# Patient Record
Sex: FEMALE | Race: WHITE | NOT HISPANIC OR LATINO | Employment: OTHER | URBAN - METROPOLITAN AREA
[De-identification: names, ages, dates, MRNs, and addresses within clinical notes are randomized per-mention and may not be internally consistent; named-entity substitution may affect disease eponyms.]

---

## 2017-05-31 DIAGNOSIS — Z13.820 ENCOUNTER FOR SCREENING FOR OSTEOPOROSIS: ICD-10-CM

## 2017-06-05 ENCOUNTER — ALLSCRIPTS OFFICE VISIT (OUTPATIENT)
Dept: OTHER | Facility: OTHER | Age: 82
End: 2017-06-05

## 2017-07-21 ENCOUNTER — GENERIC CONVERSION - ENCOUNTER (OUTPATIENT)
Dept: OTHER | Facility: OTHER | Age: 82
End: 2017-07-21

## 2018-01-12 VITALS
SYSTOLIC BLOOD PRESSURE: 130 MMHG | TEMPERATURE: 98.7 F | HEART RATE: 72 BPM | WEIGHT: 130.8 LBS | BODY MASS INDEX: 28.22 KG/M2 | DIASTOLIC BLOOD PRESSURE: 80 MMHG | HEIGHT: 57 IN

## 2018-01-14 NOTE — PROGRESS NOTES
Assessment    1  Encounter for preventive health examination (V70 0) (Z00 00)   2  Hypertension (401 9) (I10)   3  Hypothyroidism (244 9) (E03 9)    Plan  Health Maintenance    · *VB - Fall Risk Assessment  (Dx Z13 89 Screen for Neurologic Disorder);  Status:Complete;   Done: 17YCW3547 05:43PM   · *VB - Urinary Incontinence Screen (Dx Z13 89 Screen for UI); Status:Complete;   Done:  36XRA7775 05:43PM   · Eat a low fat and low cholesterol diet ; Status:Complete;   Done: 53URH6258   · Stretch and warm up your muscles during the first 10 minutes , then cool down your  muscles for the last 10 minutes of exercise ; Status:Complete;   Done: 29WCH5830   · The plan of care for falls is detailed in the plan and/or discussion section of today's note ;  Status:Complete;   Done: 62DGA5415   · The plan of care for urinary incontinence is detailed in the plan and/or discussion section  of today's note ; Status:Complete;   Done: 32ZSL1070   · We recommend that you create an advance directive ; Status:Complete;   Done:  99MVU2028  PMH: Encounter for screening for osteoporosis    · * DXA BONE DENSITY SPINE HIP AND PELVIS; Status:Active; Requested  for:73Hrn3490;   PMH: Screening for genitourinary condition    · *VB - Urinary Incontinence Screen (Dx Z13 89 Screen for UI); Status:Resulted - Requires  Verification,Retrospective Authorization;   Done: 11MXH4296 06:54PM    Discussion/Summary  Impression: Subsequent Annual Wellness Visit  Cardiovascular screening and counseling: the patient declines screening  Diabetes screening and counseling: the patient declines screening, counseling was given on maintaining a healthy diet and counseling was given on maintaining a healthy weight  Colorectal cancer screening and counseling: screening not indicated  Breast cancer screening and counseling: the risks and benefits of screening were discussed and the patient declines screening     Cervical cancer screening and counseling: screening not indicated  Osteoporosis screening and counseling: the risks and benefits of screening were discussed and the patient declines screening  Abdominal aortic aneurysm screening and counseling: screening not indicated  Glaucoma screening and counseling: screening is current  HIV screening and counseling: screening not indicated  Immunizations: the patient declines the influenza vaccination, the patient declines the pneumococcal vaccination, hepatitis B vaccination series is not indicated at this time due to the patient's low risk of apryl the disease, the patient declines the Zostavax vaccine and the patient declines the Td vaccine  Advance Directive Planning: not complete, paperwork and instructions were given to the patient  Advice and education were given regarding fall risk reduction  She was referred to none  Medical Equipment/Suppliers: none  Patient Discussion: plan discussed with the patient, follow-up visit needed in one year  Possible side effects of new medications were reviewed with the patient/guardian today  The treatment plan was reviewed with the patient/guardian  The patient/guardian understands and agrees with the treatment plan         Self Referrals: No      Chief Complaint  HM      History of Present Illness  HPI: Feeling well overall  Keeps active, mows her lawn with diana nava       Welcome to [employer] and Wellness Visits: The patient is being seen for the subsequent annual wellness visit  Medicare Screening and Risk Factors   Hospitalizations: no previous hospitalizations  Once per lifetime medicare screening tests: ECG has not been done and AAA screening US has not yet been done  Medicare Screening Tests Risk Questions   Abdominal aortic aneurysm risk assessment: none indicated  Osteoporosis risk assessment: , female gender and over 48years of age  HIV risk assessment: none indicated  Drug and Alcohol Use:  The patient reports never drinking alcohol  She has never used illicit drugs  Diet and Physical Activity: Current diet includes well balanced meals  She exercises daily  Exercise: walking  Mood Disorder and Cognitive Impairment Screening:   Depression screening  negative for symptoms  She denies feeling down, depressed, or hopeless over the past two weeks  She denies feeling little interest or pleasure in doing things over the past two weeks  Cognitive impairment screening: denies difficulty learning/retaining new information, denies difficulty handling complex tasks, denies difficulty with reasoning, denies difficulty with spatial ability and orientation, denies difficulty with language and denies difficulty with behavior  Functional Ability/Level of Safety: Hearing is significantly decreased and a hearing aid is not used  The patient is currently able to drive with limitations, but able to do activities of daily living without limitations, able to do instrumental activities of daily living without limitations and able to participate in social activities without limitations  Activities of daily living details: transportation help needed, but does not need help using the phone, does not need help shopping, no meal preparation help needed, does not need help doing housework, does not need help doing laundry, does not need help managing medications and does not need help managing money  Fall risk factors:  no antidepressant use, no deconditioning, no sedative use, no visual impairment, no urinary incontinence, no antihypertensive use and no cognitive impairment  Home safety risk factors:  no unfamiliar surroundings, no loose rugs and no poor household lighting  Advance Directives: Advance directives: no living will, no durable power of  for health care directives and no advance directives  end of life decisions were not reviewed with the patient     Co-Managers and Medical Equipment/Suppliers: See Patient Care Team Reviewed Updated Earnest Dopp:   Last Medicare Wellness Visit Information was reviewed, patient interviewed and updates made to the record today  Preventive Quality Program 65 and Older: Falls Risk: The patient fell 0 times in the past 12 months  The patient is currently asymptomatic Symptoms Include:  Associated symptoms:  No associated symptoms are reported  The patient currently has no urinary incontinence symptoms  Patient Care Team    Care Team Member Role Specialty Office Number   Mckayla Hernandez MD  Family Medicine [phone]     Review of Systems    Constitutional: no fever and no malaise  ENT: no earache and no nasal congestion  Cardiovascular: no chest pain and no palpitations  Respiratory: no shortness of breath and no wheezing  Gastrointestinal: no abdominal pain, no nausea and no vomiting  Genitourinary: no dysuria  Musculoskeletal: joint swelling and joint stiffness, but no diffuse joint pain  Integumentary and Breasts: no rashes  Neurological: headache, but no leg numbness  Psychiatric: no insomnia, no anxiety and no depression  Hematologic and Lymphatic: no jaundice  Active Problems    1  Generalized anxiety disorder (300 02) (F41 1)   2  Headache (784 0) (R51)   3  Hyperlipidemia (272 4) (E78 5)   4  Hypertension (401 9) (I10)   5  Hypothyroidism (244 9) (E03 9)    Past Medical History    · Denied: History of Alcohol abuse   · Denied: History of substance abuse    Surgical History    · History of Cataract Surgery    The surgical history was reviewed and updated today         Family History  Mother    · Denied: Family history of alcoholism   · Denied: Family history of substance abuse   · Denied: Family history of Mental health problem  Father    · Denied: Family history of alcoholism   · Denied: Family history of substance abuse   · Denied: Family history of Mental health problem    Social History    · Marital History - Currently    · Never A Smoker   · Never Drank Alcohol   · Non-smoker (V49 89) (Z78 9)  The social history was reviewed and updated today  The social history was reviewed and is unchanged  Current Meds   1  Enalapril Maleate 2 5 MG Oral Tablet; TAKE ONE TABLET BY MOUTH EVERY DAY; Therapy: 53TKP8074 to (Jailene Dubose)  Requested for: 71MRZ3751; Last   Rx:[id number] Ordered   2  LORazepam 1 MG Oral Tablet; TAKE ONE-HALF TO ONE TABLET BY MOUTH EVERY 8   HOURS AS NEEDED; Therapy: 17ZHK5367 to (Evaluate:26Yef7333)  Requested for: 24XER3086; Last   Rx:[id number] Ordered    The medication list was reviewed and updated today  Allergies    1  Ultram TABS    Immunizations   1    PPSV  Temporarily Deferred: Pt refuses    Zoster  Temporarily Deferred: Pt refuses     Vitals  Signs    Temperature: 98 7 F, Tympanic  Heart Rate: 72  Pulse Quality: Regular  Systolic: 969, Sitting  Diastolic: 80, Sitting  Height: 4 ft 9 in  Weight: 130 lb 12 8 oz  BMI Calculated: 28 31  BSA Calculated: 1 5    Physical Exam    Constitutional   General appearance: No acute distress, well appearing and well nourished  Eyes   Conjunctiva and lids: No swelling, erythema or discharge  Ears, Nose, Mouth, and Throat   Otoscopic examination: Tympanic membranes translucent with normal light reflex  Canals patent without erythema  Hearing: Normal     Nasal mucosa, septum, and turbinates: Normal without edema or erythema  Lips, teeth, and gums: Normal, good dentition  Oropharynx: Normal with no erythema, edema, exudate or lesions  Neck   Neck: Supple, symmetric, trachea midline, no masses  Thyroid: Normal, no thyromegaly  Pulmonary   Respiratory effort: No increased work of breathing or signs of respiratory distress  Auscultation of lungs: Clear to auscultation  Cardiovascular   Auscultation of heart: Normal rate and rhythm, normal S1 and S2, no murmurs  Carotid pulses: 2+ bilaterally  Pedal pulses: 2+ bilaterally      Peripheral vascular exam: Normal     Examination of extremities for edema and/or varicosities: Normal     Lymphatic   Palpation of lymph nodes in neck: No lymphadenopathy  Musculoskeletal   Gait and station: Normal     Joints, bones, and muscles: Abnormal   medially deviated DIP's both hands  Range of motion: Abnormal   marked decrease in vertical spinal collumn  Stability: Normal     Muscle strength/tone: Normal        Results/Data  PHQ-9 Adult Depression Screening 31DRF3702 06:56PM User, Coridons     Test Name Result Flag Reference   PHQ-9 Adult Depression Score 0     Over the last two weeks, how often have you been bothered by any of the following problems? Little interest or pleasure in doing things: Not at all - 0  Feeling down, depressed, or hopeless: Not at all - 0  Trouble falling or staying asleep, or sleeping too much: Not at all - 0  Feeling tired or having little energy: Not at all - 0  Poor appetite or over eating: Not at all - 0  Feeling bad about yourself - or that you are a failure or have let yourself or your family down: Not at all - 0  Trouble concentrating on things, such as reading the newspaper or watching television: Not at all - 0  Moving or speaking so slowly that other people could have noticed   Or the opposite -  being so fidgety or restless that you have been moving around a lot more than usual: Not at all - 0  Thoughts that you would be better off dead, or of hurting yourself in some way: Not at all - 0   PHQ-9 Adult Depression Screening Negative     PHQ-9 Difficulty Level Not difficult at all     PHQ-9 Severity No Depression       *VB - Urinary Incontinence Screen (Dx Z13 89 Screen for UI) 34JVY9158 06:54PM Fox Ramirez     Test Name Result Flag Reference   Urinary Incontinence Assessment [id number]                   Falls Risk Assessment (Dx Z13 89 Screen for Neurologic Disorder) 63QOL4880 06:53PM User, Coridons     Test Name Result Flag Reference   Falls Risk      No falls in the past year *VB - Fall Risk Assessment  (Dx Z13 89 Screen for Neurologic Disorder) 36ZSN8610 05:43PM Jacklynn Derek     Test Name Result Flag Reference   Falls Risk      No falls in the past year     *VB - Urinary Incontinence Screen (Dx Z13 89 Screen for UI) 80LAW9903 05:43PM Jacklynn Belmont     Test Name Result Flag Reference   Urinary Incontinence Assessment 93EXN0432         Procedure    Procedure: Visual Acuity Test    Indication: routine screening  Inforrmation supplied by a Snellen chart     Results: 20/40 in the right eye with corrective device, 20/50 in the left eye with corrective device      Future Appointments    Date/Time Provider Specialty Site   06/11/2018 06:00 PM Thiago Reed MD Family Medicine Mattie Duran MD     Signatures   Electronically signed by : Alli Aguilar MD; Jun 5 2017  7:24PM EST                       (Author)

## 2018-01-15 NOTE — MISCELLANEOUS
Message   Recorded as Task   Date: 07/21/2017 10:16 AM, Created By: Mikael Hubbard   Task Name: Medical Complaint Callback   Assigned To: Judah Jackson   Regarding Patient: Krista Sun, Status: Active   Comment:    Mikael Hubbard - 21 Jul 2017 10:16 AM     TASK CREATED  Caller: Self; [phone] (Home)  Patient called in this AM asking for a prescription to be ordered for hemorrhoids  Does this patient need to be seen in office first?   Jihan Culver - 21 Jul 2017 10:19 AM     TASK REASSIGNED: Previously Assigned To Σκαφίδια 233 - 21 Jul 2017 11:57 AM     TASK REPLIED TO: Previously Assigned To Suyapa Nassar               what symptoms is she having that makes her thinks it is a hemorrhoid? does she have a known h/o hemorrhoids - no mention in chart? has she been straining or constipation recently to trigger a hemorrhoid? Jessica Bailon - 21 Jul 2017 1:03 PM     TASK EDITED  Pt has never been diagnosed with Hemorrhoid  She has been taking fiber tablets for the last two weeks--she has been trouble passing bowels in the morning  Please advise  She has been using the [employer] brand of Prep H  Suyapa Nassar - 21 Jul 2017 3:23 PM     TASK REPLIED TO: Previously Assigned To Suyapa Nsasar  Pt called d/t unclear medical complaint - pt states she has h/o hemorrhoids and needs a hemorrhoid cream, she has been having more constipation and has been straining  She notes no significant pain/bleeding/Abd pain/F/C  She did just buy a stool softner, she is taking a fiber supplement also and is drinking plenty of water    Rx sent to [facility]   Electronically signed by : Sara Ellis DO; Jul 21 2017  3:24PM EST                       (Author)

## 2018-01-18 NOTE — PROGRESS NOTES
Assessment    1  Encounter for preventive health examination (V70 0) (Z00 00)    Plan  Health Maintenance    · *VB - Fall Risk Assessment  (Dx V80 09 Screen for Neurologic Disorder);  Status:Complete;   Done: 82KSY5303   · *VB-Urinary Incontinence Screen (Dx V81 6 Screen for UI); Status:Complete;   Done:  03NVW7581   · Eat a low fat and low cholesterol diet ; Status:Complete;   Done: 99DVJ3180   · The plan of care for urinary incontinence is detailed in the plan and/or discussion section  of today's note ; Status:Complete;   Done: 35EKG1053   · These are things you can do to prevent falls in and around the home ; Status:Complete;    Done: 43TKY4646   · Follow-up visit in 1 year Evaluation and Treatment  Follow-up  Status: Complete  Done:  83TAY9814  Screening for osteoporosis    · * DXA BONE DENSITY SPINE HIP AND PELVIS; Status:Canceled;    · * DXA BONE DENSITY SPINE HIP AND PELVIS; Status:Temporary Deferral - Pt refuses; Discussion/Summary  Impression: Subsequent Annual Wellness Visit  Cardiovascular screening and counseling: the patient declines screening  Diabetes screening and counseling: the patient declines screening, counseling was given on maintaining a healthy diet and counseling was given on maintaining a healthy weight  Colorectal cancer screening and counseling: screening not indicated  Breast cancer screening and counseling: the risks and benefits of screening were discussed and the patient declines screening  Cervical cancer screening and counseling: screening not indicated  Osteoporosis screening and counseling: the risks and benefits of screening were discussed and the patient declines screening  Abdominal aortic aneurysm screening and counseling: screening not indicated  Glaucoma screening and counseling: screening is current  HIV screening and counseling: screening not indicated   Immunizations: the patient declines the influenza vaccination, the patient declines the pneumococcal vaccination, hepatitis B vaccination series is not indicated at this time due to the patient's low risk of apryl the disease, the patient declines the Zostavax vaccine and the patient declines the Td vaccine  Advance Directive Planning: not complete, paperwork and instructions were given to the patient  Advice and education were given regarding fall risk reduction  She was referred to none  Medical Equipment/Suppliers: none  Patient Discussion: plan discussed with the patient, follow-up visit needed in one year  Chief Complaint  HM      History of Present Illness  HPI: Feeling well overall  Keeps active, mows her lawn with diana nava   Welcome to [employer] and Wellness Visits: The patient is being seen for the subsequent annual wellness visit  Medicare Screening and Risk Factors   Hospitalizations: no previous hospitalizations  Once per lifetime medicare screening tests: ECG has not been done and AAA screening US has not yet been done  Medicare Screening Tests Risk Questions   Abdominal aortic aneurysm risk assessment: none indicated  Osteoporosis risk assessment: , female gender and over 48years of age  HIV risk assessment: none indicated  Drug and Alcohol Use: The patient reports never drinking alcohol  She has never used illicit drugs  Diet and Physical Activity: Current diet includes well balanced meals  She exercises daily  Exercise: walking  Mood Disorder and Cognitive Impairment Screening:   Depression screening  negative for symptoms  She denies feeling down, depressed, or hopeless over the past two weeks  She denies feeling little interest or pleasure in doing things over the past two weeks     Cognitive impairment screening: denies difficulty learning/retaining new information, denies difficulty handling complex tasks, denies difficulty with reasoning, denies difficulty with spatial ability and orientation, denies difficulty with language and denies difficulty with behavior  Functional Ability/Level of Safety: Hearing is significantly decreased and a hearing aid is not used  The patient is currently able to drive with limitations, but able to do activities of daily living without limitations, able to do instrumental activities of daily living without limitations and able to participate in social activities without limitations  Activities of daily living details: transportation help needed, but does not need help using the phone, does not need help shopping, no meal preparation help needed, does not need help doing housework, does not need help doing laundry, does not need help managing medications and does not need help managing money  Fall risk factors:  no antidepressant use, no deconditioning, no sedative use, no visual impairment, no urinary incontinence, no antihypertensive use and no cognitive impairment  Home safety risk factors:  no unfamiliar surroundings, no loose rugs and no poor household lighting  Advance Directives: Advance directives: no living will, no durable power of  for health care directives and no advance directives  end of life decisions were not reviewed with the patient  Co-Managers and Medical Equipment/Suppliers: See Patient Care Team   Reviewed Updated [facility]:   Last Medicare Wellness Visit Information was reviewed, patient interviewed and updates made to the record today  Preventive Quality Program 65 and Older: Falls Risk: The patient fell 0 times in the past 12 months  The patient is currently asymptomatic Symptoms Include:  Associated symptoms:  No associated symptoms are reported  The patient currently has no urinary incontinence symptoms  Patient Care Team    Care Team Member Role Specialty Office Number   Montell Gosselin MD  Family Medicine [phone]     Review of Systems    Constitutional: no fever and no malaise  ENT: no earache and no nasal congestion     Cardiovascular: no chest pain and no palpitations  Respiratory: no shortness of breath and no wheezing  Gastrointestinal: no abdominal pain, no nausea and no vomiting  Genitourinary: no dysuria  Musculoskeletal: joint swelling and joint stiffness, but no diffuse joint pain  Integumentary and Breasts: no rashes  Neurological: headache, but no leg numbness  Psychiatric: no insomnia, no anxiety and no depression  Hematologic and Lymphatic: no jaundice  Active Problems    1  Acute Bronchitis With Bronchospasm (466 0)   2  Acute upper respiratory infection (465 9) (J06 9)   3  Encounter for screening mammogram for malignant neoplasm of breast (V76 12)   (Z12 31)   4  Generalized anxiety disorder (300 02) (F41 1)   5  Headache (784 0) (R51)   6  Hyperlipidemia (272 4) (E78 5)   7  Hypertension (401 9) (I10)   8  Hypothyroidism (244 9) (E03 9)   9  Screening for osteoporosis (V82 81) (Z13 820)   10  Vaginal candidiasis (112 1) (B37 3)    Surgical History    · History of Cataract Surgery    The surgical history was reviewed and updated today  Social History    · Marital History - Currently    · Never A Smoker   · Never Drank Alcohol   · Non-smoker (V49 89) (Z78 9)  The social history was reviewed and updated today  The social history was reviewed and is unchanged  Current Meds   1  Enalapril Maleate 2 5 MG Oral Tablet; TAKE ONE TABLET BY MOUTH EVERY DAY; Therapy: 45DGC6458 to (Evaluate:93Tfd2772)  Requested for: 57Tpb9749; Last   Rx:[id number] Ordered   2  LORazepam 1 MG Oral Tablet; TAKE ONE-HALF TO ONE TABLET BY MOUTH EVERY 8   HOURS AS NEEDED; Therapy: 05LOM3808 to (David Bounds)  Requested for: 86Ejk8608; Last   Rx:[id number] Ordered    The medication list was reviewed and updated today  Allergies    1   Ultram TABS    Immunizations   1    Pneumococcal  Temporarily Deferred: Pt refuses    Zoster  Temporarily Deferred: Pt refuses     Vitals  Signs [Data Includes: Current Encounter]    Systolic: 582  Diastolic: 90   Temperature: 98 7 F, Tympanic  Heart Rate: 84, L Radial  Pulse Quality: Regular  Systolic: 725, LUE, Sitting  Diastolic: 90, LUE, Sitting  Height: 4 ft 9 in  Weight: 126 lb 12 8 oz  BMI Calculated: 27 44  BSA Calculated: 1 49    Physical Exam    Constitutional   General appearance: No acute distress, well appearing and well nourished  Eyes   Conjunctiva and lids: No swelling, erythema or discharge  Ears, Nose, Mouth, and Throat   Otoscopic examination: Tympanic membranes translucent with normal light reflex  Canals patent without erythema  Hearing: Normal     Nasal mucosa, septum, and turbinates: Normal without edema or erythema  Lips, teeth, and gums: Normal, good dentition  Oropharynx: Normal with no erythema, edema, exudate or lesions  Neck   Neck: Supple, symmetric, trachea midline, no masses  Thyroid: Normal, no thyromegaly  Pulmonary   Respiratory effort: No increased work of breathing or signs of respiratory distress  Auscultation of lungs: Clear to auscultation  Cardiovascular   Auscultation of heart: Normal rate and rhythm, normal S1 and S2, no murmurs  Carotid pulses: 2+ bilaterally  Pedal pulses: 2+ bilaterally  Peripheral vascular exam: Normal     Examination of extremities for edema and/or varicosities: Normal     Lymphatic   Palpation of lymph nodes in neck: No lymphadenopathy  Musculoskeletal   Gait and station: Normal     Joints, bones, and muscles: Abnormal   medially deviated DIP's both hands  Range of motion: Abnormal   marked decrease in vertical spinal collumn  Stability: Normal     Muscle strength/tone: Normal        Procedure    Procedure: Visual Acuity Test    Indication: routine screening  Inforrmation supplied by a Snellen chart     Results: 20/40 in the right eye with corrective device, 20/50 in the left eye with corrective device      Future Appointments    Date/Time Provider Specialty Site   05/23/2017 10:00 AM Bonilla Junior Jett  [facility] MD     Signatures   Electronically signed by : Berlin Powers MD; May 16 2016 10:50PM EST                       (Author)

## 2018-03-16 ENCOUNTER — HOSPITAL ENCOUNTER (EMERGENCY)
Facility: HOSPITAL | Age: 83
Discharge: HOME | End: 2018-03-16
Admitting: EMERGENCY MEDICINE
Payer: COMMERCIAL

## 2018-03-16 ENCOUNTER — APPOINTMENT (EMERGENCY)
Dept: RADIOLOGY | Facility: HOSPITAL | Age: 83
End: 2018-03-16
Payer: COMMERCIAL

## 2018-03-16 VITALS
RESPIRATION RATE: 18 BRPM | SYSTOLIC BLOOD PRESSURE: 173 MMHG | BODY MASS INDEX: 28.13 KG/M2 | OXYGEN SATURATION: 96 % | WEIGHT: 130 LBS | DIASTOLIC BLOOD PRESSURE: 80 MMHG | HEART RATE: 79 BPM | TEMPERATURE: 98.3 F

## 2018-03-16 DIAGNOSIS — R00.0 TACHYCARDIA: Primary | ICD-10-CM

## 2018-03-16 LAB
ALBUMIN SERPL BCP-MCNC: 3.8 G/DL (ref 3.5–5)
ALP SERPL-CCNC: 85 U/L (ref 46–116)
ALT SERPL W P-5'-P-CCNC: 19 U/L (ref 12–78)
ANION GAP SERPL CALCULATED.3IONS-SCNC: 12 MMOL/L (ref 4–13)
AST SERPL W P-5'-P-CCNC: 21 U/L (ref 5–45)
ATRIAL RATE: 102 BPM
BASOPHILS # BLD AUTO: 0.02 THOUSANDS/ΜL (ref 0–0.1)
BASOPHILS NFR BLD AUTO: 0 % (ref 0–1)
BILIRUB SERPL-MCNC: 0.6 MG/DL (ref 0.2–1)
BUN SERPL-MCNC: 14 MG/DL (ref 5–25)
CALCIUM SERPL-MCNC: 8.3 MG/DL (ref 8.3–10.1)
CHLORIDE SERPL-SCNC: 104 MMOL/L (ref 100–108)
CO2 SERPL-SCNC: 25 MMOL/L (ref 21–32)
CREAT SERPL-MCNC: 0.9 MG/DL (ref 0.6–1.3)
EOSINOPHIL # BLD AUTO: 0.1 THOUSAND/ΜL (ref 0–0.61)
EOSINOPHIL NFR BLD AUTO: 1 % (ref 0–6)
ERYTHROCYTE [DISTWIDTH] IN BLOOD BY AUTOMATED COUNT: 14.2 % (ref 11.6–15.1)
GFR SERPL CREATININE-BSD FRML MDRD: 59 ML/MIN/1.73SQ M
GLUCOSE SERPL-MCNC: 133 MG/DL (ref 65–140)
HCT VFR BLD AUTO: 41 % (ref 34.8–46.1)
HGB BLD-MCNC: 14 G/DL (ref 11.5–15.4)
LYMPHOCYTES # BLD AUTO: 2.27 THOUSANDS/ΜL (ref 0.6–4.47)
LYMPHOCYTES NFR BLD AUTO: 31 % (ref 14–44)
MCH RBC QN AUTO: 29.8 PG (ref 26.8–34.3)
MCHC RBC AUTO-ENTMCNC: 34.1 G/DL (ref 31.4–37.4)
MCV RBC AUTO: 87 FL (ref 82–98)
MONOCYTES # BLD AUTO: 0.47 THOUSAND/ΜL (ref 0.17–1.22)
MONOCYTES NFR BLD AUTO: 7 % (ref 4–12)
NEUTROPHILS # BLD AUTO: 4.38 THOUSANDS/ΜL (ref 1.85–7.62)
NEUTS SEG NFR BLD AUTO: 61 % (ref 43–75)
P AXIS: 64 DEGREES
PLATELET # BLD AUTO: 266 THOUSANDS/UL (ref 149–390)
PMV BLD AUTO: 9.8 FL (ref 8.9–12.7)
POTASSIUM SERPL-SCNC: 3.6 MMOL/L (ref 3.5–5.3)
PR INTERVAL: 128 MS
PROT SERPL-MCNC: 7.5 G/DL (ref 6.4–8.2)
QRS AXIS: 82 DEGREES
QRSD INTERVAL: 80 MS
QT INTERVAL: 346 MS
QTC INTERVAL: 450 MS
RBC # BLD AUTO: 4.7 MILLION/UL (ref 3.81–5.12)
SODIUM SERPL-SCNC: 141 MMOL/L (ref 136–145)
T WAVE AXIS: 23 DEGREES
TROPONIN I SERPL-MCNC: <0.02 NG/ML
TSH SERPL DL<=0.05 MIU/L-ACNC: 3.52 UIU/ML (ref 0.36–3.74)
VENTRICULAR RATE: 102 BPM
WBC # BLD AUTO: 7.24 THOUSAND/UL (ref 4.31–10.16)

## 2018-03-16 PROCEDURE — 93005 ELECTROCARDIOGRAM TRACING: CPT | Performed by: PHYSICIAN ASSISTANT

## 2018-03-16 PROCEDURE — 99285 EMERGENCY DEPT VISIT HI MDM: CPT

## 2018-03-16 PROCEDURE — 36415 COLL VENOUS BLD VENIPUNCTURE: CPT | Performed by: PHYSICIAN ASSISTANT

## 2018-03-16 PROCEDURE — 84443 ASSAY THYROID STIM HORMONE: CPT | Performed by: PHYSICIAN ASSISTANT

## 2018-03-16 PROCEDURE — 71046 X-RAY EXAM CHEST 2 VIEWS: CPT

## 2018-03-16 PROCEDURE — 96360 HYDRATION IV INFUSION INIT: CPT

## 2018-03-16 PROCEDURE — 80053 COMPREHEN METABOLIC PANEL: CPT | Performed by: PHYSICIAN ASSISTANT

## 2018-03-16 PROCEDURE — 84484 ASSAY OF TROPONIN QUANT: CPT | Performed by: PHYSICIAN ASSISTANT

## 2018-03-16 PROCEDURE — 96361 HYDRATE IV INFUSION ADD-ON: CPT

## 2018-03-16 PROCEDURE — 85025 COMPLETE CBC W/AUTO DIFF WBC: CPT | Performed by: PHYSICIAN ASSISTANT

## 2018-03-16 RX ORDER — ENALAPRIL MALEATE 2.5 MG/1
1 TABLET ORAL DAILY
COMMUNITY
Start: 2012-10-24 | End: 2018-05-03 | Stop reason: SDUPTHER

## 2018-03-16 RX ORDER — LORAZEPAM 1 MG/1
TABLET ORAL
COMMUNITY
Start: 2012-10-24 | End: 2018-06-22 | Stop reason: SDUPTHER

## 2018-03-16 RX ORDER — ENALAPRIL MALEATE 5 MG/1
2.5 TABLET ORAL ONCE
Status: COMPLETED | OUTPATIENT
Start: 2018-03-16 | End: 2018-03-16

## 2018-03-16 RX ADMIN — ENALAPRIL MALEATE 2.5 MG: 5 TABLET ORAL at 14:04

## 2018-03-16 RX ADMIN — SODIUM CHLORIDE 1000 ML: 0.9 INJECTION, SOLUTION INTRAVENOUS at 14:10

## 2018-03-16 NOTE — ED NOTES
Discharge instructions reviewed with patient  All questions and concerns addressed        Noy Tatum RN  03/16/18 [id number]

## 2018-03-16 NOTE — ED PROVIDER NOTES
History  Chief Complaint   Patient presents with    Palpitations     pt stated that they started this morning    Weakness - Generalized     pt stated that she is feeling weak with the palpitations     Patient presents to the ED with heart palpitations that started this morning  She states it feels like her heart is racing  She states she has been fatigued over the past couple days  She denies chest pain, SOB, nausea, vomiting, blood in stool, or diarrhea  She denies any recent illnesses  SHe denies any medication changes  She does have a history of anxiety and states she has been under increased stress lately, but did take her ativan  She states she did not take her BP meds today  Patient states she just feels "really dry "         History provided by:  Patient  Palpitations   Palpitations quality:  Fast  Onset quality:  Sudden  Duration:  1 day  Timing:  Constant  Progression:  Unchanged  Chronicity:  New  Context: anxiety    Context: not blood loss and not dehydration    Relieved by:  Nothing  Worsened by:  Nothing  Ineffective treatments: ativan  Associated symptoms: malaise/fatigue    Associated symptoms: no back pain, no chest pain, no chest pressure, no cough, no diaphoresis, no dizziness, no hemoptysis, no leg pain, no lower extremity edema, no nausea, no near-syncope, no numbness, no orthopnea, no shortness of breath, no syncope, no vomiting and no weakness    Risk factors: heart disease    Risk factors: no diabetes mellitus, no hx of atrial fibrillation, no hx of PE, no hyperthyroidism, no OTC sinus medications and no stress        Prior to Admission Medications   Prescriptions Last Dose Informant Patient Reported? Taking?    LORazepam (ATIVAN) 1 mg tablet   Yes Yes   Sig: Take by mouth   enalapril (VASOTEC) 2 5 mg tablet   Yes Yes   Sig: Take 1 tablet by mouth daily      Facility-Administered Medications: None       Past Medical History:   Diagnosis Date    Hypertension        Past Surgical History:   Procedure Laterality Date    JOINT REPLACEMENT         History reviewed  No pertinent family history  I have reviewed and agree with the history as documented  Social History   Substance Use Topics    Smoking status: Never Smoker    Smokeless tobacco: Never Used    Alcohol use No        Review of Systems   Constitutional: Positive for fatigue and malaise/fatigue  Negative for diaphoresis and fever  HENT: Negative for facial swelling and trouble swallowing  Eyes: Negative for visual disturbance  Respiratory: Negative for cough, hemoptysis and shortness of breath  Cardiovascular: Positive for palpitations  Negative for chest pain, orthopnea, leg swelling, syncope and near-syncope  Gastrointestinal: Negative for nausea and vomiting  Genitourinary: Negative for dysuria  Musculoskeletal: Negative for back pain  Skin: Negative for color change and rash  Neurological: Negative for dizziness, weakness and numbness  Psychiatric/Behavioral: Negative for confusion  All other systems reviewed and are negative  Physical Exam  ED Triage Vitals [03/16/18 1343]   Temperature Pulse Respirations Blood Pressure SpO2   98 3 °F (36 8 °C) (!) 120 16 (!) 235/115 97 %      Temp Source Heart Rate Source Patient Position - Orthostatic VS BP Location FiO2 (%)   Temporal Monitor Sitting Right arm --      Pain Score       No Pain           Orthostatic Vital Signs  Vitals:    03/16/18 1345 03/16/18 1430 03/16/18 1445 03/16/18 1500   BP: (!) 235/115 168/77 170/76 (!) 173/80   Pulse: (!) 119 85 83 79   Patient Position - Orthostatic VS: Lying Lying Lying Lying       Physical Exam   Constitutional: She is oriented to person, place, and time  She appears well-developed and well-nourished  She is active and cooperative  She does not appear ill  No distress  HENT:   Head: Normocephalic and atraumatic     Right Ear: Hearing and tympanic membrane normal    Left Ear: Hearing and tympanic membrane normal  Nose: Nose normal    Mouth/Throat: Oropharynx is clear and moist and mucous membranes are normal    Eyes: Conjunctivae and EOM are normal  Pupils are equal, round, and reactive to light  Neck: Normal range of motion  Cardiovascular: Normal heart sounds  An irregular rhythm present  Tachycardia present  No murmur heard  Pulmonary/Chest: Effort normal and breath sounds normal  She has no wheezes  She has no rhonchi  She has no rales  Abdominal: Soft  Normal appearance and bowel sounds are normal  There is no tenderness  Musculoskeletal: Normal range of motion  She exhibits no edema, tenderness or deformity  Neurological: She is alert and oriented to person, place, and time  She has normal strength  No sensory deficit  Gait normal    Skin: Skin is warm and dry  No rash noted  She is not diaphoretic  No pallor  Psychiatric: Her mood appears anxious  Her speech is rapid and/or pressured  Cognition and memory are normal    Nursing note and vitals reviewed  ED Medications  Medications   sodium chloride 0 9 % bolus 1,000 mL (1,000 mL Intravenous New Bag 3/16/18 1410)   enalapril (VASOTEC) tablet 2 5 mg (2 5 mg Oral Given 3/16/18 1404)       Diagnostic Studies  Results Reviewed     Procedure Component Value Units Date/Time    TSH [[id number]]  (Normal) Collected:  03/16/18 1409    Lab Status:  Final result Specimen:  Blood from Arm, Right Updated:  03/16/18 1458     TSH 3RD GENERATON 3 521 uIU/mL     Narrative:         Patients undergoing fluorescein dye angiography may retain small amounts of fluorescein in the body for 48-72 hours post procedure  Samples containing fluorescein can produce falsely depressed TSH values  If the patient had this procedure,a specimen should be resubmitted post fluorescein clearance            The recommended reference ranges for TSH during pregnancy are as follows:  First trimester 0 1 to 2 5 uIU/mL  Second trimester  0 2 to 3 0 uIU/mL  Third trimester 0 3 to 3 0 uIU/m Comprehensive metabolic panel [[id number]] Collected:  03/16/18 1409    Lab Status:  Final result Specimen:  Blood from Arm, Right Updated:  03/16/18 1456     Sodium 141 mmol/L      Potassium 3 6 mmol/L      Chloride 104 mmol/L      CO2 25 mmol/L      Anion Gap 12 mmol/L      BUN 14 mg/dL      Creatinine 0 90 mg/dL      Glucose 133 mg/dL      Calcium 8 3 mg/dL      AST 21 U/L      ALT 19 U/L      Alkaline Phosphatase 85 U/L      Total Protein 7 5 g/dL      Albumin 3 8 g/dL      Total Bilirubin 0 60 mg/dL      eGFR 59 ml/min/1 73sq m     Narrative:         National Kidney Disease Education Program recommendations are as follows:  GFR calculation is accurate only with a steady state creatinine  Chronic Kidney disease less than 60 ml/min/1 73 sq  meters  Kidney failure less than 15 ml/min/1 73 sq  meters  Troponin I [[id number]]  (Normal) Collected:  03/16/18 1409    Lab Status:  Final result Specimen:  Blood from Arm, Right Updated:  03/16/18 1452     Troponin I <0 02 ng/mL     Narrative:         [employer] Chemistry analyzer 99% cutoff is > 0 04 ng/mL in network labs    o cTnI 99% cutoff is useful only when applied to patients in the clinical setting of myocardial ischemia  o cTnI 99% cutoff should be interpreted in the context of clinical history, ECG findings and possibly cardiac imaging to establish correct diagnosis  o cTnI 99% cutoff may be suggestive but clearly not indicative of a coronary event without the clinical setting of myocardial ischemia      CBC and differential [[id number]]  (Normal) Collected:  03/16/18 1410    Lab Status:  Final result Specimen:  Blood from Arm, Right Updated:  03/16/18 1424     WBC 7 24 Thousand/uL      RBC 4 70 Million/uL      Hemoglobin 14 0 g/dL      Hematocrit 41 0 %      MCV 87 fL      MCH 29 8 pg      MCHC 34 1 g/dL      RDW 14 2 %      MPV 9 8 fL      Platelets 480 Thousands/uL      Neutrophils Relative 61 %      Lymphocytes Relative 31 %      Monocytes Relative 7 % Eosinophils Relative 1 %      Basophils Relative 0 %      Neutrophils Absolute 4 38 Thousands/µL      Lymphocytes Absolute 2 27 Thousands/µL      Monocytes Absolute 0 47 Thousand/µL      Eosinophils Absolute 0 10 Thousand/µL      Basophils Absolute 0 02 Thousands/µL     POCT urinalysis dipstick [[id number]]     Lab Status:  No result Specimen:  Urine                  XR chest 2 views   Final Result by Ana Chen MD (03/16 1502)      Clear lungs  Distended stomach with an air-fluid level within a large hiatal hernia  Workstation performed: [id number]                    Procedures  ECG 12 Lead Documentation  Date/Time: 3/16/2018 1:52 PM  Performed by: Jenni Upton by: Parker Marquez     Indications / Diagnosis:  Palpitations  Patient location:  ED  Previous ECG:     Previous ECG:  Unavailable  Rate:     ECG rate:  102    ECG rate assessment: tachycardic    Ectopy:     Ectopy: PAC    QRS:     QRS axis:  Normal  ST segments:     ST segments:  Normal  T waves:     T waves: normal             Phone Contacts  ED Phone Contact    ED Course  ED Course as of Mar 16 1532   Fri Mar 16, 2018   1529 Patient feeling better  No longer tachycardic, BP improving  MDM  Number of Diagnoses or Management Options  Tachycardia: new and requires workup  Diagnosis management comments: Patient with tachycardia, will check TSH, labs to r/o electrolyte abnormality, anemia, or thyroid disease  Patient improved while in the ED, tachycardia most likely from anxiety          Amount and/or Complexity of Data Reviewed  Clinical lab tests: ordered and reviewed  Tests in the radiology section of CPT®: ordered and reviewed    Patient Progress  Patient progress: improved    CritCare Time    Disposition  Final diagnoses:   Tachycardia     Time reflects when diagnosis was documented in both MDM as applicable and the Disposition within this note     Time User Action Codes Description Comment    3/16/2018  3:30 PM Kevin Negron Add [R00 0] Tachycardia       ED Disposition     ED Disposition Condition Comment    Discharge  Layamna Petite discharge to home/self care  Condition at discharge: Stable        Follow-up Information     Follow up With Specialties Details Why Contact Info    Grzegorz Stubbs MD Family [facility] In 3 days For [facility] [place]  [address]  [phone]          Patient's Medications   Discharge Prescriptions    No medications on file     No discharge procedures on file      ED Provider  Electronically Signed by           Vanessa Garrett PA-C  03/16/18 1190

## 2018-03-16 NOTE — DISCHARGE INSTRUCTIONS
Rest, increase fluids  Follow up with family doctor on MOnday for recheck and possibly a Holter monitor  Continue your current medications  REturn to ER if symptoms worsen  Tachycardia   WHAT YOU NEED TO KNOW:   Tachycardia (fast heart rate) is when your heart rate is 100 beats per minute or more at rest  It is normal for the heart rate to increase with activity or exercise and then decrease when you stop  A fast heart rate at rest may be caused by any of the following:  · Anxiety, stress, or pain    · Fever    · Physical fatigue or strenuous exercise    · Large amounts of caffeine such as coffee, tea, and energy drinks    · Heavy alcohol use or cigarette smoking    · Some medicines, inhalers, or street drugs    · Increased thyroid hormone level  DISCHARGE INSTRUCTIONS:   Call 911 or have someone else call for any of the following:   · You have any of the following signs of a heart attack:     ¨ Squeezing, pressure, or pain in your chest that lasts longer than a few minutes  Chest pain may come and go  ¨ Pain in your jaw, neck, one or both arms, upper and lower back, or stomach  ¨ Shortness of breath, or panting    ¨ Nausea or vomiting    ¨ Lightheadedness    · You cannot be woken  Contact your healthcare provider if:   · Your pulse is faster than your healthcare provider said it should be  · You have frequent periods of a fast heart rate  · You feel weak or dizzy  · You have questions or concerns about your condition or care  Help prevent a fast heart rate:  · Decrease the amount of caffeine you drink  Caffeine can increase your heart rate  · Limit or do not drink alcohol  Alcohol can increase your heart rate  Ask your healthcare provider if it is safe for you to drink alcohol  Also ask how much is safe for you to drink  · Do not smoke  Nicotine and other chemicals in cigarettes can cause damage to your heart   Ask your healthcare provider for information if you currently smoke and need help to quit  E-cigarettes or smokeless tobacco still contain nicotine  Talk to your healthcare provider before you use these products  · Do not use illegal drugs  Drugs such as meth and cocaine can increase your heart rate  Talk to your healthcare provider if you use illegal drugs and want to quit  · Get more rest   Fatigue can cause your heart rate to increase  Ask your healthcare provider about the best exercise plan for you  · Learn ways to cope with stress  Stress, fear, and anxiety can cause a fast heart rate  Your healthcare provider may recommend relaxation techniques and deep breathing exercises  Your healthcare provider may recommend you talk to someone about your stress or anxiety, such as a counselor or a trusted friend  Check your pulse as directed: Your healthcare provider will show you how to check your pulse, and how often to check it  Write down how fast your pulse is and if it feels regular or like it is skipping beats  Also write down the activity you were doing if your heart rate is above 100  Bring the information with you to your follow-up appointment  Follow up with your healthcare provider as directed: You may need more tests  Write down your questions so you remember to ask them at your visit  © 2017 [address] Information is for End User's use only and may not be sold, redistributed or otherwise used for commercial purposes  All illustrations and images included in CareNotes® are the copyrighted property of [facility] [employer] , [employer]  or [employer]  The above information is an  only  It is not intended as medical advice for individual conditions or treatments  Talk to your doctor, nurse or pharmacist before following any medical regimen to see if it is safe and effective for you

## 2018-03-25 PROBLEM — K64.4 EXTERNAL HEMORRHOIDS: Status: ACTIVE | Noted: 2017-07-21

## 2018-03-26 ENCOUNTER — OFFICE VISIT (OUTPATIENT)
Dept: FAMILY MEDICINE CLINIC | Facility: HOSPITAL | Age: 83
End: 2018-03-26
Payer: COMMERCIAL

## 2018-03-26 VITALS
HEART RATE: 92 BPM | TEMPERATURE: 100.1 F | HEIGHT: 59 IN | DIASTOLIC BLOOD PRESSURE: 78 MMHG | WEIGHT: 129.6 LBS | SYSTOLIC BLOOD PRESSURE: 158 MMHG | BODY MASS INDEX: 26.13 KG/M2

## 2018-03-26 DIAGNOSIS — L30.4 INTERTRIGO: ICD-10-CM

## 2018-03-26 DIAGNOSIS — I10 ESSENTIAL HYPERTENSION: Primary | ICD-10-CM

## 2018-03-26 DIAGNOSIS — K64.4 EXTERNAL HEMORRHOIDS: ICD-10-CM

## 2018-03-26 DIAGNOSIS — F41.1 GENERALIZED ANXIETY DISORDER: ICD-10-CM

## 2018-03-26 PROCEDURE — 1160F RVW MEDS BY RX/DR IN RCRD: CPT | Performed by: FAMILY MEDICINE

## 2018-03-26 PROCEDURE — 99214 OFFICE O/P EST MOD 30 MIN: CPT | Performed by: FAMILY MEDICINE

## 2018-03-26 PROCEDURE — 3008F BODY MASS INDEX DOCD: CPT | Performed by: FAMILY MEDICINE

## 2018-03-26 NOTE — PROGRESS NOTES
Assessment/Plan:       Diagnoses and all orders for this visit:    Essential hypertension  Comments:  Labile but very much affected by anxiety, advised to take a full tablet Enalapril rather than just 1/2 tablet  ER evaluation and labs reviewed in detail    Generalized anxiety disorder  Comments:  OK to be more regular with Lorazepam which does help her anxiety    External hemorrhoids  Comments:  OK to continue OTC topicals as she has been doing  She refused colorectal evaluation    Intertrigo  Comments:  Gave samples of Dr Dariana Marshall cream to apply to intertriginous area  Subjective:      Patient ID: Eric Rapp is a 80 y o  female  Had episode of fast heartbeat and elevated Bp  Seen in [facility] ER, was given Enalapril 2 5 mg with benefit  Was only taking 1/2 Enalapril and 1/2 Lorazepam  Has a m  Burping, will take TUMs  Hemorrhoids from prolonged diarrhea and uses OTC products  Skin rash in folds L inguinal and panniculus, also anterior pubic area        The following portions of the patient's history were reviewed and updated as appropriate: allergies, current medications, past family history, past medical history, past social history, past surgical history and problem list     Review of Systems   Constitutional: Negative for appetite change, fatigue and fever  HENT: Negative for congestion  Eyes: Negative for pain  Respiratory: Negative for cough, chest tightness and shortness of breath  Cardiovascular: Negative for chest pain, palpitations and leg swelling  Gastrointestinal: Positive for diarrhea  Negative for abdominal pain  Genitourinary: Negative for difficulty urinating  Musculoskeletal: Positive for arthralgias  Neurological: Positive for headaches  Negative for dizziness  Hematological: Negative for adenopathy  Does not bruise/bleed easily  Psychiatric/Behavioral: Positive for sleep disturbance  The patient is nervous/anxious            Objective:      /78   Pulse 92 Temp 100 1 °F (37 8 °C) (Tympanic)   Ht 4' 11" (1 499 m)   Wt 58 8 kg (129 lb 9 6 oz)   BMI 26 18 kg/m²          Physical Exam   Constitutional: She is oriented to person, place, and time  She appears well-developed and well-nourished  Neck: No thyromegaly present  Cardiovascular: Normal rate, regular rhythm, normal heart sounds and intact distal pulses  No murmur heard  Pulmonary/Chest: Effort normal and breath sounds normal    Abdominal: Bowel sounds are normal  She exhibits no distension  Musculoskeletal: Normal range of motion  Neurological: She is alert and oriented to person, place, and time  Skin: Rash noted  Psychiatric: She has a normal mood and affect  Her behavior is normal  Judgment and thought content normal    Nursing note and vitals reviewed  irritated skinfolds L abdominal pannus

## 2018-05-03 DIAGNOSIS — I10 ESSENTIAL HYPERTENSION: Primary | ICD-10-CM

## 2018-05-03 RX ORDER — ENALAPRIL MALEATE 2.5 MG/1
2.5 TABLET ORAL DAILY
Qty: 30 TABLET | Refills: 5 | Status: SHIPPED | OUTPATIENT
Start: 2018-05-03 | End: 2018-07-06 | Stop reason: SDUPTHER

## 2018-06-11 ENCOUNTER — OFFICE VISIT (OUTPATIENT)
Dept: FAMILY MEDICINE CLINIC | Facility: HOSPITAL | Age: 83
End: 2018-06-11
Payer: COMMERCIAL

## 2018-06-11 VITALS
TEMPERATURE: 98.2 F | SYSTOLIC BLOOD PRESSURE: 132 MMHG | BODY MASS INDEX: 26.29 KG/M2 | HEIGHT: 59 IN | WEIGHT: 130.4 LBS | DIASTOLIC BLOOD PRESSURE: 82 MMHG | HEART RATE: 80 BPM | RESPIRATION RATE: 14 BRPM

## 2018-06-11 DIAGNOSIS — Z00.00 MEDICARE ANNUAL WELLNESS VISIT, SUBSEQUENT: Primary | ICD-10-CM

## 2018-06-11 DIAGNOSIS — I10 ESSENTIAL HYPERTENSION: ICD-10-CM

## 2018-06-11 DIAGNOSIS — E78.2 MIXED HYPERLIPIDEMIA: ICD-10-CM

## 2018-06-11 DIAGNOSIS — F41.1 GENERALIZED ANXIETY DISORDER: ICD-10-CM

## 2018-06-11 PROCEDURE — 3725F SCREEN DEPRESSION PERFORMED: CPT | Performed by: FAMILY MEDICINE

## 2018-06-11 PROCEDURE — 1101F PT FALLS ASSESS-DOCD LE1/YR: CPT | Performed by: FAMILY MEDICINE

## 2018-06-11 PROCEDURE — 3075F SYST BP GE 130 - 139MM HG: CPT | Performed by: FAMILY MEDICINE

## 2018-06-11 PROCEDURE — G0439 PPPS, SUBSEQ VISIT: HCPCS | Performed by: FAMILY MEDICINE

## 2018-06-11 PROCEDURE — 1036F TOBACCO NON-USER: CPT | Performed by: FAMILY MEDICINE

## 2018-06-11 PROCEDURE — 3079F DIAST BP 80-89 MM HG: CPT | Performed by: FAMILY MEDICINE

## 2018-06-11 NOTE — PATIENT INSTRUCTIONS

## 2018-06-11 NOTE — PROGRESS NOTES
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  The following portions of the patient's history were reviewed and updated as appropriate: allergies, current medications, past family history, past medical history, past social history, past surgical history and problem list     Review of Systems   Constitutional: Negative  HENT: Positive for sinus pressure  Negative for congestion  Eyes: Negative  Respiratory: Negative  Cardiovascular: Negative  Gastrointestinal: Negative  Genitourinary: Negative for dysuria  Allergic/Immunologic: Positive for environmental allergies  Neurological: Positive for headaches  Hematological: Negative  Psychiatric/Behavioral: Negative for dysphoric mood         AWV Clinical     ISAR:   Previous hospitalizations?:  No       Once in a Lifetime Medicare Screening:   EKG performed?:  No    AAA screening performed? (if performed, please add date to Health Maintenance):  No       Medicare Screening Tests and Risk Assessment:   AAA Risk Assessment    Age over 72 (males only):  No    Osteoporosis Risk Assessment     Female:  Yes   :  Yes :  No   Age over 48:  Yes Low body weight (<127lbs):  No   Tobacco use:  No Alcohol use:  No   Low calcium diet:  No PMHX of fractures:  No   FHX of fractures:  No    HIV Risk Assessment        Drug and Alcohol Use:   Tobacco use    Cigarettes:  never smoker    Smokeless:  never used smokeless tobacco    Tobacco use duration    Tobacco Cessation Readiness    Alcohol use    Alcohol use:  never    Alcohol Treatment Readiness   Illicit Drug Use    Drug use:  never    Drug type:  no sedative use       Diet & Exercise:   Diet   What is your diet?:  Regular   How many servings a day of the following:   Fruits and Vegetables:  1-2, 3-4 Meat:  1-2   Whole Grains:  1    Dairy:  2 Soda:  0, 1   Coffee:  0 Tea:  0   Exercise    Do you currently exercise?:  yes    Frequency:  rarely    Type of exercise:  walking   Mow grass         Cognitive Impairment Screening:   Depression screening preformed:  Yes     PHQ-9 Depression scale score:  0   Depression screening results:  negative for symptoms   Cognitive Impairment Screening    Do you have difficulty learning or retaining new information?:  No Do you have difficulty handling new tasks?:  No   Do you have difficulty with reasoning?:  No Do you have difficulty with spatial ability and orientation?:  No   Do you have difficulty with language?:  No Do you have difficulty with behavior?:  No       Functional Ability/Level of Safety:   Hearing    Hearing difficulties:  Yes Bilateral:  slightly decreased   Left:  slightly decreased Right:  slightly decreased   Hearing aid:  No    Hearing Impairment Assessment    Hearing status:  Hard of hearing   Current Activities    Status:  unlimited ADL's, unlimited IADL's, no driving, limited social activities   Help needed with the folllowing:    Using the phone:  No Transportation:  Yes   Shopping:  No Preparing Meals:  No   Doing Housework:  No Doing Laundry:  No   Managing Medications:  No Managing Money:  No   ADL    Fall Risk   Have you fallen in the last 12 months?:  No    How many times?:  0    Injury History   Polypharmacy:  No Antidepressant Use:  No   Sedative Use:  No Antihypertensive Use:  No   Previous Fall:  No Alcohol Use:  No   Deconditioning:  No Visual Impairment:  No   Cogitive Impairment:  No Mmobility Impairment:  No   Postural Hypotension:  No Urinary Incontinence:  No       Home Safety:   Home Safety Risk Factors   Unfamilar with surroundings:  No Uneven floors:  No   Stairs or handrail saftey risk:  No Loose rugs:  No   Household clutter:  No Poor household lighting:  No   No grab bars in bathroom:  No Further evaluation needed:  No       Advanced Directives:   Advanced Directives    Living Will:  No Durable POA for healthcare:  No   Advanced directive:  No    Patient's End of Life Decisions        Urinary Incontinence:   Do you have urinary incontinence?:  No        Glaucoma:           Provider Screening     Preventative Screening/Counseling:   Cardiovascular Screening/Counseling:   (Labs Q5 years, EKG optional one-time)   General:  Risks and Benefits Discussed           Diabetes Screening/Counseling:   (2 tests/year if Pre-Diabetes or 1 test/year if no Diabetes)   General:  Screening Current           Colorectal Cancer Screening/Counseling:   (FOBT Q1 yr; Flex Sig Q4 yrs or Q10 yrs after Screening Colonoscopy; Screening Colonoscpy Q2 yrs High Risk or Q10 yrs Low Risk; Barium Enema Q2 yrs High Risk or Q4 yrs Low Risk)   General:  Screening Not Indicated           Prostate Cancer Screening/Counseling:   (Annual)          Breast Cancer Screening/Counseling:   (Baseline Age 28 - 43; Annual Age 36+)   General:  Patient Declines          Cervical Cancer Screening/Counseling:   (Annual for High Risk or Childbearing Age with Abnormal Pap in Last 3 yrs;  Every 2 all others)   General:  Screening Not Indicated           Osteoporosis Screening/Counseling:   (Every 2 Yrs if at risk or more if medically necessary)   General:  Patient Declines           AAA Screening/Counseling:   (Once per Lifetime with risk factors)     Age over 72 (males only):  No    General:  Screening Not Indicated           Glaucoma Screening/Counseling:   (Annual)   General:  Screening Current          HIV Screening/Counseling: (Voluntary; Once annually for high risk OR 3 times for Pregnancy at diagnosis of IUP; 3rd trimester; and at Labor   General:  Screening Not Indicated           Hepatitis C Screening:             Immunizations:   Influenza (annual):  Patient Declines   Pneumococcal (Once in a Lifetime):  Patient Declines   Hepatitis B Series (low risk patients):  Series Not Indicated   Zostavax (Medicare D Coverage, Pt >70 yo):  Patient Declines   TD (Non-Medicare Wellness  Visit required):  Patient Declines   Tdap (Non-Medicare Wellness Visit required):  Patient Declines       Other Preventative Couseling (Non-Medicare Wellness Visit Required):   fall prevention education provided       Referrals (Non-Medicare Wellness Visit Required):       Medical Equipment/Suppliers:   none           Objective:      /82   Pulse 80   Temp 98 2 °F (36 8 °C)   Resp 14   Ht 4' 11" (1 499 m)   Wt 59 1 kg (130 lb 6 4 oz)   BMI 26 34 kg/m²          Physical Exam   Constitutional: She is oriented to person, place, and time  She appears well-developed and well-nourished  Eyes: Conjunctivae are normal    Neck: Neck supple  Cardiovascular: Normal rate, regular rhythm, normal heart sounds and intact distal pulses  Pulmonary/Chest: Effort normal and breath sounds normal    Musculoskeletal: She exhibits no edema  Neurological: She is alert and oriented to person, place, and time  Skin: No rash noted  Psychiatric: She has a normal mood and affect  Her behavior is normal  Judgment and thought content normal    Nursing note and vitals reviewed

## 2018-06-22 DIAGNOSIS — F41.9 ANXIETY: Primary | ICD-10-CM

## 2018-06-22 RX ORDER — LORAZEPAM 1 MG/1
1 TABLET ORAL
Qty: 30 TABLET | Refills: 1 | Status: SHIPPED | OUTPATIENT
Start: 2018-06-22 | End: 2018-11-16 | Stop reason: SDUPTHER

## 2018-07-06 DIAGNOSIS — I10 ESSENTIAL HYPERTENSION: ICD-10-CM

## 2018-07-06 RX ORDER — ENALAPRIL MALEATE 2.5 MG/1
2.5 TABLET ORAL DAILY
Qty: 90 TABLET | Refills: 1 | Status: SHIPPED | OUTPATIENT
Start: 2018-07-06 | End: 2019-05-28 | Stop reason: SDUPTHER

## 2018-10-10 ENCOUNTER — TELEPHONE (OUTPATIENT)
Dept: FAMILY MEDICINE CLINIC | Facility: HOSPITAL | Age: 83
End: 2018-10-10

## 2018-10-10 DIAGNOSIS — L30.4 INTERTRIGO: Primary | ICD-10-CM

## 2018-10-10 RX ORDER — MOMETASONE FUROATE 1 MG/ML
SOLUTION TOPICAL DAILY
Qty: 60 ML | Refills: 0 | Status: SHIPPED | OUTPATIENT
Start: 2018-10-10 | End: 2019-07-08 | Stop reason: ALTCHOICE

## 2018-10-10 NOTE — TELEPHONE ENCOUNTER
Pt has an itch in her private area, she wants lotion rx'd   I offered her an apt, she doesn't drive and has no ride, please advise

## 2018-10-10 NOTE — TELEPHONE ENCOUNTER
Patient has tried OTC ointments but they do not make the itch go away  Can we Rx something?   [facility] Q/T

## 2018-11-16 DIAGNOSIS — F41.9 ANXIETY: ICD-10-CM

## 2018-11-16 RX ORDER — LORAZEPAM 1 MG/1
1 TABLET ORAL
Qty: 30 TABLET | Refills: 3 | Status: SHIPPED | OUTPATIENT
Start: 2018-11-16 | End: 2019-07-10 | Stop reason: SDUPTHER

## 2019-05-10 ENCOUNTER — OFFICE VISIT (OUTPATIENT)
Dept: FAMILY MEDICINE CLINIC | Facility: HOSPITAL | Age: 84
End: 2019-05-10
Payer: COMMERCIAL

## 2019-05-10 VITALS
DIASTOLIC BLOOD PRESSURE: 90 MMHG | BODY MASS INDEX: 26.21 KG/M2 | TEMPERATURE: 98.8 F | HEART RATE: 78 BPM | WEIGHT: 130 LBS | HEIGHT: 59 IN | SYSTOLIC BLOOD PRESSURE: 164 MMHG

## 2019-05-10 DIAGNOSIS — I10 ESSENTIAL HYPERTENSION: ICD-10-CM

## 2019-05-10 DIAGNOSIS — Z01.818 PRE-OP EXAM: Primary | ICD-10-CM

## 2019-05-10 DIAGNOSIS — E03.9 ACQUIRED HYPOTHYROIDISM: ICD-10-CM

## 2019-05-10 DIAGNOSIS — H25.012 CORTICAL AGE-RELATED CATARACT OF LEFT EYE: ICD-10-CM

## 2019-05-10 PROCEDURE — 1160F RVW MEDS BY RX/DR IN RCRD: CPT | Performed by: FAMILY MEDICINE

## 2019-05-10 PROCEDURE — 93000 ELECTROCARDIOGRAM COMPLETE: CPT | Performed by: FAMILY MEDICINE

## 2019-05-10 PROCEDURE — 99214 OFFICE O/P EST MOD 30 MIN: CPT | Performed by: FAMILY MEDICINE

## 2019-05-28 DIAGNOSIS — I10 ESSENTIAL HYPERTENSION: ICD-10-CM

## 2019-05-28 RX ORDER — ENALAPRIL MALEATE 2.5 MG/1
2.5 TABLET ORAL DAILY
Qty: 90 TABLET | Refills: 1 | Status: SHIPPED | OUTPATIENT
Start: 2019-05-28 | End: 2019-12-05 | Stop reason: SDUPTHER

## 2019-06-10 ENCOUNTER — TELEPHONE (OUTPATIENT)
Dept: OTHER | Facility: OTHER | Age: 84
End: 2019-06-10

## 2019-06-11 NOTE — TELEPHONE ENCOUNTER
PT called in confused as to why she has an upcoming annual wellness check up  PT states she just had one done last month, and canceled the one scheduled for the 12 of this month

## 2019-07-02 ENCOUNTER — HOSPITAL ENCOUNTER (EMERGENCY)
Facility: HOSPITAL | Age: 84
Discharge: HOME | End: 2019-07-02
Attending: EMERGENCY MEDICINE | Admitting: EMERGENCY MEDICINE
Payer: COMMERCIAL

## 2019-07-02 ENCOUNTER — APPOINTMENT (EMERGENCY)
Dept: CT IMAGING | Facility: HOSPITAL | Age: 84
End: 2019-07-02
Payer: COMMERCIAL

## 2019-07-02 VITALS
DIASTOLIC BLOOD PRESSURE: 99 MMHG | SYSTOLIC BLOOD PRESSURE: 162 MMHG | TEMPERATURE: 98 F | HEIGHT: 59 IN | RESPIRATION RATE: 20 BRPM | WEIGHT: 130 LBS | OXYGEN SATURATION: 94 % | HEART RATE: 88 BPM | BODY MASS INDEX: 26.21 KG/M2
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
## 2019-07-02 DIAGNOSIS — S01.511A LACERATION OF VERMILION BORDER OF UPPER LIP: ICD-10-CM

## 2019-07-02 DIAGNOSIS — S09.93XA DENTAL INJURY: ICD-10-CM

## 2019-07-02 PROCEDURE — 99284 EMERGENCY DEPT VISIT MOD MDM: CPT | Performed by: PHYSICIAN ASSISTANT

## 2019-07-02 PROCEDURE — 70486 CT MAXILLOFACIAL W/O DYE: CPT

## 2019-07-02 PROCEDURE — 90715 TDAP VACCINE 7 YRS/> IM: CPT | Performed by: PHYSICIAN ASSISTANT

## 2019-07-02 PROCEDURE — 99284 EMERGENCY DEPT VISIT MOD MDM: CPT

## 2019-07-02 PROCEDURE — 12013 RPR F/E/E/N/L/M 2.6-5.0 CM: CPT | Performed by: PHYSICIAN ASSISTANT

## 2019-07-02 PROCEDURE — 70450 CT HEAD/BRAIN W/O DYE: CPT

## 2019-07-02 PROCEDURE — 90471 IMMUNIZATION ADMIN: CPT

## 2019-07-02 RX ORDER — LIDOCAINE HYDROCHLORIDE 10 MG/ML
5 INJECTION, SOLUTION EPIDURAL; INFILTRATION; INTRACAUDAL; PERINEURAL ONCE
Status: COMPLETED | OUTPATIENT
Start: 2019-07-02 | End: 2019-07-02

## 2019-07-02 RX ORDER — GINSENG 100 MG
1 CAPSULE ORAL ONCE
Status: COMPLETED | OUTPATIENT
Start: 2019-07-02 | End: 2019-07-02

## 2019-07-02 RX ADMIN — BACITRACIN 1 SMALL APPLICATION: 500 OINTMENT TOPICAL at 18:44

## 2019-07-02 RX ADMIN — TETANUS TOXOID, REDUCED DIPHTHERIA TOXOID AND ACELLULAR PERTUSSIS VACCINE, ADSORBED 0.5 ML: 5; 2.5; 8; 8; 2.5 SUSPENSION INTRAMUSCULAR at 18:43

## 2019-07-02 RX ADMIN — LIDOCAINE HYDROCHLORIDE 5 ML: 10 INJECTION, SOLUTION EPIDURAL; INFILTRATION; INTRACAUDAL; PERINEURAL at 18:44

## 2019-07-03 NOTE — DISCHARGE INSTRUCTIONS
Keep wound dry for next 24 hours, then clean daily with soap and water and apply antibiotic ointment  Follow up with family doctor in 4-6 days for suture removal   If change in behavior or vomiting return to ER  Tylenol as needed for pain  Follow up with dentist concerning fractured tooth

## 2019-07-03 NOTE — ED PROVIDER NOTES
History  Chief Complaint   Patient presents with   Rodolfo Stafford     pt stated "I fell while going out to get the mail" NO BLOOD THINNERS    Lip Laceration     right upper lip laceration, bleeding controlled, no blood thinners, abrasions to right knee     Patient is an 81 y/o F with h/o anxiety, HTN, thyroid disease that presents to the ED after a fall that occurred 3 hours ago  She states she was walking outside to get the mail and lost her balance and fell  She fell forward and hit her face on the concrete  She denies LOC  She has a laceration to her upper lip and dental fracture  No other injuries  Last tetanus is unknown  History provided by:  Patient  Fall   Mechanism of injury: fall    Injury location:  Face  Facial injury location:  Upper lip  Incident location:  Outdoors  Time since incident:  3 hours  Arrived directly from scene: no    Fall:     Fall occurred:  Walking    Impact surface:  [place]    Point of impact:  Face  Protective equipment: none    Suspicion of alcohol use: no    Suspicion of drug use: no    Tetanus status:  Unknown  Prior to arrival data:     Blood loss:  Minimal    Responsiveness at scene:  Alert    Orientation at scene:  Person, place and situation    Loss of consciousness: no      Amnesic to event: no      Immobilization:  None  Associated symptoms: no abdominal pain, no back pain, no blindness, no chest pain, no difficulty breathing, no headaches, no hearing loss, no loss of consciousness, no nausea, no neck pain, no seizures and no vomiting    Risk factors: no anticoagulation therapy        Prior to Admission Medications   Prescriptions Last Dose Informant Patient Reported? Taking?    LORazepam (ATIVAN) 1 mg tablet   No No   Sig: Take 1 tablet (1 mg total) by mouth daily at bedtime   enalapril (VASOTEC) 2 5 mg tablet   No No   Sig: Take 1 tablet (2 5 mg total) by mouth daily   mometasone (ELOCON) 0 1 % lotion   No No   Sig: Apply topically daily   Patient not taking: Reported on 5/10/2019      Facility-Administered Medications: None       Past Medical History:   Diagnosis Date    Anxiety     Disease of thyroid gland     Hypertension        Past Surgical History:   Procedure Laterality Date    CATARACT EXTRACTION Right     FOOT SURGERY      JOINT REPLACEMENT         History reviewed  No pertinent family history  I have reviewed and agree with the history as documented  Social History     Tobacco Use    Smoking status: Never Smoker    Smokeless tobacco: Never Used   Substance Use Topics    Alcohol use: No    Drug use: No        Review of Systems   Constitutional: Negative for chills and fever  HENT: Positive for dental problem  Negative for hearing loss  Eyes: Negative for blindness  Cardiovascular: Negative for chest pain  Gastrointestinal: Negative for abdominal pain, nausea and vomiting  Musculoskeletal: Negative for back pain and neck pain  Skin: Positive for wound  Neurological: Negative for dizziness, seizures, loss of consciousness, weakness and headaches  Psychiatric/Behavioral: Negative for confusion  All other systems reviewed and are negative  Physical Exam  Physical Exam   Constitutional: She is oriented to person, place, and time  She appears well-developed and well-nourished  She is cooperative  She does not appear ill  No distress  HENT:   Head: Normocephalic  Head is with laceration  Right Ear: Decreased hearing is noted  Left Ear: Decreased hearing is noted  Nose: Nose normal    Mouth/Throat: Oropharynx is clear and moist and mucous membranes are normal  Abnormal dentition  Eyes: Pupils are equal, round, and reactive to light  Conjunctivae are normal    Neck: Normal range of motion  No spinous process tenderness and no muscular tenderness present  Cardiovascular: Normal rate, regular rhythm and normal heart sounds  No murmur heard    Pulmonary/Chest: Effort normal and breath sounds normal  She has no wheezes  She has no rhonchi  She has no rales  Abdominal: Soft  Normal appearance and bowel sounds are normal  There is no tenderness  Musculoskeletal: Normal range of motion  She exhibits no tenderness or deformity  Neurological: She is alert and oriented to person, place, and time  She has normal strength  No cranial nerve deficit or sensory deficit  Gait normal  GCS eye subscore is 4  GCS verbal subscore is 5  GCS motor subscore is 6  Skin: Skin is warm and dry  Laceration (3cm linear laceration to upper lip extending through vermillion border  bleeding controlled  ) noted  No rash noted  She is not diaphoretic  No pallor  Nursing note and vitals reviewed  Vital Signs  ED Triage Vitals   Temperature Pulse Respirations Blood Pressure SpO2   07/02/19 1726 07/02/19 1726 07/02/19 1726 07/02/19 1726 07/02/19 1726   98 °F (36 7 °C) 96 20 (!) 235/103 96 %      Temp Source Heart Rate Source Patient Position - Orthostatic VS BP Location FiO2 (%)   07/02/19 1726 07/02/19 1726 07/02/19 1800 07/02/19 1726 --   Tympanic Monitor Lying Right arm       Pain Score       07/02/19 1726       3           Vitals:    07/02/19 1726 07/02/19 1730 07/02/19 1800 07/02/19 2015   BP: (!) 235/103 (!) 191/86 167/80 162/99   Pulse: 96 92 85 88   Patient Position - Orthostatic VS:   Lying Lying         Visual Acuity      ED Medications  Medications   tetanus-diphtheria-acellular pertussis (BOOSTRIX) IM injection 0 5 mL (0 5 mL Intramuscular Given 7/2/19 1843)   lidocaine (PF) (XYLOCAINE-MPF) 1 % injection 5 mL (5 mL Infiltration Given by Other 7/2/19 1844)   bacitracin topical ointment 1 small application (1 small application Topical Given by Other 7/2/19 1844)       Diagnostic Studies  Results Reviewed     None                 CT head without contrast   Final Result by Scottie Deutsch DO (07/02 1933)      No acute intracranial abnormality                    Workstation performed: [id number]         CT facial bones without contrast Final Result by David Mijares DO (1934)      Normal noncontrast CT of the facial bones  Workstation performed: [id number]                    Procedures  Laceration repair  Date/Time: 7/2/2019 7:00 PM  Performed by: William Pearl PA-C  Authorized by: William Pearl PA-C   Consent: Verbal consent obtained  Risks and benefits: risks, benefits and alternatives were discussed  Consent given by: patient  Body area: head/neck  Location details: upper lip  Full thickness lip laceration: no  Vermilion border involved: yes  Laceration length: 3 cm  Foreign bodies: no foreign bodies  Tendon involvement: none  Nerve involvement: none  Vascular damage: no  Anesthesia: local infiltration    Anesthesia:  Local Anesthetic: lidocaine 1% without epinephrine  Anesthetic total: 2 mL    Wound Dehiscence:  Superficial Wound Dehiscence: simple closure      Procedure Details:  Preparation: Patient was prepped and draped in the usual sterile fashion  Irrigation solution: saline  Irrigation method: tap  Amount of cleaning: standard  Debridement: none  Degree of undermining: none  Skin closure: 6-0 nylon (5)  Subcutaneous closure: 5-0 Vicryl (2 inner lip)  Number of sutures: 7  Technique: simple  Approximation: close  Approximation difficulty: simple  Lip approximation: vermillion border well aligned  Dressing: antibiotic ointment  Patient tolerance: Patient tolerated the procedure well with no immediate complications  Comments: Suture through vermillion border placed by me, the other sutures placed by Chelly POP under my direct supervision                ED Course                               MDM  Number of Diagnoses or Management Options  Dental injury: minor  Fall: minor  Laceration of vermilion border of upper lip: minor  Patient Progress  Patient progress: stable      Disposition  Final diagnoses:   Fall   Laceration of vermilion border of upper lip   Dental injury - tooth #7     Time reflects when diagnosis was documented in both MDM as applicable and the Disposition within this note     Time User Action Codes Description Comment    7/2/2019  7:59 PM Ravi Pope Add [Z58  XXXA] Fall     7/2/2019  7:59 PM Ravi Pope Add [S19 [id number]] Laceration of vermilion border of upper lip     7/2/2019  7:59 PM Ravi Pope Add [D71 [id number]] Dental injury     7/2/2019  8:00 PM [address], [address] [G32 [id number]] Dental injury tooth #7      ED Disposition     ED Disposition Condition Date/Time Comment    Discharge Stable Tue Jul 2, 2019  7:59 PM Lexy Draper discharge to home/self care  Follow-up Information     Follow up With Specialties Details Why Contact Info    Nehal Kovacs MD Family Medicine Call in 4 days For suture removal [facility]  [address] [place] [address] Road  [phone]      your dentist  Call  concerning fractured tooth           Discharge Medication List as of 7/2/2019  8:02 PM      CONTINUE these medications which have NOT CHANGED    Details   enalapril (VASOTEC) 2 5 mg tablet Take 1 tablet (2 5 mg total) by mouth daily, Starting Tue 5/28/2019, Normal      LORazepam (ATIVAN) 1 mg tablet Take 1 tablet (1 mg total) by mouth daily at bedtime, Starting Fri 11/16/2018, Normal      mometasone (ELOCON) 0 1 % lotion Apply topically daily, Starting Wed 10/10/2018, Normal           No discharge procedures on file      ED Provider  Electronically Signed by           Michelle Lubin PA-C  07/02/19 2892

## 2019-07-08 ENCOUNTER — OFFICE VISIT (OUTPATIENT)
Dept: FAMILY MEDICINE CLINIC | Facility: HOSPITAL | Age: 84
End: 2019-07-08
Payer: COMMERCIAL

## 2019-07-08 VITALS
HEART RATE: 74 BPM | HEIGHT: 59 IN | BODY MASS INDEX: 26 KG/M2 | SYSTOLIC BLOOD PRESSURE: 160 MMHG | WEIGHT: 129 LBS | TEMPERATURE: 99.6 F | DIASTOLIC BLOOD PRESSURE: 92 MMHG

## 2019-07-08 DIAGNOSIS — S01.511D: ICD-10-CM

## 2019-07-08 DIAGNOSIS — Z48.02 VISIT FOR SUTURE REMOVAL: Primary | ICD-10-CM

## 2019-07-08 PROCEDURE — 1160F RVW MEDS BY RX/DR IN RCRD: CPT | Performed by: NURSE PRACTITIONER

## 2019-07-08 PROCEDURE — 99213 OFFICE O/P EST LOW 20 MIN: CPT | Performed by: NURSE PRACTITIONER

## 2019-07-08 NOTE — PROGRESS NOTES
Assessment/Plan:      Diagnoses and all orders for this visit:    Visit for suture removal    Laceration of vermilion border of upper lip, subsequent encounter    Other orders  -     Suture removal          Subjective:     Patient ID: Chantelle Gutierrez is a 80 y o  female  Donna Linea going up front walk steps  Tripped on step and hit face on concrete  Went to ER and 7 sutures  7 outside and 2 in lip that she was told are dissolvable  Has been uisng Neosporin  Some mild discomfort at wound  No redness, swelling, drainage, fever,chills  Review of Systems   Constitutional: Negative for chills and fever  Skin: Positive for wound  The following portions of the patient's history were reviewed and updated as appropriate: allergies, current medications, past family history, past medical history, past social history, past surgical history and problem list     Objective:  Vitals:    07/08/19 1056   BP: 160/92   Pulse: 74   Temp: 99 6 °F (37 6 °C)      Physical Exam   Constitutional: She is oriented to person, place, and time  She appears well-developed and well-nourished  Neurological: She is alert and oriented to person, place, and time  Skin: Skin is warm and dry  3 cm linear laceration upper lip extending to vermillion border  7 sutures noted  Laceration is scabbed over  No redness, swelling, drainage noted  Psychiatric: She has a normal mood and affect  Suture removal  Date/Time: 7/8/2019 11:47 AM  Performed by: CYRUS Levin  Authorized by: CYRUS Levin     Consent:     Consent obtained:  Verbal    Consent given by:  Patient    Risks discussed:  Bleeding, pain and wound separation  Universal protocol:     Procedure explained and questions answered to patient or proxy's satisfaction: yes    Location:     Laterality:  Median    Location:  Mouth    Mouth location:  Upper exterior lip  Procedure details:      Tools used:  Scissors and tweezers    Wound appearance:  No sign(s) of infection, good wound healing and clean    Number of sutures removed:  5  Post-procedure details:     Post-removal:  Antibiotic ointment applied    Patient tolerance of procedure: Tolerated well, no immediate complications  Comments:      2 dissolvable sutures in upper lip left

## 2019-07-10 DIAGNOSIS — F41.9 ANXIETY: ICD-10-CM

## 2019-07-10 RX ORDER — LORAZEPAM 1 MG/1
1 TABLET ORAL
Qty: 30 TABLET | Refills: 3 | Status: SHIPPED | OUTPATIENT
Start: 2019-07-10 | End: 2020-01-18 | Stop reason: SDUPTHER

## 2019-08-06 ENCOUNTER — OFFICE VISIT (OUTPATIENT)
Dept: FAMILY MEDICINE CLINIC | Facility: HOSPITAL | Age: 84
End: 2019-08-06
Payer: COMMERCIAL

## 2019-08-06 VITALS
WEIGHT: 129.8 LBS | HEIGHT: 59 IN | DIASTOLIC BLOOD PRESSURE: 96 MMHG | SYSTOLIC BLOOD PRESSURE: 180 MMHG | HEART RATE: 78 BPM | TEMPERATURE: 99 F | BODY MASS INDEX: 26.17 KG/M2

## 2019-08-06 DIAGNOSIS — I10 ESSENTIAL HYPERTENSION: ICD-10-CM

## 2019-08-06 DIAGNOSIS — Z48.89 SUTURE CHECK: Primary | ICD-10-CM

## 2019-08-06 PROCEDURE — 1036F TOBACCO NON-USER: CPT | Performed by: INTERNAL MEDICINE

## 2019-08-06 PROCEDURE — 99213 OFFICE O/P EST LOW 20 MIN: CPT | Performed by: INTERNAL MEDICINE

## 2019-08-06 NOTE — ASSESSMENT & PLAN NOTE
BP very elevated and not better on recheck at end of appt - pt visibly anxious and admits to not taking her BP meds this am, advised to take meds daily and re-eval in 3 mos with Dr Una Nguyen to ensure BP is better and anxiety improved,  Currently asymptomatic but urged to call with HA/dizziness/CP/fatigue or any stroke/TIA symptoms

## 2019-08-06 NOTE — PROGRESS NOTES
Assessment/Plan:    Essential hypertension  BP very elevated and not better on recheck at end of appt - pt visibly anxious and admits to not taking her BP meds this am, advised to take meds daily and re-eval in 3 mos with Dr Robby Hayes to ensure BP is better and anxiety improved,  Currently asymptomatic but urged to call with HA/dizziness/CP/fatigue or any stroke/TIA symptoms       Diagnoses and all orders for this visit:    Suture check  Comments:  Retained suture noted R upper lip - removed w/o difficulty, advised pt to stop Neosporin and to just keep area clean with soap/water    Essential hypertension    Other orders  -     Suture removal          Subjective:      Patient ID: Darby Lester is a 80 y o  female  HPI Pt here for evaluation of sutures in the lip  Pt was in ED 7/2/19 and had 7 sutures place I her lip after a fall  5 sutures were nylon and 2 were vicryl for subcutaneous closure  Pt was in and saw Jered Onofre 7/8/19 and had the 5 skin nylon sutures removed  She was told the other 2 vicryl sutures would slowly absorb  She had a large scab over the inner lip region  She states a large part of the scab fell off 2 wks ago and another small part fell off last night  She has a small piece of suture that is sticking out and is bothersome  She notes no pain/redness/drainage/F/C  Bp very elevated today  Pt admits she has not taken her BP meds yet today and she was running late to appt and is all worked up  She notes no symptoms today at all  Review of Systems   Constitutional: Negative for chills and fever  Eyes: Negative for visual disturbance  Respiratory: Negative for shortness of breath  Cardiovascular: Negative for chest pain  Skin: Negative for wound  Neurological: Negative for dizziness and headaches  Psychiatric/Behavioral: The patient is nervous/anxious            Objective:    BP (!) 180/96   Pulse 78   Temp 99 °F (37 2 °C)   Ht 4' 11" (1 499 m)   Wt 58 9 kg (129 lb 12 8 oz) BMI 26 22 kg/m²        Physical Exam   Constitutional: She appears well-developed and well-nourished  anxious   Skin:   R upper lip with small retained suture sticking out   Psychiatric:   anxious   Nursing note and vitals reviewed  Suture removal  Date/Time: 8/6/2019 10:31 AM  Performed by: Sara Ellis DO  Authorized by: Sara Ellis DO     Patient location:  Clinic  Other Assisting Provider: No    Consent:     Consent obtained:  Verbal    Consent given by:  Patient    Risks discussed:  Bleeding and pain    Alternatives discussed:  No treatment and observation  Universal protocol:     Procedure explained and questions answered to patient or proxy's satisfaction: yes      Patient identity confirmed:  Verbally with patient  Location:     Laterality:  Right    Location:  Mouth    Mouth location:  Upper exterior lip  Procedure details: Tools used:  Scissors and tweezers    Wound appearance:  No sign(s) of infection and good wound healing    Number of sutures removed:  1  Post-procedure details:     Post-removal:  No dressing applied    Patient tolerance of procedure:   Tolerated well, no immediate complications

## 2019-11-06 ENCOUNTER — OFFICE VISIT (OUTPATIENT)
Dept: FAMILY MEDICINE CLINIC | Facility: HOSPITAL | Age: 84
End: 2019-11-06
Payer: COMMERCIAL

## 2019-11-06 VITALS
TEMPERATURE: 97 F | HEART RATE: 82 BPM | DIASTOLIC BLOOD PRESSURE: 84 MMHG | SYSTOLIC BLOOD PRESSURE: 152 MMHG | HEIGHT: 59 IN | BODY MASS INDEX: 26.41 KG/M2 | WEIGHT: 131 LBS

## 2019-11-06 DIAGNOSIS — F41.1 GENERALIZED ANXIETY DISORDER: ICD-10-CM

## 2019-11-06 DIAGNOSIS — E78.2 MIXED HYPERLIPIDEMIA: ICD-10-CM

## 2019-11-06 DIAGNOSIS — I10 ESSENTIAL HYPERTENSION: Primary | ICD-10-CM

## 2019-11-06 DIAGNOSIS — E03.9 ACQUIRED HYPOTHYROIDISM: ICD-10-CM

## 2019-11-06 DIAGNOSIS — E66.3 OVERWEIGHT (BMI 25.0-29.9): ICD-10-CM

## 2019-11-06 PROCEDURE — 3288F FALL RISK ASSESSMENT DOCD: CPT | Performed by: FAMILY MEDICINE

## 2019-11-06 PROCEDURE — 99214 OFFICE O/P EST MOD 30 MIN: CPT | Performed by: FAMILY MEDICINE

## 2019-11-06 PROCEDURE — 1100F PTFALLS ASSESS-DOCD GE2>/YR: CPT | Performed by: FAMILY MEDICINE

## 2019-11-06 NOTE — PROGRESS NOTES
Assessment/Plan:         Diagnoses and all orders for this visit:    Essential hypertension  Comments:  Very labile BP readings  She is very reluctant to increase medication doses  She will have her son check BP's at home  Mixed hyperlipidemia  Comments:  Not interested in rechecking levels and not wanting medication for lipids  Acquired hypothyroidism  Comments:  Clinically euthyroid    Generalized anxiety disorder  Comments:  Stable on intermittent use of Lorazepam     Overweight (BMI 25 0-29  9)          Subjective:      Patient ID: Evonne Ferguson is a 80 y o  female  Follow up BP and other issues    Healing OK from her facial injuries  Still has area of roughness on upper lip    Taking Colace for stool softener with regular bowels  Very happy with her cataract surgery by Dr Ericka Dejesus  Had some scar tissue removed with laser OD    She is aware of tendency for BP to elevate especially when she gets nervous      The following portions of the patient's history were reviewed and updated as appropriate: allergies, current medications, past family history, past medical history, past social history, past surgical history and problem list     Review of Systems   Constitutional: Negative for unexpected weight change  HENT: Negative  Respiratory: Negative  Gastrointestinal: Negative  Musculoskeletal: Positive for arthralgias  Neurological: Negative  Hematological: Negative  Psychiatric/Behavioral: The patient is nervous/anxious  All other systems reviewed and are negative  Objective:      /84   Pulse 82   Temp (!) 97 °F (36 1 °C)   Ht 4' 11" (1 499 m)   Wt 59 4 kg (131 lb)   BMI 26 46 kg/m²          Physical Exam   Constitutional: She appears well-developed and well-nourished  Cardiovascular: Normal rate, regular rhythm, normal heart sounds and intact distal pulses     Pulmonary/Chest: Effort normal and breath sounds normal    Lymphadenopathy:     She has no cervical adenopathy  Psychiatric: Her speech is normal and behavior is normal  Judgment and thought content normal  Her mood appears anxious  Cognition and memory are normal    Nursing note and vitals reviewed  BMI Counseling: Body mass index is 26 46 kg/m²  The BMI is above normal  Nutrition recommendations include reducing portion sizes and moderation in carbohydrate intake  Exercise recommendations include strength training exercises

## 2019-12-05 DIAGNOSIS — I10 ESSENTIAL HYPERTENSION: ICD-10-CM

## 2019-12-05 RX ORDER — ENALAPRIL MALEATE 2.5 MG/1
2.5 TABLET ORAL DAILY
Qty: 90 TABLET | Refills: 1 | Status: SHIPPED | OUTPATIENT
Start: 2019-12-05 | End: 2020-06-10 | Stop reason: SDUPTHER

## 2020-01-18 DIAGNOSIS — F41.9 ANXIETY: ICD-10-CM

## 2020-01-18 RX ORDER — LORAZEPAM 1 MG/1
1 TABLET ORAL
Qty: 30 TABLET | Refills: 3 | Status: SHIPPED | OUTPATIENT
Start: 2020-01-18 | End: 2020-06-24 | Stop reason: SDUPTHER

## 2020-06-10 DIAGNOSIS — I10 ESSENTIAL HYPERTENSION: ICD-10-CM

## 2020-06-10 RX ORDER — ENALAPRIL MALEATE 2.5 MG/1
2.5 TABLET ORAL DAILY
Qty: 90 TABLET | Refills: 1 | Status: SHIPPED | OUTPATIENT
Start: 2020-06-10 | End: 2020-12-07 | Stop reason: SDUPTHER

## 2020-06-24 DIAGNOSIS — F41.9 ANXIETY: ICD-10-CM

## 2020-06-24 RX ORDER — LORAZEPAM 1 MG/1
1 TABLET ORAL
Qty: 30 TABLET | Refills: 3 | Status: SHIPPED | OUTPATIENT
Start: 2020-06-24 | End: 2020-11-24 | Stop reason: SDUPTHER

## 2020-07-14 ENCOUNTER — OFFICE VISIT (OUTPATIENT)
Dept: FAMILY MEDICINE CLINIC | Facility: HOSPITAL | Age: 85
End: 2020-07-14
Payer: COMMERCIAL

## 2020-07-14 VITALS
HEIGHT: 57 IN | BODY MASS INDEX: 28.48 KG/M2 | SYSTOLIC BLOOD PRESSURE: 138 MMHG | DIASTOLIC BLOOD PRESSURE: 78 MMHG | WEIGHT: 132 LBS | TEMPERATURE: 99.2 F | HEART RATE: 83 BPM

## 2020-07-14 DIAGNOSIS — F41.1 GENERALIZED ANXIETY DISORDER: ICD-10-CM

## 2020-07-14 DIAGNOSIS — E78.2 MIXED HYPERLIPIDEMIA: ICD-10-CM

## 2020-07-14 DIAGNOSIS — I10 ESSENTIAL HYPERTENSION: ICD-10-CM

## 2020-07-14 DIAGNOSIS — E03.9 ACQUIRED HYPOTHYROIDISM: ICD-10-CM

## 2020-07-14 DIAGNOSIS — Z00.00 MEDICARE ANNUAL WELLNESS VISIT, SUBSEQUENT: Primary | ICD-10-CM

## 2020-07-14 PROCEDURE — 99214 OFFICE O/P EST MOD 30 MIN: CPT | Performed by: FAMILY MEDICINE

## 2020-07-14 PROCEDURE — 3075F SYST BP GE 130 - 139MM HG: CPT | Performed by: FAMILY MEDICINE

## 2020-07-14 PROCEDURE — 1160F RVW MEDS BY RX/DR IN RCRD: CPT | Performed by: FAMILY MEDICINE

## 2020-07-14 PROCEDURE — G0439 PPPS, SUBSEQ VISIT: HCPCS | Performed by: FAMILY MEDICINE

## 2020-07-14 PROCEDURE — 3078F DIAST BP <80 MM HG: CPT | Performed by: FAMILY MEDICINE

## 2020-07-14 PROCEDURE — 1170F FXNL STATUS ASSESSED: CPT | Performed by: FAMILY MEDICINE

## 2020-07-14 PROCEDURE — 1036F TOBACCO NON-USER: CPT | Performed by: FAMILY MEDICINE

## 2020-07-14 PROCEDURE — 1125F AMNT PAIN NOTED PAIN PRSNT: CPT | Performed by: FAMILY MEDICINE

## 2020-07-14 NOTE — PATIENT INSTRUCTIONS

## 2020-07-14 NOTE — PROGRESS NOTES
Assessment/Plan:         Diagnoses and all orders for this visit:    Medicare annual wellness visit, subsequent    Essential hypertension  Comments:  Excellent BP control    Acquired hypothyroidism  Comments:  Clinically euthyroid    Mixed hyperlipidemia    Generalized anxiety disorder  Comments:  OK PRN Lorazepam    BMI 28 0-28 9,adult          Subjective:      Patient ID: Nilson More is a 80 y o  female  6 month follow up  Feeling well overall  Mood and nerves OK with PRN use of Lorazepam    NO recent illness or injury  NO COVID concerns    Sons help with her shopping and other needs  The following portions of the patient's history were reviewed and updated as appropriate: allergies, current medications, past family history, past medical history, past social history, past surgical history and problem list     Review of Systems   Constitutional: Negative for unexpected weight change  HENT: Positive for hearing loss  Eyes: Negative for visual disturbance  Respiratory: Negative  Cardiovascular: Negative  Gastrointestinal: Negative  Genitourinary: Negative  Musculoskeletal: Positive for arthralgias  Neurological: Negative  Hematological: Negative  Psychiatric/Behavioral: The patient is nervous/anxious  All other systems reviewed and are negative  Objective:      /78   Pulse 83   Temp 99 2 °F (37 3 °C)   Ht 4' 9" (1 448 m)   Wt 59 9 kg (132 lb)   BMI 28 56 kg/m²          Physical Exam   Constitutional: She is oriented to person, place, and time  She appears well-developed and well-nourished  HENT:   Head: Normocephalic and atraumatic  Neck: No thyromegaly present  Cardiovascular: Normal rate, regular rhythm, normal heart sounds and intact distal pulses  Pulmonary/Chest: Effort normal    Neurological: She is alert and oriented to person, place, and time  Psychiatric: She has a normal mood and affect   Her behavior is normal  Judgment and thought content normal    Nursing note and vitals reviewed

## 2020-07-14 NOTE — PROGRESS NOTES
Assessment and Plan:     Problem List Items Addressed This Visit        Endocrine    Acquired hypothyroidism       Cardiovascular and Mediastinum    Essential hypertension       Other    Generalized anxiety disorder    Mixed hyperlipidemia    Medicare annual wellness visit, subsequent - Primary    BMI 28 0-28 9,adult        BMI Counseling: Body mass index is 28 56 kg/m²  The BMI is above normal  Nutrition recommendations include decreasing portion sizes, moderation in carbohydrate intake and reducing intake of cholesterol  Exercise recommendations include strength training exercises  No pharmacotherapy was ordered  Preventive health issues were discussed with patient, and age appropriate screening tests were ordered as noted in patient's After Visit Summary  Personalized health advice and appropriate referrals for health education or preventive services given if needed, as noted in patient's After Visit Summary  History of Present Illness:     Patient presents for Medicare Annual Wellness visit    Patient Care Team:  Ewa Choudhary MD as PCP - General  Ewa Choudhary MD     Problem List:     Patient Active Problem List   Diagnosis    External hemorrhoids    Generalized anxiety disorder    Headache    Mixed hyperlipidemia    Essential hypertension    Acquired hypothyroidism    Intertrigo    Medicare annual wellness visit, subsequent    Cortical age-related cataract of left eye    Pre-op exam    BMI 28 0-28 9,adult      Past Medical and Surgical History:     Past Medical History:   Diagnosis Date    Anxiety     Disease of thyroid gland     Hypertension      Past Surgical History:   Procedure Laterality Date    CATARACT EXTRACTION Right     FOOT SURGERY      JOINT REPLACEMENT        Family History:     History reviewed  No pertinent family history     Social History:        Social History     Socioeconomic History    Marital status: /Civil Union     Spouse name: None    Number of children: None    Years of education: None    Highest education level: None   Occupational History    None   Social Needs    Financial resource strain: None    Food insecurity:     Worry: None     Inability: None    Transportation needs:     Medical: None     Non-medical: None   Tobacco Use    Smoking status: Never Smoker    Smokeless tobacco: Never Used   Substance and Sexual Activity    Alcohol use: No    Drug use: No    Sexual activity: None   Lifestyle    Physical activity:     Days per week: None     Minutes per session: None    Stress: None   Relationships    Social connections:     Talks on phone: None     Gets together: None     Attends [employer] witness service: None     Active member of club or organization: None     Attends meetings of clubs or organizations: None     Relationship status: None    Intimate partner violence:     Fear of current or ex partner: None     Emotionally abused: None     Physically abused: None     Forced sexual activity: None   Other Topics Concern    None   Social History Narrative    None      Medications and Allergies:     Current Outpatient Medications   Medication Sig Dispense Refill    enalapril (VASOTEC) 2 5 mg tablet Take 1 tablet (2 5 mg total) by mouth daily 90 tablet 1    LORazepam (ATIVAN) 1 mg tablet Take 1 tablet (1 mg total) by mouth daily at bedtime 30 tablet 3     No current facility-administered medications for this visit        Allergies   Allergen Reactions    Preservision Areds [B-Plex Plus] Tremor    Tramadol      Reaction Date: [id number];       Immunizations:     Immunization History   Administered Date(s) Administered    Tdap 07/02/2019      Health Maintenance:         Topic Date Due    DXA SCAN  01/14/2021 (Originally 1934)         Topic Date Due    Influenza Vaccine  07/01/2020      Medicare Health Risk Assessment:     /78   Pulse 83   Temp 99 2 °F (37 3 °C)   Ht 4' 9" (1 448 m)   Wt 59 9 kg (132 lb)   BMI 28 56 kg/m² Shiloh Price is here for her Subsequent Wellness visit  Last Medicare Wellness visit information reviewed, patient interviewed and updates made to the record today  Health Risk Assessment:   Patient rates overall health as good  Patient feels that their physical health rating is same  Eyesight was rated as same  Hearing was rated as slightly worse  Patient feels that their emotional and mental health rating is same  Pain experienced in the last 7 days has been none  Patient states that she has experienced no weight loss or gain in last 6 months  Depression Screening:   PHQ-2 Score: 0      Fall Risk Screening: In the past year, patient has experienced: no history of falling in past year      Urinary Incontinence Screening:   Patient has not leaked urine accidently in the last six months  Home Safety:  Patient has trouble with stairs inside or outside of their home  Patient has working smoke alarms and has working carbon monoxide detector  Home safety hazards include: none  Nutrition:   Current diet is Regular  Medications:   Patient is currently taking over-the-counter supplements  OTC medications include: see medication list  Patient is able to manage medications  Activities of Daily Living (ADLs)/Instrumental Activities of Daily Living (IADLs):   Walk and transfer into and out of bed and chair?: Yes  Dress and groom yourself?: Yes    Bathe or shower yourself?: Yes    Feed yourself? Yes  Do your laundry/housekeeping?: Yes  Manage your money, pay your bills and track your expenses?: Yes  Make your own meals?: Yes    Do your own shopping?: Yes    ADL comments: Family takes care of yardwork  Previous Hospitalizations:   Any hospitalizations or ED visits within the last 12 months?: No      Advance Care Planning:   Living will: Yes    Durable POA for healthcare:  Yes    Advanced directive: Yes    Five wishes given: No      Cognitive Screening:   Provider or family/friend/caregiver concerned regarding cognition?: No    PREVENTIVE SCREENINGS      Cardiovascular Screening:    General: Screening Not Indicated and History Lipid Disorder      Diabetes Screening:     General: Patient Declines      Colorectal Cancer Screening:     General: Screening Not Indicated      Breast Cancer Screening:     General: Patient Declines and Risks and Benefits Discussed    Due for: Mammogram        Cervical Cancer Screening:    General: Screening Not Indicated      Osteoporosis Screening:    General: Screening Current      Abdominal Aortic Aneurysm (AAA) Screening:        General: Screening Not Indicated      Lung Cancer Screening:     General: Screening Not Indicated      Hepatitis C Screening:    General: Screening Not Indicated      Mika Noriega MD

## 2020-11-24 DIAGNOSIS — F41.9 ANXIETY: ICD-10-CM

## 2020-11-24 RX ORDER — LORAZEPAM 1 MG/1
1 TABLET ORAL
Qty: 30 TABLET | Refills: 3 | Status: SHIPPED | OUTPATIENT
Start: 2020-11-24 | End: 2021-04-28 | Stop reason: SDUPTHER

## 2020-12-07 DIAGNOSIS — I10 ESSENTIAL HYPERTENSION: ICD-10-CM

## 2020-12-07 RX ORDER — ENALAPRIL MALEATE 2.5 MG/1
2.5 TABLET ORAL DAILY
Qty: 90 TABLET | Refills: 1 | Status: SHIPPED | OUTPATIENT
Start: 2020-12-07 | End: 2021-06-01 | Stop reason: SDUPTHER

## 2021-04-08 ENCOUNTER — TELEPHONE (OUTPATIENT)
Dept: FAMILY MEDICINE CLINIC | Facility: HOSPITAL | Age: 86
End: 2021-04-08

## 2021-04-08 DIAGNOSIS — N34.2 INFECTIVE URETHRITIS: Primary | ICD-10-CM

## 2021-04-08 RX ORDER — NITROFURANTOIN 25; 75 MG/1; MG/1
100 CAPSULE ORAL 2 TIMES DAILY
Qty: 10 CAPSULE | Refills: 0 | Status: SHIPPED | OUTPATIENT
Start: 2021-04-08 | End: 2021-04-13

## 2021-04-08 NOTE — TELEPHONE ENCOUNTER
Believes she has a UTI  Urinating frequently, urine has an odor  Asking if Dr Jose Miguel Ackerman will sent Dixie Thomas to pharmacy for her   PCB

## 2021-04-28 DIAGNOSIS — F41.9 ANXIETY: ICD-10-CM

## 2021-04-28 RX ORDER — LORAZEPAM 1 MG/1
1 TABLET ORAL
Qty: 30 TABLET | Refills: 3 | Status: SHIPPED | OUTPATIENT
Start: 2021-04-28 | End: 2021-09-02 | Stop reason: SDUPTHER

## 2021-06-01 DIAGNOSIS — I10 ESSENTIAL HYPERTENSION: ICD-10-CM

## 2021-06-01 RX ORDER — ENALAPRIL MALEATE 2.5 MG/1
2.5 TABLET ORAL DAILY
Qty: 90 TABLET | Refills: 1 | Status: SHIPPED | OUTPATIENT
Start: 2021-06-01 | End: 2021-11-08 | Stop reason: HOSPADM

## 2021-07-14 ENCOUNTER — OFFICE VISIT (OUTPATIENT)
Dept: FAMILY MEDICINE CLINIC | Facility: HOSPITAL | Age: 86
End: 2021-07-14
Payer: COMMERCIAL

## 2021-07-14 VITALS
HEIGHT: 58 IN | DIASTOLIC BLOOD PRESSURE: 82 MMHG | BODY MASS INDEX: 27.08 KG/M2 | WEIGHT: 129 LBS | HEART RATE: 77 BPM | SYSTOLIC BLOOD PRESSURE: 152 MMHG | TEMPERATURE: 98.2 F

## 2021-07-14 DIAGNOSIS — I10 ESSENTIAL HYPERTENSION: ICD-10-CM

## 2021-07-14 DIAGNOSIS — E78.2 MIXED HYPERLIPIDEMIA: ICD-10-CM

## 2021-07-14 DIAGNOSIS — Z00.00 MEDICARE ANNUAL WELLNESS VISIT, SUBSEQUENT: Primary | ICD-10-CM

## 2021-07-14 DIAGNOSIS — E03.9 ACQUIRED HYPOTHYROIDISM: ICD-10-CM

## 2021-07-14 PROCEDURE — 3288F FALL RISK ASSESSMENT DOCD: CPT | Performed by: FAMILY MEDICINE

## 2021-07-14 PROCEDURE — 99214 OFFICE O/P EST MOD 30 MIN: CPT | Performed by: FAMILY MEDICINE

## 2021-07-14 PROCEDURE — G0439 PPPS, SUBSEQ VISIT: HCPCS | Performed by: FAMILY MEDICINE

## 2021-07-14 NOTE — PATIENT INSTRUCTIONS
Medicare Preventive Visit Patient Instructions  Thank you for completing your Welcome to Medicare Visit or Medicare Annual Wellness Visit today  Your next wellness visit will be due in one year (7/15/2022)  The screening/preventive services that you may require over the next 5-10 years are detailed below  Some tests may not apply to you based off risk factors and/or age  Screening tests ordered at today's visit but not completed yet may show as past due  Also, please note that scanned in results may not display below  Preventive Screenings:  Service Recommendations Previous Testing/Comments   Colorectal Cancer Screening  * Colonoscopy    * Fecal Occult Blood Test (FOBT)/Fecal Immunochemical Test (FIT)  * Fecal DNA/Cologuard Test  * Flexible Sigmoidoscopy Age: 54-65 years old   Colonoscopy: every 10 years (may be performed more frequently if at higher risk)  OR  FOBT/FIT: every 1 year  OR  Cologuard: every 3 years  OR  Sigmoidoscopy: every 5 years  Screening may be recommended earlier than age 48 if at higher risk for colorectal cancer  Also, an individualized decision between you and your healthcare provider will decide whether screening between the ages of 74-80 would be appropriate  Colonoscopy: Not on file  FOBT/FIT: Not on file  Cologuard: Not on file  Sigmoidoscopy: Not on file    Screening Not Indicated     Breast Cancer Screening Age: 36 years old  Frequency: every 1-2 years  Not required if history of left and right mastectomy Mammogram: Not on file        Cervical Cancer Screening Between the ages of 21-29, pap smear recommended once every 3 years  Between the ages of 33-67, can perform pap smear with HPV co-testing every 5 years     Recommendations may differ for women with a history of total hysterectomy, cervical cancer, or abnormal pap smears in past  Pap Smear: Not on file    Screening Not Indicated   Hepatitis C Screening Once for adults born between 1945 and 1965  More frequently in patients at high risk for Hepatitis C Hep C Antibody: Not on file        Diabetes Screening 1-2 times per year if you're at risk for diabetes or have pre-diabetes Fasting glucose: No results in last 5 years   A1C: No results in last 5 years        Cholesterol Screening Once every 5 years if you don't have a lipid disorder  May order more often based on risk factors  Lipid panel: Not on file    Screening Not Indicated  History Lipid Disorder     Other Preventive Screenings Covered by Medicare:  1  Abdominal Aortic Aneurysm (AAA) Screening: covered once if your at risk  You're considered to be at risk if you have a family history of AAA  2  Lung Cancer Screening: covers low dose CT scan once per year if you meet all of the following conditions: (1) Age 50-69; (2) No signs or symptoms of lung cancer; (3) Current smoker or have quit smoking within the last 15 years; (4) You have a tobacco smoking history of at least 30 pack years (packs per day multiplied by number of years you smoked); (5) You get a written order from a healthcare provider  3  Glaucoma Screening: covered annually if you're considered high risk: (1) You have diabetes OR (2) Family history of glaucoma OR (3)  aged 48 and older OR (3)  American aged 72 and older  3  Osteoporosis Screening: covered every 2 years if you meet one of the following conditions: (1) You're estrogen deficient and at risk for osteoporosis based off medical history and other findings; (2) Have a vertebral abnormality; (3) On glucocorticoid therapy for more than 3 months; (4) Have primary hyperparathyroidism; (5) On osteoporosis medications and need to assess response to drug therapy  · Last bone density test (DXA Scan): Not on file  5  HIV Screening: covered annually if you're between the age of 12-76  Also covered annually if you are younger than 13 and older than 72 with risk factors for HIV infection   For pregnant patients, it is covered up to 3 times per pregnancy  Immunizations:  Immunization Recommendations   Influenza Vaccine Annual influenza vaccination during flu season is recommended for all persons aged >= 6 months who do not have contraindications   Pneumococcal Vaccine (Prevnar and Pneumovax)  * Prevnar = PCV13  * Pneumovax = PPSV23   Adults 25-60 years old: 1-3 doses may be recommended based on certain risk factors  Adults 72 years old: Prevnar (PCV13) vaccine recommended followed by Pneumovax (PPSV23) vaccine  If already received PPSV23 since turning 65, then PCV13 recommended at least one year after PPSV23 dose  Hepatitis B Vaccine 3 dose series if at intermediate or high risk (ex: diabetes, end stage renal disease, liver disease)   Tetanus (Td) Vaccine - COST NOT COVERED BY MEDICARE PART B Following completion of primary series, a booster dose should be given every 10 years to maintain immunity against tetanus  Td may also be given as tetanus wound prophylaxis  Tdap Vaccine - COST NOT COVERED BY MEDICARE PART B Recommended at least once for all adults  For pregnant patients, recommended with each pregnancy  Shingles Vaccine (Shingrix) - COST NOT COVERED BY MEDICARE PART B  2 shot series recommended in those aged 48 and above     Health Maintenance Due:      Topic Date Due    DXA SCAN  01/14/2022 (Originally 1934)     Immunizations Due:      Topic Date Due    COVID-19 Vaccine (1) Never done    Influenza Vaccine (1) 09/01/2021     Advance Directives   What are advance directives? Advance directives are legal documents that state your wishes and plans for medical care  These plans are made ahead of time in case you lose your ability to make decisions for yourself  Advance directives can apply to any medical decision, such as the treatments you want, and if you want to donate organs  What are the types of advance directives? There are many types of advance directives, and each state has rules about how to use them   You may choose a combination of any of the following:  · Living will: This is a written record of the treatment you want  You can also choose which treatments you do not want, which to limit, and which to stop at a certain time  This includes surgery, medicine, IV fluid, and tube feedings  · Durable power of  for healthcare [facility]): This is a written record that states who you want to make healthcare choices for you when you are unable to make them for yourself  This person, called a proxy, is usually a family member or a friend  You may choose more than 1 proxy  · Do not resuscitate (DNR) order:  A DNR order is used in case your heart stops beating or you stop breathing  It is a request not to have certain forms of treatment, such as CPR  A DNR order may be included in other types of advance directives  · Medical directive: This covers the care that you want if you are in a coma, near death, or unable to make decisions for yourself  You can list the treatments you want for each condition  Treatment may include pain medicine, surgery, blood transfusions, dialysis, IV or tube feedings, and a ventilator (breathing machine)  · Values history: This document has questions about your views, beliefs, and how you feel and think about life  This information can help others choose the care that you would choose  Why are advance directives important? An advance directive helps you control your care  Although spoken wishes may be used, it is better to have your wishes written down  Spoken wishes can be misunderstood, or not followed  Treatments may be given even if you do not want them  An advance directive may make it easier for your family to make difficult choices about your care  Weight Management   Why it is important to manage your weight:  Being overweight increases your risk of health conditions such as heart disease, high blood pressure, type 2 diabetes, and certain types of cancer   It can also increase your risk for osteoarthritis, sleep apnea, and other respiratory problems  Aim for a slow, steady weight loss  Even a small amount of weight loss can lower your risk of health problems  How to lose weight safely:  A safe and healthy way to lose weight is to eat fewer calories and get regular exercise  You can lose up about 1 pound a week by decreasing the number of calories you eat by 500 calories each day  Healthy meal plan for weight management:  A healthy meal plan includes a variety of foods, contains fewer calories, and helps you stay healthy  A healthy meal plan includes the following:  · Eat whole-grain foods more often  A healthy meal plan should contain fiber  Fiber is the part of grains, fruits, and vegetables that is not broken down by your body  Whole-grain foods are healthy and provide extra fiber in your diet  Some examples of whole-grain foods are whole-wheat breads and pastas, oatmeal, brown rice, and bulgur  · Eat a variety of vegetables every day  Include dark, leafy greens such as spinach, kale, bharti greens, and mustard greens  Eat yellow and orange vegetables such as carrots, sweet potatoes, and winter squash  · Eat a variety of fruits every day  Choose fresh or canned fruit (canned in its own juice or light syrup) instead of juice  Fruit juice has very little or no fiber  · Eat low-fat dairy foods  Drink fat-free (skim) milk or 1% milk  Eat fat-free yogurt and low-fat cottage cheese  Try low-fat cheeses such as mozzarella and other reduced-fat cheeses  · Choose meat and other protein foods that are low in fat  Choose beans or other legumes such as split peas or lentils  Choose fish, skinless poultry (chicken or turkey), or lean cuts of red meat (beef or pork)  Before you cook meat or poultry, cut off any visible fat  · Use less fat and oil  Try baking foods instead of frying them  Add less fat, such as margarine, sour cream, regular salad dressing and mayonnaise to foods   Eat fewer high-fat foods  Some examples of high-fat foods include french fries, doughnuts, ice cream, and cakes  · Eat fewer sweets  Limit foods and drinks that are high in sugar  This includes candy, cookies, regular soda, and sweetened drinks  Exercise:  Exercise at least 30 minutes per day on most days of the week  Some examples of exercise include walking, biking, dancing, and swimming  You can also fit in more physical activity by taking the stairs instead of the elevator or parking farther away from stores  Ask your healthcare provider about the best exercise plan for you  © Copyright [employer] 2018 Information is for End User's use only and may not be sold, redistributed or otherwise used for commercial purposes   All illustrations and images included in CareNotes® are the copyrighted property of A D A M [employer]  or [address] [place] [address]

## 2021-09-02 DIAGNOSIS — F41.9 ANXIETY: ICD-10-CM

## 2021-09-02 RX ORDER — LORAZEPAM 1 MG/1
1 TABLET ORAL
Qty: 30 TABLET | Refills: 3 | Status: ON HOLD | OUTPATIENT
Start: 2021-09-02 | End: 2022-01-20 | Stop reason: SDUPTHER

## 2021-11-02 ENCOUNTER — HOSPITAL ENCOUNTER (INPATIENT)
Facility: HOSPITAL | Age: 86
LOS: 6 days | Discharge: HOME HEALTH | DRG: 871 | End: 2021-11-08
Attending: EMERGENCY MEDICINE | Admitting: INTERNAL MEDICINE
Payer: COMMERCIAL

## 2021-11-02 ENCOUNTER — APPOINTMENT (EMERGENCY)
Dept: RADIOLOGY | Facility: HOSPITAL | Age: 86
DRG: 871 | End: 2021-11-02
Payer: COMMERCIAL

## 2021-11-02 DIAGNOSIS — I26.09 OTHER ACUTE PULMONARY EMBOLISM WITH ACUTE COR PULMONALE (HCC): ICD-10-CM

## 2021-11-02 DIAGNOSIS — I10 ESSENTIAL HYPERTENSION: Chronic | ICD-10-CM

## 2021-11-02 DIAGNOSIS — R77.8 ELEVATED TROPONIN: ICD-10-CM

## 2021-11-02 DIAGNOSIS — R65.20 SEVERE SEPSIS (HCC): ICD-10-CM

## 2021-11-02 DIAGNOSIS — A41.9 SEVERE SEPSIS (HCC): ICD-10-CM

## 2021-11-02 DIAGNOSIS — J18.9 COMMUNITY ACQUIRED PNEUMONIA OF RIGHT LOWER LOBE OF LUNG: Primary | ICD-10-CM

## 2021-11-02 DIAGNOSIS — I27.20 PULMONARY HTN (HCC): ICD-10-CM

## 2021-11-02 DIAGNOSIS — K44.9 HIATAL HERNIA: ICD-10-CM

## 2021-11-02 PROBLEM — E87.1 HYPONATREMIA: Status: ACTIVE | Noted: 2021-11-02

## 2021-11-02 PROBLEM — R79.89 ELEVATED TROPONIN: Status: ACTIVE | Noted: 2021-11-02

## 2021-11-02 LAB
ALBUMIN SERPL BCP-MCNC: 3 G/DL (ref 3.5–5)
ALP SERPL-CCNC: 89 U/L (ref 46–116)
ALT SERPL W P-5'-P-CCNC: 17 U/L (ref 12–78)
ANION GAP SERPL CALCULATED.3IONS-SCNC: 17 MMOL/L (ref 4–13)
APTT PPP: 32 SECONDS (ref 23–37)
AST SERPL W P-5'-P-CCNC: 37 U/L (ref 5–45)
ATRIAL RATE: 116 BPM
BASOPHILS # BLD AUTO: 0.02 THOUSANDS/ÂΜL (ref 0–0.1)
BASOPHILS NFR BLD AUTO: 0 % (ref 0–1)
BILIRUB SERPL-MCNC: 0.9 MG/DL (ref 0.2–1)
BUN SERPL-MCNC: 24 MG/DL (ref 5–25)
CALCIUM ALBUM COR SERPL-MCNC: 9.6 MG/DL (ref 8.3–10.1)
CALCIUM SERPL-MCNC: 8.8 MG/DL (ref 8.3–10.1)
CHLORIDE SERPL-SCNC: 94 MMOL/L (ref 100–108)
CO2 SERPL-SCNC: 18 MMOL/L (ref 21–32)
CREAT SERPL-MCNC: 1.17 MG/DL (ref 0.6–1.3)
EOSINOPHIL # BLD AUTO: 0 THOUSAND/ÂΜL (ref 0–0.61)
EOSINOPHIL NFR BLD AUTO: 0 % (ref 0–6)
ERYTHROCYTE [DISTWIDTH] IN BLOOD BY AUTOMATED COUNT: 14.1 % (ref 11.6–15.1)
FLUAV RNA RESP QL NAA+PROBE: NEGATIVE
FLUBV RNA RESP QL NAA+PROBE: NEGATIVE
GFR SERPL CREATININE-BSD FRML MDRD: 42 ML/MIN/1.73SQ M
GLUCOSE SERPL-MCNC: 140 MG/DL (ref 65–140)
HCT VFR BLD AUTO: 40.9 % (ref 34.8–46.1)
HGB BLD-MCNC: 13.3 G/DL (ref 11.5–15.4)
IMM GRANULOCYTES # BLD AUTO: 0.08 THOUSAND/UL (ref 0–0.2)
IMM GRANULOCYTES NFR BLD AUTO: 1 % (ref 0–2)
INR PPP: 1.08 (ref 0.84–1.19)
LACTATE SERPL-SCNC: 1.8 MMOL/L (ref 0.5–2)
LACTATE SERPL-SCNC: 3.1 MMOL/L (ref 0.5–2)
LYMPHOCYTES # BLD AUTO: 1.18 THOUSANDS/ÂΜL (ref 0.6–4.47)
LYMPHOCYTES NFR BLD AUTO: 8 % (ref 14–44)
MCH RBC QN AUTO: 29.4 PG (ref 26.8–34.3)
MCHC RBC AUTO-ENTMCNC: 32.5 G/DL (ref 31.4–37.4)
MCV RBC AUTO: 90 FL (ref 82–98)
MONOCYTES # BLD AUTO: 1.1 THOUSAND/ÂΜL (ref 0.17–1.22)
MONOCYTES NFR BLD AUTO: 7 % (ref 4–12)
NEUTROPHILS # BLD AUTO: 13.18 THOUSANDS/ÂΜL (ref 1.85–7.62)
NEUTS SEG NFR BLD AUTO: 84 % (ref 43–75)
NRBC BLD AUTO-RTO: 0 /100 WBCS
P AXIS: 37 DEGREES
PLATELET # BLD AUTO: 262 THOUSANDS/UL (ref 149–390)
PMV BLD AUTO: 10.4 FL (ref 8.9–12.7)
POTASSIUM SERPL-SCNC: 5.1 MMOL/L (ref 3.5–5.3)
PR INTERVAL: 124 MS
PROCALCITONIN SERPL-MCNC: 0.97 NG/ML
PROT SERPL-MCNC: 7.8 G/DL (ref 6.4–8.2)
PROTHROMBIN TIME: 13.9 SECONDS (ref 11.6–14.5)
QRS AXIS: 88 DEGREES
QRSD INTERVAL: 76 MS
QT INTERVAL: 314 MS
QTC INTERVAL: 436 MS
RBC # BLD AUTO: 4.53 MILLION/UL (ref 3.81–5.12)
RSV RNA RESP QL NAA+PROBE: NEGATIVE
SARS-COV-2 RNA RESP QL NAA+PROBE: NEGATIVE
SODIUM SERPL-SCNC: 129 MMOL/L (ref 136–145)
T WAVE AXIS: -12 DEGREES
TROPONIN I SERPL-MCNC: 1.39 NG/ML
TROPONIN I SERPL-MCNC: 1.58 NG/ML
VENTRICULAR RATE: 116 BPM
WBC # BLD AUTO: 15.56 THOUSAND/UL (ref 4.31–10.16)

## 2021-11-02 PROCEDURE — 85025 COMPLETE CBC W/AUTO DIFF WBC: CPT | Performed by: PHYSICIAN ASSISTANT

## 2021-11-02 PROCEDURE — 84484 ASSAY OF TROPONIN QUANT: CPT | Performed by: PHYSICIAN ASSISTANT

## 2021-11-02 PROCEDURE — 99285 EMERGENCY DEPT VISIT HI MDM: CPT

## 2021-11-02 PROCEDURE — 0241U HB NFCT DS VIR RESP RNA 4 TRGT: CPT | Performed by: PHYSICIAN ASSISTANT

## 2021-11-02 PROCEDURE — 99223 1ST HOSP IP/OBS HIGH 75: CPT | Performed by: INTERNAL MEDICINE

## 2021-11-02 PROCEDURE — 85610 PROTHROMBIN TIME: CPT | Performed by: PHYSICIAN ASSISTANT

## 2021-11-02 PROCEDURE — 96365 THER/PROPH/DIAG IV INF INIT: CPT

## 2021-11-02 PROCEDURE — 87040 BLOOD CULTURE FOR BACTERIA: CPT | Performed by: PHYSICIAN ASSISTANT

## 2021-11-02 PROCEDURE — 85730 THROMBOPLASTIN TIME PARTIAL: CPT | Performed by: PHYSICIAN ASSISTANT

## 2021-11-02 PROCEDURE — 80053 COMPREHEN METABOLIC PANEL: CPT | Performed by: PHYSICIAN ASSISTANT

## 2021-11-02 PROCEDURE — 36415 COLL VENOUS BLD VENIPUNCTURE: CPT | Performed by: PHYSICIAN ASSISTANT

## 2021-11-02 PROCEDURE — 84145 PROCALCITONIN (PCT): CPT | Performed by: PHYSICIAN ASSISTANT

## 2021-11-02 PROCEDURE — 71045 X-RAY EXAM CHEST 1 VIEW: CPT

## 2021-11-02 PROCEDURE — 99285 EMERGENCY DEPT VISIT HI MDM: CPT | Performed by: PHYSICIAN ASSISTANT

## 2021-11-02 PROCEDURE — 93010 ELECTROCARDIOGRAM REPORT: CPT | Performed by: INTERNAL MEDICINE

## 2021-11-02 PROCEDURE — 83605 ASSAY OF LACTIC ACID: CPT | Performed by: PHYSICIAN ASSISTANT

## 2021-11-02 PROCEDURE — 93005 ELECTROCARDIOGRAM TRACING: CPT

## 2021-11-02 RX ORDER — ONDANSETRON 2 MG/ML
4 INJECTION INTRAMUSCULAR; INTRAVENOUS EVERY 6 HOURS PRN
Status: DISCONTINUED | OUTPATIENT
Start: 2021-11-02 | End: 2021-11-08 | Stop reason: HOSPADM

## 2021-11-02 RX ORDER — ACETAMINOPHEN 325 MG/1
650 TABLET ORAL ONCE
Status: COMPLETED | OUTPATIENT
Start: 2021-11-02 | End: 2021-11-02

## 2021-11-02 RX ORDER — DOXYCYCLINE HYCLATE 100 MG/1
100 CAPSULE ORAL EVERY 12 HOURS SCHEDULED
Status: DISCONTINUED | OUTPATIENT
Start: 2021-11-02 | End: 2021-11-06

## 2021-11-02 RX ORDER — HEPARIN SODIUM 5000 [USP'U]/ML
5000 INJECTION, SOLUTION INTRAVENOUS; SUBCUTANEOUS EVERY 8 HOURS SCHEDULED
Status: DISCONTINUED | OUTPATIENT
Start: 2021-11-02 | End: 2021-11-04

## 2021-11-02 RX ORDER — CEFTRIAXONE 2 G/50ML
2000 INJECTION, SOLUTION INTRAVENOUS EVERY 24 HOURS
Status: DISCONTINUED | OUTPATIENT
Start: 2021-11-03 | End: 2021-11-07

## 2021-11-02 RX ORDER — SODIUM CHLORIDE 9 MG/ML
75 INJECTION, SOLUTION INTRAVENOUS CONTINUOUS
Status: DISCONTINUED | OUTPATIENT
Start: 2021-11-02 | End: 2021-11-04

## 2021-11-02 RX ORDER — LORAZEPAM 1 MG/1
1 TABLET ORAL
Status: DISCONTINUED | OUTPATIENT
Start: 2021-11-02 | End: 2021-11-08 | Stop reason: HOSPADM

## 2021-11-02 RX ORDER — ACETAMINOPHEN 325 MG/1
650 TABLET ORAL EVERY 6 HOURS PRN
Status: DISCONTINUED | OUTPATIENT
Start: 2021-11-02 | End: 2021-11-08 | Stop reason: HOSPADM

## 2021-11-02 RX ORDER — CEFTRIAXONE 2 G/50ML
2000 INJECTION, SOLUTION INTRAVENOUS ONCE
Status: COMPLETED | OUTPATIENT
Start: 2021-11-02 | End: 2021-11-02

## 2021-11-02 RX ADMIN — ACETAMINOPHEN 650 MG: 325 TABLET, FILM COATED ORAL at 18:18

## 2021-11-02 RX ADMIN — METOPROLOL TARTRATE 25 MG: 25 TABLET, FILM COATED ORAL at 20:59

## 2021-11-02 RX ADMIN — SODIUM CHLORIDE 100 ML/HR: 0.9 INJECTION, SOLUTION INTRAVENOUS at 20:59

## 2021-11-02 RX ADMIN — SODIUM CHLORIDE 500 ML: 0.9 INJECTION, SOLUTION INTRAVENOUS at 15:26

## 2021-11-02 RX ADMIN — LORAZEPAM 1 MG: 1 TABLET ORAL at 21:00

## 2021-11-02 RX ADMIN — HEPARIN SODIUM 5000 UNITS: 5000 INJECTION INTRAVENOUS; SUBCUTANEOUS at 21:04

## 2021-11-02 RX ADMIN — CEFTRIAXONE 2000 MG: 2 INJECTION, SOLUTION INTRAVENOUS at 15:41

## 2021-11-02 NOTE — ASSESSMENT & PLAN NOTE
Patient has elevated troponin 1 39  This is most likely due to non MI troponin elevation due to severe sepsis due to pneumonia    Patient denies chest pain and EKG shows no acute ischemia

## 2021-11-02 NOTE — ASSESSMENT & PLAN NOTE
Patient has generalized anxiety disorder and she takes lorazepam in the evening    Will continue the same

## 2021-11-02 NOTE — ASSESSMENT & PLAN NOTE
Patient has chronic hypertension  Will hold enalapril, her potassium is 5 1  I gave her Lopressor 25 b i d

## 2021-11-02 NOTE — ASSESSMENT & PLAN NOTE
Patient meets criteria for severe sepsis as evidenced by her 124, respiratory 24, leukocytosis of 15,000, lactic acid level 3 1      Most likely source is right lower lobe pneumonia    Will follow-up on blood cultures  I placed patient on IV Rocephin and doxycycline  Will hydrate patient with normal saline solution  Patient requires more than 2 midnights hospital stay  Patient is level 3 DNR  I discussed the case with patient's sons at the bedside in detail

## 2021-11-02 NOTE — ED PROVIDER NOTES
History  Chief Complaint   Patient presents with   • Shortness of Breath     patient presents to the ED with c/o fever, SOB, cough, back pain begining around sunday  +n/+v     81 yo female w/ hx of HTN, hypothyroidism presents for evaluation of fever, low back pain and SOB x 2-3 days  SOB dramatically worsened in past 24 hours  No back trauma  Denies fever  Did received her COVID vaccine  Denies chest pain, abd pain, N/V/D, leg swelling or rashes  No ill contacts  Prior to Admission Medications   Prescriptions Last Dose Informant Patient Reported? Taking? LORazepam (ATIVAN) 1 mg tablet   No No   Sig: Take 1 tablet (1 mg total) by mouth daily at bedtime   enalapril (VASOTEC) 2 5 mg tablet   No No   Sig: Take 1 tablet (2 5 mg total) by mouth daily      Facility-Administered Medications: None       Past Medical History:   Diagnosis Date   • Anxiety    • Disease of thyroid gland    • Hypertension        Past Surgical History:   Procedure Laterality Date   • CATARACT EXTRACTION Right    • FOOT SURGERY     • JOINT REPLACEMENT         History reviewed  No pertinent family history  I have reviewed and agree with the history as documented  E-Cigarette/Vaping   • E-Cigarette Use Never User      E-Cigarette/Vaping Substances     Social History     Tobacco Use   • Smoking status: Never Smoker   • Smokeless tobacco: Never Used   Vaping Use   • Vaping Use: Never used   Substance Use Topics   • Alcohol use: No   • Drug use: No       Review of Systems   Constitutional: Positive for fatigue  Negative for chills, diaphoresis and fever  Eyes: Negative for visual disturbance  Respiratory: Positive for shortness of breath  Negative for cough  Cardiovascular: Negative for chest pain and palpitations  Gastrointestinal: Negative for abdominal pain, diarrhea, nausea and vomiting  Genitourinary: Negative for dysuria, flank pain and frequency  Musculoskeletal: Positive for back pain   Negative for arthralgias and myalgias  Skin: Negative for color change, rash and wound  Allergic/Immunologic: Negative for immunocompromised state  Neurological: Negative for dizziness and light-headedness  Hematological: Does not bruise/bleed easily  Psychiatric/Behavioral: Negative for confusion  The patient is not nervous/anxious  Physical Exam  Physical Exam  Vitals reviewed  Constitutional:       General: She is not in acute distress  Appearance: She is well-developed  She is not diaphoretic  HENT:      Head: Normocephalic and atraumatic  Mouth/Throat:      Mouth: Mucous membranes are dry  Eyes:      General: No scleral icterus  Pupils: Pupils are equal, round, and reactive to light  Neck:      Vascular: No JVD  Cardiovascular:      Rate and Rhythm: Regular rhythm  Tachycardia present  Heart sounds: No murmur heard  No friction rub  No gallop  Pulmonary:      Effort: Tachypnea present  No accessory muscle usage, respiratory distress or retractions  Breath sounds: No decreased air movement  Examination of the right-lower field reveals decreased breath sounds  Decreased breath sounds present  No wheezing or rales  Abdominal:      General: Bowel sounds are normal  There is no distension  Palpations: Abdomen is soft  There is no mass  Tenderness: There is no abdominal tenderness  There is no guarding or rebound  Musculoskeletal:      Right lower leg: No edema  Left lower leg: No edema  Skin:     General: Skin is warm and dry  Capillary Refill: Capillary refill takes less than 2 seconds  Coloration: Skin is not pale  Neurological:      Mental Status: She is alert and oriented to person, place, and time  Cranial Nerves: No cranial nerve deficit     Psychiatric:         Mood and Affect: Mood normal          Behavior: Behavior normal          Vital Signs  ED Triage Vitals   Temperature Pulse Respirations Blood Pressure SpO2   11/02/21 1455 11/02/21 1455 11/02/21 1455 11/02/21 1459 11/02/21 1455   99 3 °F (37 4 °C) (!) 124 (!) 24 (!) 174/100 91 %      Temp Source Heart Rate Source Patient Position - Orthostatic VS BP Location FiO2 (%)   11/02/21 1455 11/02/21 1455 -- -- --   Oral Monitor         Pain Score       --                  Vitals:    11/02/21 1455 11/02/21 1459   BP:  (!) 174/100   Pulse: (!) 124          Visual Acuity      ED Medications  Medications   acetaminophen (TYLENOL) tablet 650 mg (has no administration in time range)   sodium chloride 0 9 % bolus 500 mL (500 mL Intravenous New Bag 11/2/21 1526)   cefTRIAXone (ROCEPHIN) IVPB (premix in dextrose) 2,000 mg 50 mL (2,000 mg Intravenous New Bag 11/2/21 1541)       Diagnostic Studies  Results Reviewed     Procedure Component Value Units Date/Time    Troponin I [[id number]]     Lab Status: No result Specimen: Blood     COVID19, Influenza A/B, RSV PCR, [facility] - 2 hour STAT [[id number]]  (Normal) Collected: 11/02/21 1524    Lab Status: Final result Specimen: Nares from Nose Updated: 11/02/21 1609     SARS-CoV-2 Negative     INFLUENZA A PCR Negative     INFLUENZA B PCR Negative     RSV PCR Negative    Narrative:      FOR PEDIATRIC PATIENTS - copy/paste COVID Guidelines URL to browser: [email or web address]     This test has been authorized by FDA under an EUA (Emergency Use Assay) for use by authorized laboratories  Clinical caution and judgement should be used with the interpretation of these results with consideration of the clinical impression and other laboratory testing  Testing reported as "Positive" or "Negative" has been proven to be accurate according to standard laboratory validation requirements  All testing is performed with control materials showing appropriate reactivity at standard intervals      Lactic acid [[id number]]  (Abnormal) Collected: 11/02/21 1523    Lab Status: Final result Specimen: Blood from Arm, Right Updated: 11/02/21 1605 LACTIC ACID 3 1 mmol/L     Narrative:      Result may be elevated if tourniquet was used during collection      Lactic acid 2 Hours [[id number]]     Lab Status: No result Specimen: Blood     Comprehensive metabolic panel [[id number]]  (Abnormal) Collected: 11/02/21 1523    Lab Status: Final result Specimen: Blood from Arm, Right Updated: 11/02/21 1602     Sodium 129 mmol/L      Potassium 5 1 mmol/L      Chloride 94 mmol/L      CO2 18 mmol/L      ANION GAP 17 mmol/L      BUN 24 mg/dL      Creatinine 1 17 mg/dL      Glucose 140 mg/dL      Calcium 8 8 mg/dL      Corrected Calcium 9 6 mg/dL      AST 37 U/L      ALT 17 U/L      Alkaline Phosphatase 89 U/L      Total Protein 7 8 g/dL      Albumin 3 0 g/dL      Total Bilirubin 0 90 mg/dL      eGFR 42 ml/min/1 73sq m     Narrative:      [facility] guidelines for Chronic Kidney Disease (CKD):   •  Stage 1 with normal or high GFR (GFR > 90 mL/min/1 73 square meters)  •  Stage 2 Mild CKD (GFR = 60-89 mL/min/1 73 square meters)  •  Stage 3A Moderate CKD (GFR = 45-59 mL/min/1 73 square meters)  •  Stage 3B Moderate CKD (GFR = 30-44 mL/min/1 73 square meters)  •  Stage 4 Severe CKD (GFR = 15-29 mL/min/1 73 square meters)  •  Stage 5 End Stage CKD (GFR <15 mL/min/1 73 square meters)  Note: GFR calculation is accurate only with a steady state creatinine    Troponin I [[id number]]  (Abnormal) Collected: 11/02/21 1523    Lab Status: Final result Specimen: Blood from Arm, Right Updated: 11/02/21 1602     Troponin I 1 39 ng/mL     Protime-INR [[id number]]  (Normal) Collected: 11/02/21 1523    Lab Status: Final result Specimen: Blood from Arm, Right Updated: 11/02/21 1601     Protime 13 9 seconds      INR 1 08    APTT [[id number]]  (Normal) Collected: 11/02/21 1523    Lab Status: Final result Specimen: Blood from Arm, Right Updated: 11/02/21 1601     PTT 32 seconds     CBC and differential [[id number]]  (Abnormal) Collected: 11/02/21 1523    Lab Status: Final result Specimen: Blood from Arm, Right Updated: 11/02/21 1536     WBC 15 56 Thousand/uL      RBC 4 53 Million/uL      Hemoglobin 13 3 g/dL      Hematocrit 40 9 %      MCV 90 fL      MCH 29 4 pg      MCHC 32 5 g/dL      RDW 14 1 %      MPV 10 4 fL      Platelets 460 Thousands/uL      nRBC 0 /100 WBCs      Neutrophils Relative 84 %      Immat GRANS % 1 %      Lymphocytes Relative 8 %      Monocytes Relative 7 %      Eosinophils Relative 0 %      Basophils Relative 0 %      Neutrophils Absolute 13 18 Thousands/µL      Immature Grans Absolute 0 08 Thousand/uL      Lymphocytes Absolute 1 18 Thousands/µL      Monocytes Absolute 1 10 Thousand/µL      Eosinophils Absolute 0 00 Thousand/µL      Basophils Absolute 0 02 Thousands/µL     Procalcitonin with AM Reflex [[id number]] Collected: 11/02/21 1523    Lab Status: In process Specimen: Blood from Arm, Right Updated: 11/02/21 1531    Blood culture #1 [[id number]] Collected: 11/02/21 1523    Lab Status: In process Specimen: Blood from Arm, Left Updated: 11/02/21 1531    Blood culture #2 [[id number]] Collected: 11/02/21 1523    Lab Status: In process Specimen: Blood from Arm, Right Updated: 11/02/21 1531    UA w Reflex to Microscopic w Reflex to Culture [[id number]]     Lab Status: No result Specimen: Urine                  XR chest portable - 1 view   ED Interpretation by Geovani Kumar PA-C (11/02 1517)   RLL infiltrate with associated effusion                 Procedures  Procedures         ED Course  ED Course as of Nov 02 1644   Tue Nov 02, 2021   1608 Likely on the basis of hypoxemia and tachycardia, patient has no chest pain or ST elevations   Troponin I(!): 1 39                         Initial Sepsis Screening     Row Name 11/02/21 1607                Is the patient's history suggestive of a new or worsening infection?   (!) Yes (Proceed)  -DK        Suspected source of infection  pneumonia  -DK        Are two or more of the following signs & symptoms of infection both present and new to the patient? (!) Yes (Proceed)  -DK        Indicate SIRS criteria  Tachycardia > 90 bpm;Tachypnea > 20 resp per min;Leukocytosis (WBC > 89650 IJL)  -DK        If the answer is yes to both questions, suspicion of sepsis is present  --        If severe sepsis is present AND tissue hypoperfusion perists in the hour after fluid resuscitation or lactate > 4, the patient meets criteria for SEPTIC SHOCK  --        Are any of the following organ dysfunction criteria present within 6 hours of suspected infection and SIRS criteria that are NOT considered to be chronic conditions? (!) Yes  -DK        Organ dysfunction  Lactate > 2 0 mmol/L  -DK        Date of presentation of severe sepsis  11/02/21  -DK        Time of presentation of severe sepsis  --        Tissue hypoperfusion persists in the hour after crystalloid fluid administration, evidenced, by either:  --        Was hypotension present within one hour of the conclusion of crystalloid fluid administration? No  -DK        Date of presentation of septic shock  --        Time of presentation of septic shock  --          User Key  (r) = Recorded By, (t) = Taken By, (c) = Cosigned By    [facility] [address] Name Provider Type    TORIE Deal PA-C Physician Assistant                        MDM  Number of Diagnoses or Management Options  Community acquired pneumonia of right lower lobe of lung: new and requires workup  Elevated troponin: new and requires workup  Severe sepsis [facility]): new and requires workup  Diagnosis management comments:  80 year presents with shortness of breath and general weakness secondary to right lower lobe community-acquired pneumonia  COVID-19 is negative    She will be admitted to Medicine for ongoing IV antibiotics fluid resuscitation given severe sepsis criteria with lactic acidosis and elevated troponin       Amount and/or Complexity of Data Reviewed  Clinical lab tests: ordered and reviewed  Tests in the radiology section of CPT®: ordered and reviewed  Tests in the medicine section of CPT®: ordered and reviewed  Review and summarize past medical records: yes  Independent visualization of images, tracings, or specimens: yes        Disposition  Final diagnoses:   Community acquired pneumonia of right lower lobe of lung   Severe sepsis (HCC)   Elevated troponin     Time reflects when diagnosis was documented in both MDM as applicable and the Disposition within this note     Time User Action Codes Description Comment    11/2/2021  4:15 PM Marthenia Furnish Add [J18 9] Community acquired pneumonia of right lower lobe of lung     11/2/2021  4:15 PM Marthenia Furnish Add [A41 9,  R65 20] Severe sepsis ([address] )     11/2/2021  4:15 PM Marthenia Furnish Add [R77 8] Elevated troponin       ED Disposition     ED Disposition Condition Date/Time Comment    Admit Stable Tue Nov 2, 2021  4:15 PM Case was discussed with Dr Santo Berumen and the patient's admission status was agreed to be Admission Status: inpatient status to the service of Dr Santo Berumen   Follow-up Information    None         Patient's Medications   Discharge Prescriptions    No medications on file     No discharge procedures on file      PDMP Review       Value Time User    PDMP Reviewed  Yes 9/2/2021  2:55 PM Marita Resendiz MD          ED Provider  Electronically Signed by           Fariha Strong PA-C  11/02/21 2111

## 2021-11-02 NOTE — ASSESSMENT & PLAN NOTE
Patient has hyponatremia admission was sodium 129  She appears to be dehydrated    Will hydrate with normal saline solution will follow sodium

## 2021-11-02 NOTE — H&P
[facility]  H&P- Nilson More 1934, 80 y o  female MRN: [id number]  Unit/Bed#: ED 02 Encounter: [id number]  Primary Care Provider: Berlin Powers MD   Date and time admitted to hospital: 11/2/2021  2:47 PM    Severe sepsis [facility])  Assessment & Plan  Patient meets criteria for severe sepsis as evidenced by her 124, respiratory 24, leukocytosis of 15,000, lactic acid level 3 1  Most likely source is right lower lobe pneumonia    Will follow-up on blood cultures  I placed patient on IV Rocephin and doxycycline  Will hydrate patient with normal saline solution  Patient requires more than 2 midnights hospital stay  Patient is level 3 DNR  I discussed the case with patient's sons at the bedside in detail    Pneumonia  Assessment & Plan  Patient presented with the chills, shortness of breath worsening with exertion fevers at home an dry cough  Chest x-ray showed new right lower lobe infiltrate  Suspect patient has right-sided pneumonia  She denies any signs of aspiration or dysphagia    Will treat her with IV Rocephin and doxycycline oxygen    Elevated troponin  Assessment & Plan  Patient has elevated troponin 1 39  This is most likely due to non MI troponin elevation due to severe sepsis due to pneumonia  Patient denies chest pain and EKG shows no acute ischemia    Hyponatremia  Assessment & Plan  Patient has hyponatremia admission was sodium 129  She appears to be dehydrated  Will hydrate with normal saline solution will follow sodium      Essential hypertension  Assessment & Plan  Patient has chronic hypertension  Will hold enalapril, her potassium is 5 1  I gave her Lopressor 25 b i d  Generalized anxiety disorder  Assessment & Plan  Patient has generalized anxiety disorder and she takes lorazepam in the evening    Will continue the same    VTE Prophylaxis: ordered    Code Status: as above    Anticipated Length of Stay: as above    Justification for Hospital Stay: see assessment and plan        Chief Complaint:   Shortness of breath    History of Present Illness:    Arley Hogue is a 80 y o  female who presents with above chief compaint  Patient was in her usual state of health until Sunday when she developed low back pain, chills, fever, shortness of breath worsening with exertion, dry cough  She had nausea and vomited once today  She was brought to the ER because of her worsening shortness of breath with exertion  Without questioned her regarding her back pain she told me that her back pain is gone after placing a pillow behind her back  She denies any dysphagia, odynophagia, coughing or choking on swallowing  She had subjective fevers at home but she does not have a thermometer she did not take her temperature  She also had chills  She denies any productive cough or hemoptysis  She has no chest pain or pleurisy  She was getting shortness of breath with the slightest of physical activity at home  Denies weight gain or increasing lower extremity edema  Review of Systems:    Review of Systems   Constitutional: Positive for appetite change (She lost her appetite the last 2 days and barely ate anything), chills and fever  Respiratory: Positive for cough and shortness of breath  Cardiovascular: Negative for chest pain, palpitations and leg swelling  Gastrointestinal: Positive for nausea and vomiting  Negative for abdominal pain, blood in stool and diarrhea  Genitourinary: Negative for dysuria, frequency, hematuria and vaginal bleeding  Musculoskeletal: Positive for back pain ( resolved by the time I saw her in the ER)  Skin: Negative for rash  Neurological: Negative for headaches  Psychiatric/Behavioral: The patient is nervous/anxious          Past Medical and Surgical History:     Past Medical History:   Diagnosis Date   • Anxiety    • Disease of thyroid gland    • Hypertension        Past Surgical History:   Procedure Laterality Date   • CATARACT EXTRACTION Right    • FOOT SURGERY     • JOINT REPLACEMENT         Meds/Allergies:    Prior to Admission Medications   Prescriptions Last Dose Informant Patient Reported? Taking? LORazepam (ATIVAN) 1 mg tablet   No No   Sig: Take 1 tablet (1 mg total) by mouth daily at bedtime   enalapril (VASOTEC) 2 5 mg tablet   No No   Sig: Take 1 tablet (2 5 mg total) by mouth daily      Facility-Administered Medications: None       Allergies: Allergies   Allergen Reactions   • Preservision Areds [B-Plex Plus] Tremor   • Tramadol      Reaction Date: 22Aug2011;        Social History:     Social History     Substance and Sexual Activity   Alcohol Use No     Social History     Tobacco Use   Smoking Status Never Smoker   Smokeless Tobacco Never Used     Social History     Substance and Sexual Activity   Drug Use No       Family History:    Family History   Problem Relation Age of Onset   • No Known Problems Mother    • Stroke Father        Physical Exam:     Vitals:   Blood Pressure: (!) 174/100 (11/02/21 1459)  Pulse: (!) 124 (11/02/21 1455)  Temperature: 99 3 °F (37 4 °C) (11/02/21 1455)  Temp Source: Oral (11/02/21 1455)  Respirations: (!) 24 (11/02/21 1455)  SpO2: 91 % (11/02/21 1455)    Physical Exam  HENT:      Mouth/Throat:      Mouth: Mucous membranes are dry  Pharynx: No oropharyngeal exudate  Eyes:      Conjunctiva/sclera: Conjunctivae normal    Cardiovascular:      Rate and Rhythm: Regular rhythm  Tachycardia present  Comments: Sinus tachycardia on the EKG  Pulmonary:      Effort: Respiratory distress (Has conversational dyspnea) present  Breath sounds: Rales ( scant crackles are heard bilaterally posteriorly) present  No wheezing  Abdominal:      General: There is no distension  Palpations: Abdomen is soft  Tenderness: There is no abdominal tenderness  There is no guarding     Musculoskeletal:         General: No tenderness ( patient had no tenderness on palpation of the spinous processes  She had no paraspinal tenderness)  Skin:     General: Skin is warm and dry  Comments: Patient no lower extremity edema   Neurological:      Mental Status: She is alert  Comments: She has no facial droop, tongue is midline, extremity strength is 5 5 bilaterally equal, speech is normal   Psychiatric:      Comments: She is anxious looking             Additional Data:     Lab Results: I personally reviewed them    Results from last 7 days   Lab Units 11/02/21  1523   WBC Thousand/uL 15 56*   HEMOGLOBIN g/dL 13 3   HEMATOCRIT % 40 9   PLATELETS Thousands/uL 262   NEUTROS PCT % 84*   LYMPHS PCT % 8*   MONOS PCT % 7   EOS PCT % 0     Results from last 7 days   Lab Units 11/02/21  1523   POTASSIUM mmol/L 5 1   CHLORIDE mmol/L 94*   CO2 mmol/L 18*   BUN mg/dL 24   CREATININE mg/dL 1 17   CALCIUM mg/dL 8 8   ALK PHOS U/L 89   ALT U/L 17   AST U/L 37     Results from last 7 days   Lab Units 11/02/21  1523   INR  1 08       Imaging: I personally reviewed them    XR chest portable - 1 view   ED Interpretation by Melly Brooke PA-C (11/02 1517)   RLL infiltrate with associated effusion      Final Result by Kanika Saini MD (11/02 1714)      New lower lung opacity concerning for pneumonia  New right pleural effusion  Large hiatal hernia  Workstation performed: [id number]             EKG : I personally reviewed      Ton Ortega MD    ** Please Note: This note has been constructed using a voice recognition system   **

## 2021-11-02 NOTE — ASSESSMENT & PLAN NOTE
Patient presented with the chills, shortness of breath worsening with exertion fevers at home an dry cough  Chest x-ray showed new right lower lobe infiltrate  Suspect patient has right-sided pneumonia    She denies any signs of aspiration or dysphagia    Will treat her with IV Rocephin and doxycycline oxygen

## 2021-11-03 ENCOUNTER — APPOINTMENT (INPATIENT)
Dept: NON INVASIVE DIAGNOSTICS | Facility: HOSPITAL | Age: 86
DRG: 871 | End: 2021-11-03
Payer: COMMERCIAL

## 2021-11-03 LAB
ALBUMIN SERPL BCP-MCNC: 2.3 G/DL (ref 3.5–5)
ALP SERPL-CCNC: 69 U/L (ref 46–116)
ALT SERPL W P-5'-P-CCNC: 13 U/L (ref 12–78)
ANION GAP SERPL CALCULATED.3IONS-SCNC: 13 MMOL/L (ref 4–13)
AORTIC ROOT: 2.6 CM
APICAL FOUR CHAMBER EJECTION FRACTION: 66 %
ASCENDING AORTA: 3.1 CM
AST SERPL W P-5'-P-CCNC: 19 U/L (ref 5–45)
BACTERIA UR QL AUTO: ABNORMAL /HPF
BILIRUB SERPL-MCNC: 0.5 MG/DL (ref 0.2–1)
BILIRUB UR QL STRIP: NEGATIVE
BUN SERPL-MCNC: 26 MG/DL (ref 5–25)
CALCIUM ALBUM COR SERPL-MCNC: 9.5 MG/DL (ref 8.3–10.1)
CALCIUM SERPL-MCNC: 8.1 MG/DL (ref 8.3–10.1)
CHLORIDE SERPL-SCNC: 102 MMOL/L (ref 100–108)
CLARITY UR: CLEAR
CO2 SERPL-SCNC: 20 MMOL/L (ref 21–32)
COLOR UR: YELLOW
CREAT SERPL-MCNC: 0.98 MG/DL (ref 0.6–1.3)
DOP CALC LVOT AREA: 3.14 CM2
DOP CALC LVOT DIAMETER: 2 CM
E WAVE DECELERATION TIME: 252 MS
ERYTHROCYTE [DISTWIDTH] IN BLOOD BY AUTOMATED COUNT: 14.5 % (ref 11.6–15.1)
FRACTIONAL SHORTENING: 47 % (ref 28–44)
GFR SERPL CREATININE-BSD FRML MDRD: 52 ML/MIN/1.73SQ M
GLUCOSE SERPL-MCNC: 104 MG/DL (ref 65–140)
GLUCOSE UR STRIP-MCNC: NEGATIVE MG/DL
HCT VFR BLD AUTO: 35.7 % (ref 34.8–46.1)
HGB BLD-MCNC: 11.5 G/DL (ref 11.5–15.4)
HGB UR QL STRIP.AUTO: ABNORMAL
INTERVENTRICULAR SEPTUM IN DIASTOLE (PARASTERNAL SHORT AXIS VIEW): 1.3 CM
KETONES UR STRIP-MCNC: ABNORMAL MG/DL
LEFT INTERNAL DIMENSION IN SYSTOLE: 1.9 CM (ref 2.1–4)
LEFT VENTRICULAR INTERNAL DIMENSION IN DIASTOLE: 3.6 CM (ref 3.7–5.51)
LEFT VENTRICULAR POSTERIOR WALL IN END DIASTOLE: 1.2 CM
LEFT VENTRICULAR STROKE VOLUME: 43 ML
LEUKOCYTE ESTERASE UR QL STRIP: NEGATIVE
MAGNESIUM SERPL-MCNC: 2.3 MG/DL (ref 1.6–2.6)
MCH RBC QN AUTO: 29.1 PG (ref 26.8–34.3)
MCHC RBC AUTO-ENTMCNC: 32.2 G/DL (ref 31.4–37.4)
MCV RBC AUTO: 90 FL (ref 82–98)
MUCOUS THREADS UR QL AUTO: ABNORMAL
MV E'TISSUE VEL-SEP: 6 CM/S
MV PEAK A VEL: 1.03 M/S
MV PEAK E VEL: 67 CM/S
MV STENOSIS PRESSURE HALF TIME: 0 MS
NITRITE UR QL STRIP: NEGATIVE
NON-SQ EPI CELLS URNS QL MICRO: ABNORMAL /HPF
PA SYSTOLIC PRESSURE: 60 MMHG
PH UR STRIP.AUTO: 5.5 [PH]
PLATELET # BLD AUTO: 226 THOUSANDS/UL (ref 149–390)
PMV BLD AUTO: 10.2 FL (ref 8.9–12.7)
POTASSIUM SERPL-SCNC: 4.1 MMOL/L (ref 3.5–5.3)
PROCALCITONIN SERPL-MCNC: 1.07 NG/ML
PROT SERPL-MCNC: 6 G/DL (ref 6.4–8.2)
PROT UR STRIP-MCNC: ABNORMAL MG/DL
RBC # BLD AUTO: 3.95 MILLION/UL (ref 3.81–5.12)
RBC #/AREA URNS AUTO: ABNORMAL /HPF
RIGHT VENTRICLE ID DIMENSION: 3.4 CM
SL CV LV EF: 55
SL CV PED ECHO LEFT VENTRICLE DIASTOLIC VOLUME (MOD BIPLANE) 2D: 55 ML
SL CV PED ECHO LEFT VENTRICLE SYSTOLIC VOLUME (MOD BIPLANE) 2D: 12 ML
SODIUM SERPL-SCNC: 135 MMOL/L (ref 136–145)
SP GR UR STRIP.AUTO: 1.02 (ref 1–1.03)
TR PEAK VELOCITY: 3.6 M/S
TRICUSPID VALVE PEAK REGURGITATION VELOCITY: 3.56 M/S
TRICUSPID VALVE S': 0.7 CM/S
TV PEAK GRADIENT: 51 MMHG
UROBILINOGEN UR QL STRIP.AUTO: 0.2 E.U./DL
WBC # BLD AUTO: 11.18 THOUSAND/UL (ref 4.31–10.16)
WBC #/AREA URNS AUTO: ABNORMAL /HPF
Z-SCORE OF LEFT VENTRICULAR DIMENSION IN END SYSTOLE: -2.26

## 2021-11-03 PROCEDURE — 80053 COMPREHEN METABOLIC PANEL: CPT | Performed by: INTERNAL MEDICINE

## 2021-11-03 PROCEDURE — 93005 ELECTROCARDIOGRAM TRACING: CPT

## 2021-11-03 PROCEDURE — 97163 PT EVAL HIGH COMPLEX 45 MIN: CPT

## 2021-11-03 PROCEDURE — 81001 URINALYSIS AUTO W/SCOPE: CPT | Performed by: INTERNAL MEDICINE

## 2021-11-03 PROCEDURE — 93306 TTE W/DOPPLER COMPLETE: CPT | Performed by: INTERNAL MEDICINE

## 2021-11-03 PROCEDURE — 84145 PROCALCITONIN (PCT): CPT | Performed by: PHYSICIAN ASSISTANT

## 2021-11-03 PROCEDURE — 93306 TTE W/DOPPLER COMPLETE: CPT

## 2021-11-03 PROCEDURE — 83735 ASSAY OF MAGNESIUM: CPT | Performed by: PHYSICIAN ASSISTANT

## 2021-11-03 PROCEDURE — 85027 COMPLETE CBC AUTOMATED: CPT | Performed by: INTERNAL MEDICINE

## 2021-11-03 PROCEDURE — 99233 SBSQ HOSP IP/OBS HIGH 50: CPT | Performed by: INTERNAL MEDICINE

## 2021-11-03 RX ORDER — ASPIRIN 81 MG/1
81 TABLET ORAL DAILY
Status: DISCONTINUED | OUTPATIENT
Start: 2021-11-03 | End: 2021-11-05

## 2021-11-03 RX ADMIN — SODIUM CHLORIDE 100 ML/HR: 0.9 INJECTION, SOLUTION INTRAVENOUS at 07:55

## 2021-11-03 RX ADMIN — CEFTRIAXONE 2000 MG: 2 INJECTION, SOLUTION INTRAVENOUS at 17:27

## 2021-11-03 RX ADMIN — HEPARIN SODIUM 5000 UNITS: 5000 INJECTION INTRAVENOUS; SUBCUTANEOUS at 21:06

## 2021-11-03 RX ADMIN — HEPARIN SODIUM 5000 UNITS: 5000 INJECTION INTRAVENOUS; SUBCUTANEOUS at 05:24

## 2021-11-03 RX ADMIN — SODIUM CHLORIDE 75 ML/HR: 0.9 INJECTION, SOLUTION INTRAVENOUS at 20:09

## 2021-11-03 RX ADMIN — HEPARIN SODIUM 5000 UNITS: 5000 INJECTION INTRAVENOUS; SUBCUTANEOUS at 13:14

## 2021-11-03 RX ADMIN — METOPROLOL TARTRATE 25 MG: 25 TABLET, FILM COATED ORAL at 08:15

## 2021-11-03 RX ADMIN — LORAZEPAM 1 MG: 1 TABLET ORAL at 21:05

## 2021-11-03 RX ADMIN — METOPROLOL TARTRATE 25 MG: 25 TABLET, FILM COATED ORAL at 21:05

## 2021-11-03 RX ADMIN — ASPIRIN 81 MG: 81 TABLET, COATED ORAL at 13:13

## 2021-11-03 NOTE — PLAN OF CARE
Problem: Potential for Falls  Goal: Patient will remain free of falls  Description: INTERVENTIONS:  - Educate patient/family on patient safety including physical limitations  - Instruct patient to call for assistance with activity   - Consult OT/PT to assist with strengthening/mobility   - Keep Call bell within reach  - Keep bed low and locked with side rails adjusted as appropriate  - Keep care items and personal belongings within reach  - Initiate and maintain comfort rounds  - Make Fall Risk Sign visible to staff  - Offer Toileting every 3 Hours, in advance of need  - Initiate/Maintain 3alarm  - Obtain necessary fall risk management equipment: 3  - Apply yellow socks and bracelet for high fall risk patients  - Consider moving patient to room near nurses station  Outcome: Progressing     Problem: DISCHARGE PLANNING - CARE MANAGEMENT  Goal: Discharge to post-acute care or home with appropriate resources  Description: INTERVENTIONS:  - Conduct assessment to determine patient/family and health care team treatment goals, and need for post-acute services based on payer coverage, community resources, and patient preferences, and barriers to discharge  - Address psychosocial, clinical, and financial barriers to discharge as identified in assessment in conjunction with the patient/family and health care team  - Arrange appropriate level of post-acute services according to patient’s   needs and preference and payer coverage in collaboration with the physician and health care team  - Communicate with and update the patient/family, physician, and health care team regarding progress on the discharge plan  - Arrange appropriate transportation to post-acute venues  Outcome: Progressing     Problem: PAIN - ADULT  Goal: Verbalizes/displays adequate comfort level or baseline comfort level  Description: Interventions:  - Encourage patient to monitor pain and request assistance  - Assess pain using appropriate pain scale  - Administer analgesics based on type and severity of pain and evaluate response  - Implement non-pharmacological measures as appropriate and evaluate response  - Consider cultural and social influences on pain and pain management  - Notify physician/advanced practitioner if interventions unsuccessful or patient reports new pain  Outcome: Progressing     Problem: INFECTION - ADULT  Goal: Absence or prevention of progression during hospitalization  Description: INTERVENTIONS:  - Assess and monitor for signs and symptoms of infection  - Monitor lab/diagnostic results  - Monitor all insertion sites, i e  indwelling lines, tubes, and drains  - Monitor endotracheal if appropriate and nasal secretions for changes in amount and color  - [place] appropriate cooling/warming therapies per order  - Administer medications as ordered  - Instruct and encourage patient and family to use good hand hygiene technique  - Identify and instruct in appropriate isolation precautions for identified infection/condition  Outcome: Progressing     Problem: SAFETY ADULT  Goal: Patient will remain free of falls  Description: INTERVENTIONS:  - Educate patient/family on patient safety including physical limitations  - Instruct patient to call for assistance with activity   - Consult OT/PT to assist with strengthening/mobility   - Keep Call bell within reach  - Keep bed low and locked with side rails adjusted as appropriate  - Keep care items and personal belongings within reach  - Initiate and maintain comfort rounds  - Make Fall Risk Sign visible to staff  - Offer Toileting every 3 Hours, in advance of need  - Initiate/Maintain 3alarm  - Obtain necessary fall risk management equipment: 3  - Apply yellow socks and bracelet for high fall risk patients  - Consider moving patient to room near nurses station  Outcome: Progressing  Goal: Maintain or return to baseline ADL function  Description: INTERVENTIONS:  -  Assess patient's ability to carry out ADLs; assess patient's baseline for ADL function and identify physical deficits which impact ability to perform ADLs (bathing, care of mouth/teeth, toileting, grooming, dressing, etc )  - Assess/evaluate cause of self-care deficits   - Assess range of motion  - Assess patient's mobility; develop plan if impaired  - Assess patient's need for assistive devices and provide as appropriate  - Encourage maximum independence but intervene and supervise when necessary  - Involve family in performance of ADLs  - Assess for home care needs following discharge   - Consider OT consult to assist with ADL evaluation and planning for discharge  - Provide patient education as appropriate  Outcome: Progressing  Goal: Maintains/Returns to pre admission functional level  Description: INTERVENTIONS:  - Perform BMAT or MOVE assessment daily    - Set and communicate daily mobility goal to care team and patient/family/caregiver  - Collaborate with rehabilitation services on mobility goals if consulted  - Perform Range of Motion 3 times a day  - Reposition patient every 3 hours    - Dangle patient 3 times a day  - Stand patient 3 times a day  - Ambulate patient 3 times a day  - Out of bed to chair 3 times a day   - Out of bed for meals 3 times a day  - Out of bed for toileting  - Record patient progress and toleration of activity level   Outcome: Progressing     Problem: DISCHARGE PLANNING  Goal: Discharge to home or other facility with appropriate resources  Description: INTERVENTIONS:  - Identify barriers to discharge w/patient and caregiver  - Arrange for needed discharge resources and transportation as appropriate  - Identify discharge learning needs (meds, wound care, etc )  - Arrange for interpretive services to assist at discharge as needed  - Refer to Case Management Department for coordinating discharge planning if the patient needs post-hospital services based on physician/advanced practitioner order or complex needs related to functional status, cognitive ability, or social support system  Outcome: Progressing     Problem: Knowledge Deficit  Goal: Patient/family/caregiver demonstrates understanding of disease process, treatment plan, medications, and discharge instructions  Description: Complete learning assessment and assess knowledge base  Interventions:  - Provide teaching at level of understanding  - Provide teaching via preferred learning methods  Outcome: Progressing     Problem: Nutrition/Hydration-ADULT  Goal: Nutrient/Hydration intake appropriate for improving, restoring or maintaining nutritional needs  Description: Monitor and assess patient's nutrition/hydration status for malnutrition  Collaborate with interdisciplinary team and initiate plan and interventions as ordered  Monitor patient's weight and dietary intake as ordered or per policy  Utilize nutrition screening tool and intervene as necessary  Determine patient's food preferences and provide high-protein, high-caloric foods as appropriate       INTERVENTIONS:  - Monitor oral intake, urinary output, labs, and treatment plans  - Assess nutrition and hydration status and recommend course of action  - Evaluate amount of meals eaten  - Assist patient with eating if necessary   - Allow adequate time for meals  - Recommend/ encourage appropriate diets, oral nutritional supplements, and vitamin/mineral supplements  - Order, calculate, and assess calorie counts as needed  - Recommend, monitor, and adjust tube feedings and TPN/PPN based on assessed needs  - Assess need for intravenous fluids  - Provide specific nutrition/hydration education as appropriate  - Include patient/family/caregiver in decisions related to nutrition  Outcome: Progressing     Problem: MOBILITY - ADULT  Goal: Maintain or return to baseline ADL function  Description: INTERVENTIONS:  -  Assess patient's ability to carry out ADLs; assess patient's baseline for ADL function and identify physical deficits which impact ability to perform ADLs (bathing, care of mouth/teeth, toileting, grooming, dressing, etc )  - Assess/evaluate cause of self-care deficits   - Assess range of motion  - Assess patient's mobility; develop plan if impaired  - Assess patient's need for assistive devices and provide as appropriate  - Encourage maximum independence but intervene and supervise when necessary  - Involve family in performance of ADLs  - Assess for home care needs following discharge   - Consider OT consult to assist with ADL evaluation and planning for discharge  - Provide patient education as appropriate  Outcome: Progressing  Goal: Maintains/Returns to pre admission functional level  Description: INTERVENTIONS:  - Perform BMAT or MOVE assessment daily    - Set and communicate daily mobility goal to care team and patient/family/caregiver  - Collaborate with rehabilitation services on mobility goals if consulted  - Perform Range of Motion 3 times a day  - Reposition patient every 3 hours    - Dangle patient 3 times a day  - Stand patient 3 times a day  - Ambulate patient 3 times a day  - Out of bed to chair 3 times a day   - Out of bed for meals 3 times a day  - Out of bed for toileting  - Record patient progress and toleration of activity level   Outcome: Progressing     Problem: Prexisting or High Potential for Compromised Skin Integrity  Goal: Skin integrity is maintained or improved  Description: INTERVENTIONS:  - Identify patients at risk for skin breakdown  - Assess and monitor skin integrity  - Assess and monitor nutrition and hydration status  - Monitor labs   - Assess for incontinence   - Turn and reposition patient  - Assist with mobility/ambulation  - Relieve pressure over bony prominences  - Avoid friction and shearing  - Provide appropriate hygiene as needed including keeping skin clean and dry  - Evaluate need for skin moisturizer/barrier cream  - Collaborate with interdisciplinary team   - Patient/family teaching  - Consider wound care consult   Outcome: Progressing

## 2021-11-03 NOTE — PROGRESS NOTES
[place]  Progress Note Charlene Lunch 1934, 80 y o  female MRN: [id number]  Unit/Bed#: -01 Encounter: [id number]  Primary Care Provider: Aly Hernandez MD   Date and time admitted to hospital: 11/2/2021  2:47 PM    * Severe sepsis [facility])  Assessment & Plan  Patient meets criteria for severe sepsis as evidenced by her 124, respiratory 24, leukocytosis of 15,000, lactic acid level 3 1  due to right lower lobe pneumonia  Cultures showed no growth so far  Lactic acidosis resolved  Continue IV fluids at 75 cc an hour    Called patient's son and left message on his voicemail on November 3rd      Pneumonia  Assessment & Plan  Patient presented with the chills, shortness of breath worsening with exertion fevers at home an dry cough  Chest x-ray showed new right lower lobe infiltrate  Patient has right lower lobe community-acquired pneumonia  Her COVID came back negative  Her swallowing is normal without any coughing or choking when swallowing    She was hypoxic on room air and will continue oxygen via nasal cannula at 3 liters/minute with which her oxygen saturation is 94% while I was in the room  Continue Rocephin and doxycycline    Will monitor leukocyte count and procalcitonin  I requested Physical and Occupational therapy evaluation    Elevated troponin  Assessment & Plan  Patient has elevated troponin 1 39 most likely due to non MI troponin elevation due to severe sepsis due to pneumonia  Patient denies chest pain and EKG showed no acute ischemia  Hyponatremia  Assessment & Plan  Patient had hyponatremia on admission with sodium of 129  Hyponatremia is improving with hydration:  Sodium increased 135    Essential hypertension  Assessment & Plan  Patient has chronic hypertension    Systolic blood pressure is 124 this morning  Continue Lopressor 25 b i d       VTE Prophylaxis: in place    Patient Centered Rounds: I rounded with patient's nurse    Current Length of Stay: 1 day(s)    Current Patient Status: Inpatient    Certification Statement: Pt requires additional inpatient hospital stay due to: see assessment and plan        Subjective:   Patient's nurse reported the patient had hypoxia and oxygen had to be increased to 3 liters/minute  She had poor p o  Intake and 8 or 25% of her meals  She had no coughing or choking when swallowing  Patient complains of cough, denies chest pain, pleurisy, shortness of breath on oxygen, abdominal pain, nausea, diarrhea  She has mild low back pain which is chronic and is alleviated when she places a pillow behind her back      All other ROS are negative    Objective:     Vitals:   Temp (24hrs), Av 3 °F (36 8 °C), Min:97 7 °F (36 5 °C), Max:99 3 °F (37 4 °C)    Temp:  [97 7 °F (36 5 °C)-99 3 °F (37 4 °C)] 97 7 °F (36 5 °C)  HR:  [] 88  Resp:  [20-24] 20  BP: (110-174)/() 124/89  SpO2:  [87 %-95 %] 87 %  There is no height or weight on file to calculate BMI  Input and Output Summary (last 24 hours): Intake/Output Summary (Last 24 hours) at 11/3/2021 1124  Last data filed at 11/3/2021 0755  Gross per 24 hour   Intake 1093 33 ml   Output --   Net 1093 33 ml       Physical Exam:     Physical Exam  Constitutional:       General: She is not in acute distress  Eyes:      Conjunctiva/sclera: Conjunctivae normal    Neck:      Comments: I saw no JVD  Cardiovascular:      Rate and Rhythm: Normal rate and regular rhythm  Pulmonary:      Effort: No respiratory distress  Breath sounds: Rales (Scant crackles were heard the right side) present  No wheezing  Abdominal:      General: Bowel sounds are normal       Palpations: Abdomen is soft  Tenderness: There is no abdominal tenderness  Musculoskeletal:      Cervical back: Neck supple  Skin:     General: Skin is warm and dry  Comments: Patient no pedal edema   Neurological:      Mental Status: She is alert        Comments: She had no facial droop, she was moving all extremities on command, her speech was normal             I personally reviewed labs and imaging reports for today  Last 24 Hours Medication List:   Current Facility-Administered Medications   Medication Dose Route Frequency Provider Last Rate   • acetaminophen  650 mg Oral Q6H PRN Sparkle Lord MD     • aspirin  81 mg Oral Daily Sparkle Lord MD     • cefTRIAXone  2,000 mg Intravenous Q24H Sparkel Lord MD     • doxycycline hyclate  100 mg Oral Q12H Severino Escobedo MD     • heparin (porcine)  5,000 Units Subcutaneous Q8H [address] Sparkle Lord MD     • LORazepam  1 mg Oral HS Sparkle Lord MD     • metoprolol tartrate  25 mg Oral Q12H Severino Escobedo MD     • ondansetron  4 mg Intravenous Q6H PRN Sparkle Lord MD     • sodium chloride  75 mL/hr Intravenous Continuous Sparkle Lord  mL/hr (11/03/21 3335)          Today, Patient Was Seen By: Sparkle Lord MD    ** Please Note: Dictation voice to text software may have been used in the creation of this document   **

## 2021-11-03 NOTE — ASSESSMENT & PLAN NOTE
Patient has elevated troponin 1 39 most likely due to non MI troponin elevation due to severe sepsis due to pneumonia  Patient denies chest pain and EKG showed no acute ischemia

## 2021-11-03 NOTE — ASSESSMENT & PLAN NOTE
Patient meets criteria for severe sepsis as evidenced by her 124, respiratory 24, leukocytosis of 15,000, lactic acid level 3 1  due to right lower lobe pneumonia  Cultures showed no growth so far  Lactic acidosis resolved      Continue IV fluids at 75 cc an hour    Called patient's son and left message on his voicemail on November 3rd

## 2021-11-03 NOTE — ASSESSMENT & PLAN NOTE
Patient has chronic hypertension  Systolic blood pressure is 124 this morning  Continue Lopressor 25 b i d

## 2021-11-03 NOTE — RESPIRATORY THERAPY NOTE
RT Protocol Note  Tor Majors 80 y o  female MRN: [id number]  Unit/Bed#: -01 Encounter: [id number]    Assessment    Active Problems:    Generalized anxiety disorder    Essential hypertension    Severe sepsis (HCC)    Pneumonia    Hyponatremia    Elevated troponin      Home Pulmonary Medications:None         Past Medical History:   Diagnosis Date   • Anxiety    • Disease of thyroid gland    • Hypertension      Social History     Socioeconomic History   • Marital status: /Civil Union     Spouse name: None   • Number of children: None   • Years of education: None   • Highest education level: None   Occupational History   • None   Tobacco Use   • Smoking status: Never Smoker   • Smokeless tobacco: Never Used   Vaping Use   • Vaping Use: Never used   Substance and Sexual Activity   • Alcohol use: No   • Drug use: No   • Sexual activity: None   Other Topics Concern   • None   Social History Narrative   • None     Social Determinants of Health     Financial Resource Strain:    • Difficulty of Paying Living Expenses:    Food Insecurity:    • Worried About Running Out of Food in the Last Year:    • Ran Out of Food in the Last Year:    Transportation Needs:    • Lack of Transportation (Medical):     • Lack of Transportation (Non-Medical):    Physical Activity:    • Days of Exercise per Week:    • Minutes of Exercise per Session:    Stress:    • Feeling of Stress :    Social Connections:    • Frequency of Communication with Friends and Family:    • Frequency of Social Gatherings with Friends and Family:    • Attends [employer] Services:    • Active Member of Clubs or Organizations:    • Attends Club or Organization Meetings:    • Marital Status:    Intimate Partner Violence:    • Fear of Current or Ex-Partner:    • Emotionally Abused:    • Physically Abused:    • Sexually Abused:        Subjective         Objective    Physical Exam:   Assessment Type: (P) Assess only  General Appearance: (P) Awake, Other (Comment) (anxious)  Respiratory Pattern: (P) Tachypneic  Chest Assessment: (P) Chest expansion symmetrical  Bilateral Breath Sounds: (P) Diminished  Cough: (P) None    Vitals:  Blood pressure 128/83, pulse 88, temperature 99 3 °F (37 4 °C), temperature source Oral, resp  rate (!) (P) 24, SpO2 (P) 93 %  Imaging and other studies: I have personally reviewed pertinent reports              Plan    Respiratory Plan: (P) No distress/Pulmonary history

## 2021-11-03 NOTE — PHYSICAL THERAPY NOTE
PT eval   11/03/21 1525   PT Last Visit   PT Visit Date 11/03/21   Note Type   Note type Evaluation   Pain Assessment   Pain Assessment Tool 0-10   Pain Score No Pain   Home Living   Type of [address] One level; Able to live on main level with bedroom/bathroom  (1ste)   Home Equipment Cane   Additional Comments no AD PTA   Prior Function   Level of [place] Needs assistance with IADLs; Independent with ADLs and functional mobility   Lives With Son   Receives Help From Family   ADL Assistance Independent   IADLs Needs assistance   Falls in the last 6 months 0   Vocational Retired   Comments reports recent cataract surgery but no new glasses yet   Restrictions/Precautions   Weight Bearing Precautions Per Order No   Other Precautions Telemetry;Multiple lines;O2;Visual impairment;Hard of hearing   General   Additional Pertinent History ADm with sob x few days and orthopnea, dx= sepsis/pneumonia, covid neg   Family/Caregiver Present No   Cognition   Overall Cognitive Status WFL   Arousal/Participation Alert   Orientation Level Oriented X4   Memory Within functional limits   Following Commands Follows all commands and directions without difficulty   RUE Assessment   RUE Assessment WFL  (arhtritic changes B hands R>L [place] DIP)   LUE Assessment   LUE Assessment WFL   RLE Assessment   RLE Assessment WFL   LLE Assessment   LLE Assessment WFL   Coordination   Movements are Fluid and Coordinated 1   Sensation WFL   Proprioception   RLE Proprioception Grossly intact   LLE Proprioception Grossly Intact   Bed Mobility   Rolling R 5  Supervision   Rolling L 5  Supervision   Supine to Sit 5  Supervision   Sit to Supine 5  Supervision   Transfers   Sit to Stand 5  Supervision   Stand to Sit 5  Supervision   Stand pivot 4  Minimal assistance   Additional items Assist x 1;Verbal cues;Armrests; Bedrails   Ambulation/Elevation   Gait pattern   (not assessed pt sob with standing)   Balance   Static Sitting Good   Dynamic Sitting Fair +   Static Standing Fair +   Dynamic Standing Fair   Endurance Deficit   Endurance Deficit Yes   Endurance Deficit Description tires quickly adn very SOB, denies nose blocked only able to avoid mouth breathing x few seconds   Activity Tolerance   Activity Tolerance Patient limited by fatigue   Nurse Made Aware RN Ruth   Assessment   Prognosis Good   Problem List Decreased endurance; Impaired balance;Decreased mobility   Assessment Pt presents with impaired activit ytolernace and sob with any activity  On 2L 02 adn sats 89-95%  Tends to mouth breathe and only corrects breifly with v cues  Returned to bed after session and needs in reach  [facility] benefit [facility] skilled[facility] to regain safe ind funcitonal mboiltyi and activity tolerance  WIll follow see goals  Hopes to return home at d/c  con't PT   Barriers to Discharge   (medical clearance, breathing)   Goals   Patient Goals breath better get off 02   STG Expiration Date 11/13/21   Short Term Goal #1 1) safe ind transfers 2) safe ind amb with or without AD 50' level no sob on room air 3) improve balance to good   Plan   Treatment/Interventions ADL retraining;Functional transfer training; Endurance training;Gait training;Spoke to nursing   PT Frequency   (3-5x/wk)   Recommendation   PT Discharge Recommendation Home with home health rehabilitation  (depending on progress)   PT - OK to Discharge No   AM-PAC Basic Mobility Inpatient   Turning in Bed Without Bedrails 3   Lying on Back to Sitting on Edge of Flat Bed 3   Moving Bed to Chair 3   Standing Up From Chair 3   Walk in Room 3   Climb 3-5 Stairs 2   Basic Mobility Inpatient Raw Score 17   Basic Mobility Standardized Score 39 67   Modified Toribio Scale   Modified Toribio Scale 4   Laurel Villegas, PT

## 2021-11-03 NOTE — ASSESSMENT & PLAN NOTE
Patient had hyponatremia on admission with sodium of 129      Hyponatremia is improving with hydration:  Sodium increased 135

## 2021-11-03 NOTE — PLAN OF CARE
Problem: PHYSICAL THERAPY ADULT  Goal: Performs mobility at highest level of function for planned discharge setting  See evaluation for individualized goals  Description: Treatment/Interventions: ADL retraining, Functional transfer training, Endurance training, Gait training, Spoke to nursing          See flowsheet documentation for full assessment, interventions and recommendations  Outcome: Progressing  Note: Prognosis: Good  Problem List: Decreased endurance, Impaired balance, Decreased mobility  Assessment: Pt presents with impaired activit ytolernace and sob with any activity  On 2L 02 adn sats 89-95%  Tends to mouth breathe and only corrects breifly with v cues  Returned to bed after session and needs in reach  Cna benefit [facility] skilledTP services to regain safe ind funcitonal mboiltyi and activity tolerance  WIll follow see goals  Hopes to return home at d/c  con't PT  Barriers to Discharge:  (medical clearance, breathing)        PT Discharge Recommendation: Home with home health rehabilitation (depending on progress)     PT - OK to Discharge: No    See flowsheet documentation for full assessment

## 2021-11-03 NOTE — CASE MANAGEMENT
LOS: 1 day  Pt is not a documented bundle  Pt is not a 30 day readmission  Unplanned readmission score is 11 and green  Met with Pt  Pt presents AA&Ox3  Discussed role of case management, discharge planning, identifying help at home and discharge preference  Pt reports her son(Sanford) lives with her in 1sh, 1 step to enter  Pt reports she is independent with adls and ambulation  Pt reports she has cane  Pt reports she does not have home oxygen  Pt reports her PCP is Dr Leigha Justin  Pt reports she uses [facility] DR AYLIN EUGENE in [place] and is able to afford medications  Pt reports her son(Fernando) is POA and she has living will  Pt denies hx of VNA and SNF  Pt reports she had mental health treatment 50 or 60 years ago  Pt reports her sons assist with transportation and she goes grocery shopping and cooking  Pt reports her sons help her at home as needed  Pt reports she her son will transport her home and is unsure of discharge needs at this time  CM to follow      Pt's son:  Foot: [phone], [phone]

## 2021-11-03 NOTE — UTILIZATION REVIEW
Initial Clinical Review    Admission: Date/Time/Statement:   Admission Orders (From admission, onward)     Ordered        11/02/21 1616  INPATIENT ADMISSION  Once                   Orders Placed This Encounter   Procedures   • INPATIENT ADMISSION     Standing Status:   Standing     Number of Occurrences:   1     Order Specific Question:   Level of Care     Answer:   Med Surg [16]     Order Specific Question:   Estimated length of stay     Answer:   More than 2 Midnights     Order Specific Question:   Certification     Answer:   I certify that inpatient services are medically necessary for this patient for a duration of greater than two midnights  See H&P and MD Progress Notes for additional information about the patient's course of treatment  ED Arrival Information     Expected Arrival Acuity    - 11/2/2021 14:19 Emergent         Means of arrival Escorted by Service Admission type    [facility] Emergency         Arrival complaint    back pain, fever, sob         Chief Complaint   Patient presents with   • Shortness of Breath     patient presents to the ED with c/o fever, SOB, cough, back pain begining around sunday  +n/+v       Initial Presentation: this is a 80year old female from home to ED admitted inpatient due to Sepsis/pneumonia/elevated troponin/hyponatremia  Presented due to fever, low back pain and shortness of breath starting about 3 days ago and worse in last 24 hours prior to arrival   On exam mucous membranes dry  Tachycardic  Tachypnea  Decreased breath sounds  Procalcitonin 0 97  Troponin 1 39,  1 58  Lactic acid 3 1  Na 129  Wbc 15 56  CxR showed pneumonia  In the ED given bolus of IVF and started on antibiotics  Plan is follow cultures, continue Rocephin and start Doxycycline, hydrate with IVF  Suspect elevated troponin is non MI, trend  Hold enalapril and start lopressor  Trend BMP    Date: 11/3/21 Day 2: patient with hypoxia and placed on oxygen    Eating only 25% of meals, intake in poor  No cough with swallow  Has intermittent cough, shortness of breath, pleurisy, abdominal discomfort and nausea  Chronic back pain continues  Na improving to 135 with IVF  One exam:  Scant rales right side  Continue IVF, antibiotics  Continue oxygen, wean as able  ED Triage Vitals   Temperature Pulse Respirations Blood Pressure SpO2   11/02/21 1455 11/02/21 1455 11/02/21 1455 11/02/21 1459 11/02/21 1455   99 3 °F (37 4 °C) (!) 124 (!) 24 (!) 174/100 91 %      Temp Source Heart Rate Source Patient Position - Orthostatic VS BP Location FiO2 (%)   11/02/21 1455 11/02/21 1455 11/02/21 2235 11/02/21 2235 --   Oral Monitor Lying Right arm       Pain Score       11/02/21 1818       Med Not Given for Pain - for MAR use only          Wt Readings from Last 1 Encounters:   07/14/21 58 5 kg (129 lb)     Additional Vital Signs:   11/03/21 0818  --  --  --  --  --  --  32  3 L/min  Nasal cannula  --   11/03/21 0800  --  --  --  --  --  87 %Abnormal   --  --  --  --   11/03/21 0713  97 7 °F (36 5 °C)  88  20  124/89  101  95 %  --  --  --  --   11/03/21 0000  --  --  --  --  --  90 %  28  2 L/min  Nasal cannula  --   11/02/21 22:35:35  97 8 °F (36 6 °C)  87  22  110/76  87  89 %Abnormal   28  2 L/min  Nasal cannula  Lying   11/02/21 2153  --  88  24Abnormal   --  --  93 %  --  --  --  --   11/02/21 21:11:38  --  98  --  --  --  95 %  --  --  --  --   11/02/21 2059  --  97  --  128/83  --  --  --  --           Pertinent Labs/Diagnostic Test Results:   11/2/21 CxR - New lower lung opacity concerning for pneumonia  New right pleural effusion  Large hiatal hernia       11/2/21 ecg Sinus tachycardia   ST & T wave abnormality, consider inferior ischemia   Abnormal ECG   When compared with ECG of 16-MAR-2018 13:48,   Aberrant conduction is no longer Present   Nonspecific T wave abnormality now evident in Anterior leads     Results from last 7 days   Lab Units 11/02/21  1524   SARS-COV-2 Negative     Results from last 7 days   Lab Units 11/03/21  0453 11/02/21  1523   WBC Thousand/uL 11 18* 15 56*   HEMOGLOBIN g/dL 11 5 13 3   HEMATOCRIT % 35 7 40 9   PLATELETS Thousands/uL 226 262   NEUTROS ABS Thousands/µL  --  13 18*     Results from last 7 days   Lab Units 11/03/21  0453 11/02/21  1523   SODIUM mmol/L 135* 129*   POTASSIUM mmol/L 4 1 5 1   CHLORIDE mmol/L 102 94*   CO2 mmol/L 20* 18*   ANION GAP mmol/L 13 17*   BUN mg/dL 26* 24   CREATININE mg/dL 0 98 1 17   EGFR ml/min/1 73sq m 52 42   CALCIUM mg/dL 8 1* 8 8     Results from last 7 days   Lab Units 11/03/21  0453 11/02/21  1523   AST U/L 19 37   ALT U/L 13 17   ALK PHOS U/L 69 89   TOTAL PROTEIN g/dL 6 0* 7 8   ALBUMIN g/dL 2 3* 3 0*   TOTAL BILIRUBIN mg/dL 0 50 0 90     Results from last 7 days   Lab Units 11/03/21  0453 11/02/21  1523   GLUCOSE RANDOM mg/dL 104 140     Results from last 7 days   Lab Units 11/02/21  1844 11/02/21  1523   TROPONIN I ng/mL 1 58* 1 39*     Results from last 7 days   Lab Units 11/02/21  1523   PROTIME seconds 13 9   INR  1 08   PTT seconds 32     Results from last 7 days   Lab Units 11/03/21  0453 11/02/21  1523   PROCALCITONIN ng/ml 1 07* 0 97*     Results from last 7 days   Lab Units 11/02/21  1844 11/02/21  1523   LACTIC ACID mmol/L 1 8 3 1*     Results from last 7 days   Lab Units 11/02/21  1524   INFLUENZA A PCR  Negative   INFLUENZA B PCR  Negative   RSV PCR  Negative     Results from last 7 days   Lab Units 11/02/21  1523   BLOOD CULTURE  Received in Microbiology Lab  Culture in Progress  Received in Microbiology Lab  Culture in Progress       ED Treatment:   Medication Administration from 11/02/2021 1419 to 11/02/2021 2032       Date/Time Order Dose Route Action Comments     11/02/2021 1526 sodium chloride 0 9 % bolus 500 mL 500 mL Intravenous New Bag      11/02/2021 1541 cefTRIAXone (ROCEPHIN) IVPB (premix in dextrose) 2,000 mg 50 mL 2,000 mg Intravenous New Bag      11/02/2021 1814 acetaminophen (TYLENOL) tablet 650 mg 650 mg Oral Given         Past Medical History:   Diagnosis Date   • Anxiety    • Disease of thyroid gland    • Hypertension      Present on Admission:  • Severe sepsis (HCC)  • Generalized anxiety disorder  • Essential hypertension  • Pneumonia  • Hyponatremia  • Elevated troponin      Admitting Diagnosis: Shortness of breath [R06 02]  Elevated troponin [R77 8]  Severe sepsis ([facility] [address] ) [A41 9, R65 20]  Community acquired pneumonia of right lower lobe of lung [J18 9]  Age/Sex: 80 y o  female  Admission Orders:  11/2/21 1616 inpatient   Scheduled Medications:  cefTRIAXone, 2,000 mg, Intravenous, Q24H  doxycycline hyclate, 100 mg, Oral, Q12H FRAN  heparin (porcine), 5,000 Units, Subcutaneous, Q8H FRAN  LORazepam, 1 mg, Oral, HS  metoprolol tartrate, 25 mg, Oral, Q12H [address]      Continuous IV Infusions:  sodium chloride, 100 mL/hr, Intravenous, Continuous      PRN Meds: not used   acetaminophen, 650 mg, Oral, Q6H PRN  ondansetron, 4 mg, Intravenous, Q6H PRN    Telemetry       Network Utilization Review Department  ATTENTION: Please call with any questions or concerns to [phone] and carefully listen to the prompts so that you are directed to the right person  All voicemails are confidential   Rg Worthington all requests for admission clinical reviews, approved or denied determinations and any other requests to dedicated fax number below belonging to the campus where the patient is receiving treatment   List of dedicated fax numbers for the Facilities:  [address] DENIALS (Administrative/Medical Necessity) [phone]   [address] (Maternity/NICU/Pediatrics) [phone]   [address] [place] [address] [address] [address] Cristian Lares [facility] 28 [phone]   [address] [facility] 134 [address] [facility] [phone]

## 2021-11-03 NOTE — ASSESSMENT & PLAN NOTE
Patient presented with the chills, shortness of breath worsening with exertion fevers at home an dry cough  Chest x-ray showed new right lower lobe infiltrate  Patient has right lower lobe community-acquired pneumonia  Her COVID came back negative  Her swallowing is normal without any coughing or choking when swallowing    She was hypoxic on room air and will continue oxygen via nasal cannula at 3 liters/minute with which her oxygen saturation is 94% while I was in the room    Continue Rocephin and doxycycline    Will monitor leukocyte count and procalcitonin  I requested Physical and Occupational therapy evaluation

## 2021-11-04 ENCOUNTER — APPOINTMENT (INPATIENT)
Dept: CT IMAGING | Facility: HOSPITAL | Age: 86
DRG: 871 | End: 2021-11-04
Payer: COMMERCIAL

## 2021-11-04 PROBLEM — I26.09 ACUTE PULMONARY EMBOLISM WITH ACUTE COR PULMONALE (HCC): Status: ACTIVE | Noted: 2021-11-04

## 2021-11-04 PROBLEM — I50.812 CHRONIC RIGHT-SIDED CHF (CONGESTIVE HEART FAILURE) (HCC): Status: ACTIVE | Noted: 2021-11-04

## 2021-11-04 LAB
ANION GAP SERPL CALCULATED.3IONS-SCNC: 12 MMOL/L (ref 4–13)
ATRIAL RATE: 89 BPM
ATRIAL RATE: 95 BPM
BUN SERPL-MCNC: 15 MG/DL (ref 5–25)
CALCIUM SERPL-MCNC: 8 MG/DL (ref 8.3–10.1)
CHLORIDE SERPL-SCNC: 107 MMOL/L (ref 100–108)
CO2 SERPL-SCNC: 20 MMOL/L (ref 21–32)
CREAT SERPL-MCNC: 0.71 MG/DL (ref 0.6–1.3)
ERYTHROCYTE [DISTWIDTH] IN BLOOD BY AUTOMATED COUNT: 14.3 % (ref 11.6–15.1)
GFR SERPL CREATININE-BSD FRML MDRD: 77 ML/MIN/1.73SQ M
GLUCOSE SERPL-MCNC: 105 MG/DL (ref 65–140)
HCT VFR BLD AUTO: 33.1 % (ref 34.8–46.1)
HGB BLD-MCNC: 10.6 G/DL (ref 11.5–15.4)
MCH RBC QN AUTO: 29.3 PG (ref 26.8–34.3)
MCHC RBC AUTO-ENTMCNC: 32 G/DL (ref 31.4–37.4)
MCV RBC AUTO: 91 FL (ref 82–98)
P AXIS: 32 DEGREES
P AXIS: 53 DEGREES
PLATELET # BLD AUTO: 248 THOUSANDS/UL (ref 149–390)
PMV BLD AUTO: 10.5 FL (ref 8.9–12.7)
POTASSIUM SERPL-SCNC: 3.8 MMOL/L (ref 3.5–5.3)
PR INTERVAL: 126 MS
PR INTERVAL: 132 MS
PROCALCITONIN SERPL-MCNC: 0.6 NG/ML
QRS AXIS: 56 DEGREES
QRS AXIS: 59 DEGREES
QRSD INTERVAL: 80 MS
QRSD INTERVAL: 88 MS
QT INTERVAL: 348 MS
QT INTERVAL: 372 MS
QTC INTERVAL: 437 MS
QTC INTERVAL: 452 MS
RBC # BLD AUTO: 3.62 MILLION/UL (ref 3.81–5.12)
SODIUM SERPL-SCNC: 139 MMOL/L (ref 136–145)
T WAVE AXIS: -23 DEGREES
T WAVE AXIS: -3 DEGREES
VENTRICULAR RATE: 89 BPM
VENTRICULAR RATE: 95 BPM
WBC # BLD AUTO: 9.24 THOUSAND/UL (ref 4.31–10.16)

## 2021-11-04 PROCEDURE — 99233 SBSQ HOSP IP/OBS HIGH 50: CPT | Performed by: INTERNAL MEDICINE

## 2021-11-04 PROCEDURE — 93005 ELECTROCARDIOGRAM TRACING: CPT

## 2021-11-04 PROCEDURE — 97167 OT EVAL HIGH COMPLEX 60 MIN: CPT

## 2021-11-04 PROCEDURE — 84145 PROCALCITONIN (PCT): CPT | Performed by: INTERNAL MEDICINE

## 2021-11-04 PROCEDURE — 85027 COMPLETE CBC AUTOMATED: CPT | Performed by: INTERNAL MEDICINE

## 2021-11-04 PROCEDURE — 80048 BASIC METABOLIC PNL TOTAL CA: CPT | Performed by: INTERNAL MEDICINE

## 2021-11-04 PROCEDURE — 97535 SELF CARE MNGMENT TRAINING: CPT

## 2021-11-04 PROCEDURE — 85730 THROMBOPLASTIN TIME PARTIAL: CPT | Performed by: NURSE PRACTITIONER

## 2021-11-04 PROCEDURE — 71275 CT ANGIOGRAPHY CHEST: CPT

## 2021-11-04 PROCEDURE — 93010 ELECTROCARDIOGRAM REPORT: CPT | Performed by: INTERNAL MEDICINE

## 2021-11-04 PROCEDURE — 85610 PROTHROMBIN TIME: CPT | Performed by: NURSE PRACTITIONER

## 2021-11-04 PROCEDURE — G1004 CDSM NDSC: HCPCS

## 2021-11-04 PROCEDURE — 87449 NOS EACH ORGANISM AG IA: CPT | Performed by: INTERNAL MEDICINE

## 2021-11-04 RX ORDER — SODIUM CHLORIDE 9 MG/ML
75 INJECTION, SOLUTION INTRAVENOUS CONTINUOUS
Status: CANCELLED | OUTPATIENT
Start: 2021-11-04

## 2021-11-04 RX ORDER — HEPARIN SODIUM 1000 [USP'U]/ML
4800 INJECTION, SOLUTION INTRAVENOUS; SUBCUTANEOUS ONCE
Status: COMPLETED | OUTPATIENT
Start: 2021-11-04 | End: 2021-11-04

## 2021-11-04 RX ORDER — HYDRALAZINE HYDROCHLORIDE 20 MG/ML
20 INJECTION INTRAMUSCULAR; INTRAVENOUS EVERY 6 HOURS PRN
Status: DISCONTINUED | OUTPATIENT
Start: 2021-11-04 | End: 2021-11-05

## 2021-11-04 RX ORDER — METOPROLOL TARTRATE 50 MG/1
50 TABLET, FILM COATED ORAL EVERY 12 HOURS SCHEDULED
Status: DISCONTINUED | OUTPATIENT
Start: 2021-11-04 | End: 2021-11-08 | Stop reason: HOSPADM

## 2021-11-04 RX ORDER — HEPARIN SODIUM 1000 [USP'U]/ML
2400 INJECTION, SOLUTION INTRAVENOUS; SUBCUTANEOUS
Status: DISCONTINUED | OUTPATIENT
Start: 2021-11-04 | End: 2021-11-06

## 2021-11-04 RX ORDER — HEPARIN SODIUM 10000 [USP'U]/100ML
3-30 INJECTION, SOLUTION INTRAVENOUS
Status: DISCONTINUED | OUTPATIENT
Start: 2021-11-04 | End: 2021-11-06

## 2021-11-04 RX ORDER — HEPARIN SODIUM 1000 [USP'U]/ML
4800 INJECTION, SOLUTION INTRAVENOUS; SUBCUTANEOUS
Status: DISCONTINUED | OUTPATIENT
Start: 2021-11-04 | End: 2021-11-06

## 2021-11-04 RX ORDER — SODIUM CHLORIDE, SODIUM GLUCONATE, SODIUM ACETATE, POTASSIUM CHLORIDE, MAGNESIUM CHLORIDE, SODIUM PHOSPHATE, DIBASIC, AND POTASSIUM PHOSPHATE .53; .5; .37; .037; .03; .012; .00082 G/100ML; G/100ML; G/100ML; G/100ML; G/100ML; G/100ML; G/100ML
50 INJECTION, SOLUTION INTRAVENOUS CONTINUOUS
Status: DISCONTINUED | OUTPATIENT
Start: 2021-11-04 | End: 2021-11-04

## 2021-11-04 RX ADMIN — LORAZEPAM 1 MG: 1 TABLET ORAL at 21:03

## 2021-11-04 RX ADMIN — HEPARIN SODIUM 5000 UNITS: 5000 INJECTION INTRAVENOUS; SUBCUTANEOUS at 21:03

## 2021-11-04 RX ADMIN — IOHEXOL 85 ML: 350 INJECTION, SOLUTION INTRAVENOUS at 20:38

## 2021-11-04 RX ADMIN — METOPROLOL TARTRATE 50 MG: 50 TABLET, FILM COATED ORAL at 21:03

## 2021-11-04 RX ADMIN — ACETAMINOPHEN 650 MG: 325 TABLET, FILM COATED ORAL at 23:58

## 2021-11-04 RX ADMIN — HEPARIN SODIUM 5000 UNITS: 5000 INJECTION INTRAVENOUS; SUBCUTANEOUS at 15:20

## 2021-11-04 RX ADMIN — CEFTRIAXONE 2000 MG: 2 INJECTION, SOLUTION INTRAVENOUS at 15:26

## 2021-11-04 RX ADMIN — SODIUM CHLORIDE, SODIUM GLUCONATE, SODIUM ACETATE, POTASSIUM CHLORIDE, MAGNESIUM CHLORIDE, SODIUM PHOSPHATE, DIBASIC, AND POTASSIUM PHOSPHATE 50 ML/HR: .53; .5; .37; .037; .03; .012; .00082 INJECTION, SOLUTION INTRAVENOUS at 21:03

## 2021-11-04 RX ADMIN — ASPIRIN 81 MG: 81 TABLET, COATED ORAL at 10:10

## 2021-11-04 RX ADMIN — HEPARIN SODIUM 5000 UNITS: 5000 INJECTION INTRAVENOUS; SUBCUTANEOUS at 05:20

## 2021-11-04 RX ADMIN — METOPROLOL TARTRATE 25 MG: 25 TABLET, FILM COATED ORAL at 10:10

## 2021-11-04 RX ADMIN — HEPARIN SODIUM 4800 UNITS: 1000 INJECTION INTRAVENOUS; SUBCUTANEOUS at 23:50

## 2021-11-04 NOTE — OCCUPATIONAL THERAPY NOTE
Occupational Therapy Evaluation & Treatment     Patient Name: Shakira Kaba  [id number] Date: 11/4/2021  Problem List  Principal Problem:    Severe sepsis [facility])  Active Problems:    Generalized anxiety disorder    Essential hypertension    Pneumonia    Hyponatremia    Elevated troponin    Past Medical History  Past Medical History:   Diagnosis Date   • Anxiety    • Disease of thyroid gland    • Hypertension      Past Surgical History  Past Surgical History:   Procedure Laterality Date   • CATARACT EXTRACTION Right    • FOOT SURGERY     • JOINT REPLACEMENT               11/04/21 1008   OT Last Visit   OT Visit Date 11/04/21   Note Type   Note type Evaluation   Restrictions/Precautions   Weight Bearing Precautions Per Order No   Other Precautions Telemetry;Multiple lines;O2;Hard of hearing;Visual impairment   Pain Assessment   Pain Assessment Tool 0-10   Pain Score No Pain   Home Living   Type of [address] [place] [address] One level; Able to live on main level with bedroom/bathroom  (1 ALE )   Bathroom Shower/Tub Tub/shower unit  ([employer] )   Bathroom Toilet Standard   Bathroom Equipment Other (Comment)  (None per pt )   [address]   Additional Comments Pt only uses [facility] out in the yard  Prior Function   Lives With Son   Receives Help From Family   ADL Assistance Independent   IADLs Independent   Falls in the last 6 months 0   Vocational Retired   Lifestyle   Autonomy Pt states she's (I) w/ ADLs and IADLS  Ambulates without AD unless out in the yard uses [facility]  - falls  - drives  Sons transport      Reciprocal Relationships Family    Intrinsic Gratification Puzzles and crosswords    Psychosocial   Psychosocial (WDL) WDL   Subjective   Subjective " My breathing is better"    ADL   Eating Assistance 6  Modified independent   Grooming Assistance 6  Modified Independent   UB Bathing Assistance 5  Supervision/Setup   UB Bathing Deficit Setup   LB Bathing Assistance 4  Minimal Assistance   LB Bathing Deficit Setup   UB Dressing Assistance 5  Supervision/Setup   UB Dressing Deficit Setup   LB Dressing Assistance 4  Minimal [address]   4  Minimal Assistance   Additional Comments Easily SOB  Feels unsteady when unuspported  Bed Mobility   Supine to Sit 5  Supervision   Additional items HOB elevated; Bedrails; Increased time required   Additional Comments Remains OOB in recliner w/ all needs and aide present for EKG  Alarm engaged  Transfers   Sit to Stand 5  Supervision   Additional items Increased time required   Stand to Sit 5  Supervision   Additional items Increased time required   Stand pivot 4  Minimal assistance   Additional items   (Arm held assist to [facility])   Functional Mobility   Functional Mobility   (CG using RW for increased support )   Balance   Static Sitting Fair +   Dynamic Sitting Fair   Static Standing Fair   Dynamic Standing Fair -   Ambulatory Fair -   Activity Tolerance   Activity Tolerance Patient limited by fatigue;Treatment limited secondary to medical complications (Comment)  (3L O2)   Nurse Made Aware Ruth ESTRELLA    RUE Assessment   RUE Assessment WFL  (4-/5; Arthritic hands )   LUE Assessment   LUE Assessment WFL  (4-/5; Arthritic hands )   Hand Function   Gross Motor Coordination Functional   Fine Motor Coordination Impaired   Vision-Basic Assessment   Current Vision Wears glasses all the time   Cognition   Arousal/Participation Cooperative   Attention Within functional limits   Orientation Level Oriented X4   Memory Within functional limits   Following Commands Follows all commands and directions without difficulty   Assessment   Limitation Decreased ADL status; Decreased endurance;Decreased self-care trans;Decreased high-level ADLs   Prognosis Good   Assessment Pt admit 11/2/21 with back pain, chills, fever, nausea, and worsening SOB  Dx: Severe sepsis due to pneumonia  (-) COVID   OT completed extensive review of pt's medical and social history  Pt with h/o anxiety, thyroid disease, HTN, cataract sx- R side, foot sx, and joint replacement  Prior to admit was living in [address] [place] [address] w/ 1 ALE  Son lives with her  (I) w/ ADLS and IADLs  Ambulates without AD  Pt uses [facility] outdoors  - falls  - drives  Pt presents to OT below baseline due to the following performance deficits: balance; stand tolerance; functional mobility; community integration; self care; and IADLs  Therefore, pt would benefit from OT services to achieve optimal level of performance  Occupational performance areas to be addressed include: bathing, toileting, dressing, activity tolerance, functional mobility, and clothing management  Pt is (S) for bed mobility, (S) STS, and Min for mobility  Reports feeling unsteady due to decreased movement  Provided RW and progressed to CG w/ increased support  No AD PTA  Increased assist needed for ADLS due to fatigue, SOB, balance, and deconditioning  Currently on 3L O2, which she doesn't wear at home  Remains below baseline status  Fatigues quickly  Based on findings, pt is of high complexity  The patient's raw score on the AM-PAC Daily Activity inpatient short form is 20, standardized score is 42 03, greater than 39 4  Patients at this level are likely to benefit from discharge to home  Recommend pt return home w/ support from son and home therapy to achieve optimal independence  Goals   Patient Goals To move more    Plan   Treatment Interventions ADL retraining;Functional transfer training;UE strengthening/ROM; Endurance training;Patient/family training;Equipment evaluation/education; Energy conservation; Activityengagement   Goal Expiration Date 11/18/21   OT Treatment Day 1   OT Frequency 2-3x/wk   Additional Treatment Session   Start Time 3397   End Time 1008   Treatment Assessment Pt seen for OT tx session s/p evaluation w/ focus on ADLs  Min to perform urine and backside hygiene for balance only  Very SOB; SPO2 95% 3L  SPT to recliner w/ CG using RW for increased balance and support  Pt feels better using RW at this time  Performed UB ADLs w/ (S) and increased time  Rest breaks needed  Min for LB ADLS w/ increased time and fatigue  Cues needed to manage O2 cord during functional tasks; didn't have oxygen at home  Continue OT to achieve optimal independence and for [facility] techs      Recommendation   OT Discharge Recommendation Home with home health rehabilitation   AM-PAC Daily Activity Inpatient   Lower Body Dressing 3   Bathing 3   Toileting 3   Upper Body Dressing 3   Grooming 4   Eating 4   Daily Activity Raw Score 20   Daily Activity Standardized Score (Calc for Raw Score >=11) 42 03   AM-PAC Applied Cognition Inpatient   Following a Speech/Presentation 4   Understanding Ordinary Conversation 4   Taking Medications 4   Remembering Where Things Are Placed or Put Away 4   Remembering List of 4-5 Errands 3   Taking Care of Complicated Tasks 3   Applied Cognition Raw Score 22   Applied Cognition Standardized Score 47 83     GOALS:     • Pt will complete UB ADL's w/ MI     • Pt will complete LB ADL's w/ MI using AE PRN    • Pt will complete toileting including hygiene and clothing management w/ MI     • Pt will complete functional transfers w/ MI using most appropriate method    • Pt will complete dynamic stand balance w/ G- for safe clothing management     • Pt will improve stand tolerance to 5-10 mins for safety w/ ADL tasks     • Pt will improve activity tolerance to F+ for 20- 30 min tx session for increased engagement in functional tasks     •  After education, pt will verbalize 3 energy conservation techs to utilize to increase activity tolerance during functional tasks     • Pt will perform simulated IADLS w/ G- balance       Marlon Mandel MS, OTR/L

## 2021-11-04 NOTE — ASSESSMENT & PLAN NOTE
Echo shows normal systolic function with ejection fraction of 50% but patient has right-sided CHF and pulmonary hypertension  There is no significant mitral or aortic valve pathology that would explain right-sided CHF and pulmonary hypertension  I ordered CT of the chest for tomorrow look for PE  Patient has no pedal edema, I see no volume of overload tonight

## 2021-11-04 NOTE — PLAN OF CARE
Problem: OCCUPATIONAL THERAPY ADULT  Goal: Performs self-care activities at highest level of function for planned discharge setting  See evaluation for individualized goals  Description: Treatment Interventions: ADL retraining, Functional transfer training, UE strengthening/ROM, Endurance training, Patient/family training, Equipment evaluation/education, Energy conservation, Activityengagement          See flowsheet documentation for full assessment, interventions and recommendations  Note: Limitation: Decreased ADL status, Decreased endurance, Decreased self-care trans, Decreased high-level ADLs  Prognosis: Good  Assessment: Pt admit 11/2/21 with back pain, chills, fever, nausea, and worsening SOB  Dx: Severe sepsis due to pneumonia  (-) COVID  OT completed extensive review of pt's medical and social history  Pt with h/o anxiety, thyroid disease, HTN, cataract sx- R side, foot sx, and joint replacement  Prior to admit was living in [facility] w/ 1 ALE  Son lives with her  (I) w/ ADLS and IADLs  Ambulates without AD  Pt uses [facility] outdoors  - falls  - drives  Pt presents to OT below baseline due to the following performance deficits: balance; stand tolerance; functional mobility; community integration; self care; and IADLs  Therefore, pt would benefit from OT services to achieve optimal level of performance  Occupational performance areas to be addressed include: bathing, toileting, dressing, activity tolerance, functional mobility, and clothing management  Pt is (S) for bed mobility, (S) STS, and Min for mobility  Reports feeling unsteady due to decreased movement  Provided RW and progressed to CG w/ increased support  No AD PTA  Increased assist needed for ADLS due to fatigue, SOB, balance, and deconditioning  Currently on 3L O2, which she doesn't wear at home  Remains below baseline status  Fatigues quickly  Based on findings, pt is of high complexity   The patient's raw score on the AM-PAC Daily Activity inpatient short form is 20, standardized score is 42 03, greater than 39 4  Patients at this level are likely to benefit from discharge to home  Recommend pt return home w/ support from son and home therapy to achieve optimal independence        OT Discharge Recommendation: Home with home health rehabilitation

## 2021-11-04 NOTE — CASE MANAGEMENT
Met with pt to discuss therapies recommendation for home therapy  Pt declined  Pt states her son is home with her and is able to provide support and assistance  IMM given

## 2021-11-04 NOTE — PROGRESS NOTES
Ed [place]  Progress Note Pasha Olivo 1934, 80 y o  female MRN: [id number]  Unit/Bed#: -01 Encounter: [id number]  Primary Care Provider: Tori Vale MD   Date and time admitted to hospital: 11/2/2021  2:47 PM    * Severe sepsis [facility])  Assessment & Plan  Patient meets criteria for severe sepsis as evidenced by her 124, respiratory 24, leukocytosis of 15,000, lactic acid level 3 1  due to right lower lobe pneumonia  Cultures showed no growth so far  Lactic acidosis resolved  Patient has poor p o  Intake, will continue IV fluids at 50 cc an hour    Called patient's son and left message on his voicemail on November 4th      Chronic right-sided CHF (congestive heart failure) ([facility] [address] )  Assessment & Plan  Echo shows normal systolic function with ejection fraction of 50% but patient has right-sided CHF and pulmonary hypertension  There is no significant mitral or aortic valve pathology that would explain right-sided CHF and pulmonary hypertension  I ordered CT of the chest for tomorrow look for PE  Patient has no pedal edema, I see no volume of overload tonight  Pneumonia  Assessment & Plan  Patient presented with the chills, shortness of breath worsening with exertion fevers at home an dry cough  Chest x-ray showed new right lower lobe infiltrate  Patient has right lower lobe community-acquired pneumonia  Her COVID came back negative  Her swallowing is normal without any coughing or choking when swallowing    Leukocytosis is decreasing, procalcitonin is decreasing  She had no diarrhea on Rocephin and doxycycline that I will continue    Essential hypertension  Assessment & Plan  Patient has chronic hypertension  Systolic blood pressure was elevated today as high as 131 systolic    I increased Lopressor to 50 mg p o  B i d         VTE Prophylaxis: in place    Patient Centered Rounds: I rounded with patient's nurse    Current Length of Stay: 2 day(s)    Current Patient Status: Inpatient    Certification Statement: Pt requires additional inpatient hospital stay due to: see assessment and plan        Subjective:   Patient's nurse reports that patient had poor p o  Intake, she was short of breath with exertion, she had no diarrhea  She was hypertensive with systolic blood pressures in the 180s  She remains on oxygen via nasal cannula at 3 liters/minute  Patient feels that her breathing is improving, cough is lessening  Denies abdominal pain, chest pain, pleurisy, dysuria    All other ROS are negative    Objective:     Vitals:   Temp (24hrs), Av 7 °F (37 1 °C), Min:98 3 °F (36 8 °C), Max:99 °F (37 2 °C)    Temp:  [98 3 °F (36 8 °C)-99 °F (37 2 °C)] 99 °F (37 2 °C)  HR:  [89-91] 91  Resp:  [19-20] 19  BP: (129-186)/() 182/105  SpO2:  [92 %-98 %] 97 %  Body mass index is 26 96 kg/m²  Input and Output Summary (last 24 hours): Intake/Output Summary (Last 24 hours) at 2021  Last data filed at 11/3/2021 2009  Gross per 24 hour   Intake 1051 67 ml   Output --   Net 1051 67 ml       Physical Exam:     Physical Exam  Constitutional:       General: She is not in acute distress  Appearance: She is not toxic-appearing  HENT:      Head: Normocephalic  Mouth/Throat:      Mouth: Mucous membranes are moist    Eyes:      Conjunctiva/sclera: Conjunctivae normal    Neck:      Comments: I saw no JVD  Cardiovascular:      Rate and Rhythm: Normal rate and regular rhythm  Pulmonary:      Effort: No respiratory distress  Breath sounds: Rales (I heard crackles on right side posteriorly) present  No wheezing  Abdominal:      General: Bowel sounds are normal       Palpations: Abdomen is soft  Tenderness: There is no abdominal tenderness  Musculoskeletal:         General: Normal range of motion  Cervical back: Neck supple  Skin:     General: Skin is warm and dry        Comments: Patient no pedal edema   Neurological: Mental Status: She is alert  Mental status is at baseline  Motor: No weakness ( she moves all extremities on command, speech is normal)  I personally reviewed labs and imaging reports for today  Last 24 Hours Medication List:   Current Facility-Administered Medications   Medication Dose Route Frequency Provider Last Rate   • acetaminophen  650 mg Oral Q6H PRN Elena Edmond MD     • aspirin  81 mg Oral Daily Elena Edmond MD     • cefTRIAXone  2,000 mg Intravenous Q24H Elena Edmond MD 2,000 mg (11/04/21 1526)   • doxycycline hyclate  100 mg Oral Q12H Jaquan Hernandez MD     • heparin (porcine)  5,000 Units Subcutaneous Q8H [address] Elena Edmond MD     • LORazepam  1 mg Oral HS Elena Edmond MD     • metoprolol tartrate  50 mg Oral Q12H Jaquan Hernandez MD     • multi-electrolyte  50 mL/hr Intravenous Continuous Elena Edmond MD     • ondansetron  4 mg Intravenous Q6H PRN Elena Edmond MD            Today, Patient Was Seen By: Elena Edmond MD    ** Please Note: Dictation voice to text software may have been used in the creation of this document   **

## 2021-11-04 NOTE — ASSESSMENT & PLAN NOTE
Patient presented with the chills, shortness of breath worsening with exertion fevers at home an dry cough  Chest x-ray showed new right lower lobe infiltrate  Patient has right lower lobe community-acquired pneumonia  Her COVID came back negative    Her swallowing is normal without any coughing or choking when swallowing    Leukocytosis is decreasing, procalcitonin is decreasing  She had no diarrhea on Rocephin and doxycycline that I will continue

## 2021-11-04 NOTE — ASSESSMENT & PLAN NOTE
Patient meets criteria for severe sepsis as evidenced by her 124, respiratory 24, leukocytosis of 15,000, lactic acid level 3 1  due to right lower lobe pneumonia  Cultures showed no growth so far  Lactic acidosis resolved  Patient has poor p o   Intake, will continue IV fluids at 50 cc an hour    Called patient's son and left message on his voicemail on November 4th

## 2021-11-04 NOTE — PLAN OF CARE
Problem: Potential for Falls  Goal: Patient will remain free of falls  Description: INTERVENTIONS:  - Educate patient/family on patient safety including physical limitations  - Instruct patient to call for assistance with activity   - Consult OT/PT to assist with strengthening/mobility   - Keep Call bell within reach  - Keep bed low and locked with side rails adjusted as appropriate  - Keep care items and personal belongings within reach  - Initiate and maintain comfort rounds  - Make Fall Risk Sign visible to staff  - Offer Toileting every 2 Hours, in advance of need  - Initiate/Maintain bed alarm  - Obtain necessary fall risk management equipment: alarm  - Apply yellow socks and bracelet for high fall risk patients  - Consider moving patient to room near nurses station  Outcome: Progressing     Problem: DISCHARGE PLANNING - CARE MANAGEMENT  Goal: Discharge to post-acute care or home with appropriate resources  Description: INTERVENTIONS:  - Conduct assessment to determine patient/family and health care team treatment goals, and need for post-acute services based on payer coverage, community resources, and patient preferences, and barriers to discharge  - Address psychosocial, clinical, and financial barriers to discharge as identified in assessment in conjunction with the patient/family and health care team  - Arrange appropriate level of post-acute services according to patient’s   needs and preference and payer coverage in collaboration with the physician and health care team  - Communicate with and update the patient/family, physician, and health care team regarding progress on the discharge plan  - Arrange appropriate transportation to post-acute venues  Outcome: Progressing     Problem: PAIN - ADULT  Goal: Verbalizes/displays adequate comfort level or baseline comfort level  Description: Interventions:  - Encourage patient to monitor pain and request assistance  - Assess pain using appropriate pain scale  - Administer analgesics based on type and severity of pain and evaluate response  - Implement non-pharmacological measures as appropriate and evaluate response  - Consider cultural and social influences on pain and pain management  - Notify physician/advanced practitioner if interventions unsuccessful or patient reports new pain  Outcome: Progressing     Problem: INFECTION - ADULT  Goal: Absence or prevention of progression during hospitalization  Description: INTERVENTIONS:  - Assess and monitor for signs and symptoms of infection  - Monitor lab/diagnostic results  - Monitor all insertion sites, i e  indwelling lines, tubes, and drains  - Monitor endotracheal if appropriate and nasal secretions for changes in amount and color  - [place] appropriate cooling/warming therapies per order  - Administer medications as ordered  - Instruct and encourage patient and family to use good hand hygiene technique  - Identify and instruct in appropriate isolation precautions for identified infection/condition  Outcome: Progressing     Problem: SAFETY ADULT  Goal: Patient will remain free of falls  Description: INTERVENTIONS:  - Educate patient/family on patient safety including physical limitations  - Instruct patient to call for assistance with activity   - Consult OT/PT to assist with strengthening/mobility   - Keep Call bell within reach  - Keep bed low and locked with side rails adjusted as appropriate  - Keep care items and personal belongings within reach  - Initiate and maintain comfort rounds  - Make Fall Risk Sign visible to staff  - Offer Toileting every 2 Hours, in advance of need  - Initiate/Maintain bed alarm  - Obtain necessary fall risk management equipment: alarm  - Apply yellow socks and bracelet for high fall risk patients  - Consider moving patient to room near nurses station  Outcome: Progressing  Goal: Maintain or return to baseline ADL function  Description: INTERVENTIONS:  -  Assess patient's ability to carry out ADLs; assess patient's baseline for ADL function and identify physical deficits which impact ability to perform ADLs (bathing, care of mouth/teeth, toileting, grooming, dressing, etc )  - Assess/evaluate cause of self-care deficits   - Assess range of motion  - Assess patient's mobility; develop plan if impaired  - Assess patient's need for assistive devices and provide as appropriate  - Encourage maximum independence but intervene and supervise when necessary  - Involve family in performance of ADLs  - Assess for home care needs following discharge   - Consider OT consult to assist with ADL evaluation and planning for discharge  - Provide patient education as appropriate  Outcome: Progressing  Goal: Maintains/Returns to pre admission functional level  Description: INTERVENTIONS:  - Perform BMAT or MOVE assessment daily    - Set and communicate daily mobility goal to care team and patient/family/caregiver  - Collaborate with rehabilitation services on mobility goals if consulted  - Perform Range of Motion 3 times a day  - Reposition patient every 2 hours    - Dangle patient 3 times a day  - Stand patient 3 times a day  - Ambulate patient 3 times a day  - Out of bed to chair 3 times a day   - Out of bed for meals 3 times a day  - Out of bed for toileting  - Record patient progress and toleration of activity level   Outcome: Progressing

## 2021-11-04 NOTE — ASSESSMENT & PLAN NOTE
Patient has chronic hypertension  Systolic blood pressure was elevated today as high as 726 systolic    I increased Lopressor to 50 mg p o  B i d

## 2021-11-05 ENCOUNTER — APPOINTMENT (INPATIENT)
Dept: NON INVASIVE DIAGNOSTICS | Facility: HOSPITAL | Age: 86
DRG: 871 | End: 2021-11-05
Payer: COMMERCIAL

## 2021-11-05 PROBLEM — K44.9 HIATAL HERNIA: Status: ACTIVE | Noted: 2021-11-05

## 2021-11-05 PROBLEM — E87.1 HYPONATREMIA: Status: RESOLVED | Noted: 2021-11-02 | Resolved: 2021-11-05

## 2021-11-05 LAB
ANION GAP SERPL CALCULATED.3IONS-SCNC: 12 MMOL/L (ref 4–13)
APTT PPP: 182 SECONDS (ref 23–37)
APTT PPP: 36 SECONDS (ref 23–37)
APTT PPP: >210 SECONDS (ref 23–37)
BUN SERPL-MCNC: 9 MG/DL (ref 5–25)
CALCIUM SERPL-MCNC: 8.1 MG/DL (ref 8.3–10.1)
CHLORIDE SERPL-SCNC: 106 MMOL/L (ref 100–108)
CO2 SERPL-SCNC: 20 MMOL/L (ref 21–32)
CREAT SERPL-MCNC: 0.74 MG/DL (ref 0.6–1.3)
ERYTHROCYTE [DISTWIDTH] IN BLOOD BY AUTOMATED COUNT: 14.1 % (ref 11.6–15.1)
GFR SERPL CREATININE-BSD FRML MDRD: 73 ML/MIN/1.73SQ M
GLUCOSE SERPL-MCNC: 145 MG/DL (ref 65–140)
HCT VFR BLD AUTO: 32.4 % (ref 34.8–46.1)
HGB BLD-MCNC: 10.5 G/DL (ref 11.5–15.4)
INR PPP: 1.22 (ref 0.84–1.19)
MCH RBC QN AUTO: 29 PG (ref 26.8–34.3)
MCHC RBC AUTO-ENTMCNC: 32.4 G/DL (ref 31.4–37.4)
MCV RBC AUTO: 90 FL (ref 82–98)
PLATELET # BLD AUTO: 308 THOUSANDS/UL (ref 149–390)
PMV BLD AUTO: 9.8 FL (ref 8.9–12.7)
POTASSIUM SERPL-SCNC: 3.4 MMOL/L (ref 3.5–5.3)
PROCALCITONIN SERPL-MCNC: 0.22 NG/ML
PROTHROMBIN TIME: 15.2 SECONDS (ref 11.6–14.5)
RBC # BLD AUTO: 3.62 MILLION/UL (ref 3.81–5.12)
S PNEUM AG UR QL: NEGATIVE
SODIUM SERPL-SCNC: 138 MMOL/L (ref 136–145)
WBC # BLD AUTO: 8.75 THOUSAND/UL (ref 4.31–10.16)

## 2021-11-05 PROCEDURE — 84145 PROCALCITONIN (PCT): CPT | Performed by: INTERNAL MEDICINE

## 2021-11-05 PROCEDURE — 99291 CRITICAL CARE FIRST HOUR: CPT | Performed by: NURSE PRACTITIONER

## 2021-11-05 PROCEDURE — 94760 N-INVAS EAR/PLS OXIMETRY 1: CPT

## 2021-11-05 PROCEDURE — 80048 BASIC METABOLIC PNL TOTAL CA: CPT | Performed by: INTERNAL MEDICINE

## 2021-11-05 PROCEDURE — 94640 AIRWAY INHALATION TREATMENT: CPT

## 2021-11-05 PROCEDURE — 93970 EXTREMITY STUDY: CPT | Performed by: INTERNAL MEDICINE

## 2021-11-05 PROCEDURE — 99222 1ST HOSP IP/OBS MODERATE 55: CPT | Performed by: PHYSICIAN ASSISTANT

## 2021-11-05 PROCEDURE — 85730 THROMBOPLASTIN TIME PARTIAL: CPT | Performed by: NURSE PRACTITIONER

## 2021-11-05 PROCEDURE — 99292 CRITICAL CARE ADDL 30 MIN: CPT | Performed by: INTERNAL MEDICINE

## 2021-11-05 PROCEDURE — 85730 THROMBOPLASTIN TIME PARTIAL: CPT | Performed by: INTERNAL MEDICINE

## 2021-11-05 PROCEDURE — 97530 THERAPEUTIC ACTIVITIES: CPT

## 2021-11-05 PROCEDURE — 85027 COMPLETE CBC AUTOMATED: CPT | Performed by: INTERNAL MEDICINE

## 2021-11-05 PROCEDURE — 93970 EXTREMITY STUDY: CPT

## 2021-11-05 PROCEDURE — 92610 EVALUATE SWALLOWING FUNCTION: CPT

## 2021-11-05 RX ORDER — HYDRALAZINE HYDROCHLORIDE 20 MG/ML
10 INJECTION INTRAMUSCULAR; INTRAVENOUS EVERY 6 HOURS PRN
Status: DISCONTINUED | OUTPATIENT
Start: 2021-11-05 | End: 2021-11-05

## 2021-11-05 RX ORDER — POTASSIUM CHLORIDE 20 MEQ/1
40 TABLET, EXTENDED RELEASE ORAL ONCE
Status: COMPLETED | OUTPATIENT
Start: 2021-11-05 | End: 2021-11-05

## 2021-11-05 RX ORDER — LEVALBUTEROL 1.25 MG/.5ML
1.25 SOLUTION, CONCENTRATE RESPIRATORY (INHALATION)
Status: DISCONTINUED | OUTPATIENT
Start: 2021-11-05 | End: 2021-11-05

## 2021-11-05 RX ORDER — SODIUM CHLORIDE FOR INHALATION 0.9 %
3 VIAL, NEBULIZER (ML) INHALATION
Status: DISCONTINUED | OUTPATIENT
Start: 2021-11-05 | End: 2021-11-05

## 2021-11-05 RX ORDER — POTASSIUM CHLORIDE 20 MEQ/1
20 TABLET, EXTENDED RELEASE ORAL ONCE
Status: COMPLETED | OUTPATIENT
Start: 2021-11-05 | End: 2021-11-05

## 2021-11-05 RX ADMIN — ASPIRIN 81 MG: 81 TABLET, COATED ORAL at 08:09

## 2021-11-05 RX ADMIN — ISODIUM CHLORIDE 3 ML: 0.03 SOLUTION RESPIRATORY (INHALATION) at 13:48

## 2021-11-05 RX ADMIN — CEFTRIAXONE 2000 MG: 2 INJECTION, SOLUTION INTRAVENOUS at 15:15

## 2021-11-05 RX ADMIN — METOPROLOL TARTRATE 50 MG: 50 TABLET, FILM COATED ORAL at 20:43

## 2021-11-05 RX ADMIN — ISODIUM CHLORIDE 3 ML: 0.03 SOLUTION RESPIRATORY (INHALATION) at 07:47

## 2021-11-05 RX ADMIN — METOPROLOL TARTRATE 50 MG: 50 TABLET, FILM COATED ORAL at 08:09

## 2021-11-05 RX ADMIN — HYDRALAZINE HYDROCHLORIDE 10 MG: 20 INJECTION INTRAMUSCULAR; INTRAVENOUS at 00:00

## 2021-11-05 RX ADMIN — HYDRALAZINE HYDROCHLORIDE 10 MG: 20 INJECTION INTRAMUSCULAR; INTRAVENOUS at 13:00

## 2021-11-05 RX ADMIN — ACETAMINOPHEN 650 MG: 325 TABLET, FILM COATED ORAL at 08:08

## 2021-11-05 RX ADMIN — LEVALBUTEROL HYDROCHLORIDE 1.25 MG: 1.25 SOLUTION, CONCENTRATE RESPIRATORY (INHALATION) at 07:47

## 2021-11-05 RX ADMIN — POTASSIUM CHLORIDE 40 MEQ: 1500 TABLET, EXTENDED RELEASE ORAL at 17:23

## 2021-11-05 RX ADMIN — LEVALBUTEROL HYDROCHLORIDE 1.25 MG: 1.25 SOLUTION, CONCENTRATE RESPIRATORY (INHALATION) at 13:48

## 2021-11-05 RX ADMIN — POTASSIUM CHLORIDE 20 MEQ: 1500 TABLET, EXTENDED RELEASE ORAL at 20:43

## 2021-11-05 RX ADMIN — LORAZEPAM 1 MG: 1 TABLET ORAL at 20:42

## 2021-11-05 NOTE — QUICK NOTE
I updated patient at bedside and provided update to son, Iam Damon, via telephone  All questions/concerns were answered and addressed

## 2021-11-05 NOTE — CONSULTS
Consultation - General Surgery   Lauryn Alves 80 y o  female MRN: [id number]  Unit/Bed#: -01 SDU Encounter: [id number]    Assessment/Plan   Large PEH  -recent finding on recent CTA, no findings of obstruction  -patient denies any prior known history of PEH   -denies any dysphagia, no coughing with eating, no vomiting, no GI bleeding  -discussed hernia finding with patient  She would not be a surgical candidate at this time due to acute PEs, sepsis, and pneumonia  States she does not want to consider surgical intervention even when medically optimized  -no plans for surgical intervention on this admission  -will provide follow-up information if patient wishes to follow-up as an outpatient after patient is medically optimized and recovered from hospitalization    B/L PEs  -CTA yesterday with findings of large quantity of right and moderate quantity of left acute pulmonary arterial embolus although PEs were likely present on admission  -being treated with heparin drip  -lower extremity duplex pending  -continues to complain of shortness of breath  -no prior history of blood clotting  -continue work-up    PNA  -imaging showed RLL pneumonia  -COVID/FLU/RSV negative  -continue IV antibiotics, monitoring    Sepsis POA  -indicated by tachycardia, tachypnea, leukocytosis, and lactic acid of 3 1 on admission likely related to PNA  -lactic acid doses resolved, leukocytosis resolved  -continue IV antibiotics  -follow blood and sputum cultures  -remains hemodynamically stable  -continue supportive care    CHF, HTN, Anxiety  -medical management      History of Present Illness     HPI:  Lauryn Alves is a 80 y o  female with past medical history of CHF, HTN, and anxiety who was admitted on 11/02/2021 with complaints of shortness of breath, fevers, and chills  Patient was found to have community-acquired pneumonia  She was started on antibiotics    She also had an echocardiogram with superior pulmonary hypertension of unclear etiology  She underwent a CTA with findings of large quantity of right and moderate quantity of left acute pulmonary arterial embolism  Patient was transferred to step-down unit yesterday for closer monitoring  She continues to complain of shortness of breath  She denies any previous history of blood clots  She denies any known history of paraesophageal hernia  She denies any issues with dysphagia, coughing with eating, vomiting, or GI bleeding  Patient states she does not want to consider surgical intervention for finding of paraesophageal hernia  Consults    Review of Systems   Constitutional: Positive for chills and fever  Negative for appetite change  HENT: Negative  Eyes: Negative  Respiratory: Positive for cough, shortness of breath and wheezing  Cardiovascular: Negative  Negative for chest pain and palpitations  Gastrointestinal: Negative  Negative for blood in stool, nausea and vomiting  Endocrine: Negative  Genitourinary: Negative  Negative for difficulty urinating  Musculoskeletal: Negative  Skin: Negative  Negative for rash and wound  Allergic/Immunologic: Negative  Neurological: Positive for weakness  Hematological: Negative  Does not bruise/bleed easily  Psychiatric/Behavioral: Negative  All other systems reviewed and are negative        Historical Information   Past Medical History:   Diagnosis Date   • Anxiety    • Disease of thyroid gland    • Hypertension      Past Surgical History:   Procedure Laterality Date   • CATARACT EXTRACTION Right    • FOOT SURGERY     • JOINT REPLACEMENT       Social History   Social History     Substance and Sexual Activity   Alcohol Use No     Social History     Substance and Sexual Activity   Drug Use No     E-Cigarette/Vaping   • E-Cigarette Use Never User      E-Cigarette/Vaping Substances     Social History     Tobacco Use   Smoking Status Never Smoker   Smokeless Tobacco Never Used     Family History: non-contributory    Meds/Allergies   all current active meds have been reviewed  Allergies   Allergen Reactions   • Preservision Areds [B-Plex Plus] Tremor   • Tramadol      Reaction Date: 22Aug2011;        Objective   First Vitals:   Blood Pressure: (!) 174/100 (11/02/21 1459)  Pulse: (!) 124 (11/02/21 1455)  Temperature: 99 3 °F (37 4 °C) (11/02/21 1455)  Temp Source: Oral (11/02/21 1455)  Respirations: (!) 24 (11/02/21 1455)  Height: 4' 10" (147 3 cm) (11/03/21 1500)  Weight - Scale: 58 5 kg (129 lb) (11/03/21 1500)  SpO2: 91 % (11/02/21 1455)    Current Vitals:   Blood Pressure: (!) 174/85 (11/05/21 0715)  Pulse: 90 (11/05/21 0715)  Temperature: 98 5 °F (36 9 °C) (11/05/21 0946)  Temp Source: Oral (11/05/21 0946)  Respirations: (!) 29 (11/05/21 0715)  Height: 4' 10" (147 3 cm) (11/03/21 1500)  Weight - Scale: 61 kg (134 lb 6 4 oz) (11/04/21 2200)  SpO2: 98 % (11/05/21 0757)      Intake/Output Summary (Last 24 hours) at 11/5/2021 1054  Last data filed at 11/5/2021 1030  Gross per 24 hour   Intake 538 58 ml   Output --   Net 538 58 ml       Invasive Devices     Peripheral Intravenous Line            Peripheral IV 11/02/21 Left Hand 2 days    Peripheral IV 11/04/21 Right Antecubital <1 day                Physical Exam  Constitutional:       Appearance: She is well-developed  She is ill-appearing  She is not diaphoretic  Comments: Appears SOB   HENT:      Head: Normocephalic and atraumatic  Mouth/Throat:      Pharynx: No oropharyngeal exudate  Eyes:      General: No scleral icterus  Right eye: No discharge  Left eye: No discharge  Neck:      Thyroid: No thyromegaly  Vascular: No JVD  Trachea: No tracheal deviation  Comments: Trachea midline  Cardiovascular:      Rate and Rhythm: Normal rate and regular rhythm  Heart sounds: Normal heart sounds  No murmur heard       Pulmonary:      Comments: Good effort, diffuse mild wheezing and rhonchi, decreased breath sounds right base  Abdominal:      General: Bowel sounds are normal  There is no distension  Palpations: Abdomen is soft  Tenderness: There is no abdominal tenderness  Musculoskeletal:         General: No deformity  Normal range of motion  Cervical back: Normal range of motion and neck supple  Skin:     General: Skin is warm and dry  Findings: No rash  Neurological:      Mental Status: She is alert and oriented to person, place, and time  Comments: No focal deficits   Psychiatric:         Behavior: Behavior normal          Lab Results:   I have personally reviewed pertinent lab results  , CBC:   Lab Results   Component Value Date    WBC 8 75 11/05/2021    HGB 10 5 (L) 11/05/2021    HCT 32 4 (L) 11/05/2021    MCV 90 11/05/2021     11/05/2021    MCH 29 0 11/05/2021    MCHC 32 4 11/05/2021    RDW 14 1 11/05/2021    MPV 9 8 11/05/2021   , CMP: No results found for: SODIUM, K, CL, CO2, ANIONGAP, BUN, CREATININE, GLUCOSE, CALCIUM, AST, ALT, ALKPHOS, PROT, BILITOT, EGFR, Coagulation:   Lab Results   Component Value Date    INR 1 22 (H) 11/04/2021   , Urinalysis: No results found for: Rudy Climes, SPECGRAV, PHUR, LEUKOCYTESUR, NITRITE, PROTEINUA, GLUCOSEU, KETONESU, BILIRUBINUR, BLOODU, Amylase: No results found for: AMYLASE, Lipase: No results found for: LIPASE  Imaging: I have personally reviewed pertinent reports  CTA Chest  IMPRESSION:     Large quantity of right and moderate quantity of left acute pulmonary arterial embolism      The calculated ratio of right ventricular to left ventricular diameter (RV/LV ratio) is greater than 0 9, which is abnormal and indicates right heart strain  An abnormal RV/LV ratio has been shown to be associated with an increased risk of 30 day   mortality in the setting of acute pulmonary embolism  Urgent consultation with the medical critical care team is recommended      Large paraesophageal hiatal hernia containing most of the stomach   The herniated portion is distended but does not appear obstructed  This patient is susceptible to gastric volvulus  EKG, Pathology, and Other Studies: I have personally reviewed pertinent reports         London Jules PA-C

## 2021-11-05 NOTE — PLAN OF CARE
Problem: PHYSICAL THERAPY ADULT  Goal: Performs mobility at highest level of function for planned discharge setting  See evaluation for individualized goals  Description: Treatment/Interventions: ADL retraining, Functional transfer training, Endurance training, Gait training, Spoke to nursing          See flowsheet documentation for full assessment, interventions and recommendations  Outcome: Progressing  Note: Prognosis: Good  Problem List: Decreased endurance  Assessment: Pt able to mobilize but oocas osb  02 sat 96% throughout session  Instr to  avoid mouth breathing  Still pants oocas, Instr in incentive spirometry  Remains in chair after session all needs in reach  Should be able to proress to home d/c Con't PT  Barriers to Discharge:  (medical status)        PT Discharge Recommendation: Home with home health rehabilitation     PT - OK to Discharge: No    See flowsheet documentation for full assessment

## 2021-11-05 NOTE — QUICK NOTE
Spoke with radiology about CT chest of this patient  "Large quantity of right pulmonary arterial embolism extending from the midportion of the right pulmonary artery into the lobar and segmental branches  Moderate left upper lobe and segmental left lower lobe pulmonary arterial embolism "   · Echo from 11/3 showed "RV moderately dilated with severely reduced systolic function  Severely increased pulmonary artery systolic pressure "  · Spoke with critical care AP, Eden who accepted pt as SD1        "Large paraesophageal hiatal hernia containing most of the stomach  The herniated portion is distended but does not appear obstructed  This patient is susceptible to gastric volvulus "  · Spoke with surgical AP on-call, Theresa Galvan who recommends speaking with Dr Francie Francisco, who recommended surgical consult in AM  No recommendations this evening

## 2021-11-05 NOTE — SPEECH THERAPY NOTE
Speech Language/Pathology    Speech-Language Pathology Bedside Swallow Evaluation      Patient Name: Brennan Nelson    Today's Date: 11/5/2021     Problem List  Principal Problem:    Severe sepsis ([address] )  Active Problems:    Generalized anxiety disorder    Essential hypertension    Pneumonia    Elevated troponin    Chronic right-sided CHF (congestive heart failure) (HCC)    Acute pulmonary embolism with acute cor pulmonale ([address] )    Hiatal hernia      Past Medical History  Past Medical History:   Diagnosis Date   • Anxiety    • Disease of thyroid gland    • Hypertension        Past Surgical History  Past Surgical History:   Procedure Laterality Date   • CATARACT EXTRACTION Right    • FOOT SURGERY     • JOINT REPLACEMENT         Summary   Pt presented with functional appearing oral and pharyngeal stage swallowing skills with materials administered today  Assessment limited as pt w/ poor appetite though pt agreeable to small amount of all textures offered  Mastication is functional, mild prolonged for hard solids 2/2 dentition though ultimately effective for bolus breakdown and formation  Pt takes rapid consecutive sips thin liquids without difficulty  Swallows appear timely  No overt s/s aspiration today  Pt w/ currently w/ RLL pna  Silent aspiration cannot be ruled out without VBS, if pna not resolving or increased concerns for aspiration, consider VBS  Risk/s for Aspiration: Low     Recommended Diet: regular diet, thin liquids and choose softer selections as needed    Recommended Form of Meds: whole with liquid   Aspiration precautions and swallowing strategies: upright posture, only feed when fully alert, slow rate of feeding, small bites/sips and alternating bites and sips  Other Recommendations: Continue frequent oral care      Current Medical Status  Pt is a 80 y o  female who presented to [facility] with shortness of breath     Patient was in her usual state of health until Sunday when she developed low back pain, chills, fever, shortness of breath worsening with exertion, dry cough  She had nausea and vomited once today  She was brought to the ER because of her worsening shortness of breath with exertion      Without questioned her regarding her back pain she told me that her back pain is gone after placing a pillow behind her back  She denies any dysphagia, odynophagia, coughing or choking on swallowing      She had subjective fevers at home but she does not have a thermometer she did not take her temperature  She also had chills  She denies any productive cough or hemoptysis  She has no chest pain or pleurisy      She was getting shortness of breath with the slightest of physical activity at home  Denies weight gain or increasing lower extremity edema  Current Precautions: Allergies:  No known food allergies    Past medical history:  Please see H&P for details    Special Studies:  CXR 11/2: New lower lung opacity concerning for pneumonia  New right pleural effusion  Large hiatal hernia  CTA chest 11/4: Large quantity of right and moderate quantity of left acute pulmonary arterial embolism      The calculated ratio of right ventricular to left ventricular diameter (RV/LV ratio) is greater than 0 9, which is abnormal and indicates right heart strain  An abnormal RV/LV ratio has been shown to be associated with an increased risk of 30 day   mortality in the setting of acute pulmonary embolism  Urgent consultation with the medical critical care team is recommended      Large paraesophageal hiatal hernia containing most of the stomach  The herniated portion is distended but does not appear obstructed   This patient is susceptible to gastric volvulus        Social/Education/Vocational Hx:  Pt lives with family    Swallow Information   Current Risks for Dysphagia & Aspiration: age, hiatal hernia, SOB  Current Symptoms/Concerns: RLL pna  Current Diet: regular diet and thin liquids Baseline Diet: regular diet and thin liquids      Baseline Assessment   Behavior/Cognition: alert  Speech/Language Status: able to participate in conversation and able to follow commands  Patient Positioning: upright in chair  Pain Status/Interventions/Response to Interventions:  No report of or nonverbal indications of pain  Swallow Mechanism Exam  Facial: symmetrical  Labial: WFL  Lingual: WFL  Velum: symmetrical  Mandible: adequate ROM  Dentition: adequate  Vocal quality:clear/adequate   Volitional Cough: strong/productive   Respiratory Status: on 3L O2        Consistencies Assessed and Performance   Consistencies Administered: thin liquids, soft solids, hard solids and regular texture solids  Materials administered included diced pears, hard sandwich cookie, granola bar     Oral Stage: minimal  Mastication was adequate with the materials administered today, min prolonged/impacted 2/2 dental pain  Bolus formation and transfer were functional with no significant oral residue noted  No overt s/s reduced oral control  Pharyngeal Stage: suspected WFL   Swallow Mechanics:  Swallowing initiation appeared prompt  Laryngeal rise was palpated and judged to be within functional limits  No coughing, throat clearing, change in vocal quality or respiratory status noted today  Esophageal Concerns: large hiatal hernia     Strategies and Efficacy: -     Summary and Recommendations (see above)    Results Reviewed with: patient, RN, CRNP and family     Treatment Recommended: Yes     Frequency of treatment: As needed for VBS if indicated    Patient Stated Goal: none stated    Dysphagia LTG  -Patient will demonstrate safe and effective oral intake (without overt s/s significant oral/pharyngeal dysphagia including s/s penetration or aspiration) for the highest appropriate diet level       Speech Therapy Prognosis   Prognosis: good    Prognosis Considerations: age, medical status and prior medical history

## 2021-11-05 NOTE — CONSULTS
[place]  Transfer Note- Alfonso Zavaleta 1934, 80 y o  female MRN: [id number]  Unit/Bed#: -01 SDU Encounter: [id number]  Primary Care Provider: Amber Haskins MD   Date and time admitted to hospital: 11/2/2021  2:47 PM    Consults    * Severe sepsis [facility])  Assessment & Plan  · Admitted to the [facility] with severe sepsis evidenced by heart rate of 124 respiratory rate of 24, leukocytosis, lactic acid of 3 1  · Imaging showing right lower lobe pneumonia  · Lactic acidosis has resolved  · Started on antibiotics admission, currently on doxy use ceftriaxone day 4  · Blood cultures on admission pending negative  · COVID flu RSV negative this admission  · Strep antigen pending  · Hemodynamically stable currently    Acute pulmonary embolism with acute cor pulmonale (HCC)  Assessment & Plan  · Likely present on admission  · Patient had CTA PE yesterday due to echocardiogram evidence of RV dilation and pulmonary hypertension  · 11/4 CTA PE showing a large quantity of right and moderate quantity of left acute pulmonary artery embolism, RV/LV ratio is greater than than 0 9  · Patient transferred to step-down 1 for closer monitoring  · Started on heparin infusion ET protocol  · Bilateral lower extremity venous duplex ordered  · Patient denies any recent travel, injury, smoking, self or family history of blood clots  · Will need DOAC prior to discharge    Pneumonia  Assessment & Plan  · Present on admission  Patient presented with complaints of chills, shortness of breath worsening with exertion, fevers and dry cough    · Imaging this admission showing right lower lobe consolidation  · COVID/RSV/flu negative  · Continues on doxycycline and Rocephin, day 4 of antibiotics  · Leukocytosis and procalcitonin trending down  · Continue to monitor fever curve and leukocytosis, follow-up cultures    Chronic right-sided CHF (congestive heart failure) (HCC)  Assessment & Plan  · Echocardiogram this admission showing LVEF 19%, grade 1 diastolic dysfunction  Moderately dilated RV  Severe pulmonary hypertension with the pulmonary artery systolic pressure of 60  · Patient does not appear fluid overloaded  · Found to have large bilateral PEs this admission  · Close monitoring of I/O status  · Continue home metoprolol tartrate    Hiatal hernia  Assessment & Plan  · On CT scan from 11/04/2021 noted to have a large paraesophageal hiatal hernia containing most of the stomach  The herniated portion was distended on imaging that did not seem obstructed  · Concern for patient at risk for developing gastric volvulus  · Surgery consulted and will see later today    Essential hypertension  Assessment & Plan  · Metoprolol increased to 50 b i d  Yesterday  · Starting hydralazine p r n   · Holding home Enalapril due to an FRANK on admission, now resolved so consider restarting    Generalized anxiety disorder  Assessment & Plan  · Continue Ativan q h s  Offered to call family to update them on transfers, patient declined  -------------------------------------------------------------------------------------------------------------  Chief Complaint:  Shortness of breath, fevers chills    History of Present Illness     Luke Stahl is a 80 y o  female who initially presented on 11/02/2021 with shortness of breath, fevers chills and found to have community-acquired pneumonia  She was started on empiric antibiotics with doxy and Rocephin  Echocardiogram this admission showing moderately dilated RV and severe pulmonary hypertension with unclear etiology  She underwent a CTA PE on 11/04/2021 which showed a large on anti on right and moderate quantity on left of acute pulmonary embolism and an RV/LV ratio greater than 0 9  Patient denies any recent travel, injury, self or family history of blood clots, tobacco use  Patient transferred to step-down 1 status for closer monitoring      History obtained from chart review and the patient   -------------------------------------------------------------------------------------------------------------  Dispo: Transfer to Stepdown Level 1     Code Status: Level 3 - DNAR and DNI  --------------------------------------------------------------------------------------------------------------  Review of Systems   Constitutional: Positive for chills and fever  HENT: Negative  Eyes: Negative  Respiratory: Positive for shortness of breath and wheezing  Cardiovascular: Negative for chest pain and leg swelling  Gastrointestinal: Negative  Endocrine: Negative  Genitourinary: Negative  Musculoskeletal: Positive for arthralgias  Skin: Negative  Neurological: Negative  Hematological: Negative  Psychiatric/Behavioral: Negative  Physical Exam  Vitals and nursing note reviewed  Constitutional:       Appearance: Normal appearance  Comments: Elderly female sitting up in bed on 3 L nasal cannula with SpO2 95%   HENT:      Head: Normocephalic and atraumatic  Nose: Nose normal       Mouth/Throat:      Mouth: Mucous membranes are moist       Pharynx: Oropharynx is clear  Eyes:      Extraocular Movements: Extraocular movements intact  Conjunctiva/sclera: Conjunctivae normal       Pupils: Pupils are equal, round, and reactive to light  Cardiovascular:      Rate and Rhythm: Normal rate and regular rhythm  Pulses: Normal pulses  Heart sounds: Normal heart sounds  Pulmonary:      Breath sounds: Wheezing present  Comments: On 2 L nasal cannula with SpO2 95%, lung sounds wheezing bilaterally and scattered rhonchi  Abdominal:      General: Bowel sounds are normal  There is no distension  Palpations: Abdomen is soft  Musculoskeletal:         General: No swelling or tenderness  Normal range of motion  Cervical back: Normal range of motion and neck supple  Right lower leg: No edema  Left lower leg: No edema     Skin: General: Skin is warm and dry  Neurological:      General: No focal deficit present  Mental Status: She is alert and oriented to person, place, and time  Mental status is at baseline  Cranial Nerves: No cranial nerve deficit  Comments: Hard of hearing  Moves lower extremities equally, oriented x4   Psychiatric:         Mood and Affect: Mood normal        --------------------------------------------------------------------------------------------------------------  Vitals:   Vitals:    11/04/21 1945 11/04/21 2101 11/04/21 2200 11/04/21 2304   BP: 143/78 160/91  (!) 189/99   Pulse: 62 96  86   Resp:    (!) 42   Temp:       TempSrc:       SpO2: 95% 96%  97%   Weight:   61 kg (134 lb 6 4 oz)    Height:         Temp  Min: 97 7 °F (36 5 °C)  Max: 99 3 °F (37 4 °C)  IBW (Ideal Body Weight): 40 9 kg  Height: 4' 10" (147 3 cm)  Body mass index is 28 09 kg/m²      Laboratory and Diagnostics:  Results from last 7 days   Lab Units 11/04/21  0504 11/03/21 0453 11/02/21  1523   WBC Thousand/uL 9 24 11 18* 15 56*   HEMOGLOBIN g/dL 10 6* 11 5 13 3   HEMATOCRIT % 33 1* 35 7 40 9   PLATELETS Thousands/uL 248 226 262   NEUTROS PCT %  --   --  84*   MONOS PCT %  --   --  7     Results from last 7 days   Lab Units 11/04/21  0504 11/03/21  0453 11/02/21  1523   SODIUM mmol/L 139 135* 129*   POTASSIUM mmol/L 3 8 4 1 5 1   CHLORIDE mmol/L 107 102 94*   CO2 mmol/L 20* 20* 18*   ANION GAP mmol/L 12 13 17*   BUN mg/dL 15 26* 24   CREATININE mg/dL 0 71 0 98 1 17   CALCIUM mg/dL 8 0* 8 1* 8 8   GLUCOSE RANDOM mg/dL 105 104 140   ALT U/L  --  13 17   AST U/L  --  19 37   ALK PHOS U/L  --  69 89   ALBUMIN g/dL  --  2 3* 3 0*   TOTAL BILIRUBIN mg/dL  --  0 50 0 90     Results from last 7 days   Lab Units 11/03/21  0453   MAGNESIUM mg/dL 2 3      Results from last 7 days   Lab Units 11/04/21  2347 11/02/21  1523   INR  1 22* 1 08   PTT seconds 36 32      Results from last 7 days   Lab Units 11/02/21  1844 11/02/21  1523   TROPONIN I ng/mL 1 58* 1 39*     Results from last 7 days   Lab Units 11/02/21  1844 11/02/21  1523   LACTIC ACID mmol/L 1 8 3 1*     ABG:    VBG:    Results from last 7 days   Lab Units 11/04/21  0504 11/03/21  0453 11/02/21  1523   PROCALCITONIN ng/ml 0 60* 1 07* 0 97*       Micro:  Results from last 7 days   Lab Units 11/02/21  1523   BLOOD CULTURE  No Growth at 48 hrs  No Growth at 48 hrs  EKG:  Normal sinus rhythm, heart rate 84 beats per minute  Imaging: I have personally reviewed pertinent reports     and I have personally reviewed pertinent films in PACS      Historical Information   Past Medical History:   Diagnosis Date   • Anxiety    • Disease of thyroid gland    • Hypertension      Past Surgical History:   Procedure Laterality Date   • CATARACT EXTRACTION Right    • FOOT SURGERY     • JOINT REPLACEMENT       Social History   Social History     Substance and Sexual Activity   Alcohol Use No     Social History     Substance and Sexual Activity   Drug Use No     Social History     Tobacco Use   Smoking Status Never Smoker   Smokeless Tobacco Never Used     Exercise History:  Ambulates  Family History:   Family History   Problem Relation Age of Onset   • No Known Problems Mother    • Stroke Father      Mother:  Atrial fibrillation on Coumadin  Denies any family history of blood clots      Medications:  Current Facility-Administered Medications   Medication Dose Route Frequency   • acetaminophen (TYLENOL) tablet 650 mg  650 mg Oral Q6H PRN   • aspirin (ECOTRIN LOW STRENGTH) EC tablet 81 mg  81 mg Oral Daily   • cefTRIAXone (ROCEPHIN) IVPB (premix in dextrose) 2,000 mg 50 mL  2,000 mg Intravenous Q24H   • doxycycline hyclate (VIBRAMYCIN) capsule 100 mg  100 mg Oral Q12H [facility]   • heparin (porcine) 25,000 units in 0 45% NaCl 250 mL infusion (premix)  3-30 Units/kg/hr (Order-Specific) Intravenous Titrated   • heparin (porcine) injection 2,400 Units  2,400 Units Intravenous Q1H PRN   • heparin (porcine) injection 4,800 Units  4,800 Units Intravenous Q1H PRN   • hydrALAZINE (APRESOLINE) injection 10 mg  10 mg Intravenous Q6H PRN   • LORazepam (ATIVAN) tablet 1 mg  1 mg Oral HS   • metoprolol tartrate (LOPRESSOR) tablet 50 mg  50 mg Oral Q12H [facility]   • ondansetron (ZOFRAN) injection 4 mg  4 mg Intravenous Q6H PRN     Home medications:  Prior to Admission Medications   Prescriptions Last Dose Informant Patient Reported? Taking? LORazepam (ATIVAN) 1 mg tablet   No No   Sig: Take 1 tablet (1 mg total) by mouth daily at bedtime   enalapril (VASOTEC) 2 5 mg tablet   No No   Sig: Take 1 tablet (2 5 mg total) by mouth daily      Facility-Administered Medications: None     Allergies: Allergies   Allergen Reactions   • Preservision Areds [B-Plex Plus] Tremor   • Tramadol      Reaction Date: [id number];      ------------------------------------------------------------------------------------------------------------  Advance Directive and Living Will: Yes    Power of :    POLST:    ------------------------------------------------------------------------------------------------------------  Care Time Delivered:   Upon my evaluation, this patient had a high probability of imminent or life-threatening deterioration due to Acute hypoxic respiratory failure, which required my direct attention, intervention, and personal management  I have personally provided 58 minutes (0000 to 0058) of critical care time, exclusive of procedures, teaching, family meetings, and any prior time recorded by providers other than myself  CYRUS Marie        Portions of the record may have been created with voice recognition software  Occasional wrong word or "sound a like" substitutions may have occurred due to the inherent limitations of voice recognition software    Read the chart carefully and recognize, using context, where substitutions have occurred

## 2021-11-05 NOTE — ASSESSMENT & PLAN NOTE
· Admitted to the [facility] surge with severe sepsis evidenced by heart rate of 124 respiratory rate of 24, leukocytosis, lactic acid of 3 1  · Imaging showing right lower lobe pneumonia  · Lactic acidosis has resolved  · Started on antibiotics admission, currently on doxy use ceftriaxone day 4  · Blood cultures on admission pending negative  · COVID flu RSV negative this admission  · Strep antigen pending  · Hemodynamically stable currently

## 2021-11-05 NOTE — PLAN OF CARE
Problem: Potential for Falls  Goal: Patient will remain free of falls  Description: INTERVENTIONS:  - Educate patient/family on patient safety including physical limitations  - Instruct patient to call for assistance with activity   - Consult OT/PT to assist with strengthening/mobility   - Keep Call bell within reach  - Keep bed low and locked with side rails adjusted as appropriate  - Keep care items and personal belongings within reach  - Initiate and maintain comfort rounds  - Make Fall Risk Sign visible to staff  - Offer Toileting every 3 Hours, in advance of need  - Initiate/Maintain 3alarm  - Obtain necessary fall risk management equipment: 3  - Apply yellow socks and bracelet for high fall risk patients  - Consider moving patient to room near nurses station  Outcome: Progressing     Problem: DISCHARGE PLANNING - CARE MANAGEMENT  Goal: Discharge to post-acute care or home with appropriate resources  Description: INTERVENTIONS:  - Conduct assessment to determine patient/family and health care team treatment goals, and need for post-acute services based on payer coverage, community resources, and patient preferences, and barriers to discharge  - Address psychosocial, clinical, and financial barriers to discharge as identified in assessment in conjunction with the patient/family and health care team  - Arrange appropriate level of post-acute services according to patient’s   needs and preference and payer coverage in collaboration with the physician and health care team  - Communicate with and update the patient/family, physician, and health care team regarding progress on the discharge plan  - Arrange appropriate transportation to post-acute venues  Outcome: Progressing     Problem: PAIN - ADULT  Goal: Verbalizes/displays adequate comfort level or baseline comfort level  Description: Interventions:  - Encourage patient to monitor pain and request assistance  - Assess pain using appropriate pain scale  - Administer analgesics based on type and severity of pain and evaluate response  - Implement non-pharmacological measures as appropriate and evaluate response  - Consider cultural and social influences on pain and pain management  - Notify physician/advanced practitioner if interventions unsuccessful or patient reports new pain  Outcome: Progressing     Problem: INFECTION - ADULT  Goal: Absence or prevention of progression during hospitalization  Description: INTERVENTIONS:  - Assess and monitor for signs and symptoms of infection  - Monitor lab/diagnostic results  - Monitor all insertion sites, i e  indwelling lines, tubes, and drains  - Monitor endotracheal if appropriate and nasal secretions for changes in amount and color  - [place] appropriate cooling/warming therapies per order  - Administer medications as ordered  - Instruct and encourage patient and family to use good hand hygiene technique  - Identify and instruct in appropriate isolation precautions for identified infection/condition  Outcome: Progressing     Problem: SAFETY ADULT  Goal: Patient will remain free of falls  Description: INTERVENTIONS:  - Educate patient/family on patient safety including physical limitations  - Instruct patient to call for assistance with activity   - Consult OT/PT to assist with strengthening/mobility   - Keep Call bell within reach  - Keep bed low and locked with side rails adjusted as appropriate  - Keep care items and personal belongings within reach  - Initiate and maintain comfort rounds  - Make Fall Risk Sign visible to staff  - Offer Toileting every 3 Hours, in advance of need  - Initiate/Maintain 3alarm  - Obtain necessary fall risk management equipment: 3  - Apply yellow socks and bracelet for high fall risk patients  - Consider moving patient to room near nurses station  Outcome: Progressing  Goal: Maintain or return to baseline ADL function  Description: INTERVENTIONS:  -  Assess patient's ability to carry out ADLs; assess patient's baseline for ADL function and identify physical deficits which impact ability to perform ADLs (bathing, care of mouth/teeth, toileting, grooming, dressing, etc )  - Assess/evaluate cause of self-care deficits   - Assess range of motion  - Assess patient's mobility; develop plan if impaired  - Assess patient's need for assistive devices and provide as appropriate  - Encourage maximum independence but intervene and supervise when necessary  - Involve family in performance of ADLs  - Assess for home care needs following discharge   - Consider OT consult to assist with ADL evaluation and planning for discharge  - Provide patient education as appropriate  Outcome: Progressing  Goal: Maintains/Returns to pre admission functional level  Description: INTERVENTIONS:  - Perform BMAT or MOVE assessment daily    - Set and communicate daily mobility goal to care team and patient/family/caregiver  - Collaborate with rehabilitation services on mobility goals if consulted  - Perform Range of Motion 3 times a day  - Reposition patient every 3 hours    - Dangle patient 3 times a day  - Stand patient 3 times a day  - Ambulate patient 3 times a day  - Out of bed to chair 3 times a day   - Out of bed for meals 3 times a day  - Out of bed for toileting  - Record patient progress and toleration of activity level   Outcome: Progressing     Problem: DISCHARGE PLANNING  Goal: Discharge to home or other facility with appropriate resources  Description: INTERVENTIONS:  - Identify barriers to discharge w/patient and caregiver  - Arrange for needed discharge resources and transportation as appropriate  - Identify discharge learning needs (meds, wound care, etc )  - Arrange for interpretive services to assist at discharge as needed  - Refer to Case Management Department for coordinating discharge planning if the patient needs post-hospital services based on physician/advanced practitioner order or complex needs related to functional status, cognitive ability, or social support system  Outcome: Progressing     Problem: Knowledge Deficit  Goal: Patient/family/caregiver demonstrates understanding of disease process, treatment plan, medications, and discharge instructions  Description: Complete learning assessment and assess knowledge base  Interventions:  - Provide teaching at level of understanding  - Provide teaching via preferred learning methods  Outcome: Progressing     Problem: Nutrition/Hydration-ADULT  Goal: Nutrient/Hydration intake appropriate for improving, restoring or maintaining nutritional needs  Description: Monitor and assess patient's nutrition/hydration status for malnutrition  Collaborate with interdisciplinary team and initiate plan and interventions as ordered  Monitor patient's weight and dietary intake as ordered or per policy  Utilize nutrition screening tool and intervene as necessary  Determine patient's food preferences and provide high-protein, high-caloric foods as appropriate       INTERVENTIONS:  - Monitor oral intake, urinary output, labs, and treatment plans  - Assess nutrition and hydration status and recommend course of action  - Evaluate amount of meals eaten  - Assist patient with eating if necessary   - Allow adequate time for meals  - Recommend/ encourage appropriate diets, oral nutritional supplements, and vitamin/mineral supplements  - Order, calculate, and assess calorie counts as needed  - Recommend, monitor, and adjust tube feedings and TPN/PPN based on assessed needs  - Assess need for intravenous fluids  - Provide specific nutrition/hydration education as appropriate  - Include patient/family/caregiver in decisions related to nutrition  Outcome: Progressing     Problem: MOBILITY - ADULT  Goal: Maintain or return to baseline ADL function  Description: INTERVENTIONS:  -  Assess patient's ability to carry out ADLs; assess patient's baseline for ADL function and identify physical deficits which impact ability to perform ADLs (bathing, care of mouth/teeth, toileting, grooming, dressing, etc )  - Assess/evaluate cause of self-care deficits   - Assess range of motion  - Assess patient's mobility; develop plan if impaired  - Assess patient's need for assistive devices and provide as appropriate  - Encourage maximum independence but intervene and supervise when necessary  - Involve family in performance of ADLs  - Assess for home care needs following discharge   - Consider OT consult to assist with ADL evaluation and planning for discharge  - Provide patient education as appropriate  Outcome: Progressing  Goal: Maintains/Returns to pre admission functional level  Description: INTERVENTIONS:  - Perform BMAT or MOVE assessment daily    - Set and communicate daily mobility goal to care team and patient/family/caregiver  - Collaborate with rehabilitation services on mobility goals if consulted  - Perform Range of Motion 3 times a day  - Reposition patient every 3 hours    - Dangle patient 3 times a day  - Stand patient 3 times a day  - Ambulate patient 3 times a day  - Out of bed to chair 3 times a day   - Out of bed for meals 3 times a day  - Out of bed for toileting  - Record patient progress and toleration of activity level   Outcome: Progressing     Problem: Prexisting or High Potential for Compromised Skin Integrity  Goal: Skin integrity is maintained or improved  Description: INTERVENTIONS:  - Identify patients at risk for skin breakdown  - Assess and monitor skin integrity  - Assess and monitor nutrition and hydration status  - Monitor labs   - Assess for incontinence   - Turn and reposition patient  - Assist with mobility/ambulation  - Relieve pressure over bony prominences  - Avoid friction and shearing  - Provide appropriate hygiene as needed including keeping skin clean and dry  - Evaluate need for skin moisturizer/barrier cream  - Collaborate with interdisciplinary team   - Patient/family teaching  - Consider wound care consult   Outcome: Progressing

## 2021-11-05 NOTE — ASSESSMENT & PLAN NOTE
· Echocardiogram this admission showing LVEF 26%, grade 1 diastolic dysfunction  Moderately dilated RV    Severe pulmonary hypertension with the pulmonary artery systolic pressure of 60  · Patient does not appear fluid overloaded  · Found to have large bilateral PEs this admission  · Close monitoring of I/O status  · Continue home metoprolol tartrate

## 2021-11-05 NOTE — ASSESSMENT & PLAN NOTE
· Present on admission  Patient presented with complaints of chills, shortness of breath worsening with exertion, fevers and dry cough    · Imaging this admission showing right lower lobe consolidation  · COVID/RSV/flu negative  · Continues on doxycycline and Rocephin, day 4 of antibiotics  · Leukocytosis and procalcitonin trending down  · Continue to monitor fever curve and leukocytosis, follow-up cultures

## 2021-11-05 NOTE — ASSESSMENT & PLAN NOTE
· Metoprolol increased to 50 b i d  Yesterday  · Starting hydralazine p r n   · Holding home Enalapril due to an FRANK on admission, now resolved so consider restarting

## 2021-11-05 NOTE — PHYSICAL THERAPY NOTE
PT tx     11/05/21 1216   PT Last Visit   PT Visit Date 11/05/21   Note Type   Note Type Treatment   Pain Assessment   Pain Assessment Tool 0-10   Pain Score No Pain   Restrictions/Precautions   Weight Bearing Precautions Per Order No   Other Precautions Multiple lines;Telemetry; Fall Risk;Hard of hearing   General   Chart Reviewed Yes   Additional Pertinent History pt transferred to SDU due to discovery of bilat PEs on 3L 02   Cognition   Overall Cognitive Status WFL   Arousal/Participation Alert   Attention Within functional limits   Orientation Level Oriented X4   Memory Within functional limits   Following Commands Follows all commands and directions without difficulty   Subjective   Subjective Pt oob in chair agrees to mobilize   Bed Mobility   Additional Comments oob in chair   Transfers   Sit to Stand 5  Supervision   Stand to Sit 5  Supervision   Stand pivot 5  Supervision   Balance   Static Sitting Normal   Dynamic Sitting Good   Static Standing Good   Dynamic Standing Good   Endurance Deficit   Endurance Deficit Yes   Endurance Deficit Description some sob 02 sat 96%   Activity Tolerance   Activity Tolerance Patient limited by fatigue   Nurse Made Aware RN Gema   Exercises   Marching Standing;25 reps;Right;Left;AROM   Balance training  stood x 2 min, 5 mi with sba of 1 02 sat 96% on 3L   Assessment   Prognosis Good   Problem List Decreased endurance   Assessment Pt able to mobilize but oocas osb  02 sat 96% throughout session  Instr to  avoid mouth breathing  Still pants oocas, Instr in incentive spirometry  Remains in chair after session all needs in reach  Should be able to proress to home d/c Con't PT   Barriers to Discharge   (medical status)   Goals   Patient Goals get better   Plan   Treatment/Interventions ADL retraining;Functional transfer training; Therapeutic exercise; Endurance training;Spoke to nursing   Progress Progressing toward goals   PT Frequency 2-3x/wk   Recommendation   PT Discharge Recommendation Home with home health rehabilitation   PT - OK to Discharge No   AM-PAC Basic Mobility Inpatient   Turning in Bed Without Bedrails 4   Lying on Back to Sitting on Edge of Flat Bed 4   Moving Bed to Chair 3   Standing Up From Chair 3   Walk in Room 3   Climb 3-5 Stairs 3   Basic Mobility Inpatient Raw Score 20   Basic Mobility Standardized Score 43 99   Rigo Fitzpatrick, PT

## 2021-11-05 NOTE — ASSESSMENT & PLAN NOTE
· Likely present on admission  · Patient had CTA PE yesterday due to echocardiogram evidence of RV dilation and pulmonary hypertension  · 11/4 CTA PE showing a large quantity of right and moderate quantity of left acute pulmonary artery embolism, RV/LV ratio is greater than than 0 9    · Patient transferred to step-down 1 for closer monitoring  · Started on heparin infusion ET protocol  · Bilateral lower extremity venous duplex ordered  · Patient denies any recent travel, injury, smoking, self or family history of blood clots  · Will need DOAC prior to discharge

## 2021-11-05 NOTE — ASSESSMENT & PLAN NOTE
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

## 2021-11-06 PROBLEM — K44.9 PARAESOPHAGEAL HERNIA: Chronic | Status: ACTIVE | Noted: 2021-11-05

## 2021-11-06 PROBLEM — I27.20 PULMONARY HTN (HCC): Status: ACTIVE | Noted: 2021-11-06

## 2021-11-06 PROBLEM — I82.409 DVT (DEEP VENOUS THROMBOSIS) (HCC): Status: ACTIVE | Noted: 2021-11-06

## 2021-11-06 PROBLEM — D64.9 ANEMIA: Status: ACTIVE | Noted: 2021-11-06

## 2021-11-06 LAB
ANION GAP SERPL CALCULATED.3IONS-SCNC: 11 MMOL/L (ref 4–13)
APTT PPP: 91 SECONDS (ref 23–37)
BUN SERPL-MCNC: 9 MG/DL (ref 5–25)
CALCIUM SERPL-MCNC: 8.3 MG/DL (ref 8.3–10.1)
CHLORIDE SERPL-SCNC: 107 MMOL/L (ref 100–108)
CO2 SERPL-SCNC: 20 MMOL/L (ref 21–32)
CREAT SERPL-MCNC: 0.69 MG/DL (ref 0.6–1.3)
ERYTHROCYTE [DISTWIDTH] IN BLOOD BY AUTOMATED COUNT: 14.6 % (ref 11.6–15.1)
FERRITIN SERPL-MCNC: 339 NG/ML (ref 8–388)
FOLATE SERPL-MCNC: >20 NG/ML (ref 3.1–17.5)
GFR SERPL CREATININE-BSD FRML MDRD: 79 ML/MIN/1.73SQ M
GLUCOSE SERPL-MCNC: 110 MG/DL (ref 65–140)
HCT VFR BLD AUTO: 32.4 % (ref 34.8–46.1)
HGB BLD-MCNC: 10.5 G/DL (ref 11.5–15.4)
IRON SATN MFR SERPL: 13 % (ref 15–50)
IRON SERPL-MCNC: 26 UG/DL (ref 50–170)
MAGNESIUM SERPL-MCNC: 1.8 MG/DL (ref 1.6–2.6)
MCH RBC QN AUTO: 29.2 PG (ref 26.8–34.3)
MCHC RBC AUTO-ENTMCNC: 32.4 G/DL (ref 31.4–37.4)
MCV RBC AUTO: 90 FL (ref 82–98)
PHOSPHATE SERPL-MCNC: 2.2 MG/DL (ref 2.3–4.1)
PLATELET # BLD AUTO: 358 THOUSANDS/UL (ref 149–390)
PMV BLD AUTO: 9.5 FL (ref 8.9–12.7)
POTASSIUM SERPL-SCNC: 4 MMOL/L (ref 3.5–5.3)
PROCALCITONIN SERPL-MCNC: 0.15 NG/ML
RBC # BLD AUTO: 3.59 MILLION/UL (ref 3.81–5.12)
SODIUM SERPL-SCNC: 138 MMOL/L (ref 136–145)
TIBC SERPL-MCNC: 196 UG/DL (ref 250–450)
VIT B12 SERPL-MCNC: 1246 PG/ML (ref 100–900)
WBC # BLD AUTO: 9.02 THOUSAND/UL (ref 4.31–10.16)

## 2021-11-06 PROCEDURE — 82728 ASSAY OF FERRITIN: CPT | Performed by: NURSE PRACTITIONER

## 2021-11-06 PROCEDURE — 83550 IRON BINDING TEST: CPT | Performed by: NURSE PRACTITIONER

## 2021-11-06 PROCEDURE — 83735 ASSAY OF MAGNESIUM: CPT | Performed by: NURSE PRACTITIONER

## 2021-11-06 PROCEDURE — 82607 VITAMIN B-12: CPT | Performed by: NURSE PRACTITIONER

## 2021-11-06 PROCEDURE — 85730 THROMBOPLASTIN TIME PARTIAL: CPT | Performed by: NURSE PRACTITIONER

## 2021-11-06 PROCEDURE — 99232 SBSQ HOSP IP/OBS MODERATE 35: CPT | Performed by: INTERNAL MEDICINE

## 2021-11-06 PROCEDURE — 80048 BASIC METABOLIC PNL TOTAL CA: CPT | Performed by: NURSE PRACTITIONER

## 2021-11-06 PROCEDURE — 82272 OCCULT BLD FECES 1-3 TESTS: CPT | Performed by: NURSE PRACTITIONER

## 2021-11-06 PROCEDURE — 84100 ASSAY OF PHOSPHORUS: CPT | Performed by: NURSE PRACTITIONER

## 2021-11-06 PROCEDURE — 85027 COMPLETE CBC AUTOMATED: CPT | Performed by: NURSE PRACTITIONER

## 2021-11-06 PROCEDURE — 83540 ASSAY OF IRON: CPT | Performed by: NURSE PRACTITIONER

## 2021-11-06 PROCEDURE — 99233 SBSQ HOSP IP/OBS HIGH 50: CPT | Performed by: INTERNAL MEDICINE

## 2021-11-06 PROCEDURE — 84145 PROCALCITONIN (PCT): CPT | Performed by: NURSE PRACTITIONER

## 2021-11-06 PROCEDURE — 82746 ASSAY OF FOLIC ACID SERUM: CPT | Performed by: NURSE PRACTITIONER

## 2021-11-06 RX ORDER — WARFARIN SODIUM 7.5 MG/1
7.5 TABLET ORAL
Status: COMPLETED | OUTPATIENT
Start: 2021-11-06 | End: 2021-11-06

## 2021-11-06 RX ORDER — MAGNESIUM SULFATE HEPTAHYDRATE 40 MG/ML
2 INJECTION, SOLUTION INTRAVENOUS ONCE
Status: COMPLETED | OUTPATIENT
Start: 2021-11-06 | End: 2021-11-06

## 2021-11-06 RX ORDER — AMLODIPINE BESYLATE 5 MG/1
5 TABLET ORAL DAILY
Status: DISCONTINUED | OUTPATIENT
Start: 2021-11-06 | End: 2021-11-08

## 2021-11-06 RX ADMIN — AMLODIPINE BESYLATE 5 MG: 5 TABLET ORAL at 17:34

## 2021-11-06 RX ADMIN — METOPROLOL TARTRATE 50 MG: 50 TABLET, FILM COATED ORAL at 09:28

## 2021-11-06 RX ADMIN — METOPROLOL TARTRATE 50 MG: 50 TABLET, FILM COATED ORAL at 21:54

## 2021-11-06 RX ADMIN — LORAZEPAM 1 MG: 1 TABLET ORAL at 21:54

## 2021-11-06 RX ADMIN — ENOXAPARIN SODIUM 60 MG: 60 INJECTION SUBCUTANEOUS at 21:53

## 2021-11-06 RX ADMIN — CEFTRIAXONE 2000 MG: 2 INJECTION, SOLUTION INTRAVENOUS at 15:34

## 2021-11-06 NOTE — CASE MANAGEMENT
LOS: 4  Continue to follow patient  Received request to check pricing for Eliquis  Met with patient for her meds and  and was informed she uses [employer] and does not have a prescription plan  Place call to [facility] at [phone] and spoke with Louise Oliver who confirmed she does not have prescription plan and cost of Eliquis would be over $500, 00 a month  Notified [facility] of the above and [facility]

## 2021-11-06 NOTE — ASSESSMENT & PLAN NOTE
· As above  · Concern for aspiration with large Para esophageal hernia  · Pt not surgical candidate nor would want surgery  · Speech eval thins

## 2021-11-06 NOTE — PLAN OF CARE
Problem: Potential for Falls  Goal: Patient will remain free of falls  Description: INTERVENTIONS:  - Educate patient/family on patient safety including physical limitations  - Instruct patient to call for assistance with activity   - Consult OT/PT to assist with strengthening/mobility   - Keep Call bell within reach  - Keep bed low and locked with side rails adjusted as appropriate  - Keep care items and personal belongings within reach  - Initiate and maintain comfort rounds  - Make Fall Risk Sign visible to staff  - Offer Toileting every 2 Hours, in advance of need  - Initiate/Maintain bed alarm  - Obtain necessary fall risk management equipment: signs  - Apply yellow socks and bracelet for high fall risk patients  - Consider moving patient to room near nurses station  Outcome: Progressing     Problem: DISCHARGE PLANNING - CARE MANAGEMENT  Goal: Discharge to post-acute care or home with appropriate resources  Description: INTERVENTIONS:  - Conduct assessment to determine patient/family and health care team treatment goals, and need for post-acute services based on payer coverage, community resources, and patient preferences, and barriers to discharge  - Address psychosocial, clinical, and financial barriers to discharge as identified in assessment in conjunction with the patient/family and health care team  - Arrange appropriate level of post-acute services according to patient’s   needs and preference and payer coverage in collaboration with the physician and health care team  - Communicate with and update the patient/family, physician, and health care team regarding progress on the discharge plan  - Arrange appropriate transportation to post-acute venues  Outcome: Progressing     Problem: PAIN - ADULT  Goal: Verbalizes/displays adequate comfort level or baseline comfort level  Description: Interventions:  - Encourage patient to monitor pain and request assistance  - Assess pain using appropriate pain scale  - Administer analgesics based on type and severity of pain and evaluate response  - Implement non-pharmacological measures as appropriate and evaluate response  - Consider cultural and social influences on pain and pain management  - Notify physician/advanced practitioner if interventions unsuccessful or patient reports new pain  Outcome: Progressing     Problem: INFECTION - ADULT  Goal: Absence or prevention of progression during hospitalization  Description: INTERVENTIONS:  - Assess and monitor for signs and symptoms of infection  - Monitor lab/diagnostic results  - Monitor all insertion sites, i e  indwelling lines, tubes, and drains  - Monitor endotracheal if appropriate and nasal secretions for changes in amount and color  - Hughes appropriate cooling/warming therapies per order  - Administer medications as ordered  - Instruct and encourage patient and family to use good hand hygiene technique  - Identify and instruct in appropriate isolation precautions for identified infection/condition  Outcome: Progressing     Problem: SAFETY ADULT  Goal: Patient will remain free of falls  Description: INTERVENTIONS:  - Educate patient/family on patient safety including physical limitations  - Instruct patient to call for assistance with activity   - Consult OT/PT to assist with strengthening/mobility   - Keep Call bell within reach  - Keep bed low and locked with side rails adjusted as appropriate  - Keep care items and personal belongings within reach  - Initiate and maintain comfort rounds  - Make Fall Risk Sign visible to staff  - Offer Toileting every 2 Hours, in advance of need  - Initiate/Maintain bed alarm  - Obtain necessary fall risk management equipment: signs  - Apply yellow socks and bracelet for high fall risk patients  - Consider moving patient to room near nurses station  Outcome: Progressing  Goal: Maintain or return to baseline ADL function  Description: INTERVENTIONS:  -  Assess patient's ability to carry out ADLs; assess patient's baseline for ADL function and identify physical deficits which impact ability to perform ADLs (bathing, care of mouth/teeth, toileting, grooming, dressing, etc )  - Assess/evaluate cause of self-care deficits   - Assess range of motion  - Assess patient's mobility; develop plan if impaired  - Assess patient's need for assistive devices and provide as appropriate  - Encourage maximum independence but intervene and supervise when necessary  - Involve family in performance of ADLs  - Assess for home care needs following discharge   - Consider OT consult to assist with ADL evaluation and planning for discharge  - Provide patient education as appropriate  Outcome: Progressing  Goal: Maintains/Returns to pre admission functional level  Description: INTERVENTIONS:  - Perform BMAT or MOVE assessment daily    - Set and communicate daily mobility goal to care team and patient/family/caregiver  - Collaborate with rehabilitation services on mobility goals if consulted  - Perform Range of Motion 3 times a day  - Reposition patient every 2 hours    - Dangle patient 3 times a day  - Stand patient 3 times a day  - Ambulate patient 3 times a day  - Out of bed to chair 3 times a day   - Out of bed for meals 3 times a day  - Out of bed for toileting  - Record patient progress and toleration of activity level   Outcome: Progressing     Problem: DISCHARGE PLANNING  Goal: Discharge to home or other facility with appropriate resources  Description: INTERVENTIONS:  - Identify barriers to discharge w/patient and caregiver  - Arrange for needed discharge resources and transportation as appropriate  - Identify discharge learning needs (meds, wound care, etc )  - Arrange for interpretive services to assist at discharge as needed  - Refer to Case Management Department for coordinating discharge planning if the patient needs post-hospital services based on physician/advanced practitioner order or complex needs related to functional status, cognitive ability, or social support system  Outcome: Progressing     Problem: Knowledge Deficit  Goal: Patient/family/caregiver demonstrates understanding of disease process, treatment plan, medications, and discharge instructions  Description: Complete learning assessment and assess knowledge base  Interventions:  - Provide teaching at level of understanding  - Provide teaching via preferred learning methods  Outcome: Progressing     Problem: Nutrition/Hydration-ADULT  Goal: Nutrient/Hydration intake appropriate for improving, restoring or maintaining nutritional needs  Description: Monitor and assess patient's nutrition/hydration status for malnutrition  Collaborate with interdisciplinary team and initiate plan and interventions as ordered  Monitor patient's weight and dietary intake as ordered or per policy  Utilize nutrition screening tool and intervene as necessary  Determine patient's food preferences and provide high-protein, high-caloric foods as appropriate       INTERVENTIONS:  - Monitor oral intake, urinary output, labs, and treatment plans  - Assess nutrition and hydration status and recommend course of action  - Evaluate amount of meals eaten  - Assist patient with eating if necessary   - Allow adequate time for meals  - Recommend/ encourage appropriate diets, oral nutritional supplements, and vitamin/mineral supplements  - Order, calculate, and assess calorie counts as needed  - Recommend, monitor, and adjust tube feedings and TPN/PPN based on assessed needs  - Assess need for intravenous fluids  - Provide specific nutrition/hydration education as appropriate  - Include patient/family/caregiver in decisions related to nutrition  Outcome: Progressing     Problem: MOBILITY - ADULT  Goal: Maintain or return to baseline ADL function  Description: INTERVENTIONS:  -  Assess patient's ability to carry out ADLs; assess patient's baseline for ADL function and identify physical deficits which impact ability to perform ADLs (bathing, care of mouth/teeth, toileting, grooming, dressing, etc )  - Assess/evaluate cause of self-care deficits   - Assess range of motion  - Assess patient's mobility; develop plan if impaired  - Assess patient's need for assistive devices and provide as appropriate  - Encourage maximum independence but intervene and supervise when necessary  - Involve family in performance of ADLs  - Assess for home care needs following discharge   - Consider OT consult to assist with ADL evaluation and planning for discharge  - Provide patient education as appropriate  Outcome: Progressing  Goal: Maintains/Returns to pre admission functional level  Description: INTERVENTIONS:  - Perform BMAT or MOVE assessment daily    - Set and communicate daily mobility goal to care team and patient/family/caregiver  - Collaborate with rehabilitation services on mobility goals if consulted  - Perform Range of Motion 3 times a day  - Reposition patient every 2 hours    - Dangle patient 3 times a day  - Stand patient 3 times a day  - Ambulate patient 3 times a day  - Out of bed to chair 3 times a day   - Out of bed for meals 3 times a day  - Out of bed for toileting  - Record patient progress and toleration of activity level   Outcome: Progressing     Problem: Prexisting or High Potential for Compromised Skin Integrity  Goal: Skin integrity is maintained or improved  Description: INTERVENTIONS:  - Identify patients at risk for skin breakdown  - Assess and monitor skin integrity  - Assess and monitor nutrition and hydration status  - Monitor labs   - Assess for incontinence   - Turn and reposition patient  - Assist with mobility/ambulation  - Relieve pressure over bony prominences  - Avoid friction and shearing  - Provide appropriate hygiene as needed including keeping skin clean and dry  - Evaluate need for skin moisturizer/barrier cream  - Collaborate with interdisciplinary team   - Patient/family teaching  - Consider wound care consult   Outcome: Progressing

## 2021-11-06 NOTE — PROGRESS NOTES
[place]  Progress Note April Barragan 1934, 80 y o  female MRN: [id number]  Unit/Bed#: -01 SDU Encounter: [id number]  Primary Care Provider: Aleja Gerard MD   Date and time admitted to hospital: 11/2/2021  2:47 PM    * Severe sepsis [facility])  Assessment & Plan  Patient meets criteria for severe sepsis as evidenced by her 124, respiratory 24, leukocytosis of 15,000, lactic acid level 3 1  due to right lower lobe pneumonia  Blood Cultures showed no growth  Lactic acidosis resolved  Pneumonia  Assessment & Plan  Patient presented with chills, shortness of breath worsening with exertion fevers at home an dry cough  Chest x-ray showed new right lower lobe infiltrate  Patient has right lower lobe community-acquired pneumonia  Her COVID came back negative  Her swallowing is normal without any coughing or choking when swallowing    Patient's right-sided pneumonia is improving  Will continue Rocephin and doxycycline over the weekend    Will consult Physical Occupational therapy as patient feels weak  Case was discussed with the Pulmonary team    Acute pulmonary embolism with acute cor pulmonale ([address] )  Assessment & Plan  Patient's echocardiogram showed normal LV systolic function with ejection fraction of 55% but also showed right CHF and pulmonary hypertension  This prompted CT of the chest that showed acute bilateral pulmonary embolism  Patient was treated with IV heparin by the critical care team and is transition to p o  Eliquis  She remains hemodynamically stable, she is on room air currently  Denies signs of bleeding    Essential hypertension  Assessment & Plan  Patient has chronic hypertension    Systolic blood pressure was 160 this morning  Continue Lopressor    VTE Prophylaxis: in place    Patient Centered Rounds: I rounded with patient's nurse    Current Length of Stay: 4 day(s)    Current Patient Status: Inpatient    Certification Statement: Pt requires additional inpatient hospital stay due to: see assessment and plan        Subjective:   Patient has coughing and shortness of breath are improving  Denies chest pain, pleurisy, signs of GI  bleeding  Has had no abdominal pain, diarrhea    All other ROS are negative    Objective:     Vitals:   Temp (24hrs), Av 1 °F (36 7 °C), Min:98 °F (36 7 °C), Max:98 3 °F (36 8 °C)    Temp:  [98 °F (36 7 °C)-98 3 °F (36 8 °C)] 98 °F (36 7 °C)  HR:  [82-96] 96  Resp:  [18-38] 18  BP: (153-174)/(72-87) 160/72  SpO2:  [91 %-98 %] 91 %  Body mass index is 28 09 kg/m²  Input and Output Summary (last 24 hours): Intake/Output Summary (Last 24 hours) at 2021 1001  Last data filed at 2021 0843  Gross per 24 hour   Intake 478 36 ml   Output 400 ml   Net 78 36 ml       Physical Exam:     Physical Exam  HENT:      Head: Normocephalic  Mouth/Throat:      Mouth: Mucous membranes are moist    Eyes:      Conjunctiva/sclera: Conjunctivae normal    Cardiovascular:      Rate and Rhythm: Normal rate and regular rhythm  Heart sounds: No murmur heard  Pulmonary:      Effort: No respiratory distress  Breath sounds: Rales (Scant crackles are present on right side) present  No wheezing  Abdominal:      General: Bowel sounds are normal       Palpations: Abdomen is soft  Tenderness: There is no abdominal tenderness  Musculoskeletal:         General: Swelling ( bony hypertrophy of the knees and D IP joints is present) present  Skin:     General: Skin is warm and dry  Comments: I saw no petechiae, cellulitis ulcers   Neurological:      Mental Status: She is alert  Motor: No weakness ( she moves all extremities on command, speech is normal)  I personally reviewed labs and imaging reports for today        Last 24 Hours Medication List:   Current Facility-Administered Medications   Medication Dose Route Frequency Provider Last Rate   • acetaminophen  650 mg Oral Q6H PRN Vinod Brandy CYRUS Conley     • apixaban  10 mg Oral BID CYRUS Harris     • [START ON 11/13/2021] apixaban  5 mg Oral BID CYRUS Harris     • cefTRIAXone  2,000 mg Intravenous Q24H CYRUS Harris 2,000 mg (11/05/21 1515)   • doxycycline hyclate  100 mg Oral Q12H [facility] CYRUS Harris     • LORazepam  1 mg Oral HS CYRUS Harris     • magnesium sulfate  2 g Intravenous Once CYRUS Harris     • metoprolol tartrate  50 mg Oral Q12H [facility] CYRUS Harris     • ondansetron  4 mg Intravenous Q6H PRN CYRUS Harris     • potassium-sodium phosphates  2 packet Oral BID With Meals CYRUS Harris            Today, Patient Was Seen By: Elizabeth Angeles MD    ** Please Note: Dictation voice to text software may have been used in the creation of this document   **

## 2021-11-06 NOTE — ASSESSMENT & PLAN NOTE
· POA AEB: tachycardia/tachypnea/LA/WBC  · CXR RLL PNA  · BC NG  · Strep/covid neg  · Ceftriaxone/doxy D5/7  · PCT 0 9-1-0 6-0 2 consider d/c antibx if pct remains 80% drop

## 2021-11-06 NOTE — PROGRESS NOTES
[place]  Progress Note Beulah Perez 1934, 80 y o  female MRN: [id number]  Unit/Bed#: -01 SDU Encounter: [id number]  Primary Care Provider: Ugo Edmond MD   Date and time admitted to hospital: 11/2/2021  2:47 PM    * Severe sepsis ([facility] [address] )  Assessment & Plan  · POA AEB: tachycardia/tachypnea/LA/WBC  · CXR RLL PNA  · BC NG  · Strep/covid neg  · Ceftriaxone/doxy D5/7  · PCT 0 9-1-0 6-0 2 consider d/c antibx if pct remains 80% drop    Acute pulmonary embolism with acute cor pulmonale (HCC)  Assessment & Plan  · 11/4 CTA PE -lg R and mod L acute PEwith RV/LV ratio is greater than than 0 9   · ECHO EF 55% G1DD mod dilated RV PA 60  · LE duplex RLE nonocclusive thrombus distal femoral vein/popliteal vein/peroneal vein   · Heparin gtt VTE protocol transition to NOAC  · Patient denies any recent travel, injury, smoking, self or family history of blood clots   Needs CA w/u       Pneumonia  Assessment & Plan  · As above  · Concern for aspiration with large Para esophageal hernia  · Pt not surgical candidate nor would want surgery  · Speech eval thins    Elevated troponin  Assessment & Plan  · Non mi trop   · High 1 5  · 2/2 Acute PE  · Echo with no RWMA    Paraesophageal hernia  Assessment & Plan  · 11/4 CT large paraesophageal hernia suspicious for gastric volvulus  · Surgery consulted   · Not surgical candidate with acute conditions  · Declines outpt followup for possible surgery at later date  · Small meals/reflux prevention    Pulmonary HTN ([facility] [address] )  Assessment & Plan  · 2/2 acute PE repeat ECHO 3-6mths    DVT (deep venous thrombosis) ([facility])  Assessment & Plan  · LE duplex-RLE nonocclusive thrombus distal femoral vein/popliteal vein/peroneal vein  · Heparin gtt plan for transition to NOAC    Anemia  Assessment & Plan  · Admit hgb 13  · Monitor on high VTE  · Check occult and w/u    Essential hypertension  Assessment & Plan  · Resume home vasotec  · Cont lopressor d/c if improved on vasotec  · D/c ASA    Generalized anxiety disorder  Assessment & Plan  · Continue Ativan q h s         ----------------------------------------------------------------------------------------  HPI/24hr events: placed on ra    Patient appropriate for transfer out of the ICU today?: Patient does not meet criteria for referral to the ICU Follow-Up Clinic; referral has not been made  Disposition: Discharge to home vs tx ms   Code Status: Level 3 - DNAR and DNI  ---------------------------------------------------------------------------------------  SUBJECTIVE  Denies c/o sob/cp    Review of Systems   All other systems reviewed and are negative  Review of systems was reviewed and negative unless stated above in HPI/24-hour events   ---------------------------------------------------------------------------------------  OBJECTIVE    Vitals   Vitals:    21 1538 21 1909 21 2100   BP:   153/87 153/87   Pulse:   95 90   Resp:    20   Temp:  98 1 °F (36 7 °C)     TempSrc:  Oral     SpO2: 96%   96%   Weight:       Height:         Temp (24hrs), Av 3 °F (36 8 °C), Min:98 °F (36 7 °C), Max:98 5 °F (36 9 °C)  Current: Temperature: 98 1 °F (36 7 °C)          Respiratory:  SpO2: SpO2: 96 %  Nasal Cannula O2 Flow Rate (L/min): 3 L/min    Invasive/non-invasive ventilation settings   Respiratory    Lab Data (Last 4 hours)    None         O2/Vent Data (Last 4 hours)    None                Physical Exam  Constitutional:       General: She is not in acute distress  HENT:      Head: Normocephalic  Mouth/Throat:      Mouth: Mucous membranes are moist    Eyes:      General: No scleral icterus  Pupils: Pupils are equal, round, and reactive to light  Cardiovascular:      Rate and Rhythm: Normal rate and regular rhythm  Pulses: Normal pulses  Heart sounds: Normal heart sounds  No murmur heard  Pulmonary:      Effort: Pulmonary effort is normal  No respiratory distress  Breath sounds: Normal breath sounds  Abdominal:      General: Bowel sounds are normal  There is no distension  Palpations: Abdomen is soft  Tenderness: There is no abdominal tenderness  Musculoskeletal:         General: Normal range of motion  Cervical back: Neck supple  Right lower leg: No edema  Left lower leg: No edema  Skin:     General: Skin is warm and dry  Capillary Refill: Capillary refill takes less than 2 seconds  Coloration: Skin is not pale  Findings: No erythema  Neurological:      General: No focal deficit present  Mental Status: She is alert and oriented to person, place, and time  Cranial Nerves: No cranial nerve deficit     Psychiatric:         Mood and Affect: Mood normal              Laboratory and Diagnostics:  Results from last 7 days   Lab Units 11/05/21  1052 11/04/21  0504 11/03/21  0453 11/02/21  1523   WBC Thousand/uL 8 75 9 24 11 18* 15 56*   HEMOGLOBIN g/dL 10 5* 10 6* 11 5 13 3   HEMATOCRIT % 32 4* 33 1* 35 7 40 9   PLATELETS Thousands/uL 308 248 226 262   NEUTROS PCT %  --   --   --  84*   MONOS PCT %  --   --   --  7     Results from last 7 days   Lab Units 11/05/21  1053 11/04/21  0504 11/03/21  0453 11/02/21  1523   SODIUM mmol/L 138 139 135* 129*   POTASSIUM mmol/L 3 4* 3 8 4 1 5 1   CHLORIDE mmol/L 106 107 102 94*   CO2 mmol/L 20* 20* 20* 18*   ANION GAP mmol/L 12 12 13 17*   BUN mg/dL 9 15 26* 24   CREATININE mg/dL 0 74 0 71 0 98 1 17   CALCIUM mg/dL 8 1* 8 0* 8 1* 8 8   GLUCOSE RANDOM mg/dL 145* 105 104 140   ALT U/L  --   --  13 17   AST U/L  --   --  19 37   ALK PHOS U/L  --   --  69 89   ALBUMIN g/dL  --   --  2 3* 3 0*   TOTAL BILIRUBIN mg/dL  --   --  0 50 0 90     Results from last 7 days   Lab Units 11/03/21  0453   MAGNESIUM mg/dL 2 3      Results from last 7 days   Lab Units 11/05/21  1818 11/05/21  1053 11/04/21  2347 11/02/21  1523   INR   --   --  1 22* 1 08   PTT seconds 182* >210* 36 32      Results from last 7 days   Lab Units 11/02/21  1844 11/02/21  1523   TROPONIN I ng/mL 1 58* 1 39*     Results from last 7 days   Lab Units 11/02/21  1844 11/02/21  1523   LACTIC ACID mmol/L 1 8 3 1*     ABG:    VBG:    Results from last 7 days   Lab Units 11/05/21  1053 11/04/21  0504 11/03/21  0453 11/02/21  1523   PROCALCITONIN ng/ml 0 22 0 60* 1 07* 0 97*       Micro  Results from last 7 days   Lab Units 11/04/21  1226 11/02/21  1523   BLOOD CULTURE   --  No Growth at 72 hrs  No Growth at 72 hrs  STREP PNEUMONIAE ANTIGEN, URINE  Negative  --        EKG: tele reviewed  Imaging: I have personally reviewed pertinent reports  and I have personally reviewed pertinent films in PACS    Intake and Output  I/O       11/04 0701 - 11/05 0700 11/05 0701 - 11/06 0700    P  O  200 360    I V  (mL/kg) 218 6 (3 6) 131 1 (2 1)    IV Piggyback  150    Total Intake(mL/kg) 418 6 (6 9) 641 1 (10 5)    Urine (mL/kg/hr)  800 (0 5)    Total Output  800    Net +418 6 -158 9          Unmeasured Urine Occurrence 1 x           Height and Weights   Height: 4' 10" (147 3 cm)  IBW (Ideal Body Weight): 40 9 kg  Body mass index is 28 09 kg/m²  Weight (last 2 days)     Date/Time   Weight    11/04/21 2200   61 (134 4)                Nutrition       Diet Orders   (From admission, onward)             Start     Ordered    11/02/21 1848  Diet Regular; Regular House  Diet effective now     Question Answer Comment   Diet Type Regular    Regular Regular House    RD to adjust diet per protocol?  Yes        11/02/21 1847                  Active Medications  Scheduled Meds:  Current Facility-Administered Medications   Medication Dose Route Frequency Provider Last Rate   • acetaminophen  650 mg Oral Q6H PRN Savannah Powers MD     • cefTRIAXone  2,000 mg Intravenous Q24H Savannah Powers MD 2,000 mg (11/05/21 1515)   • doxycycline hyclate  100 mg Oral Q12H 1000 Orestes Montano MD     • heparin (porcine)  3-30 Units/kg/hr (Order-Specific) Intravenous Titrated Fatou Ruiz JARRELL 12 Units/kg/hr (11/05/21 2032)   • heparin (porcine)  2,400 Units Intravenous Q1H PRN Ryan Vega PA-C     • heparin (porcine)  4,800 Units Intravenous Q1H PRN Ryan Vega PA-C     • LORazepam  1 mg Oral HS Maria Alejandra Stevens MD     • metoprolol tartrate  50 mg Oral Q12H [facility] Maria Alejandra Stevens MD     • ondansetron  4 mg Intravenous Q6H PRN Maria Alejandra Stevens MD       Continuous Infusions:  heparin (porcine), 3-30 Units/kg/hr (Order-Specific), Last Rate: 12 Units/kg/hr (11/05/21 2032)      PRN Meds:   acetaminophen, 650 mg, Q6H PRN  heparin (porcine), 2,400 Units, Q1H PRN  heparin (porcine), 4,800 Units, Q1H PRN  ondansetron, 4 mg, Q6H PRN        Invasive Devices Review  Invasive Devices     Peripheral Intravenous Line            Peripheral IV 11/05/21 Left Antecubital <1 day                Rationale for remaining devices: na  ---------------------------------------------------------------------------------------  Advance Directive and Living Will: Yes    Power of :    POLST:    ---------------------------------------------------------------------------------------  Care Time Delivered:   No Critical Care time spent       CYRUS ROBERTS      Portions of the record may have been created with voice recognition software  Occasional wrong word or "sound a like" substitutions may have occurred due to the inherent limitations of voice recognition software    Read the chart carefully and recognize, using context, where substitutions have occurred

## 2021-11-06 NOTE — ASSESSMENT & PLAN NOTE
Patient meets criteria for severe sepsis as evidenced by her 124, respiratory 24, leukocytosis of 15,000, lactic acid level 3 1  due to right lower lobe pneumonia  Blood Cultures showed no growth  Lactic acidosis resolved

## 2021-11-06 NOTE — ASSESSMENT & PLAN NOTE
Patient's echocardiogram showed normal LV systolic function with ejection fraction of 55% but also showed right CHF and pulmonary hypertension  This prompted CT of the chest that showed acute bilateral pulmonary embolism  Patient was treated with IV heparin by the critical care team and is transition to p o  Eliquis  She remains hemodynamically stable, she is on room air currently    Denies signs of bleeding

## 2021-11-06 NOTE — ASSESSMENT & PLAN NOTE
· 11/4 CT large paraesophageal hernia suspicious for gastric volvulus  · Surgery consulted   · Not surgical candidate with acute conditions  · Declines outpt followup for possible surgery at later date  · Small meals/reflux prevention

## 2021-11-06 NOTE — ASSESSMENT & PLAN NOTE
Patient has chronic hypertension    Systolic blood pressure was 160 this morning  Continue Lopressor

## 2021-11-06 NOTE — ASSESSMENT & PLAN NOTE
· LE duplex-RLE nonocclusive thrombus distal femoral vein/popliteal vein/peroneal vein  · Heparin gtt plan for transition to NOAC

## 2021-11-06 NOTE — ASSESSMENT & PLAN NOTE
Patient presented with chills, shortness of breath worsening with exertion fevers at home an dry cough  Chest x-ray showed new right lower lobe infiltrate  Patient has right lower lobe community-acquired pneumonia  Her COVID came back negative  Her swallowing is normal without any coughing or choking when swallowing    Patient's right-sided pneumonia is improving  Will continue Rocephin and doxycycline over the weekend    Will consult Physical Occupational therapy as patient feels weak      Case was discussed with the Pulmonary team

## 2021-11-06 NOTE — ASSESSMENT & PLAN NOTE
· 11/4 CTA PE -lg R and mod L acute PEwith RV/LV ratio is greater than than 0 9   · ECHO EF 55% G1DD mod dilated RV PA 60  · LE duplex RLE nonocclusive thrombus distal femoral vein/popliteal vein/peroneal vein   · Heparin gtt VTE protocol transition to NOAC  · Patient denies any recent travel, injury, smoking, self or family history of blood clots   Needs CA w/u

## 2021-11-07 LAB
ANION GAP SERPL CALCULATED.3IONS-SCNC: 11 MMOL/L (ref 4–13)
BACTERIA BLD CULT: NORMAL
BACTERIA BLD CULT: NORMAL
BASOPHILS # BLD AUTO: 0.03 THOUSANDS/ÂΜL (ref 0–0.1)
BASOPHILS NFR BLD AUTO: 0 % (ref 0–1)
BUN SERPL-MCNC: 9 MG/DL (ref 5–25)
CALCIUM SERPL-MCNC: 8.2 MG/DL (ref 8.3–10.1)
CHLORIDE SERPL-SCNC: 103 MMOL/L (ref 100–108)
CO2 SERPL-SCNC: 24 MMOL/L (ref 21–32)
CREAT SERPL-MCNC: 0.75 MG/DL (ref 0.6–1.3)
EOSINOPHIL # BLD AUTO: 0.26 THOUSAND/ÂΜL (ref 0–0.61)
EOSINOPHIL NFR BLD AUTO: 3 % (ref 0–6)
ERYTHROCYTE [DISTWIDTH] IN BLOOD BY AUTOMATED COUNT: 14.7 % (ref 11.6–15.1)
GFR SERPL CREATININE-BSD FRML MDRD: 72 ML/MIN/1.73SQ M
GLUCOSE SERPL-MCNC: 98 MG/DL (ref 65–140)
HCT VFR BLD AUTO: 35.9 % (ref 34.8–46.1)
HGB BLD-MCNC: 11.3 G/DL (ref 11.5–15.4)
IMM GRANULOCYTES # BLD AUTO: 0.04 THOUSAND/UL (ref 0–0.2)
IMM GRANULOCYTES NFR BLD AUTO: 1 % (ref 0–2)
INR PPP: 2.12 (ref 0.84–1.19)
LYMPHOCYTES # BLD AUTO: 1.8 THOUSANDS/ÂΜL (ref 0.6–4.47)
LYMPHOCYTES NFR BLD AUTO: 23 % (ref 14–44)
MAGNESIUM SERPL-MCNC: 1.9 MG/DL (ref 1.6–2.6)
MCH RBC QN AUTO: 28.8 PG (ref 26.8–34.3)
MCHC RBC AUTO-ENTMCNC: 31.5 G/DL (ref 31.4–37.4)
MCV RBC AUTO: 91 FL (ref 82–98)
MONOCYTES # BLD AUTO: 0.77 THOUSAND/ÂΜL (ref 0.17–1.22)
MONOCYTES NFR BLD AUTO: 10 % (ref 4–12)
NEUTROPHILS # BLD AUTO: 4.98 THOUSANDS/ÂΜL (ref 1.85–7.62)
NEUTS SEG NFR BLD AUTO: 63 % (ref 43–75)
NRBC BLD AUTO-RTO: 0 /100 WBCS
PHOSPHATE SERPL-MCNC: 3 MG/DL (ref 2.3–4.1)
PLATELET # BLD AUTO: 464 THOUSANDS/UL (ref 149–390)
PMV BLD AUTO: 9.4 FL (ref 8.9–12.7)
POTASSIUM SERPL-SCNC: 3.7 MMOL/L (ref 3.5–5.3)
PROCALCITONIN SERPL-MCNC: 0.06 NG/ML
PROTHROMBIN TIME: 23.3 SECONDS (ref 11.6–14.5)
RBC # BLD AUTO: 3.93 MILLION/UL (ref 3.81–5.12)
SODIUM SERPL-SCNC: 138 MMOL/L (ref 136–145)
WBC # BLD AUTO: 7.88 THOUSAND/UL (ref 4.31–10.16)

## 2021-11-07 PROCEDURE — 83735 ASSAY OF MAGNESIUM: CPT | Performed by: NURSE PRACTITIONER

## 2021-11-07 PROCEDURE — 99232 SBSQ HOSP IP/OBS MODERATE 35: CPT | Performed by: INTERNAL MEDICINE

## 2021-11-07 PROCEDURE — 84100 ASSAY OF PHOSPHORUS: CPT | Performed by: NURSE PRACTITIONER

## 2021-11-07 PROCEDURE — 85025 COMPLETE CBC W/AUTO DIFF WBC: CPT | Performed by: NURSE PRACTITIONER

## 2021-11-07 PROCEDURE — 84145 PROCALCITONIN (PCT): CPT | Performed by: NURSE PRACTITIONER

## 2021-11-07 PROCEDURE — 85610 PROTHROMBIN TIME: CPT | Performed by: INTERNAL MEDICINE

## 2021-11-07 PROCEDURE — 80048 BASIC METABOLIC PNL TOTAL CA: CPT | Performed by: NURSE PRACTITIONER

## 2021-11-07 RX ORDER — WARFARIN SODIUM 5 MG/1
5 TABLET ORAL
Status: COMPLETED | OUTPATIENT
Start: 2021-11-07 | End: 2021-11-07

## 2021-11-07 RX ORDER — PANTOPRAZOLE SODIUM 40 MG/1
40 TABLET, DELAYED RELEASE ORAL
Status: DISCONTINUED | OUTPATIENT
Start: 2021-11-07 | End: 2021-11-08 | Stop reason: HOSPADM

## 2021-11-07 RX ORDER — CEFUROXIME AXETIL 250 MG/1
500 TABLET ORAL EVERY 12 HOURS SCHEDULED
Status: DISCONTINUED | OUTPATIENT
Start: 2021-11-07 | End: 2021-11-08 | Stop reason: HOSPADM

## 2021-11-07 RX ADMIN — LORAZEPAM 1 MG: 1 TABLET ORAL at 20:59

## 2021-11-07 RX ADMIN — METOPROLOL TARTRATE 50 MG: 50 TABLET, FILM COATED ORAL at 20:59

## 2021-11-07 RX ADMIN — CEFUROXIME AXETIL 500 MG: 250 TABLET, FILM COATED ORAL at 13:12

## 2021-11-07 RX ADMIN — WARFARIN SODIUM 5 MG: 5 TABLET ORAL at 17:34

## 2021-11-07 RX ADMIN — ENOXAPARIN SODIUM 60 MG: 60 INJECTION SUBCUTANEOUS at 09:00

## 2021-11-07 RX ADMIN — CEFUROXIME AXETIL 500 MG: 250 TABLET, FILM COATED ORAL at 20:59

## 2021-11-07 RX ADMIN — METOPROLOL TARTRATE 50 MG: 50 TABLET, FILM COATED ORAL at 09:00

## 2021-11-07 RX ADMIN — AMLODIPINE BESYLATE 5 MG: 5 TABLET ORAL at 09:00

## 2021-11-07 RX ADMIN — PANTOPRAZOLE SODIUM 40 MG: 40 TABLET, DELAYED RELEASE ORAL at 13:12

## 2021-11-07 NOTE — ASSESSMENT & PLAN NOTE
Patient presented with chills, shortness of breath worsening with exertion cough and fevers  She had right lower lobe pneumonia  Her COVID came back negative  Her swallowing is normal without any coughing or choking when swallowing    She was treated with Rocephin and doxycycline, I switched her to p o   Ceftin today    Will ask Physical Occupational therapy to see her tomorrow to see if she is stable for discharge    I discussed the case with patient's son on the phone in detail November 7th

## 2021-11-07 NOTE — ASSESSMENT & PLAN NOTE
Acute PE was due to right lower extremity acute DVT  She is not mobile at home, sits a lot  She is on anticoagulation

## 2021-11-07 NOTE — PLAN OF CARE
Problem: Potential for Falls  Goal: Patient will remain free of falls  Description: INTERVENTIONS:  - Educate patient/family on patient safety including physical limitations  - Instruct patient to call for assistance with activity   - Consult OT/PT to assist with strengthening/mobility   - Keep Call bell within reach  - Keep bed low and locked with side rails adjusted as appropriate  - Keep care items and personal belongings within reach  - Initiate and maintain comfort rounds  - Make Fall Risk Sign visible to staff  - Offer Toileting every 2 Hours, in advance of need  - Initiate/Maintain bed alarm  - Obtain necessary fall risk management equipment: signs  - Apply yellow socks and bracelet for high fall risk patients  - Consider moving patient to room near nurses station  Outcome: Progressing     Problem: DISCHARGE PLANNING - CARE MANAGEMENT  Goal: Discharge to post-acute care or home with appropriate resources  Description: INTERVENTIONS:  - Conduct assessment to determine patient/family and health care team treatment goals, and need for post-acute services based on payer coverage, community resources, and patient preferences, and barriers to discharge  - Address psychosocial, clinical, and financial barriers to discharge as identified in assessment in conjunction with the patient/family and health care team  - Arrange appropriate level of post-acute services according to patient’s   needs and preference and payer coverage in collaboration with the physician and health care team  - Communicate with and update the patient/family, physician, and health care team regarding progress on the discharge plan  - Arrange appropriate transportation to post-acute venues  Outcome: Progressing     Problem: PAIN - ADULT  Goal: Verbalizes/displays adequate comfort level or baseline comfort level  Description: Interventions:  - Encourage patient to monitor pain and request assistance  - Assess pain using appropriate pain scale  - Administer analgesics based on type and severity of pain and evaluate response  - Implement non-pharmacological measures as appropriate and evaluate response  - Consider cultural and social influences on pain and pain management  - Notify physician/advanced practitioner if interventions unsuccessful or patient reports new pain  Outcome: Progressing     Problem: INFECTION - ADULT  Goal: Absence or prevention of progression during hospitalization  Description: INTERVENTIONS:  - Assess and monitor for signs and symptoms of infection  - Monitor lab/diagnostic results  - Monitor all insertion sites, i e  indwelling lines, tubes, and drains  - Monitor endotracheal if appropriate and nasal secretions for changes in amount and color  - [place] appropriate cooling/warming therapies per order  - Administer medications as ordered  - Instruct and encourage patient and family to use good hand hygiene technique  - Identify and instruct in appropriate isolation precautions for identified infection/condition  Outcome: Progressing     Problem: SAFETY ADULT  Goal: Patient will remain free of falls  Description: INTERVENTIONS:  - Educate patient/family on patient safety including physical limitations  - Instruct patient to call for assistance with activity   - Consult OT/PT to assist with strengthening/mobility   - Keep Call bell within reach  - Keep bed low and locked with side rails adjusted as appropriate  - Keep care items and personal belongings within reach  - Initiate and maintain comfort rounds  - Make Fall Risk Sign visible to staff  - Offer Toileting every 2 Hours, in advance of need  - Initiate/Maintain bed alarm  - Obtain necessary fall risk management equipment: signs  - Apply yellow socks and bracelet for high fall risk patients  - Consider moving patient to room near nurses station  Outcome: Progressing  Goal: Maintain or return to baseline ADL function  Description: INTERVENTIONS:  -  Assess patient's ability to carry out ADLs; assess patient's baseline for ADL function and identify physical deficits which impact ability to perform ADLs (bathing, care of mouth/teeth, toileting, grooming, dressing, etc )  - Assess/evaluate cause of self-care deficits   - Assess range of motion  - Assess patient's mobility; develop plan if impaired  - Assess patient's need for assistive devices and provide as appropriate  - Encourage maximum independence but intervene and supervise when necessary  - Involve family in performance of ADLs  - Assess for home care needs following discharge   - Consider OT consult to assist with ADL evaluation and planning for discharge  - Provide patient education as appropriate  Outcome: Progressing  Goal: Maintains/Returns to pre admission functional level  Description: INTERVENTIONS:  - Perform BMAT or MOVE assessment daily    - Set and communicate daily mobility goal to care team and patient/family/caregiver  - Collaborate with rehabilitation services on mobility goals if consulted  - Perform Range of Motion 3 times a day  - Reposition patient every 2 hours    - Dangle patient 3 times a day  - Stand patient 3 times a day  - Ambulate patient 3 times a day  - Out of bed to chair 3 times a day   - Out of bed for meals 3 times a day  - Out of bed for toileting  - Record patient progress and toleration of activity level   Outcome: Progressing     Problem: DISCHARGE PLANNING  Goal: Discharge to home or other facility with appropriate resources  Description: INTERVENTIONS:  - Identify barriers to discharge w/patient and caregiver  - Arrange for needed discharge resources and transportation as appropriate  - Identify discharge learning needs (meds, wound care, etc )  - Arrange for interpretive services to assist at discharge as needed  - Refer to Case Management Department for coordinating discharge planning if the patient needs post-hospital services based on physician/advanced practitioner order or complex needs related to functional status, cognitive ability, or social support system  Outcome: Progressing     Problem: Knowledge Deficit  Goal: Patient/family/caregiver demonstrates understanding of disease process, treatment plan, medications, and discharge instructions  Description: Complete learning assessment and assess knowledge base  Interventions:  - Provide teaching at level of understanding  - Provide teaching via preferred learning methods  Outcome: Progressing     Problem: Nutrition/Hydration-ADULT  Goal: Nutrient/Hydration intake appropriate for improving, restoring or maintaining nutritional needs  Description: Monitor and assess patient's nutrition/hydration status for malnutrition  Collaborate with interdisciplinary team and initiate plan and interventions as ordered  Monitor patient's weight and dietary intake as ordered or per policy  Utilize nutrition screening tool and intervene as necessary  Determine patient's food preferences and provide high-protein, high-caloric foods as appropriate       INTERVENTIONS:  - Monitor oral intake, urinary output, labs, and treatment plans  - Assess nutrition and hydration status and recommend course of action  - Evaluate amount of meals eaten  - Assist patient with eating if necessary   - Allow adequate time for meals  - Recommend/ encourage appropriate diets, oral nutritional supplements, and vitamin/mineral supplements  - Order, calculate, and assess calorie counts as needed  - Recommend, monitor, and adjust tube feedings and TPN/PPN based on assessed needs  - Assess need for intravenous fluids  - Provide specific nutrition/hydration education as appropriate  - Include patient/family/caregiver in decisions related to nutrition  Outcome: Progressing     Problem: MOBILITY - ADULT  Goal: Maintain or return to baseline ADL function  Description: INTERVENTIONS:  -  Assess patient's ability to carry out ADLs; assess patient's baseline for ADL function and identify physical deficits which impact ability to perform ADLs (bathing, care of mouth/teeth, toileting, grooming, dressing, etc )  - Assess/evaluate cause of self-care deficits   - Assess range of motion  - Assess patient's mobility; develop plan if impaired  - Assess patient's need for assistive devices and provide as appropriate  - Encourage maximum independence but intervene and supervise when necessary  - Involve family in performance of ADLs  - Assess for home care needs following discharge   - Consider OT consult to assist with ADL evaluation and planning for discharge  - Provide patient education as appropriate  Outcome: Progressing  Goal: Maintains/Returns to pre admission functional level  Description: INTERVENTIONS:  - Perform BMAT or MOVE assessment daily    - Set and communicate daily mobility goal to care team and patient/family/caregiver  - Collaborate with rehabilitation services on mobility goals if consulted  - Perform Range of Motion 3 times a day  - Reposition patient every 2 hours    - Dangle patient 3 times a day  - Stand patient 3 times a day  - Ambulate patient 3 times a day  - Out of bed to chair 3 times a day   - Out of bed for meals 3 times a day  - Out of bed for toileting  - Record patient progress and toleration of activity level   Outcome: Progressing     Problem: Prexisting or High Potential for Compromised Skin Integrity  Goal: Skin integrity is maintained or improved  Description: INTERVENTIONS:  - Identify patients at risk for skin breakdown  - Assess and monitor skin integrity  - Assess and monitor nutrition and hydration status  - Monitor labs   - Assess for incontinence   - Turn and reposition patient  - Assist with mobility/ambulation  - Relieve pressure over bony prominences  - Avoid friction and shearing  - Provide appropriate hygiene as needed including keeping skin clean and dry  - Evaluate need for skin moisturizer/barrier cream  - Collaborate with interdisciplinary team   - Patient/family teaching  - Consider wound care consult   Outcome: Progressing

## 2021-11-07 NOTE — PROGRESS NOTES
[facility] [place]  Progress Note Romy Quintero 1934, 80 y o  female MRN: [id number]  Unit/Bed#: -01 SDU Encounter: [id number]  Primary Care Provider: Jaden Clark MD   Date and time admitted to hospital: 11/2/2021  2:47 PM    Pneumonia  Assessment & Plan  Patient presented with chills, shortness of breath worsening with exertion cough and fevers  She had right lower lobe pneumonia  Her COVID came back negative  Her swallowing is normal without any coughing or choking when swallowing    She was treated with Rocephin and doxycycline, I switched her to p o  Ceftin today    Will ask Physical Occupational therapy to see her tomorrow to see if she is stable for discharge    I discussed the case with patient's son on the phone in detail November 7th    Acute pulmonary embolism with acute cor pulmonale ([facility] [address] )  Assessment & Plan  Patient's echocardiogram showed normal LV systolic function with ejection fraction of 55% but also showed right sided CHF and pulmonary hypertension  CT chest revealed bilateral acute pulmonary embolism  Patient was placed on IV heparin initially  She remained hemodynamically stable  The cost of Eliquis was prohibitive therefore I switched her to Lovenox and warfarin  INR is 2 12 today  Will discontinue Lovenox  I ordered warfarin 5 mg for tonight  Will recheck INR tomorrow    DVT (deep venous thrombosis) (HCC)  Assessment & Plan  Acute PE was due to right lower extremity acute DVT  She is not mobile at home, sits a lot  She is on anticoagulation        VTE Prophylaxis: in place    Patient Centered Rounds: I rounded with patient's nurse    Current Length of Stay: 5 day(s)    Current Patient Status: Inpatient    Certification Statement: Pt requires additional inpatient hospital stay due to: see assessment and plan        Subjective:   Patient's cough is improving    Denies shortness of breath, chest pain, pleurisy at rest   Denies abdominal pain, signs of bleeding  All other ROS are negative    Objective:     Vitals:   Temp (24hrs), Av 4 °F (36 9 °C), Min:97 7 °F (36 5 °C), Max:98 7 °F (37 1 °C)    Temp:  [97 7 °F (36 5 °C)-98 7 °F (37 1 °C)] 97 7 °F (36 5 °C)  HR:  [72-96] 96  Resp:  [16-22] 22  BP: (143-183)/(70-97) 143/70  SpO2:  [91 %-96 %] 96 %  Body mass index is 28 09 kg/m²  Input and Output Summary (last 24 hours): Intake/Output Summary (Last 24 hours) at 2021 1005  Last data filed at 2021 0801  Gross per 24 hour   Intake 543 88 ml   Output 725 ml   Net -181 12 ml       Physical Exam:     Physical Exam  HENT:      Head: Normocephalic  Eyes:      Conjunctiva/sclera: Conjunctivae normal    Cardiovascular:      Rate and Rhythm: Normal rate and regular rhythm  Pulmonary:      Effort: No respiratory distress  Breath sounds: Rales (Scant crackles are present at the right lower lobe) present  No wheezing  Abdominal:      General: Bowel sounds are normal       Palpations: Abdomen is soft  Tenderness: There is no abdominal tenderness  Skin:     General: Skin is warm and dry  Neurological:      Mental Status: She is alert  Mental status is at baseline  Motor: No weakness ( she moves all extremities on command, speech is normal)  I personally reviewed labs and imaging reports for today        Last 24 Hours Medication List:   Current Facility-Administered Medications   Medication Dose Route Frequency Provider Last Rate   • acetaminophen  650 mg Oral Q6H PRN Kenia Rojo, CRNP     • amLODIPine  5 mg Oral Daily Sixto Alba MD     • cefuroxime  500 mg Oral Q12H [facility] Sixto Alba MD     • LORazepam  1 mg Oral HS Kenia Rojo, CRNP     • metoprolol tartrate  50 mg Oral Q12H [facility] Kenia Rojo, CRLAN     • ondansetron  4 mg Intravenous Q6H PRN Kenia Forth, CRNP     • pantoprazole  40 mg Oral Early Morning Sixto Alba MD     • warfarin  5 mg Oral Once (warfarin) Sixto Alba MD            Today, Patient Was Seen By: Sneha Mendiola MD    ** Please Note: Dictation voice to text software may have been used in the creation of this document   **

## 2021-11-07 NOTE — ASSESSMENT & PLAN NOTE
Patient's echocardiogram showed normal LV systolic function with ejection fraction of 55% but also showed right sided CHF and pulmonary hypertension  CT chest revealed bilateral acute pulmonary embolism  Patient was placed on IV heparin initially  She remained hemodynamically stable  The cost of Eliquis was prohibitive therefore I switched her to Lovenox and warfarin      INR is 2 12 today  Will discontinue Lovenox  I ordered warfarin 5 mg for tonight  Will recheck INR tomorrow

## 2021-11-08 VITALS
TEMPERATURE: 98.6 F | HEIGHT: 58 IN | WEIGHT: 134.4 LBS | DIASTOLIC BLOOD PRESSURE: 81 MMHG | HEART RATE: 91 BPM | OXYGEN SATURATION: 90 % | RESPIRATION RATE: 20 BRPM | BODY MASS INDEX: 28.21 KG/M2 | SYSTOLIC BLOOD PRESSURE: 190 MMHG

## 2021-11-08 LAB
ANION GAP SERPL CALCULATED.3IONS-SCNC: 12 MMOL/L (ref 4–13)
BUN SERPL-MCNC: 10 MG/DL (ref 5–25)
CALCIUM SERPL-MCNC: 8.2 MG/DL (ref 8.3–10.1)
CHLORIDE SERPL-SCNC: 104 MMOL/L (ref 100–108)
CO2 SERPL-SCNC: 21 MMOL/L (ref 21–32)
CREAT SERPL-MCNC: 0.7 MG/DL (ref 0.6–1.3)
ERYTHROCYTE [DISTWIDTH] IN BLOOD BY AUTOMATED COUNT: 14.7 % (ref 11.6–15.1)
GFR SERPL CREATININE-BSD FRML MDRD: 78 ML/MIN/1.73SQ M
GLUCOSE SERPL-MCNC: 101 MG/DL (ref 65–140)
HCT VFR BLD AUTO: 34.2 % (ref 34.8–46.1)
HGB BLD-MCNC: 11.1 G/DL (ref 11.5–15.4)
INR PPP: 3.61 (ref 0.84–1.19)
MAGNESIUM SERPL-MCNC: 1.9 MG/DL (ref 1.6–2.6)
MCH RBC QN AUTO: 29.4 PG (ref 26.8–34.3)
MCHC RBC AUTO-ENTMCNC: 32.5 G/DL (ref 31.4–37.4)
MCV RBC AUTO: 91 FL (ref 82–98)
PHOSPHATE SERPL-MCNC: 2.6 MG/DL (ref 2.3–4.1)
PLATELET # BLD AUTO: 467 THOUSANDS/UL (ref 149–390)
PMV BLD AUTO: 9.3 FL (ref 8.9–12.7)
POTASSIUM SERPL-SCNC: 3.5 MMOL/L (ref 3.5–5.3)
PROTHROMBIN TIME: 34.9 SECONDS (ref 11.6–14.5)
RBC # BLD AUTO: 3.77 MILLION/UL (ref 3.81–5.12)
SODIUM SERPL-SCNC: 137 MMOL/L (ref 136–145)
WBC # BLD AUTO: 7.78 THOUSAND/UL (ref 4.31–10.16)

## 2021-11-08 PROCEDURE — 84100 ASSAY OF PHOSPHORUS: CPT | Performed by: NURSE PRACTITIONER

## 2021-11-08 PROCEDURE — 97535 SELF CARE MNGMENT TRAINING: CPT

## 2021-11-08 PROCEDURE — 80048 BASIC METABOLIC PNL TOTAL CA: CPT | Performed by: NURSE PRACTITIONER

## 2021-11-08 PROCEDURE — 85610 PROTHROMBIN TIME: CPT | Performed by: INTERNAL MEDICINE

## 2021-11-08 PROCEDURE — 85027 COMPLETE CBC AUTOMATED: CPT | Performed by: INTERNAL MEDICINE

## 2021-11-08 PROCEDURE — 99239 HOSP IP/OBS DSCHRG MGMT >30: CPT | Performed by: STUDENT IN AN ORGANIZED HEALTH CARE EDUCATION/TRAINING PROGRAM

## 2021-11-08 PROCEDURE — 83735 ASSAY OF MAGNESIUM: CPT | Performed by: NURSE PRACTITIONER

## 2021-11-08 PROCEDURE — 97116 GAIT TRAINING THERAPY: CPT

## 2021-11-08 PROCEDURE — 97530 THERAPEUTIC ACTIVITIES: CPT

## 2021-11-08 RX ORDER — CEFUROXIME AXETIL 500 MG/1
500 TABLET ORAL EVERY 12 HOURS SCHEDULED
Qty: 10 TABLET | Refills: 0 | Status: SHIPPED | OUTPATIENT
Start: 2021-11-08 | End: 2021-11-13

## 2021-11-08 RX ORDER — WARFARIN SODIUM 1 MG/1
2.5 TABLET ORAL
Qty: 75 TABLET | Refills: 0 | Status: SHIPPED | OUTPATIENT
Start: 2021-11-08 | End: 2021-12-08 | Stop reason: SDUPTHER

## 2021-11-08 RX ORDER — METOPROLOL TARTRATE 50 MG/1
50 TABLET, FILM COATED ORAL EVERY 12 HOURS SCHEDULED
Qty: 60 TABLET | Refills: 0 | Status: SHIPPED | OUTPATIENT
Start: 2021-11-08 | End: 2021-12-08 | Stop reason: SDUPTHER

## 2021-11-08 RX ORDER — AMLODIPINE BESYLATE 10 MG/1
5 TABLET ORAL DAILY
Qty: 15 TABLET | Refills: 0 | Status: SHIPPED | OUTPATIENT
Start: 2021-11-09 | End: 2021-12-08 | Stop reason: SDUPTHER

## 2021-11-08 RX ORDER — WARFARIN SODIUM 3 MG/1
3 TABLET ORAL
Status: DISCONTINUED | OUTPATIENT
Start: 2021-11-08 | End: 2021-11-08 | Stop reason: DRUGHIGH

## 2021-11-08 RX ORDER — PANTOPRAZOLE SODIUM 40 MG/1
40 TABLET, DELAYED RELEASE ORAL
Qty: 30 TABLET | Refills: 0 | Status: SHIPPED | OUTPATIENT
Start: 2021-11-09 | End: 2021-12-08 | Stop reason: SDUPTHER

## 2021-11-08 RX ORDER — AMLODIPINE BESYLATE 5 MG/1
10 TABLET ORAL DAILY
Status: DISCONTINUED | OUTPATIENT
Start: 2021-11-08 | End: 2021-11-08 | Stop reason: HOSPADM

## 2021-11-08 RX ADMIN — AMLODIPINE BESYLATE 10 MG: 5 TABLET ORAL at 09:02

## 2021-11-08 RX ADMIN — PANTOPRAZOLE SODIUM 40 MG: 40 TABLET, DELAYED RELEASE ORAL at 06:08

## 2021-11-08 RX ADMIN — METOPROLOL TARTRATE 50 MG: 50 TABLET, FILM COATED ORAL at 09:02

## 2021-11-08 RX ADMIN — CEFUROXIME AXETIL 500 MG: 250 TABLET, FILM COATED ORAL at 09:02

## 2021-11-08 NOTE — ASSESSMENT & PLAN NOTE
Patient presented with chills, shortness of breath worsening with exertion cough and fevers  She had right lower lobe pneumonia  Her COVID came back negative  Her swallowing is normal without any coughing or choking when swallowing    She was treated with Rocephin and doxycycline, I switched her to p o   Ceftin today    Will need 7 day total course of abx for CAP PNA

## 2021-11-08 NOTE — ASSESSMENT & PLAN NOTE
Patient's echocardiogram showed normal LV systolic function with ejection fraction of 55% but also showed right sided CHF and pulmonary hypertension  CT chest revealed bilateral acute pulmonary embolism  Patient was placed on IV heparin initially  She remained hemodynamically stable  The cost of Eliquis was prohibitive therefore I switched her to Lovenox and warfarin      INR is 3 6 today, hold coumadin  Recommend to start coumadin 2 5mg daily, will need INR check, [facility] to arrange, follow up with PCP regarding dosing

## 2021-11-08 NOTE — DISCHARGE SUMMARY
[facility]  Discharge- Georgina Crawford 1934, 80 y o  female MRN: [id number]  Unit/Bed#: -01 Encounter: [id number]  Primary Care Provider: Lizbeth Ramirez MD   Date and time admitted to hospital: 11/2/2021  2:47 PM    DVT (deep venous thrombosis) ([facility] [address] )  Assessment & Plan  Acute PE was due to right lower extremity acute DVT  On coumadin    Acute pulmonary embolism with acute cor pulmonale (HCC)  Assessment & Plan  Patient's echocardiogram showed normal LV systolic function with ejection fraction of 55% but also showed right sided CHF and pulmonary hypertension  CT chest revealed bilateral acute pulmonary embolism  Patient was placed on IV heparin initially  She remained hemodynamically stable  The cost of Eliquis was prohibitive therefore I switched her to Lovenox and warfarin  INR is 3 6 today, hold coumadin  Recommend to start coumadin 2 5mg daily, will need INR check, [facility] to arrange, follow up with PCP regarding dosing    Pneumonia  Assessment & Plan  Patient presented with chills, shortness of breath worsening with exertion cough and fevers  She had right lower lobe pneumonia  Her COVID came back negative  Her swallowing is normal without any coughing or choking when swallowing    She was treated with Rocephin and doxycycline, I switched her to p o  Ceftin today    Will need 7 day total course of abx for CAP PNA    Essential hypertension  Assessment & Plan  Patient has chronic hypertension  Continue Lopressor    * Severe sepsis [facility])  Assessment & Plan  Patient meets criteria for severe sepsis as evidenced by her 124, respiratory 24, leukocytosis of 15,000, lactic acid level 3 1  due to right lower lobe pneumonia  Blood Cultures showed no growth    Lactic acidosis resolved              Discharging Physician / Practitioner: Kaylene Fu MD  PCP: Lizbeth Ramirez MD  Admission Date:   Admission Orders (From admission, onward)     Ordered        11/02/21 [address]  Once                      Discharge Date: 11/08/21    Medical Problems             Resolved Problems  Date Reviewed: 11/8/2021          Resolved    Hyponatremia 11/5/2021     Resolved by  Leroy Yañez, [facility] Stay:  · Surgery  · ICU    Procedures Performed:   · None    Significant Findings / Test Results:   XR chest portable - 1 view    Result Date: 11/2/2021  Impression: New lower lung opacity concerning for pneumonia  New right pleural effusion  Large hiatal hernia  Workstation performed: [id number]     CTA chest pe study    Result Date: 11/4/2021  · Impression: Large quantity of right and moderate quantity of left acute pulmonary arterial embolism  The calculated ratio of right ventricular to left ventricular diameter (RV/LV ratio) is greater than 0 9, which is abnormal and indicates right heart strain  An abnormal RV/LV ratio has been shown to be associated with an increased risk of 30 day mortality in the setting of acute pulmonary embolism  Urgent consultation with the medical critical care team is recommended  Large paraesophageal hiatal hernia containing most of the stomach  The herniated portion is distended but does not appear obstructed  This patient is susceptible to gastric volvulus  I personally discussed this study with Leopoldo Azar on 11/4/2021 at 10:15 PM  Workstation performed: [id number]   ·     Incidental Findings:   · None     Test Results Pending at Discharge (will require follow up): · None     Outpatient Tests Requested:  · None    Complications:  None    Reason for Admission: SOB    Hospital Course:     Ade Martinez is a 80 y o  female patient who originally presented to the hospital on 11/2/2021 due to SOB  Initially evaluated and diagnosed with CAP PNA  Patient was started on IV rocephin  Imaging and labs were consisted with PNA  Patient persistently SOB, wheezing and tachycardia   2D echo ordered showed normal EF but had significant right heart strain  CTA PE confirmed PE  Patient started on IV heparin gtt and transition to coumadin due to cost  Monitor over course of several days and PT evaluated  Okay for [facility] with PT  Coumadin levels therapeutic over 2 days and discharged with recommended dose of 2 5mg daily, C to check and follow up with PCP  Complete 7 days of abx on ceftin  Please see above list of diagnoses and related plan for additional information  Condition at Discharge: fair     Discharge Day Visit / Exam:     Subjective:  No events overnight  No complaints at this time  Reports breathing well, ambulating without difficulty or assistance to bathroom  Has good appetite and eager for discharge  Vitals: Blood Pressure: (!) 190/81 (11/08/21 0700)  Pulse: 91 (11/08/21 0700)  Temperature: 98 6 °F (37 °C) (11/08/21 0700)  Temp Source: Oral (11/08/21 0700)  Respirations: 20 (11/08/21 0700)  Height: 4' 10" (147 3 cm) (11/03/21 1500)  Weight - Scale: 61 kg (134 lb 6 4 oz) (11/04/21 2200)  SpO2: 90 % (11/07/21 2300)  Exam:   Physical Exam  Vitals and nursing note reviewed  Constitutional:       Appearance: She is normal weight  HENT:      Head: Normocephalic and atraumatic  Eyes:      General: No scleral icterus  Conjunctiva/sclera: Conjunctivae normal    Cardiovascular:      Rate and Rhythm: Normal rate and regular rhythm  Heart sounds: Normal heart sounds  Pulmonary:      Breath sounds: Normal breath sounds  No wheezing or rhonchi  Abdominal:      General: Bowel sounds are normal  There is no distension  Palpations: Abdomen is soft  Musculoskeletal:         General: No swelling  Normal range of motion  Right lower leg: No edema  Left lower leg: No edema  Skin:     General: Skin is warm and dry  Neurological:      General: No focal deficit present  Mental Status: She is alert  Mental status is at baseline         Discussion with Family: Patient    Discharge instructions/Information to patient and family:   See after visit summary for information provided to patient and family  Provisions for Follow-Up Care:  See after visit summary for information related to follow-up care and any pertinent home health orders  Disposition:     Home with VNA Services (Reminder: Complete face to face encounter)    For Discharges to [facility] SNF:   · Not Applicable to this Patient - Not Applicable to this Patient    Planned Readmission: None     Discharge Statement:  I spent 35 minutes discharging the patient  This time was spent on the day of discharge  I had direct contact with the patient on the day of discharge  Greater than 50% of the total time was spent examining patient, answering all patient questions, arranging and discussing plan of care with patient as well as directly providing post-discharge instructions  Additional time then spent on discharge activities  Discharge Medications:  See after visit summary for reconciled discharge medications provided to patient and family        ** Please Note: This note has been constructed using a voice recognition system **

## 2021-11-08 NOTE — PLAN OF CARE
Problem: DISCHARGE PLANNING - CARE MANAGEMENT  Goal: Discharge to post-acute care or home with appropriate resources  Description: INTERVENTIONS:  - Conduct assessment to determine patient/family and health care team treatment goals, and need for post-acute services based on payer coverage, community resources, and patient preferences, and barriers to discharge  - Address psychosocial, clinical, and financial barriers to discharge as identified in assessment in conjunction with the patient/family and health care team  - Arrange appropriate level of post-acute services according to patient’s   needs and preference and payer coverage in collaboration with the physician and health care team  - Communicate with and update the patient/family, physician, and health care team regarding progress on the discharge plan  - Arrange appropriate transportation to post-acute venues  Outcome: Progressing     Problem: PAIN - ADULT  Goal: Verbalizes/displays adequate comfort level or baseline comfort level  Description: Interventions:  - Encourage patient to monitor pain and request assistance  - Assess pain using appropriate pain scale  - Administer analgesics based on type and severity of pain and evaluate response  - Implement non-pharmacological measures as appropriate and evaluate response  - Consider cultural and social influences on pain and pain management  - Notify physician/advanced practitioner if interventions unsuccessful or patient reports new pain  Outcome: Progressing

## 2021-11-08 NOTE — PHYSICAL THERAPY NOTE
PT tx     11/08/21 0955   PT Last Visit   PT Visit Date 11/08/21   Note Type   Note Type Treatment   Pain Assessment   Pain Assessment Tool 0-10   Pain Score No Pain   Restrictions/Precautions   Weight Bearing Precautions Per Order Yes   Other Precautions Hard of hearing   General   Chart Reviewed Yes   Additional Pertinent History iv heparin stopped pt now on po coumadin, hoping for d/c today   Cognition   Overall Cognitive Status WFL   Arousal/Participation Alert   Orientation Level Oriented X4   Comments [place]   Subjective   Subjective no more oxygen  Wants to go home   Bed Mobility   Additional Comments oob in chair   Transfers   Sit to Stand 5  Supervision   Stand to Sit 5  Supervision   Stand pivot 5  Supervision   Ambulation/Elevation   Gait pattern Forward Flexion; Wide BERENICE;Shuffling; Inconsistent sveta; Short stride  (widened armswings at times)   Gait Assistance 5  Supervision   Assistive Device None   Distance 50'   Balance   Static Sitting Normal   Dynamic Sitting Normal   Static Standing Fair +   Dynamic Standing Fair +   Ambulatory Fair +   Endurance Deficit   Endurance Deficit No   Endurance Deficit Description   (no sob on room air sat 95%)   Activity Tolerance   Activity Tolerance Patient tolerated treatment well   Assessment   Prognosis Good   Assessment Pt able to ambulate without SOB Appropriate form home d/c with home PT safety check   d/c PT   Barriers to Discharge   (medical clearance)   Goals   Patient Goals go home   Plan   Treatment/Interventions Gait training   Progress Improving as expected   PT Frequency   (d/c PT)   Recommendation   PT Discharge Recommendation Home with home health rehabilitation   PT - OK to Discharge Yes   [address]   Turning in Bed Without Bedrails 3   Lying on Back to Sitting on Edge of Flat Bed 3   Moving Bed to Chair 4   Standing Up From Chair 4   Walk in Room 4   Climb 3-5 Stairs 3   Basic Mobility Inpatient Raw Score 21   Basic Mobility Standardized Score 45 56   Sekou Vital, PT

## 2021-11-08 NOTE — OCCUPATIONAL THERAPY NOTE
Occupational Therapy Tx Note     Patient Name: Oseas Soto  Today's Date: 11/8/2021  Problem List  Principal Problem:    Severe sepsis [facility])  Active Problems:    Generalized anxiety disorder    Essential hypertension    Pneumonia    Elevated troponin    Acute pulmonary embolism with acute cor pulmonale (HCC)    Paraesophageal hernia    Anemia    DVT (deep venous thrombosis) (HCC)    Pulmonary HTN ([facility] [address] )            11/08/21 0942   OT Last Visit   OT Visit Date 11/08/21   Note Type   Note Type Treatment   Restrictions/Precautions   Weight Bearing Precautions Per Order No   Other Precautions Fall Risk;Hard of hearing   Pain Assessment   Pain Assessment Tool 0-10   Pain Score No Pain   ADL   Where Assessed Standing at sink   Grooming Assistance 5  Supervision/Setup   Grooming Deficit Setup   Toileting Assistance  5  Supervision/Setup   Toileting Deficit Grab bar use   Toileting Comments Pt reports that toilet at home is "much higher"   Bed Mobility   Supine to Sit 6  Modified independent   Additional Comments Pt remained seated in recliner by end of session   Transfers   Sit to Stand 6  Modified independent   Stand to Sit 5  Supervision   Functional Mobility   Functional Mobility 5  Supervision   Additional Comments no AD, Pt observed to be occasionally reaching for furniture in room  Pt reports have a cane at home  Encouraged use to maintain safety at home  Cognition   Overall Cognitive Status WFL   Arousal/Participation Alert   Attention Within functional limits   Orientation Level Oriented X4   Memory Within functional limits   Following Commands Follows all commands and directions without difficulty   Comments Pt [place]   Activity Tolerance   Activity Tolerance Patient tolerated treatment well  (-120 bpm, Spo2 91-93% t/o)   Medical Staff Made Aware DELORES Martines   Assessment   Assessment Pt seen for OT tx session with focus on functional balance, functional mobility, ADL status, and transfer safety   Patient agreeable to OT treatment session  Pt received supine in bed  Performed bed mobility with mod I  Performed transfers with supervision-mod I  Performed functional mobility with supervision as pt noted to reach for fixed furniture for support  Pt reports having a cane at home, however typically uses outside home  Pt also reports relying on grocery cart for support when shopping  Pt encouraged to use AD within home to maximize safety  Patient continues to be functioning below baseline level, occupational performance remains limited secondary to factors listed above, and pt at increased risk for falls and injury  The patient's raw score on the AM-PAC Daily Activity inpatient short form is 22, standardized score is 47 1, greater than 39 4  Patients at this level are likely to benefit from DC to home  Please refer to the recommendation of the Occupational Therapist for safe DC planning  From OT standpoint, recommendation at time of d/c would be Home OT  Pt reports that son will assist with transportation and yard work Patient to benefit from continued Occupational Therapy treatment while in the hospital to address deficits as defined above and maximize level of functional independence with ADLs and functional mobility  Pt left with call bell in reach, tray table in reach, needs met, chair alarm activated  Plan   Treatment Interventions ADL retraining;Functional transfer training; Endurance training;Patient/family training; Compensatory technique education   Goal Expiration Date 11/18/21   OT Treatment Day 2   OT Frequency 2-3x/wk   Recommendation   OT Discharge Recommendation Home with home health rehabilitation   [facility] Daily Activity Inpatient   Lower Body Dressing 3   Bathing 4   Toileting 4   Upper Body Dressing 3   Grooming 4   Eating 4   Daily Activity Raw Score 22   Daily Activity Standardized Score (Calc for Raw Score >=11) 47  1   [facility] Applied Cognition Inpatient   Following a Speech/Presentation 4 Understanding Ordinary Conversation 4   Taking Medications 4   Remembering Where Things Are Placed or Put Away 4   Remembering List of 4-5 Errands 3   Taking Care of Complicated Tasks 3   Applied Cognition Raw Score 22   Applied Cognition Standardized Score 47 83           Trista Bae, OTR/L

## 2021-11-08 NOTE — CASE MANAGEMENT
CM was informed by eMgan Wasserman PT that pt will benefit from home therapy  Met with pt to discuss [facility]  Pt is agreeable  A post acute care recommendation was made by your care team for [facility]  Discussed Freedom of Choice with patient  List of agencies given to patient via in person  patient aware the list is custom filtered for them by preference  and that [facility] post acute providers are designated  Pt is requesting -[facility] referral sent  -[facility] accepted  CM added -[facility] to pt's dc instructions  Pt states her son will transport home at dc  IMM reviewed with patient, patient agrees with discharge determination

## 2021-11-08 NOTE — PLAN OF CARE
Problem: OCCUPATIONAL THERAPY ADULT  Goal: Performs self-care activities at highest level of function for planned discharge setting  See evaluation for individualized goals  Description: Treatment Interventions: ADL retraining, Functional transfer training, UE strengthening/ROM, Endurance training, Patient/family training, Equipment evaluation/education, Energy conservation, Activityengagement          See flowsheet documentation for full assessment, interventions and recommendations  Outcome: Progressing  Note: Limitation: Decreased ADL status,Decreased endurance,Decreased self-care trans,Decreased high-level ADLs  Prognosis: Good  Assessment: Pt seen for OT tx session with focus on functional balance, functional mobility, ADL status, and transfer safety  Patient agreeable to OT treatment session  Pt received supine in bed  Performed bed mobility with mod I  Performed transfers with supervision-mod I  Performed functional mobility with supervision as pt noted to reach for fixed furniture for support  Pt reports having a cane at home, however typically uses outside home  Pt also reports relying on grocery cart for support when shopping  Pt encouraged to use AD within home to maximize safety  Patient continues to be functioning below baseline level, occupational performance remains limited secondary to factors listed above, and pt at increased risk for falls and injury  The patient's raw score on the AM-PAC Daily Activity inpatient short form is 22, standardized score is 47 1, greater than 39 4  Patients at this level are likely to benefit from DC to home  Please refer to the recommendation of the Occupational Therapist for safe DC planning  From OT standpoint, recommendation at time of d/c would be Home OT   Pt reports that son will assist with transportation and yard work Patient to benefit from continued Occupational Therapy treatment while in the hospital to address deficits as defined above and maximize level of functional independence with ADLs and functional mobility  Pt left with call bell in reach, tray table in reach, needs met, chair alarm activated       OT Discharge Recommendation: Home with home health rehabilitation

## 2021-11-09 ENCOUNTER — TRANSITIONAL CARE MANAGEMENT (OUTPATIENT)
Dept: FAMILY MEDICINE CLINIC | Facility: HOSPITAL | Age: 86
End: 2021-11-09

## 2021-11-10 LAB
HEMOCCULT STL QL: POSITIVE
HEMOCCULT STL QL: POSITIVE

## 2021-11-11 ENCOUNTER — TELEPHONE (OUTPATIENT)
Dept: FAMILY MEDICINE CLINIC | Facility: HOSPITAL | Age: 86
End: 2021-11-11

## 2021-11-16 ENCOUNTER — ANTICOAG VISIT (OUTPATIENT)
Dept: FAMILY MEDICINE CLINIC | Facility: HOSPITAL | Age: 86
End: 2021-11-16

## 2021-11-16 ENCOUNTER — TELEPHONE (OUTPATIENT)
Dept: FAMILY MEDICINE CLINIC | Facility: HOSPITAL | Age: 86
End: 2021-11-16

## 2021-11-16 LAB — SL AMB POCT INR: 4.8

## 2021-11-18 ENCOUNTER — TELEPHONE (OUTPATIENT)
Dept: FAMILY MEDICINE CLINIC | Facility: HOSPITAL | Age: 86
End: 2021-11-18

## 2021-11-22 ENCOUNTER — OFFICE VISIT (OUTPATIENT)
Dept: FAMILY MEDICINE CLINIC | Facility: HOSPITAL | Age: 86
End: 2021-11-22
Payer: COMMERCIAL

## 2021-11-22 VITALS
BODY MASS INDEX: 26.95 KG/M2 | WEIGHT: 128.4 LBS | TEMPERATURE: 98.8 F | DIASTOLIC BLOOD PRESSURE: 80 MMHG | SYSTOLIC BLOOD PRESSURE: 136 MMHG | OXYGEN SATURATION: 96 % | HEIGHT: 58 IN | HEART RATE: 92 BPM

## 2021-11-22 DIAGNOSIS — R77.8 ELEVATED TROPONIN: ICD-10-CM

## 2021-11-22 DIAGNOSIS — Z79.01 CHRONIC ANTICOAGULATION: ICD-10-CM

## 2021-11-22 DIAGNOSIS — I27.20 PULMONARY HTN (HCC): ICD-10-CM

## 2021-11-22 DIAGNOSIS — J18.9 PNEUMONIA OF RIGHT LOWER LOBE DUE TO INFECTIOUS ORGANISM: ICD-10-CM

## 2021-11-22 DIAGNOSIS — I82.4Y1 ACUTE DEEP VEIN THROMBOSIS (DVT) OF PROXIMAL VEIN OF RIGHT LOWER EXTREMITY (HCC): ICD-10-CM

## 2021-11-22 DIAGNOSIS — K44.9 PARAESOPHAGEAL HERNIA: Chronic | ICD-10-CM

## 2021-11-22 DIAGNOSIS — I26.09 OTHER ACUTE PULMONARY EMBOLISM WITH ACUTE COR PULMONALE (HCC): ICD-10-CM

## 2021-11-22 DIAGNOSIS — R65.20 SEVERE SEPSIS (HCC): ICD-10-CM

## 2021-11-22 DIAGNOSIS — R19.5 HEME POSITIVE STOOL: ICD-10-CM

## 2021-11-22 DIAGNOSIS — A41.9 SEVERE SEPSIS (HCC): ICD-10-CM

## 2021-11-22 DIAGNOSIS — D50.9 IRON DEFICIENCY ANEMIA, UNSPECIFIED IRON DEFICIENCY ANEMIA TYPE: ICD-10-CM

## 2021-11-22 DIAGNOSIS — E87.1 HYPONATREMIA: ICD-10-CM

## 2021-11-22 DIAGNOSIS — Z09 HOSPITAL DISCHARGE FOLLOW-UP: Primary | ICD-10-CM

## 2021-11-22 PROCEDURE — 99495 TRANSJ CARE MGMT MOD F2F 14D: CPT | Performed by: INTERNAL MEDICINE

## 2021-11-22 PROCEDURE — 1111F DSCHRG MED/CURRENT MED MERGE: CPT | Performed by: INTERNAL MEDICINE

## 2021-11-23 ENCOUNTER — ANTICOAG VISIT (OUTPATIENT)
Dept: FAMILY MEDICINE CLINIC | Facility: HOSPITAL | Age: 86
End: 2021-11-23

## 2021-11-23 ENCOUNTER — TELEPHONE (OUTPATIENT)
Dept: FAMILY MEDICINE CLINIC | Facility: HOSPITAL | Age: 86
End: 2021-11-23

## 2021-11-23 LAB
INR PPP: 2.9 (ref 0.84–1.19)
INR PPP: 2.9 (ref 0.84–1.19)
SL AMB POCT INR: 2.9

## 2021-11-28 LAB
BASOPHILS # BLD AUTO: 0.1 X10E3/UL (ref 0–0.2)
BASOPHILS NFR BLD AUTO: 1 %
EOSINOPHIL # BLD AUTO: 0.2 X10E3/UL (ref 0–0.4)
EOSINOPHIL NFR BLD AUTO: 3 %
ERYTHROCYTE [DISTWIDTH] IN BLOOD BY AUTOMATED COUNT: 14.2 % (ref 11.7–15.4)
HCT VFR BLD AUTO: 40 % (ref 34–46.6)
HGB BLD-MCNC: 13.4 G/DL (ref 11.1–15.9)
IMM GRANULOCYTES # BLD: 0 X10E3/UL (ref 0–0.1)
IMM GRANULOCYTES NFR BLD: 0 %
LYMPHOCYTES # BLD AUTO: 1.9 X10E3/UL (ref 0.7–3.1)
LYMPHOCYTES NFR BLD AUTO: 31 %
MCH RBC QN AUTO: 28.9 PG (ref 26.6–33)
MCHC RBC AUTO-ENTMCNC: 33.5 G/DL (ref 31.5–35.7)
MCV RBC AUTO: 86 FL (ref 79–97)
MONOCYTES # BLD AUTO: 0.6 X10E3/UL (ref 0.1–0.9)
MONOCYTES NFR BLD AUTO: 9 %
NEUTROPHILS # BLD AUTO: 3.5 X10E3/UL (ref 1.4–7)
NEUTROPHILS NFR BLD AUTO: 56 %
PLATELET # BLD AUTO: 340 X10E3/UL (ref 150–450)
RBC # BLD AUTO: 4.64 X10E6/UL (ref 3.77–5.28)
WBC # BLD AUTO: 6.2 X10E3/UL (ref 3.4–10.8)

## 2021-12-01 ENCOUNTER — ANTICOAG VISIT (OUTPATIENT)
Dept: FAMILY MEDICINE CLINIC | Facility: HOSPITAL | Age: 86
End: 2021-12-01

## 2021-12-08 DIAGNOSIS — I26.09 OTHER ACUTE PULMONARY EMBOLISM WITH ACUTE COR PULMONALE (HCC): ICD-10-CM

## 2021-12-08 DIAGNOSIS — I10 ESSENTIAL HYPERTENSION: Chronic | ICD-10-CM

## 2021-12-08 RX ORDER — AMLODIPINE BESYLATE 10 MG/1
5 TABLET ORAL DAILY
Qty: 15 TABLET | Refills: 3 | Status: SHIPPED | OUTPATIENT
Start: 2021-12-08 | End: 2022-02-17

## 2021-12-08 RX ORDER — WARFARIN SODIUM 1 MG/1
2.5 TABLET ORAL
Qty: 75 TABLET | Refills: 0 | Status: SHIPPED | OUTPATIENT
Start: 2021-12-08 | End: 2022-01-20 | Stop reason: HOSPADM

## 2021-12-08 RX ORDER — PANTOPRAZOLE SODIUM 40 MG/1
40 TABLET, DELAYED RELEASE ORAL
Qty: 30 TABLET | Refills: 3 | Status: SHIPPED | OUTPATIENT
Start: 2021-12-08 | End: 2022-06-08 | Stop reason: ALTCHOICE

## 2021-12-08 RX ORDER — METOPROLOL TARTRATE 50 MG/1
50 TABLET, FILM COATED ORAL EVERY 12 HOURS SCHEDULED
Qty: 60 TABLET | Refills: 3 | Status: SHIPPED | OUTPATIENT
Start: 2021-12-08 | End: 2022-01-20 | Stop reason: HOSPADM

## 2021-12-14 ENCOUNTER — ANTICOAG VISIT (OUTPATIENT)
Dept: FAMILY MEDICINE CLINIC | Facility: HOSPITAL | Age: 86
End: 2021-12-14

## 2021-12-20 ENCOUNTER — TELEPHONE (OUTPATIENT)
Dept: FAMILY MEDICINE CLINIC | Facility: HOSPITAL | Age: 86
End: 2021-12-20

## 2021-12-28 ENCOUNTER — ANTICOAG VISIT (OUTPATIENT)
Dept: FAMILY MEDICINE CLINIC | Facility: HOSPITAL | Age: 86
End: 2021-12-28

## 2021-12-28 LAB
INR PPP: 1.9 (ref 0.9–1.2)
PROTHROMBIN TIME: 19.8 SEC (ref 9.1–12)

## 2021-12-29 ENCOUNTER — TELEPHONE (OUTPATIENT)
Dept: FAMILY MEDICINE CLINIC | Facility: HOSPITAL | Age: 86
End: 2021-12-29

## 2022-01-05 ENCOUNTER — TELEPHONE (OUTPATIENT)
Dept: FAMILY MEDICINE CLINIC | Facility: HOSPITAL | Age: 87
End: 2022-01-05

## 2022-01-05 NOTE — TELEPHONE ENCOUNTER
Pt cant be seen by GI until end of January  She has diarrhea and no appetite  Wondering if theres anything that can be rx'd or recommended until GI can see her   Uses [facility] [place]

## 2022-01-05 NOTE — TELEPHONE ENCOUNTER
The diarrhea may be from being on the antibiotics for her pneumonia  I sent a 5 day medication to eliminate that

## 2022-01-05 NOTE — TELEPHONE ENCOUNTER
Spoke to pt's son, he reports she is getting weak and her feet/lower legs are starting to swell, he was wondering if you want to see her as she doesn't she GI until end of January    She currently has no appetite either

## 2022-01-07 ENCOUNTER — OFFICE VISIT (OUTPATIENT)
Dept: GASTROENTEROLOGY | Facility: CLINIC | Age: 87
End: 2022-01-07
Payer: COMMERCIAL

## 2022-01-07 ENCOUNTER — OFFICE VISIT (OUTPATIENT)
Dept: FAMILY MEDICINE CLINIC | Facility: HOSPITAL | Age: 87
End: 2022-01-07
Payer: COMMERCIAL

## 2022-01-07 ENCOUNTER — APPOINTMENT (OUTPATIENT)
Dept: LAB | Facility: HOSPITAL | Age: 87
End: 2022-01-07
Payer: COMMERCIAL

## 2022-01-07 VITALS
HEIGHT: 58 IN | WEIGHT: 118 LBS | HEART RATE: 47 BPM | BODY MASS INDEX: 24.77 KG/M2 | DIASTOLIC BLOOD PRESSURE: 68 MMHG | SYSTOLIC BLOOD PRESSURE: 116 MMHG

## 2022-01-07 VITALS
HEART RATE: 74 BPM | TEMPERATURE: 96.7 F | SYSTOLIC BLOOD PRESSURE: 120 MMHG | BODY MASS INDEX: 24.52 KG/M2 | HEIGHT: 58 IN | WEIGHT: 116.8 LBS | DIASTOLIC BLOOD PRESSURE: 75 MMHG | OXYGEN SATURATION: 99 %

## 2022-01-07 DIAGNOSIS — E88.09 HYPOALBUMINEMIA DUE TO PROTEIN-CALORIE MALNUTRITION (HCC): ICD-10-CM

## 2022-01-07 DIAGNOSIS — R19.7 DIARRHEA OF PRESUMED INFECTIOUS ORIGIN: Primary | ICD-10-CM

## 2022-01-07 DIAGNOSIS — Z79.01 CHRONIC ANTICOAGULATION: ICD-10-CM

## 2022-01-07 DIAGNOSIS — D64.89 ANEMIA DUE TO OTHER CAUSE, NOT CLASSIFIED: ICD-10-CM

## 2022-01-07 DIAGNOSIS — I49.9 CARDIAC ARRHYTHMIA, UNSPECIFIED CARDIAC ARRHYTHMIA TYPE: ICD-10-CM

## 2022-01-07 DIAGNOSIS — E46 HYPOALBUMINEMIA DUE TO PROTEIN-CALORIE MALNUTRITION (HCC): ICD-10-CM

## 2022-01-07 DIAGNOSIS — I26.09 OTHER ACUTE PULMONARY EMBOLISM WITH ACUTE COR PULMONALE (HCC): ICD-10-CM

## 2022-01-07 DIAGNOSIS — R19.7 DIARRHEA, UNSPECIFIED TYPE: ICD-10-CM

## 2022-01-07 DIAGNOSIS — R19.7 DIARRHEA, UNSPECIFIED TYPE: Primary | ICD-10-CM

## 2022-01-07 LAB
ALBUMIN SERPL BCP-MCNC: 2.2 G/DL (ref 3.5–5)
ALP SERPL-CCNC: 75 U/L (ref 46–116)
ALT SERPL W P-5'-P-CCNC: 54 U/L (ref 12–78)
ANION GAP SERPL CALCULATED.3IONS-SCNC: 13 MMOL/L (ref 4–13)
AST SERPL W P-5'-P-CCNC: 48 U/L (ref 5–45)
BASOPHILS # BLD MANUAL: 0 THOUSAND/UL (ref 0–0.1)
BASOPHILS NFR MAR MANUAL: 0 % (ref 0–1)
BILIRUB SERPL-MCNC: 0.6 MG/DL (ref 0.2–1)
BUN SERPL-MCNC: 19 MG/DL (ref 5–25)
CALCIUM ALBUM COR SERPL-MCNC: 10.1 MG/DL (ref 8.3–10.1)
CALCIUM SERPL-MCNC: 8.7 MG/DL (ref 8.3–10.1)
CHLORIDE SERPL-SCNC: 95 MMOL/L (ref 100–108)
CO2 SERPL-SCNC: 23 MMOL/L (ref 21–32)
CREAT SERPL-MCNC: 1.05 MG/DL (ref 0.6–1.3)
CRP SERPL QL: 204.5 MG/L
EOSINOPHIL # BLD MANUAL: 0.08 THOUSAND/UL (ref 0–0.4)
EOSINOPHIL NFR BLD MANUAL: 1 % (ref 0–6)
ERYTHROCYTE [DISTWIDTH] IN BLOOD BY AUTOMATED COUNT: 14.9 % (ref 11.6–15.1)
GFR SERPL CREATININE-BSD FRML MDRD: 47 ML/MIN/1.73SQ M
GLUCOSE P FAST SERPL-MCNC: 137 MG/DL (ref 65–99)
HCT VFR BLD AUTO: 37.4 % (ref 34.8–46.1)
HGB BLD-MCNC: 12.4 G/DL (ref 11.5–15.4)
INR PPP: 2.13 (ref 0.84–1.19)
LYMPHOCYTES # BLD AUTO: 1.8 THOUSAND/UL (ref 0.6–4.47)
LYMPHOCYTES # BLD AUTO: 24 % (ref 14–44)
MCH RBC QN AUTO: 27.7 PG (ref 26.8–34.3)
MCHC RBC AUTO-ENTMCNC: 33.2 G/DL (ref 31.4–37.4)
MCV RBC AUTO: 84 FL (ref 82–98)
MONOCYTES # BLD AUTO: 0.6 THOUSAND/UL (ref 0–1.22)
MONOCYTES NFR BLD: 8 % (ref 4–12)
NEUTROPHILS # BLD MANUAL: 4.88 THOUSAND/UL (ref 1.85–7.62)
NEUTS BAND NFR BLD MANUAL: 4 % (ref 0–8)
NEUTS SEG NFR BLD AUTO: 61 % (ref 43–75)
PLATELET # BLD AUTO: 389 THOUSANDS/UL (ref 149–390)
PLATELET BLD QL SMEAR: ABNORMAL
PMV BLD AUTO: 9.8 FL (ref 8.9–12.7)
POTASSIUM SERPL-SCNC: 3.3 MMOL/L (ref 3.5–5.3)
PROT SERPL-MCNC: 7 G/DL (ref 6.4–8.2)
PROTHROMBIN TIME: 23.3 SECONDS (ref 11.6–14.5)
RBC # BLD AUTO: 4.47 MILLION/UL (ref 3.81–5.12)
RBC MORPH BLD: NORMAL
SODIUM SERPL-SCNC: 131 MMOL/L (ref 136–145)
VARIANT LYMPHS # BLD AUTO: 2 %
WBC # BLD AUTO: 7.5 THOUSAND/UL (ref 4.31–10.16)

## 2022-01-07 PROCEDURE — 85610 PROTHROMBIN TIME: CPT

## 2022-01-07 PROCEDURE — 85007 BL SMEAR W/DIFF WBC COUNT: CPT

## 2022-01-07 PROCEDURE — 86140 C-REACTIVE PROTEIN: CPT

## 2022-01-07 PROCEDURE — 99215 OFFICE O/P EST HI 40 MIN: CPT | Performed by: FAMILY MEDICINE

## 2022-01-07 PROCEDURE — 99203 OFFICE O/P NEW LOW 30 MIN: CPT | Performed by: REGISTERED NURSE

## 2022-01-07 PROCEDURE — 85027 COMPLETE CBC AUTOMATED: CPT

## 2022-01-07 PROCEDURE — 36415 COLL VENOUS BLD VENIPUNCTURE: CPT

## 2022-01-07 PROCEDURE — 1160F RVW MEDS BY RX/DR IN RCRD: CPT | Performed by: FAMILY MEDICINE

## 2022-01-07 PROCEDURE — 80053 COMPREHEN METABOLIC PANEL: CPT

## 2022-01-07 PROCEDURE — 1036F TOBACCO NON-USER: CPT | Performed by: FAMILY MEDICINE

## 2022-01-07 RX ORDER — VANCOMYCIN HYDROCHLORIDE 125 MG/1
125 CAPSULE ORAL 4 TIMES DAILY
Qty: 56 CAPSULE | Refills: 0 | Status: SHIPPED | OUTPATIENT
Start: 2022-01-07 | End: 2022-01-20 | Stop reason: HOSPADM

## 2022-01-07 RX ORDER — CHOLESTYRAMINE 4 G/9G
1 POWDER, FOR SUSPENSION ORAL
Qty: 30 PACKET | Refills: 2 | Status: SHIPPED | OUTPATIENT
Start: 2022-01-07 | End: 2022-01-20 | Stop reason: HOSPADM

## 2022-01-07 NOTE — PROGRESS NOTES
Assessment/Plan:         Diagnoses and all orders for this visit:    Diarrhea, unspecified type  Comments:  Severe persisting diarrhea, need to evaluate for colitis or c difficile  Request urgent evaluation with [facility]  Orders:  -     C-reactive protein; Future    Hypoalbuminemia due to protein-calorie malnutrition (HCC)  Comments:  Significant weight loss over 15 lbs and malnutrition with edema due to low protein  Orders:  -     Comprehensive metabolic panel; Future    Anemia due to other cause, not classified  -     CBC and differential; Future    Chronic anticoagulation  -     Protime-INR; Future    Other acute pulmonary embolism with acute cor pulmonale (HCC)  Comments:  Continue Warfarin    Cardiac arrhythmia, unspecified cardiac arrhythmia type  Comments:  Clinically seems consistent with a fib  Pt already on Warfarin          Subjective:      Patient ID: Duc Domínguez is a 80 y o  female  Acute visit for diarrhea and swelling of feet    Seen with one of her sons in visit    Having diarrhea persistent for several weeks  Unabated with Imodium and Lomotil  Started Metronidazole empirically for possible c difficile since hospital antibiotics for pneumonia  Noting increasing edema of lower legs L>R  No appetite at all since out of hospital  Taking Ensure supplements to try to build herself back up    She did initially have some benefit with Imodium but then it stopped working  Having 10-12 loose BM's a day, hasnt looked to see if blood with it    Hospitalized for DVT and PE and concurrent CAP  Was on antibiotics during that stay    On Warfarin with some difficulty finding the optimum dose  The following portions of the patient's history were reviewed and updated as appropriate: allergies, current medications, past family history, past medical history, past social history, past surgical history and problem list     Review of Systems   Constitutional: Positive for unexpected weight change     HENT: Negative  Respiratory: Negative for cough and shortness of breath  Cardiovascular: Positive for leg swelling  Negative for palpitations  Gastrointestinal: Positive for diarrhea  Negative for blood in stool, nausea and vomiting  Genitourinary: Negative  Neurological: Negative  All other systems reviewed and are negative  Objective:      /75 (BP Location: Right arm, Patient Position: Sitting, Cuff Size: Standard)   Pulse 74   Temp (!) 96 7 °F (35 9 °C) (Tympanic)   Ht 4' 10" (1 473 m)   Wt 53 kg (116 lb 12 8 oz)   SpO2 99%   BMI 24 41 kg/m²          Physical Exam  Vitals and nursing note reviewed  Constitutional:       Appearance: She is ill-appearing  Cardiovascular:      Rate and Rhythm: Rhythm irregular  Pulmonary:      Effort: Pulmonary effort is normal       Breath sounds: Normal breath sounds  Abdominal:      General: Bowel sounds are normal  There is no distension  Tenderness: There is no abdominal tenderness  Musculoskeletal:      Right lower leg: Edema present  Left lower leg: Edema present  Skin:     Coloration: Skin is not jaundiced  Findings: No rash  Neurological:      Mental Status: She is alert and oriented to person, place, and time

## 2022-01-07 NOTE — PROGRESS NOTES
[id number] [facility] - Outpatient Consultation  Lauryn Alves 80 y o  female MRN: [id number]  Encounter: [id number]    ASSESSMENT AND PLAN:      1  Diarrhea of presumed infectious origin  2-3 weeks of of liquid/loose stools  She was on antibiotics in November for pneumonia  Presume that this is C difficile  Will prophylactically start vancomycin- discontinued the metronidazole  Instructed her not to take the 1st dose until she submits her stool sample  Will call her with the results  If negative can re-evaluate  Also told her she could take Questran but also instructed to not start until after submitting her stool sample  Discussed the importance of fluid intake as well as tried increase Ensure or other protein drinks  Advised not to take Imodium or Lomotil for now for this is an infection we would want to cleared out of the system  - Clostridium difficile toxin by PCR with EIA  - Ova and parasite examination  - Stool Enteric Bacterial Panel by PCR  - cholestyramine (QUESTRAN) 4 g packet; Take 1 packet (4 g total) by mouth daily at bedtime  Dispense: 30 packet; Refill: 2  - vancomycin (VANCOCIN) 125 MG capsule; Take 1 capsule (125 mg total) by mouth 4 (four) times a day for 14 days  Dispense: 56 capsule; Refill: 0      Followup Appointment: 4 weeks  ______________________________________________________________________    Chief Complaint   Patient presents with    Diarrhea, unable to eat       HPI:   Lauryn Alves is a 80y o  year old female who presents with diarrhea for about 2 weeks  She was referred from her primary care provider to evaluate for colitis or C difficile  C-reactive protein was ordered  It was elevated at 204  She has had a significant loss of appetite  as well as weight loss over the past few weeks due to not eating  She has trialed Imodium and Lomotil without success  Primary care provider ordered metronidazole empirically for possible C difficile  She was hospitalized in November on antibiotics for pneumonia  She has not had an appetite since leaving the hospital   She is attempting to drink Ensure for adequate protein  She states she can not even count anymore him any bowel movements she is having per day  When asked about blood in her stool she said they were all different colors  With further questioning  if there is any black or bright red and she told me no  She was having abdominal pain previously but denies any current abdominal pain  She denies any upper GI complaints  Historical Information   Past Medical History:   Diagnosis Date    Anxiety     Disease of thyroid gland      Past Surgical History:   Procedure Laterality Date    CATARACT EXTRACTION Right     FOOT SURGERY      JOINT REPLACEMENT       Social History     Substance and Sexual Activity   Alcohol Use No     Social History     Substance and Sexual Activity   Drug Use No     Social History     Tobacco Use   Smoking Status Never Smoker   Smokeless Tobacco Never Used     Family History   Problem Relation Age of Onset    No Known Problems Mother     Stroke Father     Colon cancer Neg Hx     Colon polyps Neg Hx        Meds/Allergies     Current Outpatient Medications:     amLODIPine (NORVASC) 10 mg tablet    diphenoxylate-atropine (LOMOTIL) 2 5-0 025 mg per tablet    LORazepam (ATIVAN) 1 mg tablet    metoprolol tartrate (LOPRESSOR) 50 mg tablet    Multiple Vitamins-Minerals (VITEYES AREDS ADVANCED PO)    pantoprazole (PROTONIX) 40 mg tablet    warfarin (Coumadin) 1 mg tablet    cholestyramine (QUESTRAN) 4 g packet    vancomycin (VANCOCIN) 125 MG capsule    Allergies   Allergen Reactions    Preservision Areds [B-Plex Plus] Tremor    Tramadol      Reaction Date: 22Aug2011;        PHYSICAL EXAM:    Blood pressure 116/68, pulse (!) 47, height 4' 10" (1 473 m), weight 53 5 kg (118 lb)  Body mass index is 24 66 kg/m²    General Appearance: NAD, cooperative, alert  Eyes: Anicteric, PERRLA, EOMI  ENT:  Normocephalic, atraumatic, normal mucosa  Neck:  Supple, symmetrical, trachea midline,   Resp:  Clear to auscultation bilaterally; no rales, rhonchi or wheezing; respirations unlabored   CV:  S1 S2, Regular rate and rhythm; no murmur, rub, or gallop  GI:  Soft, non-tender, non-distended; normal bowel sounds; no masses, no organomegaly   Rectal: Deferred  Musculoskeletal: No cyanosis, clubbing or edema  Normal ROM  Skin:  No jaundice, rashes, or lesions   Heme/Lymph: No palpable cervical lymphadenopathy  Psych: Normal affect, good eye contact  Neuro: No gross deficits, AAOx3    Lab Results:   Lab Results   Component Value Date    WBC 7 50 01/07/2022    HGB 12 4 01/07/2022    HCT 37 4 01/07/2022    MCV 84 01/07/2022     01/07/2022     Lab Results   Component Value Date    K 3 3 (L) 01/07/2022    CL 95 (L) 01/07/2022    CO2 23 01/07/2022    BUN 19 01/07/2022    CREATININE 1 05 01/07/2022    GLUF 137 (H) 01/07/2022    CALCIUM 8 7 01/07/2022    CORRECTEDCA 10 1 01/07/2022    AST 48 (H) 01/07/2022    ALT 54 01/07/2022    ALKPHOS 75 01/07/2022    EGFR 47 01/07/2022     Lab Results   Component Value Date    IRON 26 (L) 11/06/2021    TIBC 196 (L) 11/06/2021    FERRITIN 339 11/06/2021     No results found for: LIPASE    Radiology Results:   No results found  REVIEW OF SYSTEMS:    CONSTITUTIONAL: Denies any fever, chills, rigors, and weight loss  HEENT: No earache or tinnitus  Denies hearing loss or visual disturbances  CARDIOVASCULAR: No chest pain or palpitations  RESPIRATORY: Denies any cough, hemoptysis, shortness of breath or dyspnea on exertion  GASTROINTESTINAL: As noted in the History of Present Illness  GENITOURINARY: No problems with urination  Denies any hematuria or dysuria  NEUROLOGIC: No dizziness or vertigo, denies headaches  MUSCULOSKELETAL: Denies any muscle or joint pain  SKIN: Denies skin rashes or itching     ENDOCRINE: Denies excessive thirst  Denies intolerance to heat or cold  PSYCHOSOCIAL: Denies depression or anxiety  Denies any recent memory loss

## 2022-01-07 NOTE — PATIENT INSTRUCTIONS
Please submit stool studies  Once stool studies are complete start taking vancomycin 4 times a day  I will call you once the results of the stool studies come in and tell you whether to continue or stop the medication  In the meantime plenty of fluids and try to drink Ensure/ boost for added protein and nutrients  Questran powder each night  Okay to take once in the morning and once at night if needed

## 2022-01-10 ENCOUNTER — HOSPITAL ENCOUNTER (EMERGENCY)
Facility: HOSPITAL | Age: 87
End: 2022-01-10
Attending: EMERGENCY MEDICINE
Payer: COMMERCIAL

## 2022-01-10 ENCOUNTER — APPOINTMENT (INPATIENT)
Dept: RADIOLOGY | Facility: HOSPITAL | Age: 87
DRG: 082 | End: 2022-01-10
Payer: COMMERCIAL

## 2022-01-10 ENCOUNTER — HOSPITAL ENCOUNTER (INPATIENT)
Facility: HOSPITAL | Age: 87
LOS: 10 days | Discharge: NURSING, REHAB OR LONG-TERM CARE | DRG: 082 | End: 2022-01-20
Attending: EMERGENCY MEDICINE | Admitting: STUDENT IN AN ORGANIZED HEALTH CARE EDUCATION/TRAINING PROGRAM
Payer: COMMERCIAL

## 2022-01-10 ENCOUNTER — APPOINTMENT (EMERGENCY)
Dept: RADIOLOGY | Facility: HOSPITAL | Age: 87
End: 2022-01-10
Payer: COMMERCIAL

## 2022-01-10 ENCOUNTER — APPOINTMENT (EMERGENCY)
Dept: CT IMAGING | Facility: HOSPITAL | Age: 87
End: 2022-01-10
Payer: COMMERCIAL

## 2022-01-10 VITALS
RESPIRATION RATE: 18 BRPM | WEIGHT: 103 LBS | DIASTOLIC BLOOD PRESSURE: 61 MMHG | TEMPERATURE: 98.5 F | BODY MASS INDEX: 21.62 KG/M2 | HEIGHT: 58 IN | OXYGEN SATURATION: 95 % | SYSTOLIC BLOOD PRESSURE: 114 MMHG | HEART RATE: 84 BPM

## 2022-01-10 DIAGNOSIS — S06.5X9A SUBDURAL HEMATOMA (HCC): ICD-10-CM

## 2022-01-10 DIAGNOSIS — E86.0 DEHYDRATION: ICD-10-CM

## 2022-01-10 DIAGNOSIS — S06.5X9A SUBDURAL HEMATOMA (HCC): Primary | ICD-10-CM

## 2022-01-10 DIAGNOSIS — E87.2 LACTIC ACID ACIDOSIS: ICD-10-CM

## 2022-01-10 DIAGNOSIS — I10 ESSENTIAL HYPERTENSION: Chronic | ICD-10-CM

## 2022-01-10 DIAGNOSIS — N17.9 ACUTE KIDNEY INJURY (HCC): ICD-10-CM

## 2022-01-10 DIAGNOSIS — R19.7 DIARRHEA: Primary | ICD-10-CM

## 2022-01-10 DIAGNOSIS — F41.9 ANXIETY: ICD-10-CM

## 2022-01-10 DIAGNOSIS — R19.7 DIARRHEA OF PRESUMED INFECTIOUS ORIGIN: ICD-10-CM

## 2022-01-10 DIAGNOSIS — K51.00 PANCOLITIS (HCC): ICD-10-CM
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
## 2022-01-10 LAB
ABO GROUP BLD: NORMAL
ALBUMIN SERPL BCP-MCNC: 2.1 G/DL (ref 3.5–5)
ALP SERPL-CCNC: 76 U/L (ref 46–116)
ALT SERPL W P-5'-P-CCNC: 49 U/L (ref 12–78)
ANION GAP SERPL CALCULATED.3IONS-SCNC: 10 MMOL/L (ref 4–13)
ANION GAP SERPL CALCULATED.3IONS-SCNC: 11 MMOL/L (ref 4–13)
ANISOCYTOSIS BLD QL SMEAR: PRESENT
APTT PPP: 46 SECONDS (ref 23–37)
AST SERPL W P-5'-P-CCNC: 55 U/L (ref 5–45)
BASOPHILS # BLD MANUAL: 0 THOUSAND/UL (ref 0–0.1)
BASOPHILS NFR MAR MANUAL: 0 % (ref 0–1)
BILIRUB SERPL-MCNC: 0.6 MG/DL (ref 0.2–1)
BLD GP AB SCN SERPL QL: NEGATIVE
BUN SERPL-MCNC: 44 MG/DL (ref 5–25)
BUN SERPL-MCNC: 53 MG/DL (ref 5–25)
CALCIUM ALBUM COR SERPL-MCNC: 9.9 MG/DL (ref 8.3–10.1)
CALCIUM SERPL-MCNC: 6.5 MG/DL (ref 8.3–10.1)
CALCIUM SERPL-MCNC: 8.4 MG/DL (ref 8.3–10.1)
CHLORIDE SERPL-SCNC: 111 MMOL/L (ref 100–108)
CHLORIDE SERPL-SCNC: 97 MMOL/L (ref 100–108)
CO2 SERPL-SCNC: 18 MMOL/L (ref 21–32)
CO2 SERPL-SCNC: 25 MMOL/L (ref 21–32)
CREAT SERPL-MCNC: 1.06 MG/DL (ref 0.6–1.3)
CREAT SERPL-MCNC: 2.02 MG/DL (ref 0.6–1.3)
EOSINOPHIL # BLD MANUAL: 0.04 THOUSAND/UL (ref 0–0.4)
EOSINOPHIL NFR BLD MANUAL: 1 % (ref 0–6)
ERYTHROCYTE [DISTWIDTH] IN BLOOD BY AUTOMATED COUNT: 15 % (ref 11.6–15.1)
ERYTHROCYTE [DISTWIDTH] IN BLOOD BY AUTOMATED COUNT: 15.5 % (ref 11.6–15.1)
FLUAV RNA RESP QL NAA+PROBE: NEGATIVE
FLUBV RNA RESP QL NAA+PROBE: NEGATIVE
GFR SERPL CREATININE-BSD FRML MDRD: 21 ML/MIN/1.73SQ M
GFR SERPL CREATININE-BSD FRML MDRD: 47 ML/MIN/1.73SQ M
GLUCOSE SERPL-MCNC: 146 MG/DL (ref 65–140)
GLUCOSE SERPL-MCNC: 92 MG/DL (ref 65–140)
HCT VFR BLD AUTO: 33.8 % (ref 34.8–46.1)
HCT VFR BLD AUTO: 38.1 % (ref 34.8–46.1)
HGB BLD-MCNC: 11.5 G/DL (ref 11.5–15.4)
HGB BLD-MCNC: 12.9 G/DL (ref 11.5–15.4)
HYPERCHROMIA BLD QL SMEAR: PRESENT
INR PPP: 3.74 (ref 0.84–1.19)
LYMPHOCYTES # BLD AUTO: 0.9 THOUSAND/UL (ref 0.6–4.47)
LYMPHOCYTES # BLD AUTO: 21 % (ref 14–44)
MCH RBC QN AUTO: 27.9 PG (ref 26.8–34.3)
MCH RBC QN AUTO: 28 PG (ref 26.8–34.3)
MCHC RBC AUTO-ENTMCNC: 33.9 G/DL (ref 31.4–37.4)
MCHC RBC AUTO-ENTMCNC: 34 G/DL (ref 31.4–37.4)
MCV RBC AUTO: 82 FL (ref 82–98)
MCV RBC AUTO: 83 FL (ref 82–98)
MONOCYTES # BLD AUTO: 0.56 THOUSAND/UL (ref 0–1.22)
MONOCYTES NFR BLD: 13 % (ref 4–12)
NEUTROPHILS # BLD MANUAL: 2.78 THOUSAND/UL (ref 1.85–7.62)
NEUTS BAND NFR BLD MANUAL: 24 % (ref 0–8)
NEUTS SEG NFR BLD AUTO: 41 % (ref 43–75)
NRBC BLD AUTO-RTO: 0 /100 WBCS
PATHOLOGIST INTERPRETATION: NORMAL
PATHOLOGY REVIEW: YES
PLATELET # BLD AUTO: 318 THOUSANDS/UL (ref 149–390)
PLATELET BLD QL SMEAR: ABNORMAL
PMV BLD AUTO: 10.6 FL (ref 8.9–12.7)
PMV BLD AUTO: 9.6 FL (ref 8.9–12.7)
POLYCHROMASIA BLD QL SMEAR: PRESENT
POTASSIUM SERPL-SCNC: 3.2 MMOL/L (ref 3.5–5.3)
POTASSIUM SERPL-SCNC: 4.2 MMOL/L (ref 3.5–5.3)
PROT SERPL-MCNC: 7 G/DL (ref 6.4–8.2)
PROTHROMBIN TIME: 35.8 SECONDS (ref 11.6–14.5)
RBC # BLD AUTO: 4.1 MILLION/UL (ref 3.81–5.12)
RBC # BLD AUTO: 4.62 MILLION/UL (ref 3.81–5.12)
RBC MORPH BLD: PRESENT
RH BLD: POSITIVE
RSV RNA RESP QL NAA+PROBE: NEGATIVE
SARS-COV-2 RNA RESP QL NAA+PROBE: NEGATIVE
SODIUM SERPL-SCNC: 132 MMOL/L (ref 136–145)
SODIUM SERPL-SCNC: 140 MMOL/L (ref 136–145)
SPECIMEN EXPIRATION DATE: NORMAL
TARGETS BLD QL SMEAR: PRESENT
WBC # BLD AUTO: 4.28 THOUSAND/UL (ref 4.31–10.16)
WBC # BLD AUTO: 4.86 THOUSAND/UL (ref 4.31–10.16)

## 2022-01-10 PROCEDURE — 85027 COMPLETE CBC AUTOMATED: CPT | Performed by: EMERGENCY MEDICINE

## 2022-01-10 PROCEDURE — 71045 X-RAY EXAM CHEST 1 VIEW: CPT

## 2022-01-10 PROCEDURE — 70450 CT HEAD/BRAIN W/O DYE: CPT

## 2022-01-10 PROCEDURE — 0241U HB NFCT DS VIR RESP RNA 4 TRGT: CPT

## 2022-01-10 PROCEDURE — 80048 BASIC METABOLIC PNL TOTAL CA: CPT

## 2022-01-10 PROCEDURE — 93005 ELECTROCARDIOGRAM TRACING: CPT

## 2022-01-10 PROCEDURE — 72125 CT NECK SPINE W/O DYE: CPT

## 2022-01-10 PROCEDURE — 99291 CRITICAL CARE FIRST HOUR: CPT | Performed by: EMERGENCY MEDICINE

## 2022-01-10 PROCEDURE — 99285 EMERGENCY DEPT VISIT HI MDM: CPT

## 2022-01-10 PROCEDURE — 96374 THER/PROPH/DIAG INJ IV PUSH: CPT

## 2022-01-10 PROCEDURE — 86901 BLOOD TYPING SEROLOGIC RH(D): CPT

## 2022-01-10 PROCEDURE — 80053 COMPREHEN METABOLIC PANEL: CPT | Performed by: EMERGENCY MEDICINE

## 2022-01-10 PROCEDURE — 96361 HYDRATE IV INFUSION ADD-ON: CPT

## 2022-01-10 PROCEDURE — 90715 TDAP VACCINE 7 YRS/> IM: CPT | Performed by: EMERGENCY MEDICINE

## 2022-01-10 PROCEDURE — 85007 BL SMEAR W/DIFF WBC COUNT: CPT | Performed by: EMERGENCY MEDICINE

## 2022-01-10 PROCEDURE — 36415 COLL VENOUS BLD VENIPUNCTURE: CPT | Performed by: EMERGENCY MEDICINE

## 2022-01-10 PROCEDURE — 85730 THROMBOPLASTIN TIME PARTIAL: CPT | Performed by: EMERGENCY MEDICINE

## 2022-01-10 PROCEDURE — 90471 IMMUNIZATION ADMIN: CPT

## 2022-01-10 PROCEDURE — 85610 PROTHROMBIN TIME: CPT | Performed by: EMERGENCY MEDICINE

## 2022-01-10 PROCEDURE — 99223 1ST HOSP IP/OBS HIGH 75: CPT | Performed by: STUDENT IN AN ORGANIZED HEALTH CARE EDUCATION/TRAINING PROGRAM

## 2022-01-10 PROCEDURE — 72170 X-RAY EXAM OF PELVIS: CPT

## 2022-01-10 PROCEDURE — 86850 RBC ANTIBODY SCREEN: CPT

## 2022-01-10 PROCEDURE — 96375 TX/PRO/DX INJ NEW DRUG ADDON: CPT

## 2022-01-10 PROCEDURE — 86900 BLOOD TYPING SEROLOGIC ABO: CPT

## 2022-01-10 PROCEDURE — 85027 COMPLETE CBC AUTOMATED: CPT

## 2022-01-10 RX ORDER — DIPHENOXYLATE HYDROCHLORIDE AND ATROPINE SULFATE 2.5; .025 MG/1; MG/1
1 TABLET ORAL 4 TIMES DAILY PRN
Status: DISCONTINUED | OUTPATIENT
Start: 2022-01-10 | End: 2022-01-10

## 2022-01-10 RX ORDER — CHOLESTYRAMINE LIGHT 4 G/5.7G
4 POWDER, FOR SUSPENSION ORAL
Status: DISCONTINUED | OUTPATIENT
Start: 2022-01-10 | End: 2022-01-10

## 2022-01-10 RX ORDER — SODIUM CHLORIDE 9 MG/ML
125 INJECTION, SOLUTION INTRAVENOUS CONTINUOUS
Status: DISCONTINUED | OUTPATIENT
Start: 2022-01-10 | End: 2022-01-10 | Stop reason: HOSPADM

## 2022-01-10 RX ORDER — METOPROLOL TARTRATE 50 MG/1
50 TABLET, FILM COATED ORAL EVERY 12 HOURS SCHEDULED
Status: DISCONTINUED | OUTPATIENT
Start: 2022-01-10 | End: 2022-01-11

## 2022-01-10 RX ORDER — SENNOSIDES 8.6 MG
2 TABLET ORAL DAILY
Status: DISCONTINUED | OUTPATIENT
Start: 2022-01-11 | End: 2022-01-11

## 2022-01-10 RX ORDER — PANTOPRAZOLE SODIUM 40 MG/1
40 TABLET, DELAYED RELEASE ORAL
Status: DISCONTINUED | OUTPATIENT
Start: 2022-01-11 | End: 2022-01-12

## 2022-01-10 RX ORDER — LORAZEPAM 1 MG/1
1 TABLET ORAL
Status: DISCONTINUED | OUTPATIENT
Start: 2022-01-10 | End: 2022-01-11

## 2022-01-10 RX ORDER — DOCUSATE SODIUM 100 MG/1
100 CAPSULE, LIQUID FILLED ORAL 2 TIMES DAILY
Status: DISCONTINUED | OUTPATIENT
Start: 2022-01-10 | End: 2022-01-11

## 2022-01-10 RX ORDER — AMLODIPINE BESYLATE 5 MG/1
5 TABLET ORAL DAILY
Status: DISCONTINUED | OUTPATIENT
Start: 2022-01-11 | End: 2022-01-20 | Stop reason: HOSPADM

## 2022-01-10 RX ORDER — SODIUM CHLORIDE 9 MG/ML
100 INJECTION, SOLUTION INTRAVENOUS CONTINUOUS
Status: DISCONTINUED | OUTPATIENT
Start: 2022-01-10 | End: 2022-01-12

## 2022-01-10 RX ADMIN — LEVETIRACETAM 500 MG: 100 INJECTION, SOLUTION INTRAVENOUS at 19:50

## 2022-01-10 RX ADMIN — PHYTONADIONE 10 MG: 10 INJECTION, EMULSION INTRAMUSCULAR; INTRAVENOUS; SUBCUTANEOUS at 14:18

## 2022-01-10 RX ADMIN — SODIUM CHLORIDE 125 ML/HR: 0.9 INJECTION, SOLUTION INTRAVENOUS at 14:11

## 2022-01-10 RX ADMIN — Medication 1000 UNITS: at 14:28

## 2022-01-10 RX ADMIN — TETANUS TOXOID, REDUCED DIPHTHERIA TOXOID AND ACELLULAR PERTUSSIS VACCINE, ADSORBED 0.5 ML: 5; 2.5; 8; 8; 2.5 SUSPENSION INTRAMUSCULAR at 13:26

## 2022-01-10 RX ADMIN — SODIUM CHLORIDE 50 ML/HR: 0.9 INJECTION, SOLUTION INTRAVENOUS at 18:47

## 2022-01-10 NOTE — ED PROVIDER NOTES
Emergency Department Trauma Note  Malu Shah 80 y o  female MRN: [id number]  Unit/Bed#: ED TR13/TR13B Encounter: [id number]      Trauma Alert: Trauma Acuity: Trauma Evaluation  Model of Arrival: Mode of Arrival: Direct from scene via    Trauma Team: Current Providers  Attending Provider: Cha Sales DO  Registered Nurse: Inez Hawkins RN  Consultants: Other: Dr [id number]  Time Called [phone]      History of Present Illness     Chief Complaint:   Chief Complaint   Patient presents with    Weakness - Generalized     Pt fell today, +head strike, +coumadin  HPI:  Malu Shah is a 80 y o  female who presents with  2 cm laceration above the right eyebrow after a fall this morning secondary to generalized weakness trauma evaluation was called  Mechanism:Details of Incident: Patient fell in her home this morning  Patients' son reports that she has had diarrhea x several days, is sleeping in a living room chair, stood up this morning, tripped and fell, hitting her left forehead  Injury Date: 01/10/22 Injury Time: 1055 Injury Occurence Location - [address] [place] [address] Way: [address]     This is an 55-year-old female who presents with generalized weakness and fall this morning trauma evaluation was called secondary to head injury denies any neck or back pain  Patient has had diarrhea for quite a while and had outpatient stool sample sent for culture results pending  Son states that she is not eating and having trouble getting around at home        History provided by:  Patient and relative  Medical Problem  Location:   generalized  Quality:   weakness  Severity:  Severe  Onset quality:  Gradual  Timing:  Constant  Progression:  Worsening  Chronicity:  Recurrent  Context:   generalized weakness with fall this morning  Relieved by:    Nothing  Worsened by:   diarrhea and lack of appetite  Associated symptoms: diarrhea and fatigue      Review of Systems   Constitutional: Positive for activity change, appetite change and fatigue  Gastrointestinal: Positive for diarrhea  Neurological: Positive for weakness  All other systems reviewed and are negative  Historical Information     Immunizations:   Immunization History   Administered Date(s) Administered    COVID-19 PFIZER VACCINE 0 3 ML IM 04/22/2021    Tdap 07/02/2019, 01/10/2022       Past Medical History:   Diagnosis Date    Anxiety     Disease of thyroid gland        Family History   Problem Relation Age of Onset    No Known Problems Mother     Stroke Father     Colon cancer Neg Hx     Colon polyps Neg Hx      Past Surgical History:   Procedure Laterality Date    CATARACT EXTRACTION Right     FOOT SURGERY      JOINT REPLACEMENT       Social History     Tobacco Use    Smoking status: Never Smoker    Smokeless tobacco: Never Used   Vaping Use    Vaping Use: Never used   Substance Use Topics    Alcohol use: No    Drug use: No     E-Cigarette/Vaping    E-Cigarette Use Never User      E-Cigarette/Vaping Substances       Family History: non-contributory    Meds/Allergies   Prior to Admission Medications   Prescriptions Last Dose Informant Patient Reported? Taking?    LORazepam (ATIVAN) 1 mg tablet  Self No No   Sig: Take 1 tablet (1 mg total) by mouth daily at bedtime   Multiple Vitamins-Minerals (VITEYES AREDS ADVANCED PO)  Self Yes No   Sig: Take 1 tablet by mouth daily   amLODIPine (NORVASC) 10 mg tablet  Self No No   Sig: Take 0 5 tablets (5 mg total) by mouth daily   cholestyramine (QUESTRAN) 4 g packet   No No   Sig: Take 1 packet (4 g total) by mouth daily at bedtime   diphenoxylate-atropine (LOMOTIL) 2 5-0 025 mg per tablet  Self No No   Sig: Take 1 tablet by mouth 4 (four) times a day as needed for diarrhea   metoprolol tartrate (LOPRESSOR) 50 mg tablet  Self No No   Sig: Take 1 tablet (50 mg total) by mouth every 12 (twelve) hours   pantoprazole (PROTONIX) 40 mg tablet  Self No No   Sig: Take 1 tablet (40 mg total) by mouth daily in the early morning   vancomycin (VANCOCIN) 125 MG capsule   No No   Sig: Take 1 capsule (125 mg total) by mouth 4 (four) times a day for 14 days   warfarin (Coumadin) 1 mg tablet  Self No No   Sig: Take 2 5 tablets (2 5 mg total) by mouth daily      Facility-Administered Medications: None       Allergies   Allergen Reactions    Preservision Areds [B-Plex Plus] Tremor    Tramadol      Reaction Date: 22Aug2011;        PHYSICAL EXAM    PE limited by:  age    Objective   Vitals:   First set: Temperature: (!) 96 9 °F (36 1 °C) (01/10/22 1142)  Pulse: 72 (01/10/22 1142)  Respirations: 20 (01/10/22 1142)  Blood Pressure: 93/50 (01/10/22 1142)  SpO2: 96 % (01/10/22 1142)    Primary Survey:   (A) Airway:  patent  (B) Breathing:  Clear bilateral  (C) Circulation: Pulses:   normal  (D) Disabliity:  GCS Total:  15  (E) Expose:  Completed    Secondary Survey: (Click on Physical Exam tab above)  Physical Exam  Vitals and nursing note reviewed  Constitutional:       General: She is not in acute distress  Appearance: She is not ill-appearing, toxic-appearing or diaphoretic  Comments: Weak and frail   HENT:      Head: Normocephalic  Right Ear: Tympanic membrane, ear canal and external ear normal       Left Ear: Tympanic membrane, ear canal and external ear normal    Eyes:      General: No scleral icterus  Right eye: No discharge  Left eye: No discharge  Extraocular Movements: Extraocular movements intact  Pupils: Pupils are equal, round, and reactive to light  Cardiovascular:      Rate and Rhythm: Normal rate and regular rhythm  Pulses: Normal pulses  Heart sounds: Normal heart sounds  No murmur heard  No friction rub  No gallop  Pulmonary:      Effort: Pulmonary effort is normal  No respiratory distress  Breath sounds: Normal breath sounds  No stridor  No wheezing, rhonchi or rales  Abdominal:      General: Abdomen is flat  There is no distension  Palpations: Abdomen is soft  Tenderness: There is no abdominal tenderness  There is no guarding or rebound  Musculoskeletal:         General: No swelling, tenderness, deformity or signs of injury  Normal range of motion  Cervical back: Normal range of motion and neck supple  No rigidity or tenderness  Right lower leg: No edema  Left lower leg: No edema  Skin:     General: Skin is warm and dry  Coloration: Skin is not jaundiced  Findings: No erythema or rash  Comments: 2 cm laceration above the right eyebrow closed over does not require suturing   Neurological:      Mental Status: She is alert and oriented to person, place, and time  Cranial Nerves: No cranial nerve deficit  Sensory: No sensory deficit  Motor: Weakness ( generalized) present  Coordination: Coordination normal    Psychiatric:         Mood and Affect: Mood normal          Thought Content: Thought content normal          Cervical spine cleared by clinical criteria? No (imaging required)      Invasive Devices  Report    Peripheral Intravenous Line            Peripheral IV 01/10/22 Right Arm <1 day                Lab Results:   Results Reviewed     Procedure Component Value Units Date/Time    Path Slide Review [[id number]] Collected: 01/10/22 1323    Lab Status: In process Specimen: Blood from Arm, Right Updated: 01/10/22 1439    CBC and differential [[id number]]  (Abnormal) Collected: 01/10/22 1323    Lab Status: Edited Specimen: Blood from Arm, Right Updated: 01/10/22 1434     WBC 4 28 Thousand/uL      RBC 4 62 Million/uL      Hemoglobin 12 9 g/dL      Hematocrit 38 1 %      MCV 83 fL      MCH 27 9 pg      MCHC 33 9 g/dL      RDW 15 0 %      MPV 9 6 fL      Platelets --    Narrative: This is an appended report  These results have been appended to a previously verified report      Manual Differential(PHLEBS Do Not Order) [[id number]]  (Abnormal) Collected: 01/10/22 1323    Lab Status: Steve Baker Specimen: Blood from Arm, Right Updated: 01/10/22 1434     Segmented % 41 %      Bands % 24 %      Lymphocytes % 21 %      Monocytes % 13 %      Eosinophils, % 1 %      Basophils % 0 %      Absolute Neutrophils 2 78 Thousand/uL      Lymphocytes Absolute 0 90 Thousand/uL      Monocytes Absolute 0 56 Thousand/uL      Eosinophils Absolute 0 04 Thousand/uL      Basophils Absolute 0 00 Thousand/uL      Total Counted --     RBC Morphology Present     Anisocytosis Present     Hypochromia Present     Polychromasia Present     Target Cells Present     Platelet Estimate Unable to Estimate due to clumped platelets    Comprehensive metabolic panel [[id number]]  (Abnormal) Collected: 01/10/22 1323    Lab Status: Final result Specimen: Blood from Arm, Right Updated: 01/10/22 1349     Sodium 132 mmol/L      Potassium 4 2 mmol/L      Chloride 97 mmol/L      CO2 25 mmol/L      ANION GAP 10 mmol/L      BUN 53 mg/dL      Creatinine 2 02 mg/dL      Glucose 146 mg/dL      Calcium 8 4 mg/dL      Corrected Calcium 9 9 mg/dL      AST 55 U/L      ALT 49 U/L      Alkaline Phosphatase 76 U/L      Total Protein 7 0 g/dL      Albumin 2 1 g/dL      Total Bilirubin 0 60 mg/dL      eGFR 21 ml/min/1 73sq m     Narrative:      [place] guidelines for Chronic Kidney Disease (CKD):     Stage 1 with normal or high GFR (GFR > 90 mL/min/1 73 square meters)    Stage 2 Mild CKD (GFR = 60-89 mL/min/1 73 square meters)    Stage 3A Moderate CKD (GFR = 45-59 mL/min/1 73 square meters)    Stage 3B Moderate CKD (GFR = 30-44 mL/min/1 73 square meters)    Stage 4 Severe CKD (GFR = 15-29 mL/min/1 73 square meters)    Stage 5 End Stage CKD (GFR <15 mL/min/1 73 square meters)  Note: GFR calculation is accurate only with a steady state creatinine    Protime-INR [[id number]]  (Abnormal) Collected: 01/10/22 1323    Lab Status: Final result Specimen: Blood from Arm, Right Updated: 01/10/22 1346     Protime 35 8 seconds      INR 3 74    APTT [[id number]]  (Abnormal) Collected: 01/10/22 1323    Lab Status: Final result Specimen: Blood from Arm, Right Updated: 01/10/22 1346     PTT 46 seconds     UA w Reflex to Microscopic w Reflex to Culture [[id number]]     Lab Status: No result Specimen: Urine                  Imaging Studies:   Direct to CT: Yes  TRAUMA - CT head wo contrast   Final Result by Ever Beaulieu MD (01/10 1302)      Small left frontal acute subdural hematoma with some intermixed chronic components, no greater than 8 mm in overall total thickness with minimal sulcal effacement in the frontal region but otherwise no significant mass effect  Specifically, no midline    shift or narrowing of the ventricles  Minimal subdural hemorrhage seen along the anterior interhemispheric fissure, no greater than 1 mm in thickness  No parenchymal hemorrhage  No calvarial fracture  I reported these results to Chele Abreu via HIPAA compliant secure electronic messaging on 1/10/2022 at 12:59 PM          Workstation performed: [id number]         TRAUMA - CT spine cervical wo contrast   Final Result by Ever Beaulieu MD (01/10 1304)      No cervical spine fracture or traumatic malalignment  Workstation performed: [id number]         XR Trauma chest portable   Final Result by Linda Nava MD (01/10 9875)      No focal airspace consolidation identified                    Workstation performed: [id number]         XR Trauma pelvis ap only 1 or 2 vw    (Results Pending)         Procedures  ECG 12 Lead Documentation Only    Date/Time: 1/10/2022 12:38 PM  Performed by: Romayne Hatchet, DO  Authorized by: Romayne Hatchet, DO     ECG reviewed by me, the ED Provider: yes    Patient location:  ED  Rate:     ECG rate:  78  Rhythm:     Rhythm: sinus rhythm    Conduction:     Conduction: normal    ST segments:     ST segments:  Non-specific  CriticalCare Time  Performed by: Romayne Hatchet, DO  Authorized by: Romayne Hatchet, DO Critical care provider statement:     Critical care time (minutes):  30    Critical care time was exclusive of:  Separately billable procedures and treating other patients    Critical care was necessary to treat or prevent imminent or life-threatening deterioration of the following conditions:  CNS failure or compromise (Subdural hematoma)    Critical care was time spent personally by me on the following activities:  Obtaining history from patient or surrogate, discussions with consultants, examination of patient, ordering and performing treatments and interventions, ordering and review of laboratory studies, ordering and review of radiographic studies and re-evaluation of patient's condition    I assumed direction of critical care for this patient from another provider in my specialty: no               ED Course  ED Course as of 01/10/22 1500   Mon Ilya 10, 2022   1300  ATTEMPTING TO OBTAIN IV   1302  Spoke with Trauma will accept patient   1459  Resting comfortably  awaiting transfer           MDM  Number of Diagnoses or Management Options  Diagnosis management comments:  Generalized weakness secondary to diarrhea and dehydration also fall this morning trauma evaluation:       Amount and/or Complexity of Data Reviewed  Clinical lab tests: ordered  Tests in the radiology section of CPT®: ordered            Disposition  [facility] Transfer: No  Final diagnoses:   Subdural hematoma ([address] )   Acute kidney injury ([address] )   Dehydration     Time reflects when diagnosis was documented in both MDM as applicable and the Disposition within this note     Time User Action Codes Description Comment    1/10/2022  1:05 PM Delacruz Hard Add [S06 5X9A] Subdural hematoma ([address] )     1/10/2022  2:04 PM Delacruz Hard Add [N17 9] Acute kidney injury ([address] )     1/10/2022  2:04 PM Delacruz Hard Add [E86 0] Dehydration       ED Disposition     ED Disposition Condition Date/Time Comment    Transfer to Another Facility-In Network  Mon Ilya 10, 2022  1:05 PM Jovani Amador should be transferred out to * trauma at [facility] under Dr Drake Shock**  MD Documentation      Most Recent Value   Patient Condition The patient has been stabilized such that within reasonable medical probability, no material deterioration of the patient condition or the condition of the unborn child(klaudia) is likely to result from the transfer   Reason for Transfer Level of Care needed not available at this facility   Benefits of Transfer Specialized equipment and/or services available at the receiving facility (Include comment)________________________   Risks of Transfer Potential for delay in receiving treatment   Accepting Physician Dr Kushal Hart Name, [address]    (Name & Tel number) PACS   Transported by (Company and Unit #) [employer]   Provider Certification General risk, such as traffic hazards, adverse weather conditions, rough terrain or turbulence, possible failure of equipment (including vehicle or aircraft), or consequences of actions of persons outside the control of the transport personnel      RN Documentation      Most Recent Value   Accepting Facility Name, [address]    (Name & Tel number) PACS   Transported by (Company and Unit #) [facility]      Follow-up Information    None       Patient's Medications   Discharge Prescriptions    No medications on file     No discharge procedures on file      PDMP Review       Value Time User    PDMP Reviewed  Yes 12/29/2021 11:19 AM Chad Glover MD          ED Provider  Electronically Signed by         Ivy Harmon DO  01/10/22 5862

## 2022-01-10 NOTE — EMTALA/ACUTE CARE TRANSFER
[facility] EMERGENCY DEPARTMENT  [address]  [facility] [address]  Dept: [phone]      EMTALA TRANSFER CONSENT    NAME Zay Leyva                                         1934                              MRN [id number]    I have been informed of my rights regarding examination, treatment, and transfer   by Dr Esme Molina DO    Benefits: Specialized equipment and/or services available at the receiving facility (Include comment)________________________    Risks: Potential for delay in receiving treatment      Consent for Transfer:  I acknowledge that my medical condition has been evaluated and explained to me by the emergency department physician or other qualified medical person and/or my attending physician, who has recommended that I be transferred to the service of  Accepting Physician: Dr Jake Amezquita at [facility] Name, [address] : [facility]  The above potential benefits of such transfer, the potential risks associated with such transfer, and the probable risks of not being transferred have been explained to me, and I fully understand them  The doctor has explained that, in my case, the benefits of transfer outweigh the risks  I agree to be transferred  I authorize the performance of emergency medical procedures and treatments upon me in both transit and upon arrival at the receiving facility  Additionally, I authorize the release of any and all medical records to the receiving facility and request they be transported with me, if possible  I understand that the safest mode of transportation during a medical emergency is an ambulance and that the Hospital advocates the use of this mode of transport  Risks of traveling to the receiving facility by car, including absence of medical control, life sustaining equipment, such as oxygen, and medical personnel has been explained to me and I fully understand them      ([address] [place])  [  ] I consent to the stated transfer and to be transported by ambulance/helicopter  [  ]  I consent to the stated transfer, but refuse transportation by ambulance and accept full responsibility for my transportation by car  I understand the risks of non-ambulance transfers and I exonerate the Hospital and its staff from any deterioration in my condition that results from this refusal     X___________________________________________    DATE  01/10/22  TIME________  Signature of patient or legally responsible individual signing on patient behalf           RELATIONSHIP TO PATIENT_________________________          Provider Certification    NAME Yasmin Carreon                                         1934                              MRN [id number]    A medical screening exam was performed on the above named patient  Based on the examination:    Condition Necessitating Transfer The encounter diagnosis was Subdural hematoma ([address] )  Patient Condition: The patient has been stabilized such that within reasonable medical probability, no material deterioration of the patient condition or the condition of the unborn child(klaudia) is likely to result from the transfer    Reason for Transfer: Level of Care needed not available at this facility    Transfer Requirements: [address]   · Space available and qualified personnel available for treatment as acknowledged by PACS  · Agreed to accept transfer and to provide appropriate medical treatment as acknowledged by       Dr Flori Wyman  · Appropriate medical records of the examination and treatment of the patient are provided at the time of transfer   [address] _______  · Transfer will be performed by qualified personnel from [facility]  and appropriate transfer equipment as required, including the use of necessary and appropriate life support measures      Provider Certification: I have examined the patient and explained the following risks and benefits of being transferred/refusing transfer to the patient/family:  General risk, such as traffic hazards, adverse weather conditions, rough terrain or turbulence, possible failure of equipment (including vehicle or aircraft), or consequences of actions of persons outside the control of the transport personnel      Based on these reasonable risks and benefits to the patient and/or the unborn child(klaudia), and based upon the information available at the time of the patients examination, I certify that the medical benefits reasonably to be expected from the provision of appropriate medical treatments at another medical facility outweigh the increasing risks, if any, to the individuals medical condition, and in the case of labor to the unborn child, from effecting the transfer      X____________________________________________ DATE 01/10/22        TIME_______      ORIGINAL - SEND TO MEDICAL RECORDS   COPY - SEND WITH PATIENT DURING TRANSFER

## 2022-01-10 NOTE — H&P
H&P Exam - Trauma   Alfonso Zavaleta 80 y o  female MRN: [id number]  Unit/Bed#: ED 02 Encounter: [id number]    Assessment/Plan   Trauma Alert: Other Transfer  Model of Arrival: Transfer from "[facility]"  Trauma Team: Attending Manuela Song, Resident [facility]  Consultants: Neurosurgery    Trauma Active Problems:   - Subdural hematoma  - Multiple falls    Trauma Plan:   - Admit to Trauma  - Patient already reversed with Meme Kacie  - Repeat CT head  - EKG  - AM labs  - Keppra for seizure ppx  - Geriatrics consult  - Neurosurgery consult  - PT/OT eval      Chief Complaint: headache, loose stools    History of Present Illness   HPI:  Alfonso Zavaleta is a 80 y o  female with past medical history of hypertension, hyperlipidemia, and history of pulmonary embolism and DVT who presents with headache following mechanical fall  Patient states that she fell at home the day before yesterday and then again this morning  Patient states that she was walking towards the bathroom due to recent history of multiple bouts of diarrhea, when she fell and hit her head  Questionable loss of consciousness  Patient takes Coumadin daily, and last took it yesterday  She was seen at [facility], where a CT head showed a subdural hematoma and Roger Hash was given  Patient denies any associated symptoms, and denies numbness, tingling, loss of appetite, nausea, vomiting  Of note, patient is being worked up for a possible C-diff infection  Mechanism:Fall    Review of Systems    12-point, complete review of systems was reviewed and negative except as stated above         Historical Information     Past Medical History:   Diagnosis Date    Anxiety     Disease of thyroid gland      Past Surgical History:   Procedure Laterality Date    CATARACT EXTRACTION Right     FOOT SURGERY      JOINT REPLACEMENT       Social History   Social History     Substance and Sexual Activity   Alcohol Use No     Social History     Substance and Sexual Activity   Drug Use No     Social History     Tobacco Use   Smoking Status Never Smoker   Smokeless Tobacco Never Used     E-Cigarette/Vaping    E-Cigarette Use Never User      E-Cigarette/Vaping Substances     Immunization History   Administered Date(s) Administered    COVID-19 PFIZER VACCINE 0 3 ML IM 04/22/2021    Tdap 07/02/2019, 01/10/2022     Last Tetanus: 1/10/22  Family History: Non-contributory      Meds/Allergies   Current Outpatient Medications   Medication Instructions    amLODIPine (NORVASC) 5 mg, Oral, Daily    cholestyramine (QUESTRAN) 4 g, Oral, Daily at bedtime    diphenoxylate-atropine (LOMOTIL) 2 5-0 025 mg per tablet 1 tablet, Oral, 4 times daily PRN    LORazepam (ATIVAN) 1 mg, Oral, Daily at bedtime    metoprolol tartrate (LOPRESSOR) 50 mg, Oral, Every 12 hours scheduled    Multiple Vitamins-Minerals (VITEYES AREDS ADVANCED PO) 1 tablet, Oral, Daily    pantoprazole (PROTONIX) 40 mg, Oral, Daily (early morning)    vancomycin (VANCOCIN) 125 mg, Oral, 4 times daily    warfarin (COUMADIN) 2 5 mg, Oral, Daily (warfarin)       Allergies   Allergen Reactions    Preservision Areds [B-Plex Plus] Tremor    Tramadol      Reaction Date: 22Aug2011;          PHYSICAL EXAM    Objective   Vitals:   First set: Temperature: 97 6 °F (36 4 °C) (01/10/22 1640)  Pulse: 94 (01/10/22 1637)  Respirations: 18 (01/10/22 1637)  Blood Pressure: 109/59 (01/10/22 1639)    Primary Survey:   (A) Airway: intact   (B) Breathing: CTA bilaterally  (C) Circulation: Pulses:   normal  (D) Disabliity:  GCS Total:  14  (E) Expose:  Completed    Secondary Survey: (Click on Physical Exam tab above)  Physical Exam  Constitutional:       General: She is awake  Appearance: She is ill-appearing  HENT:      Head: Normocephalic  Cardiovascular:      Rate and Rhythm: Normal rate and regular rhythm  Pulses: Normal pulses     Pulmonary:      Effort: Pulmonary effort is normal       Breath sounds: Normal breath sounds and air entry  Abdominal:      General: Abdomen is flat  Palpations: Abdomen is soft  Tenderness: There is no abdominal tenderness  Musculoskeletal:        Arms:    Neurological:      General: No focal deficit present  Mental Status: She is alert  GCS: GCS eye subscore is 4  GCS verbal subscore is 4  GCS motor subscore is 6  Psychiatric:         Behavior: Behavior is cooperative  Invasive Devices  Report    Peripheral Intravenous Line            Peripheral IV 01/10/22 Right Arm <1 day                Lab Results:   Results Reviewed     Procedure Component Value Units Date/Time    Basic metabolic panel [[id number]]     Lab Status: No result Specimen: Blood     CBC and differential [[id number]]     Lab Status: No result Specimen: Blood     Protime-INR [[id number]]     Lab Status: No result Specimen: Blood     APTT [[id number]]     Lab Status: No result Specimen: Blood           Imaging/EKG Studies:   CT head wo contrast   Final Result by Larissa Posey MD (01/10 1910)      Small left frontal subdural hematoma, 7 mm in maximal thickness with no mass effect, unchanged since a CT from earlier today                    Workstation performed: [id number]             Code Status: Level 3 - DNAR and DNI  Advance Directive and Living Will: Yes

## 2022-01-11 ENCOUNTER — APPOINTMENT (INPATIENT)
Dept: NON INVASIVE DIAGNOSTICS | Facility: HOSPITAL | Age: 87
DRG: 082 | End: 2022-01-11
Payer: COMMERCIAL

## 2022-01-11 ENCOUNTER — APPOINTMENT (INPATIENT)
Dept: RADIOLOGY | Facility: HOSPITAL | Age: 87
DRG: 082 | End: 2022-01-11
Payer: COMMERCIAL

## 2022-01-11 ENCOUNTER — APPOINTMENT (INPATIENT)
Dept: NEUROLOGY | Facility: CLINIC | Age: 87
DRG: 082 | End: 2022-01-11
Payer: COMMERCIAL

## 2022-01-11 PROBLEM — N17.9 AKI (ACUTE KIDNEY INJURY) (HCC): Status: ACTIVE | Noted: 2022-01-11

## 2022-01-11 PROBLEM — A04.72 C. DIFFICILE DIARRHEA: Status: ACTIVE | Noted: 2022-01-11

## 2022-01-11 PROBLEM — S06.5X9A SUBDURAL HEMATOMA (HCC): Status: ACTIVE | Noted: 2022-01-11

## 2022-01-11 PROBLEM — R41.82 ALTERED MENTAL STATUS: Status: ACTIVE | Noted: 2022-01-11

## 2022-01-11 PROBLEM — Z86.718 HISTORY OF DVT (DEEP VEIN THROMBOSIS): Status: ACTIVE | Noted: 2022-01-11

## 2022-01-11 PROBLEM — R19.7 DIARRHEA: Status: ACTIVE | Noted: 2022-01-11

## 2022-01-11 PROBLEM — S06.5XAA SUBDURAL HEMATOMA: Status: ACTIVE | Noted: 2022-01-11
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
## 2022-01-11 LAB
ANION GAP SERPL CALCULATED.3IONS-SCNC: 10 MMOL/L (ref 4–13)
APTT PPP: 23 SECONDS (ref 23–37)
ATRIAL RATE: 300 BPM
ATRIAL RATE: 78 BPM
BUN SERPL-MCNC: 53 MG/DL (ref 5–25)
C DIFF TOX B TCDB STL QL NAA+PROBE: NEGATIVE
CALCIUM SERPL-MCNC: 8.3 MG/DL (ref 8.3–10.1)
CHLORIDE SERPL-SCNC: 106 MMOL/L (ref 100–108)
CO2 SERPL-SCNC: 21 MMOL/L (ref 21–32)
CREAT SERPL-MCNC: 1.2 MG/DL (ref 0.6–1.3)
ERYTHROCYTE [DISTWIDTH] IN BLOOD BY AUTOMATED COUNT: 15.3 % (ref 11.6–15.1)
GFR SERPL CREATININE-BSD FRML MDRD: 40 ML/MIN/1.73SQ M
GLUCOSE SERPL-MCNC: 111 MG/DL (ref 65–140)
HCT VFR BLD AUTO: 35 % (ref 34.8–46.1)
HGB BLD-MCNC: 11.7 G/DL (ref 11.5–15.4)
INR PPP: 1.15 (ref 0.84–1.19)
MCH RBC QN AUTO: 27.9 PG (ref 26.8–34.3)
MCHC RBC AUTO-ENTMCNC: 33.4 G/DL (ref 31.4–37.4)
MCV RBC AUTO: 84 FL (ref 82–98)
P AXIS: 56 DEGREES
PLATELET # BLD AUTO: 364 THOUSANDS/UL (ref 149–390)
PMV BLD AUTO: 9.7 FL (ref 8.9–12.7)
POTASSIUM SERPL-SCNC: 3.9 MMOL/L (ref 3.5–5.3)
PR INTERVAL: 126 MS
PROTHROMBIN TIME: 14.3 SECONDS (ref 11.6–14.5)
QRS AXIS: 56 DEGREES
QRS AXIS: 62 DEGREES
QRSD INTERVAL: 70 MS
QRSD INTERVAL: 86 MS
QT INTERVAL: 312 MS
QT INTERVAL: 404 MS
QTC INTERVAL: 412 MS
QTC INTERVAL: 460 MS
RBC # BLD AUTO: 4.19 MILLION/UL (ref 3.81–5.12)
SODIUM SERPL-SCNC: 137 MMOL/L (ref 136–145)
T WAVE AXIS: 48 DEGREES
T WAVE AXIS: 80 DEGREES
VENTRICULAR RATE: 105 BPM
VENTRICULAR RATE: 78 BPM
WBC # BLD AUTO: 4.27 THOUSAND/UL (ref 4.31–10.16)

## 2022-01-11 PROCEDURE — 81001 URINALYSIS AUTO W/SCOPE: CPT

## 2022-01-11 PROCEDURE — 99233 SBSQ HOSP IP/OBS HIGH 50: CPT | Performed by: EMERGENCY MEDICINE

## 2022-01-11 PROCEDURE — 85027 COMPLETE CBC AUTOMATED: CPT | Performed by: PHYSICIAN ASSISTANT

## 2022-01-11 PROCEDURE — 85730 THROMBOPLASTIN TIME PARTIAL: CPT

## 2022-01-11 PROCEDURE — 97163 PT EVAL HIGH COMPLEX 45 MIN: CPT

## 2022-01-11 PROCEDURE — 95816 EEG AWAKE AND DROWSY: CPT

## 2022-01-11 PROCEDURE — 87493 C DIFF AMPLIFIED PROBE: CPT | Performed by: STUDENT IN AN ORGANIZED HEALTH CARE EDUCATION/TRAINING PROGRAM

## 2022-01-11 PROCEDURE — 85610 PROTHROMBIN TIME: CPT

## 2022-01-11 PROCEDURE — 93971 EXTREMITY STUDY: CPT

## 2022-01-11 PROCEDURE — 97167 OT EVAL HIGH COMPLEX 60 MIN: CPT

## 2022-01-11 PROCEDURE — 80048 BASIC METABOLIC PNL TOTAL CA: CPT | Performed by: PHYSICIAN ASSISTANT

## 2022-01-11 PROCEDURE — 95816 EEG AWAKE AND DROWSY: CPT | Performed by: STUDENT IN AN ORGANIZED HEALTH CARE EDUCATION/TRAINING PROGRAM

## 2022-01-11 PROCEDURE — 99223 1ST HOSP IP/OBS HIGH 75: CPT | Performed by: PHYSICIAN ASSISTANT

## 2022-01-11 PROCEDURE — G1004 CDSM NDSC: HCPCS

## 2022-01-11 PROCEDURE — 92610 EVALUATE SWALLOWING FUNCTION: CPT

## 2022-01-11 PROCEDURE — 93010 ELECTROCARDIOGRAM REPORT: CPT | Performed by: INTERNAL MEDICINE

## 2022-01-11 PROCEDURE — 93971 EXTREMITY STUDY: CPT | Performed by: SURGERY

## 2022-01-11 PROCEDURE — 99223 1ST HOSP IP/OBS HIGH 75: CPT | Performed by: INTERNAL MEDICINE

## 2022-01-11 PROCEDURE — 70450 CT HEAD/BRAIN W/O DYE: CPT

## 2022-01-11 RX ORDER — METOPROLOL TARTRATE 5 MG/5ML
5 INJECTION INTRAVENOUS 2 TIMES DAILY
Status: DISCONTINUED | OUTPATIENT
Start: 2022-01-11 | End: 2022-01-16

## 2022-01-11 RX ORDER — LORAZEPAM 2 MG/ML
0.5 INJECTION INTRAMUSCULAR
Status: DISCONTINUED | OUTPATIENT
Start: 2022-01-11 | End: 2022-01-20 | Stop reason: HOSPADM

## 2022-01-11 RX ORDER — HEPARIN SODIUM 5000 [USP'U]/ML
5000 INJECTION, SOLUTION INTRAVENOUS; SUBCUTANEOUS EVERY 8 HOURS SCHEDULED
Status: DISCONTINUED | OUTPATIENT
Start: 2022-01-11 | End: 2022-01-20 | Stop reason: HOSPADM

## 2022-01-11 RX ADMIN — METOROPROLOL TARTRATE 5 MG: 5 INJECTION, SOLUTION INTRAVENOUS at 22:10

## 2022-01-11 RX ADMIN — SODIUM CHLORIDE 100 ML/HR: 0.9 INJECTION, SOLUTION INTRAVENOUS at 03:31

## 2022-01-11 RX ADMIN — LEVETIRACETAM 500 MG: 100 INJECTION, SOLUTION INTRAVENOUS at 17:06

## 2022-01-11 RX ADMIN — LEVETIRACETAM 500 MG: 100 INJECTION, SOLUTION INTRAVENOUS at 09:06

## 2022-01-11 RX ADMIN — HEPARIN SODIUM 5000 UNITS: 5000 INJECTION INTRAVENOUS; SUBCUTANEOUS at 14:57

## 2022-01-11 RX ADMIN — HEPARIN SODIUM 5000 UNITS: 5000 INJECTION INTRAVENOUS; SUBCUTANEOUS at 22:10

## 2022-01-11 NOTE — PLAN OF CARE
Problem: OCCUPATIONAL THERAPY ADULT  Goal: Performs self-care activities at highest level of function for planned discharge setting  See evaluation for individualized goals  Description: Treatment Interventions: ADL retraining,Functional transfer training,Endurance training,Cognitive reorientation,Patient/family training,Equipment evaluation/education,Compensatory technique education,Energy conservation,Activityengagement          See flowsheet documentation for full assessment, interventions and recommendations  Note: Limitation: Decreased ADL status,Decreased Safe judgement during ADL,Decreased cognition,Decreased endurance,Decreased self-care trans,Decreased high-level ADLs  Prognosis: Fair,Guarded  Assessment: 81 YO Female SEEN FOR INITIAL OCCUPATIONAL THERAPY EVALUATION FOLLOWING TXF FROM [facility]->[facility] S/P FALL RESULTING IN +HEADSTRIKE AND HA  PT FOUND TO HAVE SUBDURAL HEMATOMA AND ACUTE METABOLIC ENCEPHALOPATHY  PROBLEMS LIST INCLUDES WORKUP FOR C-DIFF, HTN, HLD, H/O PE/DVT AND MULTIPLE FALLS  PT IS A POOR HISTORIAN WITH DIFFICULTY ATTENDING TO TASK AND ANSWER QUESTIONS  INFORMATION OBTAINED FROM PT'S CHART  PT IS FROM HOME WITH SON WHERE  SHE IS OVERALL INDEPENDENT WITH ADLS/IADLS  PT CURRENTLY REQUIRES OVERALL MAX A WITH ADLS, MAX A X2 WITH SUPINE<->SIT TRANSFERS AND MOD A X2 WITH SIT<->STAND /LIMITED  FUNCTIONAL MOBILITY WITH HHA  PT INCONTINENT T/O SESSION, REQUIRING ASSIST FOR HYGIENE/CLEAN UP  PT IS LIMITED 2' PAIN, FATIGUE, IMPAIRED BALANCE, FALL RISK , LIMITED SAFETY AWARENESS/INSIGHT/JUDGEMENT, DISORIENTATION, OVERALL WEAKNESS/DECONDITIONING  and OVERALL LIMITED ACTIVITY TOLERANCE  PT EDUCATED ON CONTINUE PARTICIPATION IN SELF-CARE/MOBILITY WITH STAFF WHILE IN THE HOSPITAL   The patient's raw score on the AM-PAC Daily Activity inpatient short form is 14, standardized score is 33 39, less than 39 4  Patients at this level are likely to benefit from discharge to post-acute rehabilitation services  Please refer to the recommendation of the Occupational Therapist for safe discharge planning  FROM AN OCCUPATIONAL THERAPY PERSPECTIVE, PT WOULD BENEFIT FROM ADDITIONAL OT SERVICES IN AN INPT REHAB SETTING UPON D/C  WILL CONT TO FOLLOW TO ADDRESS THE BELOW DESCRIBED GOALS  Recommendation: Geriatric Consult  OT Discharge Recommendation: Post acute rehabilitation services  OT - OK to Discharge:  Yes

## 2022-01-11 NOTE — ASSESSMENT & PLAN NOTE
· Cr of 2 on admission  · IVF hydration  · Decreased to 1 2 on 1/11/22  · Continue to follow with BMP

## 2022-01-11 NOTE — PLAN OF CARE
Problem: Potential for Falls  Goal: Patient will remain free of falls  Description: INTERVENTIONS:  - Educate patient/family on patient safety including physical limitations  - Instruct patient to call for assistance with activity   - Consult OT/PT to assist with strengthening/mobility   - Keep Call bell within reach  - Keep bed low and locked with side rails adjusted as appropriate  - Keep care items and personal belongings within reach  - Initiate and maintain comfort rounds  - Make Fall Risk Sign visible to staff  - Offer Toileting every 2 Hours, in advance of need  - Initiate/Maintain bed alarm  - Obtain necessary fall risk management equipment: bed alarm  - Apply yellow socks and bracelet for high fall risk patients  - Consider moving patient to room near nurses station  Outcome: Progressing     Problem: Prexisting or High Potential for Compromised Skin Integrity  Goal: Skin integrity is maintained or improved  Description: INTERVENTIONS:  - Identify patients at risk for skin breakdown  - Assess and monitor skin integrity  - Assess and monitor nutrition and hydration status  - Monitor labs   - Assess for incontinence   - Turn and reposition patient  - Assist with mobility/ambulation  - Relieve pressure over bony prominences  - Avoid friction and shearing  - Provide appropriate hygiene as needed including keeping skin clean and dry  - Evaluate need for skin moisturizer/barrier cream  - Collaborate with interdisciplinary team   - Patient/family teaching  - Consider wound care consult   Outcome: Progressing     Problem: MOBILITY - ADULT  Goal: Maintain or return to baseline ADL function  Description: INTERVENTIONS:  -  Assess patient's ability to carry out ADLs; assess patient's baseline for ADL function and identify physical deficits which impact ability to perform ADLs (bathing, care of mouth/teeth, toileting, grooming, dressing, etc )  - Assess/evaluate cause of self-care deficits   - Assess range of motion  - Assess patient's mobility; develop plan if impaired  - Assess patient's need for assistive devices and provide as appropriate  - Encourage maximum independence but intervene and supervise when necessary  - Involve family in performance of ADLs  - Assess for home care needs following discharge   - Consider OT consult to assist with ADL evaluation and planning for discharge  - Provide patient education as appropriate  Outcome: Progressing  Goal: Maintains/Returns to pre admission functional level  Description: INTERVENTIONS:  - Perform BMAT or MOVE assessment daily    - Set and communicate daily mobility goal to care team and patient/family/caregiver  - Collaborate with rehabilitation services on mobility goals if consulted  - Perform Range of Motion 3 times a day  - Reposition patient every 3 hours  - Dangle patient 3 times a day  - Stand patient 3 times a day  - Ambulate patient 3 times a day  - Out of bed to chair 3 times a day   - Out of bed for meals 3 times a day  - Out of bed for toileting  - Record patient progress and toleration of activity level   Outcome: Progressing     Problem: NEUROSENSORY - ADULT  Goal: Achieves stable or improved neurological status  Description: INTERVENTIONS  - Monitor and report changes in neurological status  - Monitor vital signs such as temperature, blood pressure, glucose, and any other labs ordered   - Initiate measures to prevent increased intracranial pressure  - Monitor for seizure activity and implement precautions if appropriate      Outcome: Progressing  Goal: Achieves maximal functionality and self care  Description: INTERVENTIONS  - Monitor swallowing and airway patency with patient fatigue and changes in neurological status  - Encourage and assist patient to increase activity and self care     - Encourage visually impaired, hearing impaired and aphasic patients to use assistive/communication devices  Outcome: Progressing     Problem: GASTROINTESTINAL - ADULT  Goal: Maintains or returns to baseline bowel function  Description: INTERVENTIONS:  - Assess bowel function  - Encourage oral fluids to ensure adequate hydration  - Administer IV fluids if ordered to ensure adequate hydration  - Administer ordered medications as needed  - Encourage mobilization and activity  - Consider nutritional services referral to assist patient with adequate nutrition and appropriate food choices  Outcome: Progressing  Goal: Maintains adequate nutritional intake  Description: INTERVENTIONS:  - Monitor percentage of each meal consumed  - Identify factors contributing to decreased intake, treat as appropriate  - Assist with meals as needed  - Monitor I&O, weight, and lab values if indicated  - Obtain nutrition services referral as needed  Outcome: Progressing

## 2022-01-11 NOTE — SPEECH THERAPY NOTE
Speech-Language Pathology Bedside Swallow Evaluation      Patient Name: Alfonso Zavaleta    KVBDW'P Date: 1/11/2022     Problem List  Active Problems:    Fall    Subdural hematoma ([facility] [address] )    C  difficile diarrhea    FRANK (acute kidney injury) ([facility] [address] )    History of DVT (deep vein thrombosis)      Past Medical History  Past Medical History:   Diagnosis Date    Anxiety     Disease of thyroid gland        Past Surgical History  Past Surgical History:   Procedure Laterality Date    CATARACT EXTRACTION Right     FOOT SURGERY      JOINT REPLACEMENT         Summary   Pt presented with severe oral and absent pharyngeal stage of swallowing  The patient is very distracted and confused  She is able to adequately open oral cavity for tsp, and has good retrieval of bolus  However, she is unable to transfer bolus  Oral suction is used to remove trials x4 from oral cavity  Risk/s for Aspiration: high      Recommended Diet: NPO   Recommended Form of Meds: non-oral if possible   Other Recommendations: Continue frequent oral care    Current Medical Status  HPI:  Alfonso Zavaleta is a 80 y o  female who presents with  2 cm laceration above the right eyebrow after a fall this morning secondary to generalized weakness trauma evaluation was called  Mechanism:Details of Incident: Patient fell in her home this morning  Patients' son reports that she has had diarrhea x several days, is sleeping in a living room chair, stood up this morning, tripped and fell, hitting her left forehead  Injury Date: 01/10/22 Injury Time: 1055 Injury Occurence Location - [address] [place] [address] Way: Residence, [facility]    This is an 80-year-old female who presents with generalized weakness and fall this morning trauma evaluation was called secondary to head injury denies any neck or back pain  Patient has had diarrhea for quite a while and had outpatient stool sample sent for culture results pending   Son states that she is not eating and having trouble getting around at home      Current Precautions:  Fall  Aspiration  Contact  C diff     Allergies:  No known food allergies  Past medical history:  Please see H&P for details    Special Studies:  Chest xray 1/10/2022: No focal airspace consolidation identified  Social/Education/Vocational Hx:  Pt lives alone    Swallow Information   Current Risks for Dysphagia & Aspiration: AMS and distraction with poor attention   Current Symptoms/Concerns: unable to swallow   Current Diet: NPO   Baseline Diet: assume regular with thin liquids     Baseline Assessment   Behavior/Cognition: alert and distracted and restless   Speech/Language Status: not able to to follow commands and limited verbal output  Patient Positioning: upright in bed  Pain Status/Interventions/Response to Interventions: No report of or nonverbal indications of pain  Swallow Mechanism Exam  Not able to formally assess as patient is unable to follow commands  She does have upper partial, which she independently removes and re-enters  No gross asymmetry observed  Consistencies Assessed and Performance   Consistencies Administered: honey thick and puree  Materials administered included applesauce and honey thick liquids     Oral Stage: severe  Oral opening for tsp is adequate with good removal of bolus  The patient is unable to transfer bolus  Oral suction is used to remove material from oral cavity x4  Pharyngeal Stage: absent    Esophageal Concerns: none reported    Summary and Recommendations (see above)    Results Reviewed with: RN and PA     Treatment Recommended: dysphagia therapy      Frequency of treatment: 3-5x week, as able     Patient Stated Goal: "water"     Dysphagia LTG  -Patient will demonstrate safe and effective oral intake (without overt s/s significant oral/pharyngeal dysphagia including s/s penetration or aspiration) for the highest appropriate diet level       Short Term Goals:  -Pt will tolerate Dysphagia 1/pureed diet and honey thick liquid with no significant s/s oral or pharyngeal dysphagia across 1-3 diagnostic session/s    -Patient will tolerate trials of upgraded food and/or liquid texture with no significant s/s of oral or pharyngeal dysphagia including aspiration across 1-3 diagnostic sessions     Speech Therapy Prognosis   Prognosis: fair    Prognosis Considerations: age, medical status and cognitive status

## 2022-01-11 NOTE — ASSESSMENT & PLAN NOTE
· 2-3 week history of diarrhea  · 1/11: stool sample submitted for c diff, negative  · Diffuse abdominal tenderness, will obtain CT A/P

## 2022-01-11 NOTE — UTILIZATION REVIEW
Initial Clinical Review    Admission: Date/Time/Statement:   Admission Orders (From admission, onward)     Ordered        01/10/22 1824  Inpatient Admission  Once                      Orders Placed This Encounter   Procedures    Inpatient Admission     Standing Status:   Standing     Number of Occurrences:   1     Order Specific Question:   Level of Care     Answer:   Level 2 Stepdown / HOT [14]     Order Specific Question:   Estimated length of stay     Answer:   More than 2 Midnights     Order Specific Question:   Certification     Answer:   I certify that inpatient services are medically necessary for this patient for a duration of greater than two midnights  See H&P and MD Progress Notes for additional information about the patient's course of treatment  ED Arrival Information     Expected Arrival Acuity    1/10/2022  1/10/2022 16:35 Emergent         Means of arrival Escorted by Service Admission type    [facility]         Arrival complaint    fall, subdural        Chief Complaint   Patient presents with    Head Injury     Subdural, trauma tx from UB     Initial Presentation:   87y Female, transfer from [facility] ED to [place] ED presents Fall with headache  On coumadin daily, last took yesterday  Alina Bolaños at home the day before yesterday and then again this am  She was walking towards the bathroom when fall occur with positive head strike  Unsure of LOC  At [facility] ED, CT head showed a subdural hematoma and Kcentra was given  Pt is also being worked up for r/o c diff  PMH for HTN, HLD, PE, DVT and recent history of multiple bouts of diarrhea  Admit Inpatient level of care for Multiple Falls with Subdural hematoma on coumadin  Already reversed with Nilda Pantoja  Repeat CT Head  EKG  AM labs 1/11  Keppra for seizure ppx  Neurosurgery consult  Date: 1/11   Day 2:   Neurosurgery cons; Subdural hematoma   Acute traumatic left frontal and interhemispheric subdural hematoma  On coumadin  INR 3 7  Reversed with Kcentra  Continue monitor neuro exam  Recheck INR 1 15  Seizure ppx  Hold all anti-platelet and anticoagulation medications  No neurosurgical intervention at this time  Progress notes; Altered mental status; Shaking, incomprehensible sounds, not following commands  UA, spot EEG  Creat 2 02 on admit improved to 1 2 today  Continue to follow with BMP  Negative for c diff  Diffuse abdominal tenderness, check CT A/P  Swallow eval, recommend NPO  Continue Keppra for seizure ppx  On exam; Pt is minimally expressive during encounter  Unable to state if she is experiencing any pain or any other symptoms  Per nursing with nursing reveals that the patient is either jumping out of bed or is lying in bed and shaking while being minimally responsive  And 2 the patient has been shaking in bed, but plan blankets are given to the patient she pushes away  Patient has been having diarrhea for the past 2-3 weeks    Previously straight cath due to urinary retention    ED Triage Vitals   Temperature Pulse Respirations Blood Pressure SpO2   01/10/22 1640 01/10/22 1637 01/10/22 1637 01/10/22 1639 01/10/22 1637   97 6 °F (36 4 °C) 94 18 109/59 95 %      Temp Source Heart Rate Source Patient Position - Orthostatic VS BP Location FiO2 (%)   01/10/22 1640 01/10/22 1637 01/10/22 1731 -- --   Oral Monitor Lying        Pain Score       --                 Wt Readings from Last 1 Encounters:   01/11/22 53 2 kg (117 lb 3 2 oz)     Additional Vital Signs:   01/11/22 14:08:14 100 °F (37 8 °C) 122  Abnormal  -- 144/92 109 93 % -- --   01/11/22 11:51:50 -- 122  Abnormal  -- 142/97 112 92 % -- --   01/11/22 1003 97 5 °F (36 4 °C) 100 16 100/66 -- 94 % -- --   01/11/22 07:07:12 97 4 °F (36 3 °C)  Abnormal  103 14 104/68 -- 94 % -- --   01/11/22 06:06:52 -- 114  Abnormal  -- 106/64 78 92 % -- --   01/11/22 06:03:20 97 3 °F (36 3 °C)  Abnormal  117  Abnormal  18 106/64 78 94 % None (Room air)      /10/22 2300 -- 106  Abnormal  22 142/66 92 91 % None (Room air) --   01/10/22 2215 -- 96 18 106/55 75 92 % -- --   01/10/22 2115 -- 98 18 97/55 75 92 % -- --   01/10/22 2015 -- 106  Abnormal  20 118/60 84 95 % None (Room air) --   01/10/22 2000 -- 118  Abnormal  17 133/66 -- 95 % -- --   01/10/22 1731 -- 80 16 105/60 -- 93 % None (Room air)      Pertinent Labs/Diagnostic Test Results:   1/10  PCXR - No focal airspace consolidation identified  Xray Pelvis - No acute osseous abnormality  CT Head - Small left frontal acute subdural hematoma with some intermixed chronic components, no greater than 8 mm in overall total thickness with minimal sulcal effacement in the frontal region but otherwise no significant mass effect  Specifically, no midline   shift or narrowing of the ventricles  Minimal subdural hemorrhage seen along the anterior interhemispheric fissure, no greater than 1 mm in thickness  No parenchymal hemorrhage  No calvarial fracture  CT Cervical Spine - No cervical spine fracture or traumatic malalignment  Repeat CT Head -  Small left frontal subdural hematoma, 7 mm in maximal thickness with no mass effect, unchanged since a CT from earlier today  EKG - NSR    1/11  Repeat CT Head - Stable left frontal acute subdural hematoma  Stable underlying chronic microangiopathic change  VAS lower limb venous duplex study - RIGHT LOWER LIMB  Evidence of chronic deep vein thrombosis identified in distal femoral and  popliteal veins  No evidence of superficial thrombophlebitis noted  Doppler evaluation shows a normal response to augmentation maneuvers  Popliteal arterial Doppler waveforms are triphasic     LEFT LOWER LIMB LIMITED  Evaluation shows no evidence of thrombus in the common femoral vein  Doppler evaluation shows a normal response to augmentation maneuvers      1/11  CT abd/pelvis - Findings suggesting pancolitis, most pronounced in the region of the hepatic flexure and proximal to mid transverse colon  Clinical correlation, laboratory correlation and follow-up is recommended  The endometrium appears abnormally prominent for a postmenopausal patient, measuring approximately 9 mm AP thickness  Follow-up is recommended  Pelvic ultrasound is recommended for further evaluation  Gynecology consultation is recommended  Small bilateral pleural effusions  Mild bibasilar atelectasis  Small pericardial effusion  Large hiatal hernia/predominantly intrathoracic stomach  Cholelithiasis  No CT evidence of acute cholecystitis  Mild hepatic steatosis  Atherosclerosis  Coronary artery disease        Results from last 7 days   Lab Units 01/10/22  1934   SARS-COV-2  Negative     Lab Units 01/11/22  0341 01/10/22  1915 01/10/22  1323   WBC Thousand/uL 4 27* 4 86 4 28*   HEMOGLOBIN g/dL 11 7 11 5 12 9   HEMATOCRIT % 35 0 33 8* 38 1   PLATELETS Thousands/uL 364 318  --    BANDS PCT %  --   --  24*         Lab Units 01/11/22  0341 01/10/22  1915 01/10/22  1323   SODIUM mmol/L 137 140 132*   POTASSIUM mmol/L 3 9 3 2* 4 2   CHLORIDE mmol/L 106 111* 97*   CO2 mmol/L 21 18* 25   ANION GAP mmol/L 10 11 10   BUN mg/dL 53* 44* 53*   CREATININE mg/dL 1 20 1 06 2 02*   EGFR ml/min/1 73sq m 40 47 21   CALCIUM mg/dL 8 3 6 5* 8 4     Lab Units 01/10/22  1323   AST U/L 55*   ALT U/L 49   ALK PHOS U/L 76   TOTAL PROTEIN g/dL 7 0   ALBUMIN g/dL 2 1*   TOTAL BILIRUBIN mg/dL 0 60         Results from last 7 days   Lab Units 01/11/22  0341 01/10/22  1915 01/10/22  1323   GLUCOSE RANDOM mg/dL 111 92 146*       Lab Units 01/11/22  0342 01/10/22  1323   PROTIME seconds 14 3 35 8*   INR  1 15 3 74*   PTT seconds 23 46*       Results from last 7 days   Lab Units 01/07/22  1117   CRP mg/L 204 5*       Results from last 7 days   Lab Units 01/10/22  1934   INFLUENZA A PCR  Negative   INFLUENZA B PCR  Negative   RSV PCR  Negative       Results from last 7 days   Lab Units 01/11/22  0751   C DIFF TOXIN B BY PCR  Negative         ED Treatment:   Medication Administration from 01/10/2022 1454 to 01/11/2022 0301       Date/Time Order Dose Route Action     01/10/2022 1950 levETIRAcetam (KEPPRA) 500 mg in sodium chloride 0 9 % 100 mL IVPB 500 mg Intravenous New Bag     01/10/2022 1950 sodium chloride 0 9 % infusion 100 mL/hr Intravenous Rate/Dose Change     01/10/2022 1847 sodium chloride 0 9 % infusion 50 mL/hr Intravenous New Bag        Past Medical History:   Diagnosis Date    Anxiety     Disease of thyroid gland      Present on Admission:  **None**      Admitting Diagnosis: Subdural hematoma ([facility] [address] ) [S06 5X9A]  Age/Sex: 80 y o  female     Admission Orders:  Scheduled Medications:  amLODIPine, 5 mg, Oral, Daily  heparin (porcine), 5,000 Units, Subcutaneous, Q8H FRAN  levETIRAcetam, 500 mg, Intravenous, BID  LORazepam, 1 mg, Oral, HS  metoprolol tartrate, 50 mg, Oral, Q12H FRAN  pantoprazole, 40 mg, Oral, Early Morning  vancomycin, 125 mg, Oral, 4x Daily      Continuous IV Infusions:  sodium chloride, 100 mL/hr, Intravenous, Continuous      PRN Meds: None      NPO  Speech bedside swallowing eval and treat  R/o c diff  Neuro checks q1h  IP CONSULT TO GERONTOLOGY  IP CONSULT TO NEUROSURGERY    Network Utilization Review Department  ATTENTION: Please call with any questions or concerns to [phone] and carefully listen to the prompts so that you are directed to the right person  All voicemails are confidential   Pamella Dunham all requests for admission clinical reviews, approved or denied determinations and any other requests to dedicated fax number below belonging to the campus where the patient is receiving treatment   List of dedicated fax numbers for the Facilities:  [address] DENIALS (Administrative/Medical Necessity) [phone]   [address] (Maternity/NICU/Pediatrics) [phone]   [address] [place] [address] 7112 [facility] Yarelis Michael [phone]   [address] [place] [address] [address] [place] [address] [facility] [address] [place] [address] [phone]

## 2022-01-11 NOTE — CASE MANAGEMENT
Case Management Assessment & Discharge Planning Note    Patient name Shakira Kaba  Location [address] [place] [address] [id number] MRN [id number]  : 1934 Date 2022       Current Admission Date: 1/10/2022  Current Admission Diagnosis:Fall   Patient Active Problem List    Diagnosis Date Noted    Fall 2022    [facility] 2022    C  difficile diarrhea 2022    FRANK (acute kidney injury) ([facility] ) 2022    History of DVT (deep vein thrombosis) 2022    Cardiac arrhythmia 2022    Chronic anticoagulation 2021    Anemia 2021    DVT (deep venous thrombosis) ([facility] ) 2021    Pulmonary HTN ([place] [address] ) 2021    Paraesophageal hernia 2021    Acute pulmonary embolism with acute cor pulmonale ([facility] ) 2021    Severe sepsis ([facility] ) 2021    Pneumonia 2021    Elevated troponin 2021    BMI 26 0-26 9,adult 2019    Cortical age-related cataract of left eye 05/10/2019    Pre-op exam 05/10/2019    Medicare annual wellness visit, subsequent 2018    Intertrigo 2018    External hemorrhoids 2017    Acquired hypothyroidism 10/26/2012    Generalized anxiety disorder 10/24/2012    Headache 10/24/2012    Mixed hyperlipidemia 10/24/2012    Essential hypertension 10/24/2012      LOS (days): 1  Geometric Mean LOS (GMLOS) (days):   Days to GMLOS:     OBJECTIVE:    Risk of Unplanned Readmission Score: 12         Current admission status: Inpatient       Preferred Pharmacy:   [facility] [address]  [place] [address], [place] - [address]  [address]  [facility] [address]  Phone: [phone] Fax: [phone]    Primary Care Provider: Cipriano Glez MD    Primary Insurance: [facility]  Secondary Insurance:     ASSESSMENT:  Active Health Care Agents    There are no active Health Care Agents on file                   Readmission Root Cause  30 Day Readmission: No    Patient Information  Admitted from[de-identified] Home  Mental Status: Alert  During Assessment patient was accompanied by: Not accompanied during assessment  Assessment information provided by[de-identified] Son  Primary Caregiver: Self  Support Systems: [employer] of Residence: [address] [place] [address] do you live in?: [address] [place] [address] entry access options  Select all that apply : Stairs  Number of steps to enter home  : 1  Do the steps have railings?: No  Type of Current Residence: [place] (TUB and standard toilet)  In the last 12 months, was there a time when you were not able to pay the mortgage or rent on time?: No  In the last 12 months, was there a time when you did not have a steady place to sleep or slept in a shelter (including now)?: No  Homeless/housing insecurity resource given?: N/A  Living Arrangements: Lives w/ Son  Is patient a ?: No    Activities of Daily Living Prior to Admission  Functional Status: Independent  Completes ADLs independently?: Yes  Ambulates independently?: Yes  Does patient use assisted devices?: No  Does patient currently own DME?: Yes  What DME does the patient currently own?: [employer]ight [employer]  Does patient have a history of Outpatient Therapy (PT/OT)?: No  Does the patient have a history of Short-Term Rehab?: No  Does patient have a history of HHC?: No  Does patient currently have [place] [address]?: Yes ([facility])    Current Home Health Care  Type of Current Home Care Services: Home OT,Home PT,Nurse visit  Current Home Health Agency[de-identified] [facility] Provider[de-identified] PCP    Patient Information Continued  Income Source: Unemployed  Does patient have prescription coverage?: Yes  Within the past 12 months, you worried that your food would run out before you got the money to buy more : Never true  Within the past 12 months, the food you bought just didnt last and you didnt have money to get more : Never true  Food insecurity resource given?: N/A  Does patient receive dialysis treatments?: No  Does patient have a history of substance abuse?: No  Does patient have a history of Mental Health Diagnosis?: No         Means of Transportation  Means of Transport to Appts[de-identified] Family transport  In the past 12 months, has lack of transportation kept you from medical appointments or from getting medications?: No  In the past 12 months, has lack of transportation kept you from meetings, work, or from getting things needed for daily living?: No  Was application for public transport provided?: N/A        DISCHARGE DETAILS:    Discharge planning discussed with[de-identified] nathan Dixon of Choice: Yes     CM contacted family/caregiver?: Yes  Were Treatment Team discharge recommendations reviewed with patient/caregiver?: Yes  Did patient/caregiver verbalize understanding of patient care needs?: Yes  Were patient/caregiver advised of the risks associated with not following Treatment Team discharge recommendations?: Yes    Contacts  Patient Contacts: Glendy Kidd: son  Relationship to Patient[de-identified] Family  Contact Method: Phone  Phone Number: [phone], [phone](M)  Reason/Outcome: Continuity of Care,Emergency Contact,Discharge Planning      Treatment Team Recommendation: SNF,Short Term Rehab     Cm spoke to pt's son Marti Mackay to discuss the role of CM  Pt lives with her son Yoselin Lockhart in a 1 story home which has 1STE  Pt has a tub and standard toilet as her bathroom  Pt doesn't drive, is a former homemaker, and reports being fully independent for all ADL/IADLs  Pt's had 2 falls in the last 6 months  Pt enjoys completing puzzles  Pt owns, but doesn't use, and SPC  Pt has a living will  Pt uses [facility] in [facility]  Pt has no hx of mental health, substance abuse, or IP rehab  Pt's active with [facility]  Pt was seen by therapy and recommended for IP rehab  Pt's son Yoselin Lockhart is the main decision maker  CM left him a voicemail to discuss this plan       CM reviewed d/c planning process including the following: identifying help at home, patient preference for d/c planning needs, Discharge Jo Goldsmith Meds to Bed program, availability of treatment team to discuss questions or concerns patient and/or family may have regarding understanding medications and recognizing signs and symptoms once discharged  CM also encouraged patient to follow up with all recommended appointments after discharge  Patient advised of importance for patient and family to participate in managing patients medical well being

## 2022-01-11 NOTE — ED NOTES
Trauma resident at bedside to re-evaluate pt     Michele Shi [facility]  01/10/22 8174

## 2022-01-11 NOTE — CONSULTS
Consultation - Geriatric Medicine   Roger Marshall 80 y o  female MRN: [id number]  Unit/Bed#: [facility] 604-01 Encounter: [id number]      Assessment/Plan     Acute metabolic encephalopathy  -evidence by fluctuations in mentation, restlessness, confusion, and inattentive in patient who is otherwise usually fully oriented at baseline  -multifactorial including ambulatory dysfunction with recent fall subdural hematoma, unfamiliar environment, hospital setting and metabolic derangements present on admission which have since been corrected including hyponatremia and hypokalemia  -maintain calm quiet environment to reduce risk overstimulation  -continue to address acute metabolic derangements as arise  -reorient frequently as appropriate  -ensure acute pain is well controlled  -monitor for fecal and urinary retention  -maintain normal circadian rhythm    Subdural hematoma  -CTH on admission reveals left frontal subdural hematoma with both acute and chronic components and minimal sulcal effacement as well as subdural hematoma along anterior interhemispheric fissure  -reportedly stable on follow-up CTH yesterday  -repeat CTH pending at this time for further evaluation of acute change in mental status overnight now with dysarthric speech and encephalopathy  -AC/AP on hold  -neuro checks per protocol  -Nsx consult pending    Ambulatory dysfunction with fall  -reportedly mechanical fall at home on 1/10/22  -(+) head strike (uncertain) loss of consciousness  -maintained systemic anticoagulation with Coumadin for recent DVT/PE and AFib  -injuries as outlined below  -Requires use of cane for ambulation at baseline  -no prior hx recurrent falls  -high risk future falls due to age, hx fall, deconditioning/debility and unfamiliar environment   -encourage good body mechanics and assist with all transfers  -keep personal items and call bell close to prevent reaching  -maintain environment free of fall hazards  -encourage appropriate footwear and adequate lighting at all times when out of bed  -consider home fall risk assessment and personal fall alert system if returning home  -PT and OT pending    C   Diff  -recently seen by GI 1/7/22 - patient unable to provide stool sample for testing at that time - per GI documentation presumed C diff and treat according - PO vancomycin  -Lomotil/Imodium contraindicated due to infectious diarrhea  -patient should remain on appropriate contact precautions, appropriate BP utilized for duration of encounter    DVT with Pulmonary Embolism  -initially diagnosed 11/2021 and presumably unprovoked  -initiated on Coumadin - Eliquis was cost prohibitive  -INR on admission 3 74 down to 1 15 s/p vitamin K and [address] due to acute subdural hematoma  -continue to monitor on in case need for further vitamin K dosing    Atrial fibrillation  -home regimen includes rate control with metoprolol and anticoagulation with Coumadin (Coumadin held 2/2 SDH-see above)  -continue close outpatient follow-up with PCP and Cardiology    Generalized anxiety disorder  -maintained on lorazepam 1 mg daily HS  -minimize use of benzodiazepines in older adult population due to increased risk of confusion and falls however do not abruptly discontinue as may precipitate withdrawal symptoms given chronicity of use  -recommend dose reduction during hospitalization with hold parameters to prevent over-sedation and allow for accurate neuro assessments given acute SDH -further taper can be pursued as outpatient following recovery from acute illness/injuries  -as part of comprehensive multimodal treatment approach may benefit from referral to counseling/support group   -continue close outpatient follow-up with PCP    Impaired Vision  -recommend use of corrective lenses at all appropriate times  -encourage adequate lighting and encourage use of assistance with ambulation  -keep personal belongings close to person to avoid reaching  -encourage appropriate footwear at all times  -consider large font for printed materials provided to patient    Cognitive screening  -reportedly independent with ADLs and IADLs at base  -no prior cognitive testing on record for review  -CTH on admission revealed mild chronic microangiopathic changes  -consider checking TSH with FT4, no B12 deficiency appreciated  -acutely encephalopathic at time evaluation - not at baseline - may benefit from formal cognitive testing following resolution of acute encephalopathy  -encourage use of sensory assist devices such as corrective lenses all appropriate times to reduce risk of sensory impairment contributing to isolation, confusion, encephalopathy, more precipitous cognitive decline  -encourage patient remain physically, socially, and cognitively active and engaged to maintain cognitive acuity    Deconditioning/debility/frailty  -clinical frailty scale stage 5, mildly frail  -multifactorial including age, atrial fibrillation, recent DVT with Pulmonary Embolism and multiple additional chronic medical comorbidities now with acute subdural hematoma superimposed  -albumin low at 2 1, encourage well-balanced nutrition, consider nutrition supplementation between meals if oral intake is poor, recommend nutrition consult  -continue optimization chronic medical conditions and address acute metabolic derangements as arise  -continue treatment underlying anxiety/depression symptoms as may have large impact on patient's response to therapies as well as overall sense of well-being and quality of life    Home medication review    Will need to confirm with [facility] [phone] during regular business hours as not open at time of initial consultation      Care coordination:  Rounded with [facility] (RN) and Carlton Holt (DELOERS)    History of Present Illness   Physician Requesting Consult: Gina Reyes DO  Reason for Consult / Principal Problem:  Fall  Hx and PE limited by: NA  Additional history obtained from: Chart review and patient evaluation    HPI: Hood Coto is a 80y o  year old female with hypertension, hyperlipidemia generalized anxiety disorder, pulmonary hypertension, history of DVT with Pulmonary Embolism hypothyroidism, AFib, and her esophageal hernia was admitted to the trauma service with ambulatory dysfunction fall being seen in consultation by Geriatrics for high risk developing delirium during hospitalization  She seen examined bedside where she is lying resting, she appears extremely anxious and tremulous, speech is dysarthric and un interpretable which neutral reports has been since admission  She initially presented to [facility] yesterday following reportedly mechanical fall at homex2 with head strike and insert of consciousness  She reported feeling extremely weak over the past couple of weeks due to 2-3 weeks of liquid/loose stools and poor appetite with poor nutritional intake  She was seen by GI 1/7/22 who recommended checking for C diff and treating empirically with vancomycin  Nursing reports patient has had no stools since admission and none recorded  She was recently hospitalized 11/2021 with acute DVT and Pulmonary Embolism, pneumonia, and debility, on hospital discharge she returned home with close family support and home health at that time  She is now anticoagulated on Coumadin at baseline  Prior to admission Jeremías Sandhu was residing home with her son, she is reportedly independent with ADLs and IADLs however her son is there to assist if needed  She ambulates with use of a cane and and aside from the two falls on the day of admission she has no additional reported in the past 6 months  She wears glasses and is mildly hard of hearing however does not use any hearing assist device, dentition intact she does not use dentures  She has no previously reported cognitive impairment      Inpatient consult to Gerontology  Consult performed by: Armen Arnold DO  Consult ordered by: Ziggy Burrell MD        Review of Systems   Unable to perform ROS: Mental status change     Historical Information   Past Medical History:   Diagnosis Date    Anxiety     Disease of thyroid gland      Past Surgical History:   Procedure Laterality Date    CATARACT EXTRACTION Right     FOOT SURGERY      JOINT REPLACEMENT       Social History   Social History     Substance and Sexual Activity   Alcohol Use No     Social History     Substance and Sexual Activity   Drug Use No     Social History     Tobacco Use   Smoking Status Never Smoker   Smokeless Tobacco Never Used     Family History:   Family History   Problem Relation Age of Onset    No Known Problems Mother     Stroke Father     Colon cancer Neg Hx     Colon polyps Neg Hx      Meds/Allergies   all current active meds have been reviewed    Allergies   Allergen Reactions    Preservision Areds [B-Plex Plus] Tremor    Tramadol      Reaction Date: 22Aug2011;      Objective     Intake/Output Summary (Last 24 hours) at 1/11/2022 0652  Last data filed at 1/11/2022 0601  Gross per 24 hour   Intake 1069 17 ml   Output --   Net 1069 17 ml     Invasive Devices  Report    Peripheral Intravenous Line            Peripheral IV 01/10/22 Right Arm <1 day    Peripheral IV 01/10/22 Right Hand <1 day              Physical Exam  Vitals and nursing note reviewed  Constitutional:       Comments: Thin, frail, extremely anxious appearing elderly female   HENT:      Head: Normocephalic  Comments: Laceration right eyebrow     Nose: Nose normal       Mouth/Throat:      Mouth: Mucous membranes are dry  Comments: Dentition intact  Eyes:      General: No scleral icterus  Right eye: No discharge  Left eye: No discharge  Conjunctiva/sclera: Conjunctivae normal    Neck:      Comments: Trachea appears midline  Cardiovascular:      Rate and Rhythm: Tachycardia present  Rhythm irregular  Pulses: Normal pulses     Pulmonary:      Effort: Pulmonary effort is normal  No respiratory distress  Abdominal:      General: There is distension  Palpations: Abdomen is soft  Tenderness: There is no abdominal tenderness  Comments: Bowel sounds present   Musculoskeletal:      Cervical back: Neck supple  Right lower leg: No edema  Left lower leg: No edema  Comments: Moderate to severe diffuse subcutaneous fat and muscle wasting   Skin:     General: Skin is warm and dry  Coloration: Skin is pale  Neurological:      Mental Status: She is alert        Comments: Awake and alert, tremulous, speech dysarthric and unintelligible at times, does not follow commands - symptoms present since arrival to inpatient floor at approx 3a per nursing    Psychiatric:      Comments: Extremely anxious, impulsive, inattentive       Lab Results:   I have personally reviewed pertinent lab results including the following:  -137 potassium 3 9, chloride 106 CO2 21, BUN 53, creatinine 1 2, glucose 111, calcium 8 3, GFR 40  -hemoglobin 11 7, hematocrit 35, WBC 4 27, platelets 307   -[id number]    I have personally reviewed the following imaging study reports in PACS:    14 [facility] without contrast 1/10/22:  Small left frontal acute subdural hematoma with some intermixed chronic components no greater than 8 mm in overall total thickness with minimal sulci effacement in frontal region but otherwise no significant mass effect, no midline shift, no narrowing ventricle, minimal subdural hemorrhage along anterior interhemispheric fissure rated than 1 mm in thickness, no parenchymal hemorrhage, no calvarial fracture  CT C-spine without contrast 1/10/22:  No cervical spine fracture or traumatic malalignment  CT head without contrast 1/10/22:  Small left frontal subdural hematoma, 7 mm in maximal thickness with no mass effect, unchanged from CT earlier same day    Therapies:   PT:  Pending  OT:  Pending    VTE Prophylaxis: Reason for no pharmacologic prophylaxis Subdural hematoma    Code Status: Level 3 - DNAR and DNI  Advance Directive and Living Will: Yes    Power of :    POLST:      Family and Social Support: Son     Goals of Care:  Patient cannot state due to encephalopathy

## 2022-01-11 NOTE — PROGRESS NOTES
[address] [facility]  Progress Note - Fran Phelan 1934, 80 y o  female MRN: [id number]  Unit/Bed#: [facility] 604-01 Encounter: [id number]  Primary Care Provider: Rosa Elena Cardenas MD   Date and time admitted to hospital: 1/10/2022  4:35 PM    Altered mental status  Assessment & Plan  · Shaking, incomprehensible sounds, not following commands  · Afebrile, WBC 4 27  · UA, spot EEG    History of DVT (deep vein thrombosis)  Assessment & Plan  · History of RLE DVT and PE on 11/4/21  · B/l duplex: chronic DVT of left leg, no new DVT    FRANK (acute kidney injury) ([facility] [address] )  Assessment & Plan  · Cr of 2 on admission  · IVF hydration  · Decreased to 1 2 on 1/11/22  · Continue to follow with BMP    Diarrhea  Assessment & Plan  · 2-3 week history of diarrhea  · 1/11: stool sample submitted for c diff, negative  · Diffuse abdominal tenderness, will obtain CT A/P    Subdural hematoma (HCC)  Assessment & Plan  · CTH 1/10: Small left frontal acute subdural hematoma with some intermixed chronic components, no greater than 8 mm in overall total thickness with minimal sulcal effacement in the frontal region but otherwise no significant mass effect  Specifically, no midline shift or narrowing of the ventricles  Minimal subdural hemorrhage seen along the anterior interhemispheric fissure, no greater than 1 mm in thickness   No parenchymal hemorrhage   No calvarial fracture     · Reversed with Kcentra  · Repeat CTH x2: stable  · Keppra for sz ppx  · Swallow eval, recommend NPO  · NSx: stable on CTH, hold AC/AP, f/u in 2 weeks with repeat sca    Fall  Assessment & Plan  · Trula Aquas on day prior to admission and day of admission, questionable LOC, coumadin daily  · CTH: subdural hematoma  · PT/OT rehab      TERTIARY TRAUMA SURVEY NOTE    Prophylaxis: Sequential compression device Brianna Landaverde)     Disposition: med/surg pending resolution of AMS    Code status:  Level 3 - DNAR and DNI    Consultants: Neurosurgery      SUBJECTIVE:       Mechanism of Injury:Fall    Chief Complaint: diarrhea    HPI/Last 24 hour events:   Patient is minimally expressive during encounter  Unable to state if she is experiencing any pain or any other symptoms  Discussion with nursing reveals that the patient is either jumping out of bed or is lying in bed and shaking while being minimally responsive  And 2 the patient has been shaking in bed, but plan blankets are given to the patient she pushes away  Patient has been having diarrhea for the past 2-3 weeks  Previously straight cath due to urinary retention  NPO  Active medications:           Current Facility-Administered Medications:     amLODIPine (NORVASC) tablet 5 mg, 5 mg, Oral, Daily    heparin (porcine) subcutaneous injection 5,000 Units, 5,000 Units, Subcutaneous, Q8H FRAN    levETIRAcetam (KEPPRA) 500 mg in sodium chloride 0 9 % 100 mL IVPB, 500 mg, Intravenous, BID, 500 mg at 01/11/22 0906    LORazepam (ATIVAN) tablet 1 mg, 1 mg, Oral, HS    metoprolol tartrate (LOPRESSOR) tablet 50 mg, 50 mg, Oral, Q12H FRAN    pantoprazole (PROTONIX) EC tablet 40 mg, 40 mg, Oral, Early Morning    sodium chloride 0 9 % infusion, 100 mL/hr, Intravenous, Continuous, 100 mL/hr at 01/11/22 0331    vancomycin (VANCOCIN) oral solution 125 mg, 125 mg, Oral, 4x Daily      OBJECTIVE:     Vitals:   Vitals:    01/11/22 1408   BP: 144/92   Pulse: (!) 122   Resp:    Temp: 100 °F (37 8 °C)   SpO2: 93%       Physical Exam:   Physical Exam  Constitutional:       Appearance: She is normal weight  She is ill-appearing  She is not diaphoretic  Comments: In bed shaking, but pushes off blankets  Aware of provider in room, but barely responds to commands    HENT:      Head: Normocephalic and atraumatic        Right Ear: External ear normal       Left Ear: External ear normal       Ears:      Comments: No kaplan signs     Nose: Nose normal       Mouth/Throat:      Mouth: Mucous membranes are moist       Pharynx: Oropharynx is clear  Eyes:      Extraocular Movements: Extraocular movements intact  Conjunctiva/sclera: Conjunctivae normal       Pupils: Pupils are equal, round, and reactive to light  Cardiovascular:      Rate and Rhythm: Normal rate and regular rhythm  Pulses: Normal pulses  Heart sounds: Normal heart sounds  Comments: Radial, DP 2+  Pulmonary:      Effort: Pulmonary effort is normal       Breath sounds: Normal breath sounds  Chest:      Chest wall: No tenderness  Abdominal:      General: Abdomen is flat  Bowel sounds are normal       Palpations: Abdomen is soft  Tenderness: There is abdominal tenderness  Musculoskeletal:      Cervical back: Normal range of motion and neck supple  No tenderness  1  Before the illness or injury that brought you to the Emergency, did you need someone to help you on a regular basis? 1=Yes   2  Since the illness or injury that brought you to the Emergency, have you needed more help than usual to take care of yourself? 1=Yes   3  Have you been hospitalized for one or more nights during the past 6 months (excluding a stay in the Emergency Department)? 1=Yes   4  In general, do you see well? 1=No   5  In general, do you have serious problems with your memory? 1=Yes   6  Do you take more than three different medications everyday? 1=Yes   TOTAL   6     Did you order a geriatric consult if the score was 2 or greater?: yes                I/O:   I/O       01/09 0701  01/10 0700 01/10 0701  01/11 0700 01/11 0701 01/12 0700    P  O   0 0    I V  (mL/kg)  1069 2 (20 1)     Total Intake(mL/kg)  1069 2 (20 1) 0 (0)    Net  +1069 2 0           Unmeasured Urine Occurrence   0 x    Unmeasured Stool Occurrence   0 x          Invasive Devices:    Invasive Devices  Report    Peripheral Intravenous Line            Peripheral IV 01/11/22 Right Forearm <1 day                  Imaging:   XR Trauma chest portable    Result Date: 1/10/2022  Impression: No focal airspace consolidation identified  Workstation performed: [id number]     CT head wo contrast    Result Date: 1/11/2022  Impression: Stable left frontal acute subdural hematoma  Stable underlying chronic microangiopathic change  Workstation performed: [id number]     CT head wo contrast    Result Date: 1/10/2022  Impression: Small left frontal subdural hematoma, 7 mm in maximal thickness with no mass effect, unchanged since a CT from earlier today  Workstation performed: [id number]     TRAUMA - CT head wo contrast    Result Date: 1/10/2022  Impression: Small left frontal acute subdural hematoma with some intermixed chronic components, no greater than 8 mm in overall total thickness with minimal sulcal effacement in the frontal region but otherwise no significant mass effect  Specifically, no midline shift or narrowing of the ventricles  Minimal subdural hemorrhage seen along the anterior interhemispheric fissure, no greater than 1 mm in thickness  No parenchymal hemorrhage  No calvarial fracture  I reported these results to Jonny Cook via HIPAA compliant secure electronic messaging on 1/10/2022 at 12:59 PM  Workstation performed: [id number]     TRAUMA - CT spine cervical wo contrast    Result Date: 1/10/2022  Impression: No cervical spine fracture or traumatic malalignment  Workstation performed: [id number]     XR Trauma pelvis ap only 1 or 2 vw    Result Date: 1/10/2022  Impression: No acute osseous abnormality   Workstation performed: [id number]       Labs:   CBC:   Lab Results   Component Value Date    WBC 4 27 (L) 01/11/2022    HGB 11 7 01/11/2022    HCT 35 0 01/11/2022    MCV 84 01/11/2022     01/11/2022    MCH 27 9 01/11/2022    MCHC 33 4 01/11/2022    RDW 15 3 (H) 01/11/2022    MPV 9 7 01/11/2022    NRBC 0 01/10/2022     CMP:   Lab Results   Component Value Date     01/11/2022    CO2 21 01/11/2022    BUN 53 (H) 01/11/2022    CREATININE 1 20 01/11/2022 CALCIUM 8 3 01/11/2022    EGFR 40 01/11/2022

## 2022-01-11 NOTE — ASSESSMENT & PLAN NOTE
Acute traumatic left frontal and interhemispheric subdural hematoma  · Reported mechanical fall and was complaining of headache  · History of pulmonary embolism and DVT  On Coumadin  INR 3 7  Reversed with Kcentra    Imaging:    CT head without 1/10/2022:  Small left frontal acute subdural hematoma with small intermixed chronic components no greater than 8 mm in overall total thickness with minimal sulcal effacement in the frontal region but otherwise no significant mass effect  Specifically no midline shift or narrowing of assistive ventricles  Minimal subdural hemorrhage seem along the anterior MS Pizarro fissure, no greater than 1 mm in thickness  No parenchymal hemorrhage  No fractures   CT head without 1/10/2022 repeat:  Small left frontal subdural hematoma 7 mm in maximal thickness with no mass effect unchanged from prior CT head   CT head without 1/11/2022:  Stable left frontal acute subdural hematoma  Stable underlying chronic microangiopathic change  Plan:    Continue monitor neurological exam with frequent neurological checks   Stat CT head with any neurological decline including drop GCS of two points within 1 hour   Hold all anti-platelet and anticoagulation medications   On Coumadin at baseline with INR of 3 7 which was reversed with Kcentra  Recheck INR 1 15    Seizure prophylaxis per primary team    May mobilize with physical and occupational therapy as able   DVT prophylaxis:  Bilateral SCDs  Cleared for pharmacological DVT prophylaxis from neurosurgical standpoint   No neurosurgical intervention indicated at this juncture   Plan outpatient follow-up in two weeks with repeat CT head without  Sam Yuen Neurosurgery will sign off  Call if any questions or concerns

## 2022-01-11 NOTE — PLAN OF CARE
Problem: PHYSICAL THERAPY ADULT  Goal: Performs mobility at highest level of function for planned discharge setting  See evaluation for individualized goals  Description: Treatment/Interventions: Functional transfer training,LE strengthening/ROM,Therapeutic exercise,Endurance training,Patient/family training,Cognitive reorientation,Equipment eval/education,Bed mobility,Gait training,Spoke to nursing          See flowsheet documentation for full assessment, interventions and recommendations  Note: Prognosis: Fair  Problem List: Decreased strength,Decreased endurance,Impaired balance,Decreased mobility,Decreased coordination,Decreased cognition,Decreased safety awareness,Impaired judgement,Pain  Assessment: Pt seen for high complexity PT evaluation due to decrease in functional mobility status compared to baseline  Pt with active PT eval/treat and up in chair orders at this time  Pt is a 80 y o  F who presented to [facility] with s/p fall with +head strike resulting in subdural hematoma on 1/10/21  Pt  has a past medical history of Anxiety and Disease of thyroid gland  Pt resides with son in [facility] with 1STE  Pt presents with decreased strength, balance, endurance, cog that contribute to limitations in bed mobility, functional transfers, functional mobility  Pt requires Max A x 2 for bed mobility, Mod A x 2 for STS, Max A x 2 for marching in place at this time  Pt left supine in bed with bed alarm intact and with all needs in reach  Pt will benefit from skilled therapy in order to address current impairments and functional limitations  PT to follow pt and recommending rehab once medically cleared  The patient's AM-PAC Basic Mobility Inpatient Short Form Raw Score is 6  A Raw score of less than or equal to 16 suggests the patient may benefit from discharge to post-acute rehabilitation services  Please also refer to the recommendation of the Physical Therapist for safe discharge planning    Barriers to Discharge: Inaccessible home environment,Decreased caregiver support        PT Discharge Recommendation: Post acute rehabilitation services          See flowsheet documentation for full assessment

## 2022-01-11 NOTE — ASSESSMENT & PLAN NOTE
· CTH 1/10: Small left frontal acute subdural hematoma with some intermixed chronic components, no greater than 8 mm in overall total thickness with minimal sulcal effacement in the frontal region but otherwise no significant mass effect  Specifically, no midline shift or narrowing of the ventricles  Minimal subdural hemorrhage seen along the anterior interhemispheric fissure, no greater than 1 mm in thickness   No parenchymal hemorrhage   No calvarial fracture     · Reversed with Kcentra  · Repeat CTH x2: stable  · Keppra for sz ppx  · Swallow eval, recommend NPO  · NSx: stable on CTH, hold AC/AP, f/u in 2 weeks with repeat sca

## 2022-01-11 NOTE — CONSULTS
[address] 1934, 80 y o  female MRN: [id number]  Unit/Bed#: [facility] 604-01 Encounter: [id number]  Primary Care Provider: Mika Noriega MD   Date and time admitted to hospital: 1/10/2022  4:35 PM    Inpatient consult to Neurosurgery  Consult performed by: Yaneth Mace PA-C  Consult ordered by: Gilberto Robledo MD        Patient seen and examined 1/11/22 at 1015am    Subdural hematoma [facility])  Assessment & Plan  Acute traumatic left frontal and interhemispheric subdural hematoma  · Reported mechanical fall and was complaining of headache  · History of pulmonary embolism and DVT  On Coumadin  INR 3 7  Reversed with Kcentra    Imaging:    CT head without 1/10/2022:  Small left frontal acute subdural hematoma with small intermixed chronic components no greater than 8 mm in overall total thickness with minimal sulcal effacement in the frontal region but otherwise no significant mass effect  Specifically no midline shift or narrowing of assistive ventricles  Minimal subdural hemorrhage seem along the anterior MS Pizarro fissure, no greater than 1 mm in thickness  No parenchymal hemorrhage  No fractures   CT head without 1/10/2022 repeat:  Small left frontal subdural hematoma 7 mm in maximal thickness with no mass effect unchanged from prior CT head   CT head without 1/11/2022:  Stable left frontal acute subdural hematoma  Stable underlying chronic microangiopathic change  Plan:    Continue monitor neurological exam with frequent neurological checks   Stat CT head with any neurological decline including drop GCS of two points within 1 hour   Hold all anti-platelet and anticoagulation medications   On Coumadin at baseline with INR of 3 7 which was reversed with Kcentra  Recheck INR 1 15    Seizure prophylaxis per primary team    May mobilize with physical and occupational therapy as able   DVT prophylaxis:  Bilateral SCDs    Cleared for pharmacological DVT prophylaxis from neurosurgical standpoint   No neurosurgical intervention indicated at this juncture   Plan outpatient follow-up in two weeks with repeat CT head without  [facility] will sign off  Call if any questions or concerns  History of DVT (deep vein thrombosis)  Assessment & Plan  AC on hold for diagnosis of subdural hematoma      History of Present Illness     HPI: Reese Pichardo is a 80 y o  female with PMH including hypertension, hyperlipidemia history of DVT and PE on Coumadin, anxiety and thyroid disease who presents with complaint of headache  Patient unable to provide history at this time  Per chart review patient presented with headache following a mechanical fall  She took a fall at home one day prior to admission and again in the morning  Patient was diagnosed with seizure on treatment  CT head demonstrated a left frontal subdural hematoma along with concern for an interhemispheric hematoma which was left evident on repeat imaging  INR presentation was 3 7 was reversed with Kcentra to 1  15  Review of Systems   Reason unable to perform ROS: non cooperative         Historical Information   Past Medical History:   Diagnosis Date    Anxiety     Disease of thyroid gland      Past Surgical History:   Procedure Laterality Date    CATARACT EXTRACTION Right     FOOT SURGERY      JOINT REPLACEMENT       Social History     Substance and Sexual Activity   Alcohol Use No     Social History     Substance and Sexual Activity   Drug Use No     Social History     Tobacco Use   Smoking Status Never Smoker   Smokeless Tobacco Never Used     Family History   Problem Relation Age of Onset    No Known Problems Mother     Stroke Father     Colon cancer Neg Hx     Colon polyps Neg Hx        Meds/Allergies   all current active meds have been reviewed, current meds:   Current Facility-Administered Medications   Medication Dose Route Frequency    amLODIPine (NORVASC) tablet 5 mg  5 mg Oral Daily    levETIRAcetam (KEPPRA) 500 mg in sodium chloride 0 9 % 100 mL IVPB  500 mg Intravenous BID    LORazepam (ATIVAN) tablet 1 mg  1 mg Oral HS    metoprolol tartrate (LOPRESSOR) tablet 50 mg  50 mg Oral Q12H FRAN    pantoprazole (PROTONIX) EC tablet 40 mg  40 mg Oral Early Morning    sodium chloride 0 9 % infusion  100 mL/hr Intravenous Continuous    vancomycin (VANCOCIN) oral solution 125 mg  125 mg Oral 4x Daily    and PTA meds:   Prior to Admission Medications   Prescriptions Last Dose Informant Patient Reported? Taking? LORazepam (ATIVAN) 1 mg tablet  Self No No   Sig: Take 1 tablet (1 mg total) by mouth daily at bedtime   Multiple Vitamins-Minerals (VITEYES AREDS ADVANCED PO)  Self Yes No   Sig: Take 1 tablet by mouth daily   amLODIPine (NORVASC) 10 mg tablet  Self No No   Sig: Take 0 5 tablets (5 mg total) by mouth daily   cholestyramine (QUESTRAN) 4 g packet   No No   Sig: Take 1 packet (4 g total) by mouth daily at bedtime   diphenoxylate-atropine (LOMOTIL) 2 5-0 025 mg per tablet  Self No No   Sig: Take 1 tablet by mouth 4 (four) times a day as needed for diarrhea   metoprolol tartrate (LOPRESSOR) 50 mg tablet  Self No No   Sig: Take 1 tablet (50 mg total) by mouth every 12 (twelve) hours   pantoprazole (PROTONIX) 40 mg tablet  Self No No   Sig: Take 1 tablet (40 mg total) by mouth daily in the early morning   vancomycin (VANCOCIN) 125 MG capsule   No No   Sig: Take 1 capsule (125 mg total) by mouth 4 (four) times a day for 14 days   warfarin (Coumadin) 1 mg tablet  Self No No   Sig: Take 2 5 tablets (2 5 mg total) by mouth daily      Facility-Administered Medications: None     Allergies   Allergen Reactions    Preservision Areds [B-Plex Plus] Tremor    Tramadol      Reaction Date: 22Aug2011;        Objective   I/O       01/09 0701  01/10 0700 01/10 0701  01/11 0700 01/11 0701  01/12 0700    P  O   0 0    I V  (mL/kg)  1069 2 (20 1)     Total Intake(mL/kg)  1069 2 (20 1) 0 (0)    Net  +1069 2 0           Unmeasured Urine Occurrence   0 x    Unmeasured Stool Occurrence   0 x          Physical Exam  Constitutional:       General: She is not in acute distress  Appearance: Normal appearance  She is well-developed  HENT:      Head: Normocephalic  Nose: Nose normal       Mouth/Throat:      Mouth: Mucous membranes are dry  Eyes:      General: No scleral icterus  Right eye: No discharge  Left eye: No discharge  Extraocular Movements: EOM normal       Conjunctiva/sclera: Conjunctivae normal    Cardiovascular:      Rate and Rhythm: Tachycardia present  Pulmonary:      Effort: Pulmonary effort is normal  No respiratory distress  Abdominal:      General: There is no distension  Skin:     General: Skin is warm and dry  Neurological:      Mental Status: She is alert  Psychiatric:         Speech: Speech normal        Neurologic Exam     Mental Status   Disoriented to person  Oriented to place  Disoriented to city  Follows 1 step (intermittent) commands  Attention: decreased  Concentration: decreased  Speech: speech is normal   Level of consciousness: alert  Unable to perform simple calculations  Abnormal comprehension  Cranial Nerves     CN III, IV, VI   Extraocular motions are normal    Nystagmus: none   Upgaze: normal  Conjugate gaze: present    CN VII   Facial expression full, symmetric  CN VIII   Hearing: intact    CN XII   Tongue: not atrophic  Fasciculations: absent  Tongue deviation: none    Motor Exam   Muscle bulk: normal  Overall muscle tone: normalNot cooperative for formal motor testing  Overall moving all extremities without evidence of focal weakness  Unable to test drift     Sensory Exam   Light touch normal    Response to painful stimuli in all extremities  Appears again back to crude touch       Gait, Coordination, and Reflexes     Tremor   Resting tremor: present    Reflexes   Right Friedman: absent  Left Friedman: absent  Right ankle clonus: absent  Left ankle clonus: absentJaw tremor noted         Vitals:Blood pressure 100/66, pulse 100, temperature 97 5 °F (36 4 °C), resp  rate 16, weight 53 2 kg (117 lb 3 2 oz), SpO2 94 %  ,Body mass index is 24 49 kg/m²  Lab Results:   Results from last 7 days   Lab Units 01/11/22  0341 01/10/22  1915 01/10/22  1323 01/07/22  1117 01/07/22  1117   WBC Thousand/uL 4 27* 4 86 4 28*   < > 7 50   HEMOGLOBIN g/dL 11 7 11 5 12 9   < > 12 4   HEMATOCRIT % 35 0 33 8* 38 1   < > 37 4   PLATELETS Thousands/uL 364 318  --   --  389   MONO PCT %  --   --  13*  --  8    < > = values in this interval not displayed  Results from last 7 days   Lab Units 01/11/22  0341 01/10/22  1915 01/10/22  1323 01/07/22  1117 01/07/22  1117   POTASSIUM mmol/L 3 9 3 2* 4 2   < > 3 3*   CHLORIDE mmol/L 106 111* 97*   < > 95*   CO2 mmol/L 21 18* 25   < > 23   BUN mg/dL 53* 44* 53*   < > 19   CREATININE mg/dL 1 20 1 06 2 02*   < > 1 05   CALCIUM mg/dL 8 3 6 5* 8 4   < > 8 7   ALK PHOS U/L  --   --  76  --  75   ALT U/L  --   --  49  --  54   AST U/L  --   --  55*  --  48*    < > = values in this interval not displayed  Results from last 7 days   Lab Units 01/11/22  0342 01/10/22  1323 01/07/22  1117   INR  1 15 3 74* 2 13*   PTT seconds 23 46*  --      No results found for: TROPONINT  ABG:No results found for: PHART, RZD4QGP, PO2ART, REX3ZOI, V7RKDGVO, BEART, SOURCE    Imaging Studies: I have personally reviewed pertinent reports  and I have personally reviewed pertinent films in PACS    CT head wo contrast    Result Date: 1/11/2022  Impression: Stable left frontal acute subdural hematoma  Stable underlying chronic microangiopathic change  Workstation performed: [id number]     CT head wo contrast    Result Date: 1/10/2022  Impression: Small left frontal subdural hematoma, 7 mm in maximal thickness with no mass effect, unchanged since a CT from earlier today   Workstation performed: [id number] TRAUMA - CT head wo contrast    Result Date: 1/10/2022  Impression: Small left frontal acute subdural hematoma with some intermixed chronic components, no greater than 8 mm in overall total thickness with minimal sulcal effacement in the frontal region but otherwise no significant mass effect  Specifically, no midline shift or narrowing of the ventricles  Minimal subdural hemorrhage seen along the anterior interhemispheric fissure, no greater than 1 mm in thickness  No parenchymal hemorrhage  No calvarial fracture  I reported these results to Chloé Nash via HIPAA compliant secure electronic messaging on 1/10/2022 at 12:59 PM  Workstation performed: [id number]     TRAUMA - CT spine cervical wo contrast    Result Date: 1/10/2022  Impression: No cervical spine fracture or traumatic malalignment  Workstation performed: [id number]     EKG, Pathology, and Other Studies: none    VTE Prophylaxis: Sequential compression device Merceda Bryan)     Code Status: Level 3 - DNAR and DNI  Advance Directive and Living Will: Yes    Power of :    POLST:      Counseling / Coordination of Care  I spent 20 minutes with the patient

## 2022-01-11 NOTE — OCCUPATIONAL THERAPY NOTE
Occupational Therapy Evaluation     Patient Name: Ade Martinez  SJVAQ'H Date: 1/11/2022  Problem List  Active Problems:    Fall    Subdural hematoma ([facility] [address] )    Diarrhea    FRANK (acute kidney injury) ([facility] [address] )    History of DVT (deep vein thrombosis)    Past Medical History  Past Medical History:   Diagnosis Date    Anxiety     Disease of thyroid gland      Past Surgical History  Past Surgical History:   Procedure Laterality Date    CATARACT EXTRACTION Right     FOOT SURGERY      JOINT REPLACEMENT           01/11/22 1056   OT Last Visit   OT Visit Date 01/11/22   Note Type   Note type Evaluation   Restrictions/Precautions   Weight Bearing Precautions Per Order No   Other Precautions Cognitive; Chair Alarm; Bed Alarm;Multiple lines;Contact/isolation; Fall Risk;Pain   Pain Assessment   Pain Assessment Tool [facility] Pain Intervention(s) Repositioned; Ambulation/increased activity; Emotional support   Pain Rating: FLACC (Rest) - Face 0   Pain Rating: FLACC (Rest) - Legs 1   Pain Rating: FLACC (Rest) - Activity 1   Pain Rating: FLACC (Rest) - Cry 0   Pain Rating: FLACC (Rest) - Consolability 0   Score: FLACC (Rest) 2   Pain Rating: FLACC (Activity) - Face 0   Pain Rating: FLACC (Activity) - Legs 1   Pain Rating: FLACC (Activity) - Activity 1   Pain Rating: FLACC (Activity) - Cry 1   Pain Rating: FLACC (Activity) - Consolability 1   Score: FLACC (Activity) 4   Home Living   Type of Home House   Home Layout One level;Stairs to enter with rails  (1 ALE )   Bathroom Shower/Tub Tub/shower unit   Bathroom Toilet Standard   Bathroom Accessibility Accessible   Home Equipment Cane   Additional Comments - USE OF DME AT BASELINE    Prior Function   Level of [place] Independent with ADLs and functional mobility   Lives With Son   Receives Help From Family   ADL Assistance Independent   IADLs Independent   Falls in the last 6 months 1 to 4   Vocational Retired   Lifestyle   Autonomy PT IS A POOR HISTORIAN 2' OVERALL COGNITIVE STATUS  INFORMATION OBTAINED FROM PT'S CHART  INDEPENDENT WITH ADLS/IADLS AT BASELINE    Reciprocal Relationships LIVES WITH SON- UNKNOWN LEVEL OF ASSIST/SUPPORT AVAILABLE    Service to Others HOMEMAKER    Intrinsic Gratification ENJOYS PUZZLES    ADL   Eating Assistance 4  Minimal Assistance   Grooming Assistance 3  Moderate Assistance   UB Bathing Assistance 2  Maximal Assistance   LB Bathing Assistance 2  Maximal [address] 2  Maximal Assistance   LB Dressing Assistance 2  Maximal [address]  2  Maximal Assistance   Functional Assistance 2  Maximal Assistance   Bed Mobility   Supine to Sit 2  Maximal assistance   Additional items Assist x 2; Increased time required;Verbal cues;LE management   Sit to Supine 2  Maximal assistance   Additional items Assist x 2; Increased time required;Verbal cues;LE management   Transfers   Sit to Stand 3  Moderate assistance   Additional items Assist x 2; Increased time required;Verbal cues   Stand to Sit 3  Moderate assistance   Additional items Assist x 2; Increased time required;Verbal cues   Additional Comments DURING MULTIPLE ATTEMPTS UPON STANDING, PT INCONTINENT OF BOWEL  ABLE TO TAKE 1 STEP TOWARDS HOB HOWEVER UNABLE TO PROGRESS, REQUIRING RETURN TO SUPINE AND ASSIST WITH HYGIENE/CLEAN UP  Functional Mobility   Functional Mobility 3  Moderate assistance   Additional Comments AX2 FOR LIMITED FUNCTIONAL MOBILITY INCLUDING 1 STEP TOWARD HOB    Balance   Static Sitting Poor +   Static Standing Poor   Ambulatory Poor -   Activity Tolerance   Activity Tolerance Patient limited by fatigue  (COG )   Medical Staff Made Aware PT SEEN FOR CO-SESSION WITH SKILLED PHYSICAL THERAPIST 2' CLINICALLY UNSTABLE PRESENTATION, TRAUMATIC INJURIES, NEW PRECAUTIONS/LIMITATIONS, LIMITED ACTIVITY TOLERANCE AND PRESENT IMPAIRMENTS WHICH ARE A REGRESSION FROM THE PT'S BASELINE AND IMPACTING OVERALL OCCUPATIONAL PERFORMANCE      Nurse Made Aware APPROPRIATE TO SEE PER RN    RUE Assessment   RUE Assessment WFL  (GENERALIZED WEAKNESS )   LUE Assessment   LUE Assessment WFL  (GENERALIZED WEAKNESS )   Cognition   Overall Cognitive Status Impaired   Arousal/Participation Responsive;Persistent stimuli required   Attention Difficulty attending to directions   Orientation Level Oriented to person;Oriented to place; Disoriented to time;Disoriented to situation  ( )   Memory Decreased recall of precautions;Decreased recall of recent events;Decreased short term memory;Decreased recall of biographical information   Following Commands Follows one step commands with increased time or repetition   Comments GROSSLY ORIENTED TO PERSON (NAME+ MO/YR OF BIRTH) AND PLACE [facility])  PT PRESENTS AS FATIGUED AND DIFFICULTY ATTENDING TO TASK  EASILY DISTRACTED 2' "BEING COLD" AND DIARRHEA  FOLLOWS SIMPLE 1-STEP COMMANDS ~75% OF THE TIME WITH INCREASED TIME TO PROCESS/COMPLETE  RECOMMEND LIGHTS ON/BLIND OPEN DURING DAY TIME HOURS TO PROMOTE PROPER SLEEP/WAKE CYCLE  ALARM ON FOR SAFETY  Assessment   Limitation Decreased ADL status; Decreased Safe judgement during ADL;Decreased cognition;Decreased endurance;Decreased self-care trans;Decreased high-level ADLs   Prognosis Fair;Guarded   Assessment 81 YO Female SEEN FOR INITIAL OCCUPATIONAL THERAPY EVALUATION FOLLOWING TXF FROM SLUB->SLB S/P FALL RESULTING IN +HEADSTRIKE AND HA  PT FOUND TO HAVE SUBDURAL HEMATOMA AND ACUTE METABOLIC ENCEPHALOPATHY  PROBLEMS LIST INCLUDES WORKUP FOR C-DIFF, HTN, HLD, H/O PE/DVT AND MULTIPLE FALLS  PT IS A POOR HISTORIAN WITH DIFFICULTY ATTENDING TO TASK AND ANSWER QUESTIONS  INFORMATION OBTAINED FROM PT'S CHART  PT IS FROM HOME WITH SON WHERE  SHE IS OVERALL INDEPENDENT WITH ADLS/IADLS  PT CURRENTLY REQUIRES OVERALL MAX A WITH ADLS, MAX A X2 WITH SUPINE<->SIT TRANSFERS AND MOD A X2 WITH SIT<->STAND /LIMITED  FUNCTIONAL MOBILITY WITH HHA   PT INCONTINENT T/O SESSION, REQUIRING ASSIST FOR HYGIENE/CLEAN UP  PT IS LIMITED 2' PAIN, FATIGUE, IMPAIRED BALANCE, FALL RISK , LIMITED SAFETY AWARENESS/INSIGHT/JUDGEMENT, DISORIENTATION, OVERALL WEAKNESS/DECONDITIONING  and OVERALL LIMITED ACTIVITY TOLERANCE  PT EDUCATED ON CONTINUE PARTICIPATION IN SELF-CARE/MOBILITY WITH STAFF WHILE IN THE HOSPITAL   The patient's raw score on the AM-PAC Daily Activity inpatient short form is 14, standardized score is 33 39, less than 39 4  Patients at this level are likely to benefit from discharge to post-acute rehabilitation services  Please refer to the recommendation of the Occupational Therapist for safe discharge planning  FROM AN OCCUPATIONAL THERAPY PERSPECTIVE, PT WOULD BENEFIT FROM ADDITIONAL OT SERVICES IN AN INPT REHAB SETTING UPON D/C  WILL CONT TO FOLLOW TO ADDRESS THE BELOW DESCRIBED GOALS  Goals   Patient Goals TO GO TO THE BATHROOM    LT Time Frame 10-14   Long Term Goal #1 SEE BELOW    Plan   Treatment Interventions ADL retraining;Functional transfer training; Endurance training;Cognitive reorientation;Patient/family training;Equipment evaluation/education; Compensatory technique education; Energy conservation; Activityengagement   Goal Expiration Date 01/25/22   OT Frequency 3-5x/wk   Recommendation   Recommendation Geriatric Consult   OT Discharge Recommendation Post acute rehabilitation services   OT - OK to Discharge Yes   AM-PAC Daily Activity Inpatient   Lower Body Dressing 2   Bathing 2   Toileting 2   Upper Body Dressing 2   Grooming 3   Eating 3   Daily Activity Raw Score 14   Daily Activity Standardized Score (Calc for Raw Score >=11) 33 39   AM-PAC Applied Cognition Inpatient   Following a Speech/Presentation 2   Understanding Ordinary Conversation 3   Taking Medications 2   Remembering Where Things Are Placed or Put Away 2   Remembering List of 4-5 Errands 2   Taking Care of Complicated Tasks 1   Applied Cognition Raw Score 12   Applied Cognition Standardized Score 28 82   Modified [place] Scale Modified Toribio Scale 4       OCCUPATIONAL THERAPY GOALS TO BE MET WITHIN 10-14 DAYS:    -Pt will complete additional cognitive assessment with 100% attention to task in order to assist with safe d/c plan  -Pt will follow 100% simple 2-step commands and be A&O x4 consistently with environmental cues to increase participation in functional activities  -Pt will increase bed mobility to MOD I to participate in functional activities with G tolerance and balance  -Pt will improve functional mobility and transfers to MOD I on/off all surfaces w/ G balance/safety including toileting   -Pt will participate in lt grooming task with MOD I after set-up standing at sink ~3-5 minutes with G safety and balance  -Pt will increase independence in all ADLS to MOD I with G balance sitting upright in chair   -Pt will improve activity tolerance to G for 30 min txment sessions w/ G carry over of learned energy conservation techniques   -Pt will improve independence in lt homemaking activities to MOD I without requiring cues for safety   -Pt will demonstrate G carryover of learned safety techniques and proper body mechanics in functional and leisure activities with use of DME      Documentation completed by Ajay Baez, [facility], OTR/L  [facility] Certified ID# [id number]

## 2022-01-11 NOTE — ASSESSMENT & PLAN NOTE
· [place] on day prior to admission and day of admission, questionable LOC, coumadin daily  · CTH: subdural hematoma  · PT/OT rehab

## 2022-01-11 NOTE — PHYSICAL THERAPY NOTE
Physical Therapy Evaluation     Patient's Name: Eric Rapp    Admitting Diagnosis  Subd[facility] ([facility] ) [[id number]]    Problem List  Patient Active Problem List   Diagnosis    External hemorrhoids    Generalized anxiety disorder    Headache    Mixed hyperlipidemia    Essential hypertension    Acquired hypothyroidism    [facility]    Medicare annual wellness visit, subsequent    Cortical age-related cataract of left eye    Pre-op exam    BMI 26 0-26 9,adult    Severe sepsis (HCC)    Pneumonia    Elevated troponin    Acute pulmonary embolism with acute cor pulmonale (HCC)    Paraesophageal hernia    Anemia    DVT (deep venous thrombosis) (HCC)    Pulmonary HTN (HCC)    Chronic anticoagulation    Cardiac arrhythmia    Fall    Subdural hematoma (HCC)    Diarrhea    FRANK (acute kidney injury) ([facility] )    History of DVT (deep vein thrombosis)       Past Medical History  Past Medical History:   Diagnosis Date    Anxiety     Disease of thyroid gland        Past Surgical History  Past Surgical History:   Procedure Laterality Date    CATARACT EXTRACTION Right     FOOT SURGERY      JOINT REPLACEMENT            01/11/22 1055   PT Last Visit   PT Visit Date 01/11/22   Note Type   Note type Evaluation   Pain Assessment   Pain Assessment Tool FLACC   Pain Rating: FLACC (Rest) - Face 0   Pain Rating: FLACC (Rest) - Legs 1   Pain Rating: FLACC (Rest) - Activity 1   Pain Rating: FLACC (Rest) - Cry 0   Pain Rating: FLACC (Rest) - Consolability 0   Score: FLACC (Rest) 2   Pain Rating: FLACC (Activity) - Face 0   Pain Rating: FLACC (Activity) - Legs 1   Pain Rating: FLACC (Activity) - Activity 1   Pain Rating: FLACC (Activity) - Cry 1   Pain Rating: FLACC (Activity) - Consolability 1   Score: FLACC (Activity) 4   Restrictions/Precautions   Weight Bearing Precautions Per Order No   Other Precautions Cognitive; Chair Alarm; Bed Alarm;Multiple lines; Fall Risk;Contact/isolation;Pain   Home Living   Type of [address] One level  (1STE)   Bathroom Shower/Tub Tub/shower unit   [facility]  (uses PTA)   Prior Function   Level of [place] Independent with ADLs and functional mobility   Lives With Son   ADL Assistance Independent   IADLs Independent   Falls in the last 6 months 1 to 4   Vocational Retired   General   Family/Caregiver Present No   Cognition   Overall Cognitive Status Impaired   Arousal/Participation Lethargic   Attention Difficulty attending to directions   Orientation Level Oriented to person;Oriented to place; Disoriented to time;Disoriented to situation  (general person/place)   Memory Decreased recall of precautions;Decreased recall of recent events;Decreased short term memory;Decreased recall of biographical information;Decreased long term memory   Following Commands Follows one step commands with increased time or repetition   Comments Pt with decreased safety awareness and insight  Pt distracted by cold and diarrhea, difficulty attending to directions  Pt is somewhat lethargic, minimal eyes open this session  RLE Assessment   RLE Assessment   (functionally 2/5)   LLE Assessment   LLE Assessment   (functionally 2/5)   Bed Mobility   Supine to Sit 2  Maximal assistance   Additional items Assist x 2; Increased time required;Verbal cues;LE management   Sit to Supine 2  Maximal assistance   Additional items Assist x 2; Increased time required;Verbal cues;LE management   Additional Comments Supine in bed upon PT arrival   Sits EOB with Mod A for trunk stability  Pt left supine in bed with bed alarm intact and all needs in reach  Transfers   Sit to Stand 3  Moderate assistance   Additional items Assist x 2; Increased time required;Verbal cues   Stand to Sit 3  Moderate assistance   Additional items Assist x 2; Increased time required;Verbal cues   Additional Comments Transfers with b/l HHA and knee block  VC for hand placement and safety    STS performed 2x this session  Pt able to march in place but not take side steps  Ambulation/Elevation   Gait pattern   (marching in place, unsteady, decreased foot clearance)   Gait Assistance 2  Maximal assist   Additional items Assist x 2;Verbal cues; Tactile cues   Assistive Device Other (Comment)  (b/l HHA and knee block)   Distance marching in place   Balance   Static Sitting Poor +   Dynamic Sitting Poor   Static Standing Poor -   Dynamic Standing Poor -   Ambulatory Poor -   Endurance Deficit   Endurance Deficit Yes   Endurance Deficit Description fatigue, weakness, cog   Activity Tolerance   Activity Tolerance Patient limited by fatigue; Other (Comment)  (cog)   Medical Staff Made Aware OT Chise   Nurse Made Aware RN cleared pt to be seen by PT   Assessment   Prognosis Fair   Problem List Decreased strength;Decreased endurance; Impaired balance;Decreased mobility; Decreased coordination;Decreased cognition;Decreased safety awareness; Impaired judgement;Pain   Assessment Pt seen for high complexity PT evaluation due to decrease in functional mobility status compared to baseline  Pt with active PT eval/treat and up in chair orders at this time  Pt is a 80 y o  F who [facility] to [facility] with s/p fall with +head strike resulting in subdural hematoma on 1/10/21  Pt  has a past medical history of Anxiety and Disease of thyroid gland  Pt resides with son in [facility] with 1STE  Pt presents with decreased strength, balance, endurance, cog that contribute to limitations in bed mobility, functional transfers, functional mobility  Pt requires Max A x 2 for bed mobility, Mod A x 2 for STS, Max A x 2 for marching in place at this time  Pt left supine in bed with bed alarm intact and with all needs in reach  Pt will benefit from skilled therapy in order to address current impairments and functional limitations  PT to follow pt and recommending rehab once medically cleared    The patient's AM-PAC Basic Mobility Inpatient Short Form Raw Score is 6  A Raw score of less than or equal to 16 suggests the patient may benefit from discharge to post-acute rehabilitation services  Please also refer to the recommendation of the Physical Therapist for safe discharge planning  Barriers to Discharge Inaccessible home environment;Decreased caregiver support   Goals   Patient Goals to go to the bathroom   STG Expiration Date 01/25/22   Short Term Goal #1 1  Pt will demonstrate ability to perform all aspects of bed mobility with Min A in order to increase independence and decrease burden on caregivers  2  Pt will demonstrate ability to perform functional transfers with Min A in order to increase independence and decrease burden on caregivers  3  Pt will demonstrate ability to ambulate 50+ ft with least restrictive AD with Min A in order to return to mobility safely  4  Pt will demonstrate improved balance by one grade order to decrease risk of falls  5  Pt will increase b/l LE strength by 1 grade in order to increase ease of functional mobility and transfers  Plan   Treatment/Interventions Functional transfer training;LE strengthening/ROM; Therapeutic exercise; Endurance training;Patient/family training;Cognitive reorientation;Equipment eval/education; Bed mobility;Gait training;Spoke to nursing   PT Frequency Other (Comment)  (3-6x/wk)   Recommendation   PT Discharge Recommendation Post acute rehabilitation services   Additional Comments PT cleared pt to DC once medically cleared   AM-PAC Basic Mobility Inpatient   Turning in Bed Without Bedrails 1   Lying on Back to Sitting on Edge of Flat Bed 1   Moving Bed to Chair 1   Standing Up From Chair 1   Walk in Room 1   Climb 3-5 Stairs 1   Basic Mobility Inpatient Raw Score 6   Turning Head Towards Sound 2   Follow Simple Instructions 2   Low Function Basic Mobility Raw Score 10   Low Function Basic Mobility Standardized Score 14 65   Highest Level Of Mobility   [facility] Goal 2: Bed activities/Dependent transfer   JH-HLM Highest Level of Mobility 3: Sit at edge of bed   JH-HLM Goal Achieved Yes   Modified Toribio Scale   Modified [place] Scale 4   End of Consult   Patient Position at End of Consult Supine;Bed/Chair alarm activated; All needs within reach         Marc Christin, PT, DPT

## 2022-01-12 ENCOUNTER — TELEPHONE (OUTPATIENT)
Dept: NEUROSURGERY | Facility: CLINIC | Age: 87
End: 2022-01-12

## 2022-01-12 ENCOUNTER — APPOINTMENT (INPATIENT)
Dept: RADIOLOGY | Facility: HOSPITAL | Age: 87
DRG: 082 | End: 2022-01-12
Payer: COMMERCIAL

## 2022-01-12 PROBLEM — E87.0 HYPERNATREMIA: Status: ACTIVE | Noted: 2022-01-12

## 2022-01-12 PROBLEM — R13.10 DYSPHAGIA: Status: ACTIVE | Noted: 2022-01-12

## 2022-01-12 LAB
ANION GAP SERPL CALCULATED.3IONS-SCNC: 10 MMOL/L (ref 4–13)
ANION GAP SERPL CALCULATED.3IONS-SCNC: 10 MMOL/L (ref 4–13)
BACTERIA UR QL AUTO: ABNORMAL /HPF
BILIRUB UR QL STRIP: NEGATIVE
BUN SERPL-MCNC: 31 MG/DL (ref 5–25)
BUN SERPL-MCNC: 34 MG/DL (ref 5–25)
CALCIUM SERPL-MCNC: 7.7 MG/DL (ref 8.3–10.1)
CALCIUM SERPL-MCNC: 8.2 MG/DL (ref 8.3–10.1)
CHLORIDE SERPL-SCNC: 116 MMOL/L (ref 100–108)
CHLORIDE SERPL-SCNC: 119 MMOL/L (ref 100–108)
CLARITY UR: CLEAR
CO2 SERPL-SCNC: 19 MMOL/L (ref 21–32)
CO2 SERPL-SCNC: 20 MMOL/L (ref 21–32)
COLOR UR: YELLOW
CREAT SERPL-MCNC: 0.78 MG/DL (ref 0.6–1.3)
CREAT SERPL-MCNC: 0.84 MG/DL (ref 0.6–1.3)
CRYSTALS URNS QL MICRO: ABNORMAL /HPF
ERYTHROCYTE [DISTWIDTH] IN BLOOD BY AUTOMATED COUNT: 15.8 % (ref 11.6–15.1)
GFR SERPL CREATININE-BSD FRML MDRD: 62 ML/MIN/1.73SQ M
GFR SERPL CREATININE-BSD FRML MDRD: 68 ML/MIN/1.73SQ M
GLUCOSE SERPL-MCNC: 104 MG/DL (ref 65–140)
GLUCOSE SERPL-MCNC: 177 MG/DL (ref 65–140)
GLUCOSE UR STRIP-MCNC: NEGATIVE MG/DL
HCT VFR BLD AUTO: 35.4 % (ref 34.8–46.1)
HGB BLD-MCNC: 11.8 G/DL (ref 11.5–15.4)
HGB UR QL STRIP.AUTO: ABNORMAL
KETONES UR STRIP-MCNC: ABNORMAL MG/DL
LACTATE SERPL-SCNC: 2.2 MMOL/L (ref 0.5–2)
LACTATE SERPL-SCNC: 2.7 MMOL/L (ref 0.5–2)
LEUKOCYTE ESTERASE UR QL STRIP: NEGATIVE
MCH RBC QN AUTO: 28 PG (ref 26.8–34.3)
MCHC RBC AUTO-ENTMCNC: 33.3 G/DL (ref 31.4–37.4)
MCV RBC AUTO: 84 FL (ref 82–98)
NITRITE UR QL STRIP: NEGATIVE
NON-SQ EPI CELLS URNS QL MICRO: ABNORMAL /HPF
OTHER STN SPEC: ABNORMAL
PH UR STRIP.AUTO: 6 [PH]
PLATELET # BLD AUTO: 398 THOUSANDS/UL (ref 149–390)
PMV BLD AUTO: 10 FL (ref 8.9–12.7)
POTASSIUM SERPL-SCNC: 3.4 MMOL/L (ref 3.5–5.3)
POTASSIUM SERPL-SCNC: 3.6 MMOL/L (ref 3.5–5.3)
PROT UR STRIP-MCNC: ABNORMAL MG/DL
RBC # BLD AUTO: 4.22 MILLION/UL (ref 3.81–5.12)
RBC #/AREA URNS AUTO: ABNORMAL /HPF
SODIUM SERPL-SCNC: 146 MMOL/L (ref 136–145)
SODIUM SERPL-SCNC: 148 MMOL/L (ref 136–145)
SP GR UR STRIP.AUTO: 1.02 (ref 1–1.03)
UROBILINOGEN UR QL STRIP.AUTO: 0.2 E.U./DL
WBC # BLD AUTO: 4.67 THOUSAND/UL (ref 4.31–10.16)
WBC #/AREA URNS AUTO: ABNORMAL /HPF

## 2022-01-12 PROCEDURE — 74177 CT ABD & PELVIS W/CONTRAST: CPT

## 2022-01-12 PROCEDURE — 99232 SBSQ HOSP IP/OBS MODERATE 35: CPT | Performed by: INTERNAL MEDICINE

## 2022-01-12 PROCEDURE — 85027 COMPLETE CBC AUTOMATED: CPT

## 2022-01-12 PROCEDURE — 80048 BASIC METABOLIC PNL TOTAL CA: CPT

## 2022-01-12 PROCEDURE — 99223 1ST HOSP IP/OBS HIGH 75: CPT | Performed by: STUDENT IN AN ORGANIZED HEALTH CARE EDUCATION/TRAINING PROGRAM

## 2022-01-12 PROCEDURE — 92526 ORAL FUNCTION THERAPY: CPT

## 2022-01-12 PROCEDURE — 0T9B70Z DRAINAGE OF BLADDER WITH DRAINAGE DEVICE, VIA NATURAL OR ARTIFICIAL OPENING: ICD-10-PCS | Performed by: EMERGENCY MEDICINE

## 2022-01-12 PROCEDURE — 99232 SBSQ HOSP IP/OBS MODERATE 35: CPT | Performed by: EMERGENCY MEDICINE

## 2022-01-12 PROCEDURE — 83605 ASSAY OF LACTIC ACID: CPT | Performed by: PHYSICIAN ASSISTANT

## 2022-01-12 PROCEDURE — G1004 CDSM NDSC: HCPCS

## 2022-01-12 PROCEDURE — 80048 BASIC METABOLIC PNL TOTAL CA: CPT | Performed by: PHYSICIAN ASSISTANT

## 2022-01-12 PROCEDURE — 87505 NFCT AGENT DETECTION GI: CPT | Performed by: STUDENT IN AN ORGANIZED HEALTH CARE EDUCATION/TRAINING PROGRAM

## 2022-01-12 PROCEDURE — C9113 INJ PANTOPRAZOLE SODIUM, VIA: HCPCS | Performed by: EMERGENCY MEDICINE

## 2022-01-12 RX ORDER — SODIUM CHLORIDE, SODIUM GLUCONATE, SODIUM ACETATE, POTASSIUM CHLORIDE, MAGNESIUM CHLORIDE, SODIUM PHOSPHATE, DIBASIC, AND POTASSIUM PHOSPHATE .53; .5; .37; .037; .03; .012; .00082 G/100ML; G/100ML; G/100ML; G/100ML; G/100ML; G/100ML; G/100ML
1000 INJECTION, SOLUTION INTRAVENOUS ONCE
Status: COMPLETED | OUTPATIENT
Start: 2022-01-12 | End: 2022-01-12

## 2022-01-12 RX ORDER — PANTOPRAZOLE SODIUM 40 MG/1
40 INJECTION, POWDER, FOR SOLUTION INTRAVENOUS
Status: DISCONTINUED | OUTPATIENT
Start: 2022-01-12 | End: 2022-01-16

## 2022-01-12 RX ORDER — SODIUM CHLORIDE, SODIUM GLUCONATE, SODIUM ACETATE, POTASSIUM CHLORIDE, MAGNESIUM CHLORIDE, SODIUM PHOSPHATE, DIBASIC, AND POTASSIUM PHOSPHATE .53; .5; .37; .037; .03; .012; .00082 G/100ML; G/100ML; G/100ML; G/100ML; G/100ML; G/100ML; G/100ML
500 INJECTION, SOLUTION INTRAVENOUS ONCE
Status: DISCONTINUED | OUTPATIENT
Start: 2022-01-12 | End: 2022-01-12

## 2022-01-12 RX ORDER — POTASSIUM CHLORIDE 14.9 MG/ML
20 INJECTION INTRAVENOUS
Status: COMPLETED | OUTPATIENT
Start: 2022-01-12 | End: 2022-01-12

## 2022-01-12 RX ORDER — DEXTROSE, SODIUM CHLORIDE, SODIUM LACTATE, POTASSIUM CHLORIDE, AND CALCIUM CHLORIDE 5; .6; .31; .03; .02 G/100ML; G/100ML; G/100ML; G/100ML; G/100ML
75 INJECTION, SOLUTION INTRAVENOUS CONTINUOUS
Status: DISCONTINUED | OUTPATIENT
Start: 2022-01-12 | End: 2022-01-13

## 2022-01-12 RX ADMIN — PANTOPRAZOLE SODIUM 40 MG: 40 INJECTION, POWDER, FOR SOLUTION INTRAVENOUS at 05:14

## 2022-01-12 RX ADMIN — HEPARIN SODIUM 5000 UNITS: 5000 INJECTION INTRAVENOUS; SUBCUTANEOUS at 05:14

## 2022-01-12 RX ADMIN — METRONIDAZOLE 500 MG: 500 INJECTION, SOLUTION INTRAVENOUS at 12:16

## 2022-01-12 RX ADMIN — DEXTROSE, SODIUM CHLORIDE, SODIUM LACTATE, POTASSIUM CHLORIDE, AND CALCIUM CHLORIDE 75 ML/HR: 5; .6; .31; .03; .02 INJECTION, SOLUTION INTRAVENOUS at 23:21

## 2022-01-12 RX ADMIN — IOHEXOL 80 ML: 350 INJECTION, SOLUTION INTRAVENOUS at 00:19

## 2022-01-12 RX ADMIN — SODIUM CHLORIDE, SODIUM GLUCONATE, SODIUM ACETATE, POTASSIUM CHLORIDE, MAGNESIUM CHLORIDE, SODIUM PHOSPHATE, DIBASIC, AND POTASSIUM PHOSPHATE 1000 ML: .53; .5; .37; .037; .03; .012; .00082 INJECTION, SOLUTION INTRAVENOUS at 15:13

## 2022-01-12 RX ADMIN — METRONIDAZOLE 500 MG: 500 INJECTION, SOLUTION INTRAVENOUS at 05:00

## 2022-01-12 RX ADMIN — SODIUM CHLORIDE 100 ML/HR: 0.9 INJECTION, SOLUTION INTRAVENOUS at 05:00

## 2022-01-12 RX ADMIN — METOROPROLOL TARTRATE 5 MG: 5 INJECTION, SOLUTION INTRAVENOUS at 21:12

## 2022-01-12 RX ADMIN — METOROPROLOL TARTRATE 5 MG: 5 INJECTION, SOLUTION INTRAVENOUS at 08:19

## 2022-01-12 RX ADMIN — POTASSIUM CHLORIDE 20 MEQ: 14.9 INJECTION, SOLUTION INTRAVENOUS at 17:20

## 2022-01-12 RX ADMIN — HEPARIN SODIUM 5000 UNITS: 5000 INJECTION INTRAVENOUS; SUBCUTANEOUS at 21:12

## 2022-01-12 RX ADMIN — HEPARIN SODIUM 5000 UNITS: 5000 INJECTION INTRAVENOUS; SUBCUTANEOUS at 15:14

## 2022-01-12 RX ADMIN — POTASSIUM CHLORIDE 20 MEQ: 14.9 INJECTION, SOLUTION INTRAVENOUS at 20:21

## 2022-01-12 RX ADMIN — DEXTROSE, SODIUM CHLORIDE, SODIUM LACTATE, POTASSIUM CHLORIDE, AND CALCIUM CHLORIDE 75 ML/HR: 5; .6; .31; .03; .02 INJECTION, SOLUTION INTRAVENOUS at 08:11

## 2022-01-12 RX ADMIN — LEVETIRACETAM 500 MG: 100 INJECTION, SOLUTION INTRAVENOUS at 08:11

## 2022-01-12 RX ADMIN — LEVETIRACETAM 500 MG: 100 INJECTION, SOLUTION INTRAVENOUS at 17:17

## 2022-01-12 NOTE — SPEECH THERAPY NOTE
Speech Language/Pathology    Speech/Language Pathology Progress Note    Patient Name: Carmelina Yanez  TMUUV'W Date: 1/12/2022     Problem List  Principal Problem:    Subdural hematoma ([facility] )  Active Problems:    Fall    Diarrhea    FRANK (acute kidney injury) ([facility] )    History of DVT (deep vein thrombosis)    Altered mental status       Past Medical History  Past Medical History:   Diagnosis Date    Anxiety     Disease of thyroid gland         Past Surgical History  Past Surgical History:   Procedure Laterality Date    CATARACT EXTRACTION Right     FOOT SURGERY      JOINT REPLACEMENT           Subjective:  "Water" Patient is awake and alert  She is restless and confused  Objective: The patient continues to be NPO  Oral care is provided via suction kit and swabs  The patient automatically removes front teeth partial and attempts to independently clean oral cavity  SLP provides assistance  The patient then attempts to put partial back in, but it falls into back of oral cavity  SLP retrieves via fingers  Patient is at high risk of swallowing or choking on small partial  SLP puts partial back in, and ensures it is in place correctly  The patient is then assessed with honey thick liquids via tsp  She is unable to correctly close oral cavity for tsp  A small amount is placed in patients mouth  She is observed with tongue pumping and makes attempt to transfer bolus, but is unable  No swallow is initiated  Oral suction is used to remove residue  The patient continues with jaw trembling/shaking  Unsure if this is baseline  Assessment:  The patient is unable to safety transfer or swallow trials of honey thick liquids  Plan/Recommendations:  Continue NPO status  May need to consider alternate means of nutrition if status does not improve  ST will continue

## 2022-01-12 NOTE — ASSESSMENT & PLAN NOTE
· History of RLE DVT and PE on 11/4/21  · B/l duplex: chronic DVT of left leg, no new DVT  · Home Coumadin held and reversed due to [address]  Started on [facility] for DVT ppx on 1/11

## 2022-01-12 NOTE — UTILIZATION REVIEW
Inpatient Admission Authorization Request   NOTIFICATION OF INPATIENT ADMISSION/INPATIENT AUTHORIZATION REQUEST   SERVICING FACILITY:   [facility]  Address: [address] [place] [address], [address] [place], [place] [id number]  Tax ID: [id number]  NPI: [id number]  Place of Service: Inpatient [facility]  [address]  Place of Service Code: 24     ATTENDING PROVIDER:  Attending Name and NPI#: Caty Tello [[id number]]  Address: [address] [place] [address], [address] [place] [address]  Phone: [phone]     UTILIZATION REVIEW CONTACT:  [facility] Utilization   Network Utilization Review Department  Phone: [phone]  Fax: [phone]  Email: [email or web address]     PHYSICIAN ADVISORY SERVICES:  FOR UBIH-MI-FUKY REVIEW - MEDICAL NECESSITY DENIAL  Phone: [phone]  Fax: [phone]  Email: [email or web address]     TYPE OF REQUEST:  Inpatient Status     ADMISSION INFORMATION:  ADMISSION DATE/TIME: 1/10/22  6:22 PM  PATIENT DIAGNOSIS CODE/DESCRIPTION:  Subdural hematoma ([facility] ) [[id number]]  DISCHARGE DATE/TIME: No discharge date for patient encounter  DISCHARGE DISPOSITION (IF DISCHARGED): [address] [facility]     IMPORTANT INFORMATION:  Please contact the Jhonny Rae directly with any questions or concerns regarding this request  Department voicemails are confidential     Send requests for admission clinical reviews, concurrent reviews, approvals, and administrative denials due to lack of clinical to fax [phone]

## 2022-01-12 NOTE — QUICK NOTE
Spoke with patient's daughter in law, Donal Plunkett, as her , Hollie Yang (patient's son is currently at work and had asked her to return my call for him)  Donal Plunkett confirms that at baseline, patient is very independent and sharp as a tack  She was even push mowing her own 2-acre yard this past summer/fall on her own  She was given an update on her overall status and plan of care, including the fact that her mental status is slightly improved today, yet she still did not clear for a diet with speech therapy today  I explained this is largely due to her mentation  This may be related to her brain injury, as well as a component of dehydration from her colitis/diarrhea  Donal Plunkett is reading her living will while on the phone which states that patient would not want a feeding tube or artificial nutrition  She would like to discuss further with her brother prior to making any definite decisions regarding feeding tube placement  I recommend we hold off on a feeding tube placement this evening, and see how her mentation is in the morning and see if she is able to pass for a diet with speech in the morning, as she will likely perform better without a feeding tube in place  In the event she fails a swallow evaluation again, we can discuss temporary feeding tube placement for the next few days to give her some time and see if her mentation improves  We can plan to re-visit goals of care in the event she does not improve at that time and can plan on not placing a surgical feeding tube at any point or maintaining her on long-term nutrition if those were her wishes, in the event her mental status does not improve  Donal Cannonver was in agreement and will discuss further with patient's son noah  Will keep NPO without NGT this evening for speech evaluation in AM  Will f/u with family in AM following speech evaluation for update

## 2022-01-12 NOTE — ASSESSMENT & PLAN NOTE
· 2-3 week history of diarrhea  · 1/11: stool sample submitted for c diff, negative  Flagyl and PO vanc discontinued, per ID  · ID evaluation and recommendations appreciated  Further stool cultures ordered to be sent  Patient continues to have multiple episodes of diarrhea  · CT A/P shows pan-colitis  LA 2 7  HD normal  Will give 1 liter bolus and recheck  Continue IVF hydration  Hemodynamically normal   Abdominal exam is benign, will continue to monitor

## 2022-01-12 NOTE — PROGRESS NOTES
Progress Note - Geriatric Medicine   Merlin Most 80 y o  female MRN: [id number]  Unit/Bed#: [facility] 604-01 Encounter: [id number]      Assessment/Plan:    Acute metabolic encephalopathy  -patient admitted somnolent, confused, restless where as she is fully oriented independent at baseline  -continue hydration and correct metabolic derangements as arise  -ensure pain is controlled  -maintain normal circadian  -reorient frequently as appropriate indicated    Subdural hematoma  -CTH on admission reports small left frontal subdural hematoma with minimal hemorrhage along anterior interhemispheric fissure unchanged on follow-up CTH same day and stable on repeat [facility] 1/11/22  -neuro checks per protocol  -AC/AP on hold - Nsx follow-up in two weeks to ensure stability prior to consideration of resumption of systemic AC    DVT with Pulmonary Embolism  -initially diagnosed 11/2021 - presumably unprovoked  -Coumadin on hold due to acute subdural hematoma - see above    Colitis  -CT abdomen overnight reveals pancolitis  -suspected C  diff as outpatient initiated on Vancomycin - no stool cultures available prior to outpatient initiation of antibiotics, PCR negative on admission  -ID following, recommend dc Flagyl and vancomycin as C diff unlikely given negative PCR - recommend cultures for alternative stool pathogens, appreciate input  -encourage hydration/and electrolyte repletion  -continue pain control    Generalized anxiety disorder  -maintained on lorazepam 1 mg HS as outpatient - continued at reduced dose on admission due to altered mental status and to avoid withdrawal symptoms with abrupt discontinuation - consider hold parameters for sedation    Atrial fibrillation  -Coumadin older than subdural hematoma  -rate controlled with metoprolol    Impaired vision  -encourage use of corrective lenses at appropriate times  -ensure adequate lighting and footwear at all times when out of bed  -consider large font for printed materials provided to patient    Home medication review  Personally confirmed with [facility] [phone]:    Amlodipine 5 mg daily  Coumadin 2 5 mg daily  Pantoprazole 40 mg daily  Metoprolol tartrate 50 mg Q12H     Care coordination:  Rounded with Rene Martinez (RN) and Prince Ramos (Trauma AP)    Subjective:     Patient seen examined bedside where she is lying resting, she is mildly somnolent and does not answer questions  Nursing reports multiple bowel movements overnight and restless repeatedly attempting to get out of bed without assistance requiring soft waist belt Posey  Review of Systems   Unable to perform ROS: Mental status change     Objective:     Vitals: Blood pressure 109/62, pulse 90, temperature 98 5 °F (36 9 °C), temperature source Oral, resp  rate 14, weight 53 2 kg (117 lb 3 2 oz), SpO2 96 %  ,Body mass index is 24 49 kg/m²  Intake/Output Summary (Last 24 hours) at 1/12/2022 0758  Last data filed at 1/12/2022 0618  Gross per 24 hour   Intake 1899 16 ml   Output 1150 ml   Net 749 16 ml     Current Medications: Reviewed    Physical Exam:   Physical Exam  Vitals and nursing note reviewed  Constitutional:       General: She is not in acute distress  Appearance: She is ill-appearing  Comments: Appears stated age   HENT:      Head: Normocephalic  Nose: Nose normal       Mouth/Throat:      Mouth: Mucous membranes are dry  Eyes:      Conjunctiva/sclera: Conjunctivae normal       Comments: Wearing glasses      Periorbital areas appear very sunken with dark circles surrounding   Neck:      Comments: Trachea midline  Cardiovascular:      Rate and Rhythm: Normal rate  Rhythm irregular  Pulmonary:      Effort: Pulmonary effort is normal  No respiratory distress  Abdominal:      Palpations: Abdomen is soft  Tenderness: There is abdominal tenderness        Comments: Inconsistent abdominal exam, bloated, intermittently winces with palpation which she does not with palpation of arms or legs Musculoskeletal:      Cervical back: Neck supple  Right lower leg: No edema  Left lower leg: No edema  Skin:     General: Skin is warm and dry  Neurological:      Mental Status: She is alert  Comments: Awake and alert, oriented, answers questions appropriately   Psychiatric:         Judgment: Judgment is impulsive  Comments: Flat, withdrawn        Invasive Devices  Report    Peripheral Intravenous Line            Peripheral IV 01/11/22 Right Forearm <1 day              Lab, Imaging and other studies: I have personally reviewed pertinent reports

## 2022-01-12 NOTE — WOUND OSTOMY CARE
Progress Note - Wound   Albert Hayes 80 y o  female MRN: [id number]  Unit/Bed#: [facility] 604-01 Encounter: [id number]      Assessment:  Wound care consulted for assessment of sacral wound  Patient admitted with subdural hematoma s/p fall  History of - anxiety, HTN, HLD, PE and DVT  Patient seen in bed with lap belt restraint in place  Patient is alert and awake with anxiety, able to follow commands for the assessment  Patient is dependent for care, max assist of one with turning for the assessment  Foam wedges in use for repositioning  incontinent of bowel and bladder, candi-care rendered at time of the assessment  B/l heels are intact with no redness or wounds  1  Mid sacrum - DTI - POA  This wound has the potential to evolve to a full thickness injury: stage 3, stage 4 or unstageable pressure injury  Non-evolving at time of the assessment  Presents as intact non-blanchable purple colored skin that is tender with palpation  No blistering of the skin or open aspects  Candi-wound is intact with blanchable hyperpigmented skin  Mild blanchable redness noted the candi-wound/medial buttocks  -due to incontinence, will recommend moisture barrier cream     No induration, fluctuance, odor, warmth/temperature differences, or purulence noted to the above noted wounds  Patient tolerated assessment fairly well  Tenderness note to the sacral wound  Primary nurse aware of plan of care  See flow sheets for more detailed assessment findings  Will follow along  Plan:   1  Apply hydraguard to b/l buttocks and sacrum TID and PRN for prevention and protection  2  Apply skin nourishing cream the entire skin daily for moisture  3  Turn and reposition patient every  2 hours   4  Elevate heels off of bed with pillows to offload pressure   5  Apply EHOB waffle cushion to chair when OOB, if able  6  Apply Allevyn foam to heels, nikita w/P, peel foam check skin integrity q-shift  Change q3d and PRN for soilage/dislodgement  7   Wound care will follow along with patient weekly, please call or tiger text with questions and concerns      Objective:    Vitals: Blood pressure 112/64, pulse 100, temperature 98 1 °F (36 7 °C), resp  rate 16, weight 53 2 kg (117 lb 3 2 oz), SpO2 94 %  ,Body mass index is 24 49 kg/m²  Wound 01/11/22 Pressure Injury Sacrum Bilateral (Active)   Wound Image   01/12/22 1039   Wound Description Dry; Intact; Light purple 01/12/22 1039   Pressure Injury Stage DTPI 01/12/22 1039   Mallory-wound Assessment Dry; Intact; Hyperpigmented 01/12/22 1039   Wound Length (cm) 3 cm 01/12/22 1039   Wound Width (cm) 3 cm 01/12/22 1039   Wound Depth (cm) 0 cm 01/12/22 1039   Wound Surface Area (cm^2) 9 cm^2 01/12/22 1039   Wound Volume (cm^3) 0 cm^3 01/12/22 1039   Calculated Wound Volume (cm^3) 0 cm^3 01/12/22 1039   Tunneling 0 cm 01/12/22 1039   Tunneling in depth located at 0 01/12/22 1039   Undermining 0 01/12/22 1039   Undermining is depth extending from 0 01/12/22 1039   Drainage Amount None 01/12/22 1039   Non-staged Wound Description Not applicable 68/15/34 6058   Treatments Cleansed;Site care;Elevated 01/12/22 1039   Dressing Moisture barrier 01/12/22 1039   Wound packed? No 01/12/22 1039   Packing- # removed 0 01/12/22 1039   Packing- # inserted 0 01/12/22 1039   Patient Tolerance Tolerated well 01/12/22 1039       Recommendations written as orders  AVS updated    Nikky Schroeder RN BSN CWON

## 2022-01-12 NOTE — CONSULTS
Consultation - Infectious Disease   Merlin Most 80 y o  female MRN: [id number]  Unit/Bed#: [facility] 604-01 Encounter: [id number]      IMPRESSION & RECOMMENDATIONS:     1  Diarrhea, colitis   Diarrhea occurring for 2-3 weeks prior to admission  Concern  By outpatient PCP and GI about C Dif colitis given treatment for pneumonia with antibiotics in November, however stool testing was not completed  C Dif PCR here is negative and CT A/P shows pancolitis  She did have elevated bands on initial CBC with diff, but has no fevers and is hemodynamically stable  C Dif PCR testing is very sensitive and false negative tests are extremely rare so low suspicion she has CDif, but given clinical picture will repeat testing  Also consider ischemic colitis given atherosclerosis, dehydration and relative hypotension on admission  Consider another infectious cause of diarrhea    -stop IV metronidazole and PO vancomycin for now since C Diff unlikely    -repeat C Dif PCR   -sent stool enteric panel, giardia EIA and stool O and P   -sent lactate   -recheck CBC with Diff   -supportive care for diarrhea, IVF   -if diarrhea continues, recommend GI evaluation and consideration of flex sigmoidoscopy for further evaluation    2  Acute subdural hematoma  Occurred after a fall  Patient chronically on coumadin, elevated INR on admission  Given Kcentra  Repeat CTH x 2 stable  -neurosurgery following   -monitor mental status   -patient NPI due to mental status    3  Fall  Complicated by SDH  Patient with diarrhea and dehydration prior to admission  4  FRANK  Present on admission, improved with IVF  -continue to monitor creatinine    5  Encephalopathy  Likely due to TBI/SDH and dehydration, hyponatremia  Spot EEG on 1/11 negative for seizure activity  UA with 1-2 WBC not consistent with infection, COVID PCR negative     -UA not consistent with UTI   -monitor mental status    I have discussed the above management plan in detail with the Trauma surgery PA   ID will follow  I have performed an extensive review of the medical records in Epic including review of the notes, radiographs, and laboratory results     HISTORY OF PRESENT ILLNESS:  Reason for Consult: colitis, diarrhea  HPI: Merlin Most is an 80year old woman with a history of hypertension, hyperlipidemia, and 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 who presented to [facility] 1/10/22 with a headache following mechanical fall  For the past 2-3 weeks, the patient has had multiple episodes of diarrhea  Due to this, she has been feeling weak and fell at home  She was hospitalized in early November and was treated with a 7 day course of antibiotics for CAP (received doxycycline and Ceftriaxone, discharged on Ceftin)  She saw a GI physician 1/7/22 for the diarrhea  She had previously been started on PO metronidazole by her PCP  The GI physician ordered multiple stool studies and empirically started the patient on PO vancomycin  On presentation, COVID/flu/RSV was negative  She was afebrile  Labs showed a creatinine 2 02, WBC 4 28 with elevated bands, INR 3 74  CTH showed a left frontal subdural hematoma  She was given Kcentra and transferred to [facility] for further neurosurgical evaluation  The patient is currently admitted to the trauma service  She remains confused and is unable to answer most of my questions  She denies abdominal pain for me, but is asking for water and to be left alone  She continues to have multiple episodes of diarrhea  CDif PCR was sent yesterday which is negative  IV metronidazole was empirically started this morning since the patient cannot take PO vancomycin  CT A/P showed pancolitis  UA with 1-2 WBC  ID is consulted for further evaluation of diarrhea and colitis  REVIEW OF SYSTEMS:  A complete review of systems is negative other than that noted in the HPI      PAST MEDICAL HISTORY:  Past Medical History:   Diagnosis Date    Anxiety     Disease of thyroid gland Past Surgical History:   Procedure Laterality Date    CATARACT EXTRACTION Right     FOOT SURGERY      JOINT REPLACEMENT         FAMILY HISTORY:  Non-contributory    SOCIAL HISTORY:  Social History   Social History     Substance and Sexual Activity   Alcohol Use No     Social History     Substance and Sexual Activity   Drug Use No     Social History     Tobacco Use   Smoking Status Never Smoker   Smokeless Tobacco Never Used       ALLERGIES:  Allergies   Allergen Reactions    Preservision Areds [B-Plex Plus] Tremor    Tramadol      Reaction Date: 2011;        MEDICATIONS:  All current active medications have been reviewed  PHYSICAL EXAM:  Temp:  [98 1 °F (36 7 °C)-98 9 °F (37 2 °C)] 98 1 °F (36 7 °C)  HR:  [] 98  Resp:  [14-18] 16  BP: (109-139)/(58-83) 114/68  SpO2:  [91 %-96 %] 94 %  Temp (24hrs), Av 3 °F (36 8 °C), Min:98 1 °F (36 7 °C), Max:98 9 °F (37 2 °C)  Current: Temperature: 98 1 °F (36 7 °C)    Intake/Output Summary (Last 24 hours) at 2022 1631  Last data filed at 2022 5214  Gross per 24 hour   Intake 1899 16 ml   Output 1150 ml   Net 749 16 ml       General Appearance:  Frail appearing, appears uncomfortable   Head:  No deformities    Eyes:  Conjunctiva pink and sclera anicteric, both eyes   Nose: Nares normal, mucosa normal, no drainage   Throat: Dry mucus membranes   Neck: Supple, symmetrical, no adenopathy, no tenderness/mass/nodules   Back:   Symmetric, no curvature, ROM normal, no CVA tenderness   Lungs:   Clear to auscultation bilaterally, respirations unlabored   Chest Wall:  No tenderness or deformity   Heart:  RRR; no murmur, rub or gallop   Abdomen:   Soft, non-tender, non-distended, positive bowel sounds    Extremities: No cyanosis, clubbing or edema   Skin: No rashes or lesions  No draining wounds noted     Lymph nodes: Cervical, supraclavicular nodes normal   Neurologic: Moving extremities, only saying she wants water and to be left alone, otherwise not cooperating       LABS, IMAGING, & OTHER STUDIES:  Lab Results:  I have personally reviewed pertinent labs  Results from last 7 days   Lab Units 01/12/22  0447 01/11/22  0341 01/10/22  1915   WBC Thousand/uL 4 67 4 27* 4 86   HEMOGLOBIN g/dL 11 8 11 7 11 5   PLATELETS Thousands/uL 398* 364 318     Results from last 7 days   Lab Units 01/12/22  1401 01/12/22  0447 01/12/22  0447 01/11/22  0341 01/11/22  0341 01/10/22  1915 01/10/22  1323 01/07/22  1117 01/07/22  1117   SODIUM mmol/L 148*  --  146*  --  137   < > 132*   < > 131*   POTASSIUM mmol/L 3 4*   < > 3 6   < > 3 9   < > 4 2   < > 3 3*   CHLORIDE mmol/L 119*   < > 116*   < > 106   < > 97*   < > 95*   CO2 mmol/L 19*   < > 20*   < > 21   < > 25   < > 23   BUN mg/dL 31*   < > 34*   < > 53*   < > 53*   < > 19   CREATININE mg/dL 0 84   < > 0 78   < > 1 20   < > 2 02*   < > 1 05   EGFR ml/min/1 73sq m 62   < > 68   < > 40   < > 21   < > 47   CALCIUM mg/dL 8 2*   < > 7 7*   < > 8 3   < > 8 4   < > 8 7   AST U/L  --   --   --   --   --   --  55*  --  48*   ALT U/L  --   --   --   --   --   --  49   < > 54   ALK PHOS U/L  --   --   --   --   --   --  76   < > 75    < > = values in this interval not displayed  Results from last 7 days   Lab Units 01/11/22  0751   C DIFF TOXIN B BY PCR  Negative         Results from last 7 days   Lab Units 01/07/22  1117   CRP mg/L 204 5*               Imaging Studies:   I have personally reviewed pertinent imaging study reports and images in PACS  CT A/P 1/12/22: pancolitis, most pronounced in the hepatic flexure and proximal to mid transverse colon      Other Studies:   I have personally reviewed pertinent reports

## 2022-01-12 NOTE — PLAN OF CARE
Problem: Potential for Falls  Goal: Patient will remain free of falls  Description: INTERVENTIONS:  - Educate patient/family on patient safety including physical limitations  - Instruct patient to call for assistance with activity   - Consult OT/PT to assist with strengthening/mobility   - Keep Call bell within reach  - Keep bed low and locked with side rails adjusted as appropriate  - Keep care items and personal belongings within reach  - Initiate and maintain comfort rounds  - Make Fall Risk Sign visible to staff  - Obtain necessary fall risk management equipment  - Apply yellow socks and bracelet for high fall risk patients  - Consider moving patient to room near nurses station  Outcome: Progressing       Problem: Prexisting or High Potential for Compromised Skin Integrity  Goal: Skin integrity is maintained or improved  Description: INTERVENTIONS:  - Identify patients at risk for skin breakdown  - Assess and monitor skin integrity  - Assess and monitor nutrition and hydration status  - Monitor labs   - Assess for incontinence   - Turn and reposition patient  - Assist with mobility/ambulation  - Relieve pressure over bony prominences  - Avoid friction and shearing  - Provide appropriate hygiene as needed including keeping skin clean and dry  - Evaluate need for skin moisturizer/barrier cream  - Collaborate with interdisciplinary team   - Patient/family teaching  - Consider wound care consult   Outcome: Progressing      Problem: NEUROSENSORY - ADULT  Goal: Achieves stable or improved neurological status  Description: INTERVENTIONS  - Monitor and report changes in neurological status  - Monitor vital signs such as temperature, blood pressure, glucose, and any other labs ordered   - Initiate measures to prevent increased intracranial pressure  - Monitor for seizure activity and implement precautions if appropriate    Outcome: Progressing      Problem: Nutrition/Hydration-ADULT  Goal: Nutrient/Hydration intake appropriate for improving, restoring or maintaining nutritional needs  Description: Monitor and assess patient's nutrition/hydration status for malnutrition  Collaborate with interdisciplinary team and initiate plan and interventions as ordered  Monitor patient's weight and dietary intake as ordered or per policy  Utilize nutrition screening tool and intervene as necessary  Determine patient's food preferences and provide high-protein, high-caloric foods as appropriate       INTERVENTIONS:  - Monitor oral intake, urinary output, labs, and treatment plans  - Assess nutrition and hydration status and recommend course of action  - Evaluate amount of meals eaten  - Assist patient with eating if necessary   - Allow adequate time for meals  - Recommend/ encourage appropriate diets, oral nutritional supplements, and vitamin/mineral supplements  - Order, calculate, and assess calorie counts as needed  - Recommend, monitor, and adjust tube feedings and TPN/PPN based on assessed needs  - Assess need for intravenous fluids  - Provide specific nutrition/hydration education as appropriate  - Include patient/family/caregiver in decisions related to nutrition  Outcome: Progressing        Problem: SAFETY,RESTRAINT: NV/NON-SELF DESTRUCTIVE BEHAVIOR  Goal: Remains free of harm/injury (restraint for non violent/non self-detsructive behavior)  Description: INTERVENTIONS:  - Instruct patient/family regarding restraint use   - Assess and monitor physiologic and psychological status   - Provide interventions and comfort measures to meet assessed patient needs   - Identify and implement measures to help patient regain control  - Assess readiness for release of restraint   Outcome: Progressing  Goal: Returns to optimal restraint-free functioning  Description: INTERVENTIONS:  - Assess the patient's behavior and symptoms that indicate continued need for restraint  - Identify and implement measures to help patient regain control  - Assess readiness for release of restraint   Outcome: Progressing       Problem: CONFUSION/THOUGHT DISTURBANCE  Goal: Thought disturbances (confusion, delirium, depression, dementia or psychosis) are managed to maintain or return to baseline mental status and functional level  Description: INTERVENTIONS:  - Assess for possible contributors to  thought disturbance, including but not limited to medications, infection, impaired vision or hearing, underlying metabolic abnormalities, dehydration, respiratory compromise,  psychiatric diagnoses and notify attending PHYSICAN/AP  - Monitor and intervene to maintain adequate nutrition, hydration, elimination, sleep and activity  - Decrease environmental stimuli, including noise as appropriate  - Provide frequent contacts to provide refocusing, direction and reassurance as needed  Approach patient calmly with eye contact and at their level    - [place] high risk fall precautions, aspiration precautions and other safety measures, as indicated  - If delirium suspected, notify physician/AP of change in condition and request immediate in-person evaluation  - Pursue consults as appropriate including Geriatric (campus dependent), OT for cognitive evaluation/activity planning, psychiatric, pastoral care, etc   Outcome: Progressing

## 2022-01-12 NOTE — DISCHARGE INSTR - OTHER ORDERS
1  Apply hydraguard to b/l buttocks and sacrum TID and PRN for prevention and protection  2  Apply skin nourishing cream the entire skin daily for moisture  3  Turn and reposition patient every  2 hours   4  Elevate heels off of bed with pillows to offload pressure   5  Apply EHOB waffle cushion to chair when OOB, if able  6  Apply Allevyn foam to heels, nikita w/P, peel foam check skin integrity q-shift   Change q3d and PRN for soilage/dislodgement

## 2022-01-12 NOTE — PLAN OF CARE
Problem: Potential for Falls  Goal: Patient will remain free of falls  Description: INTERVENTIONS:  - Educate patient/family on patient safety including physical limitations  - Instruct patient to call for assistance with activity   - Consult OT/PT to assist with strengthening/mobility   - Keep Call bell within reach  - Keep bed low and locked with side rails adjusted as appropriate  - Keep care items and personal belongings within reach  - Initiate and maintain comfort rounds  - Make Fall Risk Sign visible to staff  - Offer Toileting every  Hours, in advance of need  - Initiate/Maintain alarm  - Obtain necessary fall risk management equipment  - Apply yellow socks and bracelet for high fall risk patients  - Consider moving patient to room near nurses station  Outcome: Progressing     Problem: Prexisting or High Potential for Compromised Skin Integrity  Goal: Skin integrity is maintained or improved  Description: INTERVENTIONS:  - Identify patients at risk for skin breakdown  - Assess and monitor skin integrity  - Assess and monitor nutrition and hydration status  - Monitor labs   - Assess for incontinence   - Turn and reposition patient  - Assist with mobility/ambulation  - Relieve pressure over bony prominences  - Avoid friction and shearing  - Provide appropriate hygiene as needed including keeping skin clean and dry  - Evaluate need for skin moisturizer/barrier cream  - Collaborate with interdisciplinary team   - Patient/family teaching  - Consider wound care consult   Outcome: Progressing     Problem: MOBILITY - ADULT  Goal: Maintain or return to baseline ADL function  Description: INTERVENTIONS:  -  Assess patient's ability to carry out ADLs; assess patient's baseline for ADL function and identify physical deficits which impact ability to perform ADLs (bathing, care of mouth/teeth, toileting, grooming, dressing, etc )  - Assess/evaluate cause of self-care deficits   - Assess range of motion  - Assess patient's mobility; develop plan if impaired  - Assess patient's need for assistive devices and provide as appropriate  - Encourage maximum independence but intervene and supervise when necessary  - Involve family in performance of ADLs  - Assess for home care needs following discharge   - Consider OT consult to assist with ADL evaluation and planning for discharge  - Provide patient education as appropriate  Outcome: Progressing  Goal: Maintains/Returns to pre admission functional level  Description: INTERVENTIONS:  - Perform BMAT or MOVE assessment daily    - Set and communicate daily mobility goal to care team and patient/family/caregiver  - Collaborate with rehabilitation services on mobility goals if consulted  - Perform Range of Motion  times a day  - Reposition patient every  hours  - Dangle patient  times a day  - Stand patient  times a day  - Ambulate patient  times a day  - Out of bed to chair  times a day   - Out of bed for meals  times a day  - Out of bed for toileting  - Record patient progress and toleration of activity level   Outcome: Progressing     Problem: NEUROSENSORY - ADULT  Goal: Achieves stable or improved neurological status  Description: INTERVENTIONS  - Monitor and report changes in neurological status  - Monitor vital signs such as temperature, blood pressure, glucose, and any other labs ordered   - Initiate measures to prevent increased intracranial pressure  - Monitor for seizure activity and implement precautions if appropriate      Outcome: Progressing  Goal: Achieves maximal functionality and self care  Description: INTERVENTIONS  - Monitor swallowing and airway patency with patient fatigue and changes in neurological status  - Encourage and assist patient to increase activity and self care     - Encourage visually impaired, hearing impaired and aphasic patients to use assistive/communication devices  Outcome: Progressing     Problem: GASTROINTESTINAL - ADULT  Goal: Maintains or returns to baseline bowel function  Description: INTERVENTIONS:  - Assess bowel function  - Encourage oral fluids to ensure adequate hydration  - Administer IV fluids if ordered to ensure adequate hydration  - Administer ordered medications as needed  - Encourage mobilization and activity  - Consider nutritional services referral to assist patient with adequate nutrition and appropriate food choices  Outcome: Progressing  Goal: Maintains adequate nutritional intake  Description: INTERVENTIONS:  - Monitor percentage of each meal consumed  - Identify factors contributing to decreased intake, treat as appropriate  - Assist with meals as needed  - Monitor I&O, weight, and lab values if indicated  - Obtain nutrition services referral as needed  Outcome: Progressing     Problem: Nutrition/Hydration-ADULT  Goal: Nutrient/Hydration intake appropriate for improving, restoring or maintaining nutritional needs  Description: Monitor and assess patient's nutrition/hydration status for malnutrition  Collaborate with interdisciplinary team and initiate plan and interventions as ordered  Monitor patient's weight and dietary intake as ordered or per policy  Utilize nutrition screening tool and intervene as necessary  Determine patient's food preferences and provide high-protein, high-caloric foods as appropriate       INTERVENTIONS:  - Monitor oral intake, urinary output, labs, and treatment plans  - Assess nutrition and hydration status and recommend course of action  - Evaluate amount of meals eaten  - Assist patient with eating if necessary   - Allow adequate time for meals  - Recommend/ encourage appropriate diets, oral nutritional supplements, and vitamin/mineral supplements  - Order, calculate, and assess calorie counts as needed  - Recommend, monitor, and adjust tube feedings and TPN/PPN based on assessed needs  - Assess need for intravenous fluids  - Provide specific nutrition/hydration education as appropriate  - Include patient/family/caregiver in decisions related to nutrition  Outcome: Progressing     Problem: SAFETY,RESTRAINT: NV/NON-SELF DESTRUCTIVE BEHAVIOR  Goal: Remains free of harm/injury (restraint for non violent/non self-detsructive behavior)  Description: INTERVENTIONS:  - Instruct patient/family regarding restraint use   - Assess and monitor physiologic and psychological status   - Provide interventions and comfort measures to meet assessed patient needs   - Identify and implement measures to help patient regain control  - Assess readiness for release of restraint   Outcome: Progressing  Goal: Returns to optimal restraint-free functioning  Description: INTERVENTIONS:  - Assess the patient's behavior and symptoms that indicate continued need for restraint  - Identify and implement measures to help patient regain control  - Assess readiness for release of restraint   Outcome: Progressing     Problem: CONFUSION/THOUGHT DISTURBANCE  Goal: Thought disturbances (confusion, delirium, depression, dementia or psychosis) are managed to maintain or return to baseline mental status and functional level  Description: INTERVENTIONS:  - Assess for possible contributors to  thought disturbance, including but not limited to medications, infection, impaired vision or hearing, underlying metabolic abnormalities, dehydration, respiratory compromise,  psychiatric diagnoses and notify attending PHYSICAN/AP  - Monitor and intervene to maintain adequate nutrition, hydration, elimination, sleep and activity  - Decrease environmental stimuli, including noise as appropriate  - Provide frequent contacts to provide refocusing, direction and reassurance as needed  Approach patient calmly with eye contact and at their level    - [place] high risk fall precautions, aspiration precautions and other safety measures, as indicated  - If delirium suspected, notify physician/AP of change in condition and request immediate in-person evaluation  - Pursue consults as appropriate including Geriatric (campus dependent), OT for cognitive evaluation/activity planning, psychiatric, pastoral care, etc   Outcome: Progressing     Problem: BEHAVIOR  Goal: Pt/Family maintain appropriate behavior and adhere to behavioral management agreement, if implemented  Description: INTERVENTIONS:  - Assess the family dynamic   - Encourage verbalization of thoughts and concerns in a socially appropriate manner  - Assess patient/family's coping skills and non-compliant behavior (including use of illegal substances)  - Utilize positive, consistent limit setting strategies supporting safety of patient, staff and others  - Initiate consult with Case Management, Spiritual Care or other ancillary services as appropriate  - If a patient's/visitor's behavior jeopardizes the safety of the patient, staff, or others, refer to organization procedure     - Notify Security of behavior or suspected illegal substances which indicate the need for search of the patient and/or belongings  - Encourage participation in the decision making process about a behavioral management agreement; implement if patient meets criteria  Outcome: Progressing

## 2022-01-12 NOTE — PLAN OF CARE
Problem: SAFETY,RESTRAINT: NV/NON-SELF DESTRUCTIVE BEHAVIOR  Goal: Remains free of harm/injury (restraint for non violent/non self-detsructive behavior)  Description: INTERVENTIONS:  - Instruct patient/family regarding restraint use   - Assess and monitor physiologic and psychological status   - Provide interventions and comfort measures to meet assessed patient needs   - Identify and implement measures to help patient regain control  - Assess readiness for release of restraint   1/12/2022 1612 by Abhilash Mccarty RN  Outcome: Completed  1/12/2022 1024 by Abhilash Mccarty RN  Outcome: Progressing  Goal: Returns to optimal restraint-free functioning  Description: INTERVENTIONS:  - Assess the patient's behavior and symptoms that indicate continued need for restraint  - Identify and implement measures to help patient regain control  - Assess readiness for release of restraint   1/12/2022 1612 by Abhilash Mccarty RN  Outcome: Completed  1/12/2022 1024 by Abhilash Mccarty RN  Outcome: Progressing

## 2022-01-12 NOTE — ASSESSMENT & PLAN NOTE
- due to dehydration and possible effect of NSS  - d/c NSS and transition to D5LR while NPO  - f/u with BMP in AM

## 2022-01-12 NOTE — PROGRESS NOTES
[address] [facility]  Progress Note - Jovani Amador 1934, 80 y o  female MRN: [id number]  Unit/Bed#: [facility] 604-01 Encounter: [id number]  Primary Care Provider: Chad Glover MD   Date and time admitted to hospital: 1/10/2022  4:35 PM    [address]  · [place] Anali on day prior to admission and day of admission, questionable LOC, coumadin daily  · Sustained the below stated injuries  · PT/OT recommending inpatient rehab  · CM following for disposition planning  Patient not medically stable for discharge at this time  * Subdural hematoma (HCC)  Assessment & Plan  · CTH 1/10: Small left frontal acute subdural hematoma with some intermixed chronic components, no greater than 8 mm in overall total thickness with minimal sulcal effacement in the frontal region but otherwise no significant mass effect  Specifically, no midline shift or narrowing of the ventricles  Minimal subdural hemorrhage seen along the anterior interhemispheric fissure, no greater than 1 mm in thickness   No parenchymal hemorrhage   No calvarial fracture  · Reversed with Kcentra on admission  INR improved from 3 74 to 1  15    · Repeat CTH x2: stable SDH  · Keppra for seizure prophylaxis for 7 days  · GCS 14 (4, 4, 6), non-focal  Swallow eval completed, recommending NPO  Awaiting call back from family regarding goals of care  If unable to get in touch today, will place NGT for tube feeds  · NSx: stable on CTH, hold AC/AP, f/u in 2 weeks with repeat scan at that time    Diarrhea  Assessment & Plan  · 2-3 week history of diarrhea  · 1/11: stool sample submitted for c diff, negative  Flagyl and PO vanc discontinued, per ID  · ID evaluation and recommendations appreciated  Further stool cultures ordered to be sent  Patient continues to have multiple episodes of diarrhea  · CT A/P shows pan-colitis  LA 2 7  HD normal  Will give 1 liter bolus and recheck  Continue IVF hydration   Hemodynamically normal  Abdominal exam is benign, will continue to monitor  Dysphagia  Assessment & Plan  - Speech recommending to continue NPO status on 1/12  - likely related to confusion/altered mental status from brain injury  - on IVFs with D5LR  Contacted sons to discuss, will plan to place NGT this afternoon if unable to discuss goals of care further with patient's family  - continue speech evaluations  Mental status is improving and patient reportedly independent at baseline  Hypernatremia  Assessment & Plan  - due to dehydration and possible effect of NSS  - d/c NSS and transition to D5LR while NPO  - f/u with BMP in AM    Altered mental status  Assessment & Plan  · Confusion likely multi factorial given age and concomitant TBI as well as diarrhea/dehydration  · Afebrile  UA negative for signs of infection  · Spot EEG on 1/11 negative for seizure activity  · Will check LFTs  · Afebrile, no leukocytosis  · Overall exam is improving, GCS 14 (4, 4, 6) today  · Continue neuro checks and redirection as able  Wean restraints as able  History of DVT (deep vein thrombosis)  Assessment & Plan  · History of RLE DVT and PE on 11/4/21  · B/l duplex: chronic DVT of left leg, no new DVT  · Home Coumadin held and reversed due to [address]  Started on [facility] for DVT ppx on 1/11  FRANK (acute kidney injury) ([facility] [address] )  Assessment & Plan  · Cr of 2 on admission  This is resolved, Cr this morning is 0 78    · IVF hydration to continue  Transition NSS to D5LR due to NPO status and hypernatremia  · Continue to follow with BMP  · Avoid nephrotoxic agents and hypotension        Disposition: continue stepdown 2 level care, NGT for tube feedings as patient is recommended to continue NPO status  Attempting to make contact with family to discuss plan of care  SUBJECTIVE:  Chief Complaint: Patient offers no complaints  Subjective: Is able to tell me her name is Dm   Does not know where she is   She is alert in bed, restrained, attempting to get up  Continues to have multiple episodes of diarrhea  OBJECTIVE:     Meds/Allergies     Current Facility-Administered Medications:     amLODIPine (NORVASC) tablet 5 mg, 5 mg, Oral, Daily, Gilberto Robledo MD    dextrose 5 % in lactated Ringer's infusion, 75 mL/hr, Intravenous, Continuous, Ena Claire PA-C, Last Rate: 75 mL/hr at 01/12/22 0811, 75 mL/hr at 01/12/22 0811    heparin (porcine) subcutaneous injection 5,000 Units, 5,000 Units, Subcutaneous, Q8H [address], Joann Green PA-C, 5,000 Units at 01/12/22 1514    levETIRAcetam (KEPPRA) 500 mg in sodium chloride 0 9 % 100 mL IVPB, 500 mg, Intravenous, BID, Gilberto Robledo MD, Last Rate: 400 mL/hr at 01/12/22 0811, 500 mg at 01/12/22 0811    LORazepam (ATIVAN) injection 0 5 mg, 0 5 mg, Intravenous, HS, Brandy Pena MD    metoprolol (LOPRESSOR) injection 5 mg, 5 mg, Intravenous, BID, Brandy Pena MD, 5 mg at 01/12/22 0819    multi-electrolyte (ISOLYTE-S PH 7 4) bolus 1,000 mL, 1,000 mL, Intravenous, Once, JARRELL HANEY, 1,000 mL at 01/12/22 1513    pantoprazole (PROTONIX) injection 40 mg, 40 mg, Intravenous, Q24H [address], Brandy Pena MD, 40 mg at 01/12/22 0514     Vitals:   Vitals:    01/12/22 1432   BP: 114/68   Pulse: 98   Resp: 16   Temp: 98 1 °F (36 7 °C)   SpO2: 94%       Intake/Output:  I/O       01/10 0701 01/11 0700 01/11 0701 01/12 0700 01/12 0701 01/13 0700    P  O  0 0 0    I V  (mL/kg) 1069 2 (20 1) 1799 2 (33 8)     IV Piggyback  100     Total Intake(mL/kg) 1069 2 (20 1) 1899 2 (35 7) 0 (0)    Urine (mL/kg/hr)  1150 (0 9)     Stool  0     Total Output  1150     Net +1069 2 +749 2 0           Unmeasured Urine Occurrence  5 x 1 x    Unmeasured Stool Occurrence  2 x 3 x           Nutrition/GI Proph/Bowel Reg: NPO    Physical Exam:   GENERAL APPEARANCE: NAd  NEURO: GCS 14 (4, 4,6), non-focal  HEENT: NCAT  CV: RRR, no MGR  LUNGS: CTA bilaterally  GI: soft,non-tender,non-distended; + large brown loose bowel movement in bed  : voiding incontinent, did require SC once overnight  MSK: no edema, contusions or deformities  SKIN: pink, warm, dry           Invasive Devices  Report    Peripheral Intravenous Line            Peripheral IV 01/11/22 Right Forearm 1 day                 Lab Results:   Results: I have personally reviewed pertinent reports   , BMP/CMP:   Lab Results   Component Value Date    SODIUM 148 (H) 01/12/2022    K 3 4 (L) 01/12/2022     (H) 01/12/2022    CO2 19 (L) 01/12/2022    BUN 31 (H) 01/12/2022    CREATININE 0 84 01/12/2022    CALCIUM 8 2 (L) 01/12/2022    EGFR 62 01/12/2022   , CBC:   Lab Results   Component Value Date    WBC 4 67 01/12/2022    HGB 11 8 01/12/2022    HCT 35 4 01/12/2022    MCV 84 01/12/2022     (H) 01/12/2022    MCH 28 0 01/12/2022    MCHC 33 3 01/12/2022    RDW 15 8 (H) 01/12/2022    MPV 10 0 01/12/2022   , Coagulation: No results found for: PT, INR, APTT and Lactate: No results found for: LACTATE  Imaging/EKG Studies: Results: I have personally reviewed pertinent reports      Other Studies: no new  VTE Prophylaxis: Sequential compression device (Venodyne)  and Heparin

## 2022-01-12 NOTE — ASSESSMENT & PLAN NOTE
- Speech recommending to continue NPO status on 1/12  - likely related to confusion/altered mental status from brain injury  - on IVFs with D5LR  Contacted sons to discuss, will plan to place NGT this afternoon if unable to discuss goals of care further with patient's family  - continue speech evaluations  Mental status is improving and patient reportedly independent at baseline

## 2022-01-12 NOTE — CASE MANAGEMENT
Case Management Discharge Planning Note    Patient name Suffield Sumerduck  Location [address] 604/[facility]HP 546-28 MRN [id number]  : 1934 Date 2022       Current Admission Date: 1/10/2022  Current Admission Diagnosis:Subdural hematoma [facility])   Patient Active Problem List    Diagnosis Date Noted   [address] 2022    Subdural hematoma ([facility] [address] ) 2022    Diarrhea 2022    FRANK (acute kidney injury) ([facility] [address] ) 2022    History of DVT (deep vein thrombosis) 2022    Altered mental status 2022    Cardiac arrhythmia 2022    Chronic anticoagulation 2021    Anemia 2021    DVT (deep venous thrombosis) ([facility] [address] ) 2021    Pulmonary HTN ([facility] [address] ) 2021    Paraesophageal hernia 2021    Acute pulmonary embolism with acute cor pulmonale ([facility] [address] ) 2021    Severe sepsis ([facility] [address] ) 2021    Pneumonia 2021    Elevated troponin 2021    BMI 26 0-26 9,adult 2019    Cortical age-related cataract of left eye 05/10/2019    Pre-op exam 05/10/2019    Medicare annual wellness visit, subsequent 2018    Intertrigo 2018    External hemorrhoids 2017    Acquired hypothyroidism 10/26/2012    Generalized anxiety disorder 10/24/2012    Headache 10/24/2012    Mixed hyperlipidemia 10/24/2012    Essential hypertension 10/24/2012      LOS (days): 2  Geometric Mean LOS (GMLOS) (days):   Days to GMLOS:     OBJECTIVE:  Risk of Unplanned Readmission Score: 17         Current admission status: Inpatient   Preferred Pharmacy:   [address], [address] - [address]  [address]  Melody Perrin [address] [address]  Phone: [phone] Fax: [phone]    Primary Care Provider: Amber Haskins MD    Primary Insurance: [employer] [facility] RE[facility]  Secondary Insurance:     DISCHARGE DETAILS:                   CM received voicemail from the pt's son to delphineRUSTs d/c plan  Pt's son So Rivero would like a referral to [facility]   CM placed and will follow up

## 2022-01-12 NOTE — APP STUDENT NOTE
ROSENDA STUDENT  Inpatient Progress Note for TRAINING ONLY  Not Part of Legal Medical Record       Progress Note - Carmelina Yanez 80 y o  female MRN: [id number]    Unit/Bed#: [facility] 604-01 Encounter: [id number]    Assessment & Plan: Altered mental status  Assessment & Plan  · Able to produce a few words and nod and shake her head, not following commands  · Afebrile, WBC 4 27 - > 4 67  · UA +  · Spot EEG ordered     History of DVT (deep vein thrombosis)  Assessment & Plan  · History of RLE DVT and PE on 11/4/21  · B/l duplex: chronic DVT of left leg, no new DVT     FRANK (acute kidney injury) ([facility] [address] )  Assessment & Plan  · Cr of 2 on admission  · IVF hydration  · Decreased to 1 2 -> 0 78  · Continue to follow with BMP     Diarrhea  Assessment & Plan  · 2-3 week history of diarrhea  · 1/11: stool sample submitted for c diff, negative  · Diffuse abdominal tenderness  · CT A/P : "Findings suggesting pancolitis, as described above, most pronounced in the region of the hepatic flexure and proximal to mid transverse colon  Clinical correlation, laboratory correlation and follow-up is recommended "  · Non-bloody diarrhea seen on bed pad  · Placed on IV flagyl due to aspiration risk via PO vancomycin     Subdural hematoma (HCC)  Assessment & Plan  · CTH 1/10: Small left frontal acute subdural hematoma with some intermixed chronic components, no greater than 8 mm in overall total thickness with minimal sulcal effacement in the frontal region but otherwise no significant mass effect  Specifically, no midline shift or narrowing of the ventricles  Minimal subdural hemorrhage seen along the anterior interhemispheric fissure, no greater than 1 mm in thickness   No parenchymal hemorrhage   No calvarial fracture     · Reversed with Kcentra  · Repeat CTH x2: stable  · Keppra for sz ppx  · Swallow eval, recommend NPO  · NSx: stable on CTH, hold AC/AP, f/u in 2 weeks with repeat sca     Fall  Assessment & Plan  · Yaz Wheeler on day prior to admission and Adequate: hears normal conversation without difficulty day of admission, questionable LOC, coumadin daily  · CTH: subdural hematoma  · PT/OT rehab      Subjective:   Patient is minimally expressive during the examination  She is very lethargic and only able to voice a few words, expressing that her voice is hoarse and asking for water  Pt is currently NPO due to aspiration risk per Speech  She is unable to express pain or any other symptoms  Objective:     Vitals: Blood pressure 115/67, pulse 102, temperature 98 5 °F (36 9 °C), temperature source Oral, resp  rate 14, weight 53 2 kg (117 lb 3 2 oz), SpO2 94 %  ,Body mass index is 24 49 kg/m²  Intake/Output Summary (Last 24 hours) at 1/12/2022 0934  Last data filed at 1/12/2022 0618  Gross per 24 hour   Intake 1899 16 ml   Output 1150 ml   Net 749 16 ml       Physical Exam: Physical Exam  Constitutional:       Appearance: Normal appearance  HENT:      Head: Normocephalic and atraumatic  Nose: Nose normal       Mouth/Throat:      Mouth: Mucous membranes are dry  Pharynx: Oropharynx is clear  Eyes:      General:         Right eye: No discharge  Left eye: No discharge  Conjunctiva/sclera: Conjunctivae normal       Pupils: Pupils are equal, round, and reactive to light  Cardiovascular:      Rate and Rhythm: Normal rate and regular rhythm  Pulses: Normal pulses  Heart sounds: Normal heart sounds  No murmur heard  No friction rub  No gallop  Pulmonary:      Effort: Pulmonary effort is normal  No respiratory distress  Breath sounds: Normal breath sounds  No stridor  No wheezing  Abdominal:      General: Abdomen is flat  Bowel sounds are normal    Skin:     General: Skin is warm and dry  Invasive Devices  Report    Peripheral Intravenous Line            Peripheral IV 01/11/22 Right Forearm <1 day                Lab, Imaging and other studies: I have personally reviewed pertinent reports      VTE Pharmacologic Prophylaxis: Sequential compression device (Venodyne) and Heparin

## 2022-01-12 NOTE — TELEPHONE ENCOUNTER
1/14/22- PT [address]    1/13/22- [facility]    1/12/22- [address] F/U SCHEDULED 1/25/22  PT WILL NEED CT HEAD    1/11/22- ([facility]) S/O, F/U IN 2WKS WITH CT HEAD

## 2022-01-12 NOTE — ASSESSMENT & PLAN NOTE
· Glendy Dryer on day prior to admission and day of admission, questionable LOC, coumadin daily  · Sustained the below stated injuries  · PT/OT recommending inpatient rehab  · CM following for disposition planning  Patient not medically stable for discharge at this time

## 2022-01-12 NOTE — ASSESSMENT & PLAN NOTE
· Confusion likely multi factorial given age and concomitant TBI as well as diarrhea/dehydration  · Afebrile  UA negative for signs of infection  · Spot EEG on 1/11 negative for seizure activity  · Will check LFTs  · Afebrile, no leukocytosis  · Overall exam is improving, GCS 14 (4, 4, 6) today  · Continue neuro checks and redirection as able  Wean restraints as able

## 2022-01-12 NOTE — ASSESSMENT & PLAN NOTE
· Cr of 2 on admission  This is resolved, Cr this morning is 0 78    · IVF hydration to continue  Transition NSS to D5LR due to NPO status and hypernatremia     · Continue to follow with BMP  · Avoid nephrotoxic agents and hypotension

## 2022-01-12 NOTE — ASSESSMENT & PLAN NOTE
· CTH 1/10: Small left frontal acute subdural hematoma with some intermixed chronic components, no greater than 8 mm in overall total thickness with minimal sulcal effacement in the frontal region but otherwise no significant mass effect  Specifically, no midline shift or narrowing of the ventricles  Minimal subdural hemorrhage seen along the anterior interhemispheric fissure, no greater than 1 mm in thickness   No parenchymal hemorrhage   No calvarial fracture  · Reversed with Kcentra on admission  INR improved from 3 74 to 1  15    · Repeat CTH x2: stable SDH  · Keppra for seizure prophylaxis for 7 days  · GCS 14 (4, 4, 6), non-focal  Swallow eval completed, recommending NPO  Awaiting call back from family regarding goals of care  If unable to get in touch today, will place NGT for tube feeds     · NSx: stable on CTH, hold AC/AP, f/u in 2 weeks with repeat scan at that time

## 2022-01-13 ENCOUNTER — APPOINTMENT (INPATIENT)
Dept: GASTROENTEROLOGY | Facility: HOSPITAL | Age: 87
DRG: 082 | End: 2022-01-13
Payer: COMMERCIAL

## 2022-01-13 ENCOUNTER — ANESTHESIA (INPATIENT)
Dept: GASTROENTEROLOGY | Facility: HOSPITAL | Age: 87
DRG: 082 | End: 2022-01-13
Payer: COMMERCIAL

## 2022-01-13 ENCOUNTER — ANESTHESIA EVENT (INPATIENT)
Dept: GASTROENTEROLOGY | Facility: HOSPITAL | Age: 87
DRG: 082 | End: 2022-01-13
Payer: COMMERCIAL

## 2022-01-13 PROBLEM — R33.9 URINARY RETENTION: Status: ACTIVE | Noted: 2022-01-13

## 2022-01-13 LAB
ALBUMIN SERPL BCP-MCNC: 1.5 G/DL (ref 3.5–5)
ALP SERPL-CCNC: 60 U/L (ref 46–116)
ALT SERPL W P-5'-P-CCNC: 26 U/L (ref 12–78)
ANION GAP SERPL CALCULATED.3IONS-SCNC: 6 MMOL/L (ref 4–13)
ANION GAP SERPL CALCULATED.3IONS-SCNC: 7 MMOL/L (ref 4–13)
AST SERPL W P-5'-P-CCNC: 24 U/L (ref 5–45)
BILIRUB SERPL-MCNC: 0.75 MG/DL (ref 0.2–1)
BUN SERPL-MCNC: 22 MG/DL (ref 5–25)
BUN SERPL-MCNC: 28 MG/DL (ref 5–25)
CALCIUM ALBUM COR SERPL-MCNC: 9.7 MG/DL (ref 8.3–10.1)
CALCIUM SERPL-MCNC: 7.4 MG/DL (ref 8.3–10.1)
CALCIUM SERPL-MCNC: 7.7 MG/DL (ref 8.3–10.1)
CAMPYLOBACTER DNA SPEC NAA+PROBE: NORMAL
CHLORIDE SERPL-SCNC: 124 MMOL/L (ref 100–108)
CHLORIDE SERPL-SCNC: 125 MMOL/L (ref 100–108)
CO2 SERPL-SCNC: 21 MMOL/L (ref 21–32)
CO2 SERPL-SCNC: 23 MMOL/L (ref 21–32)
CREAT SERPL-MCNC: 0.7 MG/DL (ref 0.6–1.3)
CREAT SERPL-MCNC: 0.71 MG/DL (ref 0.6–1.3)
ERYTHROCYTE [DISTWIDTH] IN BLOOD BY AUTOMATED COUNT: 15.7 % (ref 11.6–15.1)
GFR SERPL CREATININE-BSD FRML MDRD: 76 ML/MIN/1.73SQ M
GFR SERPL CREATININE-BSD FRML MDRD: 78 ML/MIN/1.73SQ M
GLUCOSE SERPL-MCNC: 186 MG/DL (ref 65–140)
GLUCOSE SERPL-MCNC: 203 MG/DL (ref 65–140)
HCT VFR BLD AUTO: 29.7 % (ref 34.8–46.1)
HGB BLD-MCNC: 10.2 G/DL (ref 11.5–15.4)
LACTATE SERPL-SCNC: 2.8 MMOL/L (ref 0.5–2)
LACTATE SERPL-SCNC: 3.6 MMOL/L (ref 0.5–2)
MCH RBC QN AUTO: 28.3 PG (ref 26.8–34.3)
MCHC RBC AUTO-ENTMCNC: 34.3 G/DL (ref 31.4–37.4)
MCV RBC AUTO: 83 FL (ref 82–98)
PLATELET # BLD AUTO: 358 THOUSANDS/UL (ref 149–390)
PMV BLD AUTO: 9.3 FL (ref 8.9–12.7)
POTASSIUM SERPL-SCNC: 3.4 MMOL/L (ref 3.5–5.3)
POTASSIUM SERPL-SCNC: 3.5 MMOL/L (ref 3.5–5.3)
PROT SERPL-MCNC: 5.1 G/DL (ref 6.4–8.2)
RBC # BLD AUTO: 3.6 MILLION/UL (ref 3.81–5.12)
SALMONELLA DNA SPEC QL NAA+PROBE: NORMAL
SHIGA TOXIN STX GENE SPEC NAA+PROBE: NORMAL
SHIGELLA DNA SPEC QL NAA+PROBE: NORMAL
SODIUM SERPL-SCNC: 153 MMOL/L (ref 136–145)
SODIUM SERPL-SCNC: 153 MMOL/L (ref 136–145)
WBC # BLD AUTO: 4.97 THOUSAND/UL (ref 4.31–10.16)

## 2022-01-13 PROCEDURE — 85027 COMPLETE CBC AUTOMATED: CPT | Performed by: PHYSICIAN ASSISTANT

## 2022-01-13 PROCEDURE — 97535 SELF CARE MNGMENT TRAINING: CPT

## 2022-01-13 PROCEDURE — 92526 ORAL FUNCTION THERAPY: CPT

## 2022-01-13 PROCEDURE — C9113 INJ PANTOPRAZOLE SODIUM, VIA: HCPCS | Performed by: EMERGENCY MEDICINE

## 2022-01-13 PROCEDURE — 45380 COLONOSCOPY AND BIOPSY: CPT | Performed by: INTERNAL MEDICINE

## 2022-01-13 PROCEDURE — 0DBM8ZX EXCISION OF DESCENDING COLON, VIA NATURAL OR ARTIFICIAL OPENING ENDOSCOPIC, DIAGNOSTIC: ICD-10-PCS | Performed by: INTERNAL MEDICINE

## 2022-01-13 PROCEDURE — 83605 ASSAY OF LACTIC ACID: CPT | Performed by: PHYSICIAN ASSISTANT

## 2022-01-13 PROCEDURE — 87493 C DIFF AMPLIFIED PROBE: CPT | Performed by: STUDENT IN AN ORGANIZED HEALTH CARE EDUCATION/TRAINING PROGRAM

## 2022-01-13 PROCEDURE — 99232 SBSQ HOSP IP/OBS MODERATE 35: CPT | Performed by: EMERGENCY MEDICINE

## 2022-01-13 PROCEDURE — 99232 SBSQ HOSP IP/OBS MODERATE 35: CPT | Performed by: STUDENT IN AN ORGANIZED HEALTH CARE EDUCATION/TRAINING PROGRAM

## 2022-01-13 PROCEDURE — 88305 TISSUE EXAM BY PATHOLOGIST: CPT | Performed by: PATHOLOGY

## 2022-01-13 PROCEDURE — 80048 BASIC METABOLIC PNL TOTAL CA: CPT | Performed by: PHYSICIAN ASSISTANT

## 2022-01-13 PROCEDURE — 80053 COMPREHEN METABOLIC PANEL: CPT | Performed by: PHYSICIAN ASSISTANT

## 2022-01-13 PROCEDURE — 0DBK8ZX EXCISION OF ASCENDING COLON, VIA NATURAL OR ARTIFICIAL OPENING ENDOSCOPIC, DIAGNOSTIC: ICD-10-PCS | Performed by: INTERNAL MEDICINE

## 2022-01-13 PROCEDURE — 99223 1ST HOSP IP/OBS HIGH 75: CPT | Performed by: INTERNAL MEDICINE

## 2022-01-13 RX ORDER — DEXTROSE AND SODIUM CHLORIDE 5; .45 G/100ML; G/100ML
125 INJECTION, SOLUTION INTRAVENOUS CONTINUOUS
Status: DISCONTINUED | OUTPATIENT
Start: 2022-01-13 | End: 2022-01-14

## 2022-01-13 RX ORDER — PROPOFOL 10 MG/ML
INJECTION, EMULSION INTRAVENOUS AS NEEDED
Status: DISCONTINUED | OUTPATIENT
Start: 2022-01-13 | End: 2022-01-13

## 2022-01-13 RX ORDER — SODIUM CHLORIDE 9 MG/ML
INJECTION, SOLUTION INTRAVENOUS CONTINUOUS PRN
Status: DISCONTINUED | OUTPATIENT
Start: 2022-01-13 | End: 2022-01-13

## 2022-01-13 RX ORDER — LIDOCAINE HYDROCHLORIDE 10 MG/ML
INJECTION, SOLUTION EPIDURAL; INFILTRATION; INTRACAUDAL; PERINEURAL AS NEEDED
Status: DISCONTINUED | OUTPATIENT
Start: 2022-01-13 | End: 2022-01-13

## 2022-01-13 RX ORDER — PROPOFOL 10 MG/ML
INJECTION, EMULSION INTRAVENOUS CONTINUOUS PRN
Status: DISCONTINUED | OUTPATIENT
Start: 2022-01-13 | End: 2022-01-13

## 2022-01-13 RX ORDER — SODIUM CHLORIDE, SODIUM GLUCONATE, SODIUM ACETATE, POTASSIUM CHLORIDE, MAGNESIUM CHLORIDE, SODIUM PHOSPHATE, DIBASIC, AND POTASSIUM PHOSPHATE .53; .5; .37; .037; .03; .012; .00082 G/100ML; G/100ML; G/100ML; G/100ML; G/100ML; G/100ML; G/100ML
1000 INJECTION, SOLUTION INTRAVENOUS ONCE
Status: COMPLETED | OUTPATIENT
Start: 2022-01-13 | End: 2022-01-13

## 2022-01-13 RX ORDER — METRONIDAZOLE 500 MG/1
500 TABLET ORAL EVERY 8 HOURS SCHEDULED
Status: DISCONTINUED | OUTPATIENT
Start: 2022-01-13 | End: 2022-01-20

## 2022-01-13 RX ADMIN — LEVETIRACETAM 500 MG: 100 INJECTION, SOLUTION INTRAVENOUS at 17:18

## 2022-01-13 RX ADMIN — METRONIDAZOLE 500 MG: 500 TABLET ORAL at 22:16

## 2022-01-13 RX ADMIN — DEXTROSE AND SODIUM CHLORIDE 100 ML/HR: 5; .45 INJECTION, SOLUTION INTRAVENOUS at 10:09

## 2022-01-13 RX ADMIN — PANTOPRAZOLE SODIUM 40 MG: 40 INJECTION, POWDER, FOR SOLUTION INTRAVENOUS at 06:03

## 2022-01-13 RX ADMIN — SODIUM CHLORIDE: 0.9 INJECTION, SOLUTION INTRAVENOUS at 16:14

## 2022-01-13 RX ADMIN — HEPARIN SODIUM 5000 UNITS: 5000 INJECTION INTRAVENOUS; SUBCUTANEOUS at 22:13

## 2022-01-13 RX ADMIN — PROPOFOL 80 MG: 10 INJECTION, EMULSION INTRAVENOUS at 16:17

## 2022-01-13 RX ADMIN — PROPOFOL 80 MCG/KG/MIN: 10 INJECTION, EMULSION INTRAVENOUS at 16:17

## 2022-01-13 RX ADMIN — METOROPROLOL TARTRATE 5 MG: 5 INJECTION, SOLUTION INTRAVENOUS at 08:48

## 2022-01-13 RX ADMIN — SODIUM CHLORIDE, SODIUM GLUCONATE, SODIUM ACETATE, POTASSIUM CHLORIDE, MAGNESIUM CHLORIDE, SODIUM PHOSPHATE, DIBASIC, AND POTASSIUM PHOSPHATE 1000 ML: .53; .5; .37; .037; .03; .012; .00082 INJECTION, SOLUTION INTRAVENOUS at 06:22

## 2022-01-13 RX ADMIN — LEVETIRACETAM 500 MG: 100 INJECTION, SOLUTION INTRAVENOUS at 08:48

## 2022-01-13 RX ADMIN — HEPARIN SODIUM 5000 UNITS: 5000 INJECTION INTRAVENOUS; SUBCUTANEOUS at 06:03

## 2022-01-13 RX ADMIN — CEFTRIAXONE SODIUM 1000 MG: 10 INJECTION, POWDER, FOR SOLUTION INTRAVENOUS at 12:15

## 2022-01-13 RX ADMIN — DEXTROSE AND SODIUM CHLORIDE 125 ML/HR: 5; .45 INJECTION, SOLUTION INTRAVENOUS at 19:58

## 2022-01-13 RX ADMIN — LIDOCAINE HYDROCHLORIDE 50 MG: 10 INJECTION, SOLUTION EPIDURAL; INFILTRATION; INTRACAUDAL; PERINEURAL at 16:17

## 2022-01-13 NOTE — QUICK NOTE
Due to rising lactic acid and ongoing loose/liquid stools  Will consult GI for evaluation and possible flex-sig

## 2022-01-13 NOTE — OCCUPATIONAL THERAPY NOTE
Occupational Therapy Treatment Note:       01/13/22 1300   OT Last Visit   OT Visit Date 01/13/22   Note Type   Note Type Treatment   ADL   Where Assessed Edge of bed   Grooming Assistance 3  Moderate Assistance   Grooming Comments hair combing detangling   Toileting Assistance  2  Maximal Assistance   Toileting Comments pt stood inorder to sit on bedpan for liquid brown stook   Functional Standing Tolerance   Time p+ static  standing tolerence   Bed Mobility   Supine to Sit 3  Moderate assistance   Sit to Supine 3  Moderate assistance   Transfers   Sit to Stand 3  Moderate assistance   Additional items Assist x 1   Stand to Sit 3  Moderate assistance   Additional items Assist x 1   Cognition   Overall Cognitive Status Impaired   Arousal/Participation Responsive   Attention Attends with cues to redirect   Memory Decreased short term memory;Decreased recall of recent events;Decreased recall of precautions   Following Commands Follows one step commands without difficulty   Comments pt with improvement nof simple command following this session  she was noted to attempt to verbalize in sentences multiple times although is  difficult to understand    pt continues to remain confused   Activity Tolerance   Activity Tolerance Patient tolerated treatment well   Assessment   Assessment pt participated in am ot session and was seen focusing on sitting tolerence eob during toileting tasks, sit to from stand and standing tolerence for hygiene  pt continues with communication difficulty  speech is very difficult to understand  pt was able to follow most simple commands and could take steps towards the hob  pt with liquid brown stool this  session needing ta for hygiene  pt was able however to id need to deficate  pt tolerated over 20 mintues oob / eob / standing  pt engaged in 10 min hair combing activity needing asst for tangled back of hair  pt was noted to use mirror as well during task       Plan   Treatment Interventions ADL retraining;Functional transfer training;UE strengthening/ROM; Endurance training;Patient/family training;Equipment evaluation/education; Activityengagement   Goal Expiration Date 01/25/22   OT Treatment Day 1   OT Frequency 3-5x/wk   Recommendation   OT Discharge Recommendation Post acute rehabilitation services   OT - OK to Discharge Yes   AM-PAC Daily Activity Inpatient   Lower Body Dressing 2   Bathing 2   Toileting 2   Upper Body Dressing 3   Grooming 3   Eating 3   Daily Activity Raw Score 15   Daily Activity Standardized Score (Calc for Raw Score >=11) 34 69   AM-PAC Applied Cognition Inpatient   Following a Speech/Presentation 2   Understanding Ordinary Conversation 3   Taking Medications 2   Remembering Where Things Are Placed or Put Away 2   Remembering List of 4-5 Errands 2   Taking Care of Complicated Tasks 1   Applied Cognition Raw Score 12   Applied Cognition Standardized Score 28 82   April A Rojas Jarquin

## 2022-01-13 NOTE — PROGRESS NOTES
[address] [facility]  Progress Note - Revonda Silence 1934, 80 y o  female MRN: [id number]  Unit/Bed#: [facility] 604-01 Encounter: [id number]  Primary Care Provider: Demar Malik MD   Date and time admitted to hospital: 1/10/2022  4:35 PM    [address]  · Amalia Santiago on day prior to admission and day of admission, questionable LOC, coumadin daily  · Sustained the below stated injuries  · PT/OT recommending inpatient rehab  · CM following for disposition planning  Patient not medically stable for discharge at this time  * Subdural hematoma (HCC)  Assessment & Plan  · CTH 1/10: Small left frontal acute subdural hematoma with some intermixed chronic components, no greater than 8 mm in overall total thickness with minimal sulcal effacement in the frontal region but otherwise no significant mass effect  Specifically, no midline shift or narrowing of the ventricles  Minimal subdural hemorrhage seen along the anterior interhemispheric fissure, no greater than 1 mm in thickness   No parenchymal hemorrhage   No calvarial fracture  · Reversed with Kcentra on admission  INR improved from 3 74 to 1  15    · Repeat CTH x2: stable SDH  · Keppra for seizure prophylaxis for 7 days  · GCS 14 (4, 4, 6), non-focal  Swallow eval completed, recommending NPO  Awaiting call back from family regarding goals of care  If unable to get in touch today, will place NGT for tube feeds  · NSx: stable on CTH, hold AC/AP, f/u in 2 weeks with repeat scan at that time    Diarrhea  Assessment & Plan  · 2-3 week history of diarrhea  · 1/11: stool sample submitted for c diff, negative  Flagyl and PO vanc discontinued, per ID  Repeat C  Diff culture pending  · ID evaluation and recommendations appreciated  Further stool cultures sent  Patient continues to have multiple episodes of diarrhea  · CT A/P shows pan-colitis on 1/11     · Due to rising lactic acid despite 2 liters IVF bolus and mIVF as well as ongoing liquid bowel movements  Will consult GI to evaluate for flex-sig today  Abdominal exam is benign, will continue to monitor  Urinary retention  Assessment & Plan  - rothman catheter placed due to retention and strict I/O's   - will attempt voiding trial once ambulatory    Dysphagia  Assessment & Plan  - Speech cleared her for diet today, purred with NTL  Will keep NPO until after flex-sig then plan to start diet if able, per GI and results of flex-sig    - dysphagia likely related to confusion/altered mental status from brain injury which is improving    Hypernatremia  Assessment & Plan  - due to dehydration from multiple liquid bowel movements  - transition IVFs to D51/2 NSS today and repeat BMP at 4 pm      Altered mental status  Assessment & Plan  · Confusion likely multi factorial given age and concomitant TBI as well as diarrhea/dehydration  · Improving today, GCS 14, more alert and conversational, periods of lucidity as well, though speech is still garbled, so difficult to understand  · Afebrile  UA negative for signs of infection  · Spot EEG on 1/11 negative for seizure activity  · LFTs normal  · Afebrile, no leukocytosis  · Continue neuro checks and redirection as able  History of DVT (deep vein thrombosis)  Assessment & Plan  · History of RLE DVT and PE on 11/4/21  · B/l duplex: chronic DVT of left leg, no new DVT  · Home Coumadin held and reversed due to [address]  Started on [facility] for DVT ppx on 1/11  FRANK (acute kidney injury) ([facility] [address] )  Assessment & Plan  · Cr of 2 on admission  This is resolved, Cr this morning is 0 68  · IVF hydration to continue  Transition D5LR to D51/2NSS at 125 ml/hr due to rising sodium and ongoing liquid bowel movements, as hypernatremia likely due to hypovolemia     · Continue to follow with BMP, repeat in for 4 pm today  · Avoid nephrotoxic agents and hypotension        Disposition: continue step-down level care, flex-sigmoidoscopy today      SUBJECTIVE:  Chief Complaint: Patient with garbled speech, but attempting to tell me about recent hospitalization for pneumonia  Subjective: Patient is difficult to understand but she is more alert and conversant today  Appears comfortable  OBJECTIVE:     Meds/Allergies     Current Facility-Administered Medications:     amLODIPine (NORVASC) tablet 5 mg, 5 mg, Oral, Daily, Pito Suazo MD    cefTRIAXone (ROCEPHIN) 1,000 mg in dextrose 5 % 50 mL IVPB, 1,000 mg, Intravenous, Q24H, Teresa Dick MD    dextrose 5 % and sodium chloride 0 45 % infusion, 125 mL/hr, Intravenous, Continuous, Ena Claire PA-C, Last Rate: 125 mL/hr at 01/13/22 1017, 125 mL/hr at 01/13/22 1017    heparin (porcine) subcutaneous injection 5,000 Units, 5,000 Units, Subcutaneous, Q8H [facility], Yenny Stephens PA-C, 5,000 Units at 01/13/22 0603    levETIRAcetam (KEPPRA) 500 mg in sodium chloride 0 9 % 100 mL IVPB, 500 mg, Intravenous, BID, Pito Suazo MD, Last Rate: 400 mL/hr at 01/13/22 0848, 500 mg at 01/13/22 0848    LORazepam (ATIVAN) injection 0 5 mg, 0 5 mg, Intravenous, HS, Marco Islas MD    metoprolol (LOPRESSOR) injection 5 mg, 5 mg, Intravenous, BID, Marco Islas MD, 5 mg at 01/13/22 0848    metroNIDAZOLE (FLAGYL) tablet 500 mg, 500 mg, Oral, Q8H [facility], Teresa Dick MD    pantoprazole (PROTONIX) injection 40 mg, 40 mg, Intravenous, Q24H [facility], Marco Islas MD, 40 mg at 01/13/22 0603     Vitals:   Vitals:    01/13/22 0813   BP: 131/81   Pulse: (!) 111   Resp: 16   Temp:    SpO2: 96%       Intake/Output:  I/O       01/11 0701 01/12 0700 01/12 0701  01/13 0700 01/13 0701 01/14 0700    P  O  0 0     I V  (mL/kg) 1799 2 (33 8) 1680 (31 6)     IV Piggyback 100 100     Total Intake(mL/kg) 1899 2 (35 7) 1780 (33 5)     Urine (mL/kg/hr) 1150 (0 9) 331 (0 3)     Stool 0 0     Total Output 1150 331     Net +749 2 +1449            Unmeasured Urine Occurrence 5 x 5 x     Unmeasured Stool Occurrence 2 x 4 x            Nutrition/GI Proph/Bowel Reg: NPO    Physical Exam:   GENERAL APPEARANCE: NAD  NEURO: GCS 14 (4, 4,6), non-focal  HEENT: NCAT  CV: + increased rate 108 bpm, regular  No MGR  LUNGS: CTA bilaterally  GI: soft,non-tender, + mildly distended  : voiding small amounts  MSK: 1+ pitting edema bilateral lower extremities  No contusions or deformity  SKIN: pink,w arm, dry           Invasive Devices  Report    Peripheral Intravenous Line            Peripheral IV 01/13/22 Right Forearm <1 day          Drain            Urethral Catheter 16 Fr  <1 day                 Lab Results:   Results: I have personally reviewed pertinent reports   , BMP/CMP:   Lab Results   Component Value Date    SODIUM 153 (H) 01/13/2022    K 3 5 01/13/2022     (H) 01/13/2022    CO2 21 01/13/2022    BUN 28 (H) 01/13/2022    CREATININE 0 71 01/13/2022    CALCIUM 7 7 (L) 01/13/2022    AST 24 01/13/2022    ALT 26 01/13/2022    ALKPHOS 60 01/13/2022    EGFR 76 01/13/2022    and CBC:   Lab Results   Component Value Date    WBC 4 97 01/13/2022    HGB 10 2 (L) 01/13/2022    HCT 29 7 (L) 01/13/2022    MCV 83 01/13/2022     01/13/2022    MCH 28 3 01/13/2022    MCHC 34 3 01/13/2022    RDW 15 7 (H) 01/13/2022    MPV 9 3 01/13/2022 1/12 LA 3 6     Imaging/EKG Studies: Results: I have personally reviewed pertinent reports      Other Studies: no new  VTE Prophylaxis: Sequential compression device (Venodyne)  and Heparin

## 2022-01-13 NOTE — SPEECH THERAPY NOTE
Speech Language/Pathology    Speech/Language Pathology Progress Note    Patient Name: Bernnan Nelson  [id number] Date: 1/13/2022     Problem List  Principal Problem:    Subdural hematoma ([facility] [address] )  Active Problems:    Fall    Diarrhea    FRANK (acute kidney injury) ([facility] [address] )    History of DVT (deep vein thrombosis)    Altered mental status    Hypernatremia    Dysphagia       Past Medical History  Past Medical History:   Diagnosis Date    Anxiety     Disease of thyroid gland         Past Surgical History  Past Surgical History:   Procedure Laterality Date    CATARACT EXTRACTION Right     FOOT SURGERY      JOINT REPLACEMENT           Subjective:  Patient awake and alert  She continues with mostly nonsensical speech  Objective:  Oral care is provided via oral swabs and suction kit  Oral cavity is dry, but clear  Front teeth partial is already removed and soaking  The patient is trialed with honey thick liquids via tsp  She is able to close oral cavity for tsp, but does not transfer bolus  Instead she "swishes" bolus around oral cavity  Patient is then trialed with thin liquids  She is able to take via straw  Suck strength is min reduced  Oral control appears reduced, and swallow is audible but no additional s/s aspiration observed  She states "this is too fast" and is then trialed with honey thick liquids via straw  She appears to have improved oral control and transfer  The patient also takes ~4 tsp pudding  Oral transfer is prolonged, with minimal oral residue  However, with time all is cleared  The patient appears min SOB after trials, but O2 is stable and no other s/s aspiration observed  Assessment:  Patient tolerated trials well  She continues at risk of aspiration due to cognitive status  Plan/Recommendations:  Recommend to begin conservative diet of puree and honey thick liquids  Please feed patient  Crush medications  ST will continue

## 2022-01-13 NOTE — APP STUDENT NOTE
ROSENDA STUDENT  Inpatient Progress Note for TRAINING ONLY  Not Part of Legal Medical Record       Progress Note - Lauryn Alves 80 y o  female MRN: [id number]    Unit/Bed#: [facility] 604-01 Encounter: [id number]      Assessment & Plan:  Sy Woodson on day prior to admission and day of admission, unclear of LOC   - Sustained below stated injuries   - PT/OT consulted   - CM following for disposition planning  Subdural hematoma   - CTH 1/10: Small left frontal acute subdural hematoma with some intermixed chronic components, no greater than 8 mm in overall total thickness with minimal sulcal effacement in the frontal region but otherwise no significant mass effect  Specifically, no midline shift or narrowing of the ventricles  Minimal subdural hemorrhage seen along the anterior interhemispheric fissure, no greater than 1 mm in thickness   No parenchymal hemorrhage   No calvarial fracture  - On anticoagulation, reversed with kcentra on admission   - repeated CTH shows stable SDH   - GCS 15   -neurosurgery consulted   - stable CTH, holding AC/AP   - F/u with neurosurgery in 2 weeks w/ repeat CT scan at that time    Diarrhea   - 2-3 week hx of diarrhea   - was being tx outpt for c diff w/ vancomycin   - 1/11 stool sample sent for c diff PCR which was negative  Further stool samples sent via ID consult   - discontinued vancomycin and iv metronidazole   - CT A/P shows pancolitis  LA 2 7 ->3 6 after 2 separate 1 liter boluses  Continuing IVF hydration   - GI consulted for scope  FRANK   - Creatinine of 2 on admission   - resolved, 0 68 1/13   - Trending  Hypernatremia   - due to dehydration from diarrhea and effect of NSS   - transitioned to D5 half NSS   - Trending  Dysphagia   - Cognition has significantly improved but still nonsensical speech   - Speech therapy consulted, recommendations appreciated   - Trialed again today on 1/13, recommend to begin conservative diet of puree and honey thick liquids  Needs to be feed    AMS   - Likely multifactorial given age, TBI, and dehydration   - Improving today, more alert and conversational   - afebrile, no leukocytosis   - continue neuro checks  History of DVT   - hx of RLE DVT and PE on 11/04/21   - B/I duplex: chronic DVT of left leg, no new DVY   - Outpt coumadin held and reversed due to SDH   - Started on sqh for DVT ppx  Subjective:   Pt presents in her hospital bed  She is very lethargic but awakens with arousal  She is more alert today and engages in conversation, but her speech is incomprehensible and only being able to say a few sentences completely during the entire exam  She states she has no pain anywhere today  Objective:       Current Facility-Administered Medications:     amLODIPine (NORVASC) tablet 5 mg, 5 mg, Oral, Daily, Aliyah Mansfield MD    cefTRIAXone (ROCEPHIN) 1,000 mg in dextrose 5 % 50 mL IVPB, 1,000 mg, Intravenous, Q24H, Teresa Dick MD    dextrose 5 % and sodium chloride 0 45 % infusion, 125 mL/hr, Intravenous, Continuous, Ena Claire PA-C, Last Rate: 125 mL/hr at 01/13/22 1017, 125 mL/hr at 01/13/22 1017    heparin (porcine) subcutaneous injection 5,000 Units, 5,000 Units, Subcutaneous, Q8H [facility], Rosa Elena Pace PA-C, 5,000 Units at 01/13/22 0603    levETIRAcetam (KEPPRA) 500 mg in sodium chloride 0 9 % 100 mL IVPB, 500 mg, Intravenous, BID, Aliyah Mansfield MD, Last Rate: 400 mL/hr at 01/13/22 0848, 500 mg at 01/13/22 0848    LORazepam (ATIVAN) injection 0 5 mg, 0 5 mg, Intravenous, HS, Jesus Manuel Vieyra MD    metoprolol (LOPRESSOR) injection 5 mg, 5 mg, Intravenous, BID, Jesus Manuel Vieyra MD, 5 mg at 01/13/22 0848    metroNIDAZOLE (FLAGYL) tablet 500 mg, 500 mg, Oral, Q8H [facility], Teresa Dick MD    pantoprazole (PROTONIX) injection 40 mg, 40 mg, Intravenous, Q24H [facility], Jesus Manuel Vieyra MD, 40 mg at 01/13/22 0603    Vitals: Blood pressure 131/81, pulse (!) 111, temperature 97 5 °F (36 4 °C), resp  rate 16, weight 53 2 kg (117 lb 3 2 oz), SpO2 96 %  ,Body mass index is 24 49 kg/m²  Intake/Output Summary (Last 24 hours) at 1/13/2022 1131  Last data filed at 1/13/2022 3996  Gross per 24 hour   Intake 1780 ml   Output 331 ml   Net 1449 ml       Physical Exam: Physical Exam  Constitutional:       General: She is not in acute distress  Appearance: She is ill-appearing  HENT:      Head: Normocephalic  Right Ear: External ear normal       Left Ear: External ear normal       Nose: Nose normal       Mouth/Throat:      Mouth: Mucous membranes are dry  Pharynx: Oropharynx is clear  Eyes:      General:         Right eye: No discharge  Left eye: No discharge  Extraocular Movements: Extraocular movements intact  Pupils: Pupils are equal, round, and reactive to light  Cardiovascular:      Rate and Rhythm: Normal rate and regular rhythm  Pulses: Normal pulses  Heart sounds: Normal heart sounds  No murmur heard  No friction rub  No gallop  Pulmonary:      Effort: Pulmonary effort is normal  No respiratory distress  Breath sounds: Normal breath sounds  No stridor  No wheezing, rhonchi or rales  Abdominal:      General: Abdomen is flat  Bowel sounds are normal  There is no distension  Palpations: Abdomen is soft  Tenderness: There is no abdominal tenderness  There is no guarding  Skin:     General: Skin is warm and dry  Neurological:      Mental Status: She is alert  Invasive Devices  Report    Peripheral Intravenous Line            Peripheral IV 01/13/22 Right Forearm <1 day          Drain            Urethral Catheter 16 Fr  <1 day                Lab, Imaging and other studies: I have personally reviewed pertinent reports      VTE Pharmacologic Prophylaxis: Heparin  VTE Mechanical Prophylaxis: sequential compression device

## 2022-01-13 NOTE — ASSESSMENT & PLAN NOTE
- rothman catheter placed due to retention and strict I/O's   - will attempt voiding trial once ambulatory

## 2022-01-13 NOTE — PLAN OF CARE
Problem: OCCUPATIONAL THERAPY ADULT  Goal: Performs self-care activities at highest level of function for planned discharge setting  See evaluation for individualized goals  Description: Treatment Interventions: ADL retraining,Functional transfer training,Endurance training,Cognitive reorientation,Patient/family training,Equipment evaluation/education,Compensatory technique education,Energy conservation,Activityengagement          See flowsheet documentation for full assessment, interventions and recommendations  Outcome: Progressing  Note: Limitation: Decreased ADL status,Decreased Safe judgement during ADL,Decreased cognition,Decreased endurance,Decreased self-care trans,Decreased high-level ADLs  Prognosis: Fair,Guarded  Assessment: pt participated in am ot session and was seen focusing on sitting tolerence eob during toileting tasks, sit to from stand and standing tolerence for hygiene  pt continues with communication difficulty  speech is very difficult to understand  pt was able to follow most simple commands and could take steps towards the hob  pt with liquid brown stool this  session needing ta for hygiene  pt was able however to id need to deficate  pt tolerated over 20 mintues oob / eob / standing  pt engaged in 10 min hair combing activity needing asst for tangled back of hair  pt was noted to use mirror as well during task  Recommendation: Geriatric Consult  OT Discharge Recommendation: Post acute rehabilitation services  OT - OK to Discharge:  Yes     DAHLIA Olivas

## 2022-01-13 NOTE — ANESTHESIA PREPROCEDURE EVALUATION
Procedure:  COLONOSCOPY    Relevant Problems   ANESTHESIA (within normal limits)      CARDIO   (+) Cardiac arrhythmia   (+) DVT (deep venous thrombosis) (HCC)   (+) Essential hypertension   (+) Mixed hyperlipidemia      ENDO   (+) Acquired hypothyroidism      GI/HEPATIC   (+) Dysphagia   (+) Paraesophageal hernia      /RENAL   (+) FRANK (acute kidney injury) ([facility] [address] )      GYN (within normal limits)      HEMATOLOGY   (+) Anemia      MUSCULOSKELETAL (within normal limits)      NEURO/PSYCH   (+) Generalized anxiety disorder   (+) Headache   (+) History of DVT (deep vein thrombosis)   (+) Subdural hematoma (HCC)      PULMONARY   (+) Pneumonia        Physical Exam    Airway    Mallampati score: III  TM Distance: >3 FB       Dental       Cardiovascular  Rhythm: regular, Rate: normal, No murmur,     Pulmonary  Breath sounds clear to auscultation,     Other Findings        Anesthesia Plan  ASA Score- 3     Anesthesia Type- IV sedation with anesthesia with ASA Monitors  Additional Monitors:   Airway Plan:           Plan Factors-Exercise tolerance (METS): <4 METS  Chart reviewed  EKG reviewed  Existing labs reviewed  Patient summary reviewed  Patient is not a current smoker  Induction- intravenous  Postoperative Plan-     Informed Consent- Anesthetic plan and risks discussed with son and healthcare power of   I personally reviewed this patient with the CRNA  Discussed and agreed on the Anesthesia Plan with the CRNA  Dara Soulier

## 2022-01-13 NOTE — ASSESSMENT & PLAN NOTE
- Speech cleared her for diet today, purred with NTL   Will keep NPO until after flex-sig then plan to start diet if able, per GI and results of flex-sig    - dysphagia likely related to confusion/altered mental status from brain injury which is improving

## 2022-01-13 NOTE — ASSESSMENT & PLAN NOTE
· Cr of 2 on admission  This is resolved, Cr this morning is 0 68  · IVF hydration to continue  Transition D5LR to D51/2NSS at 125 ml/hr due to rising sodium and ongoing liquid bowel movements, as hypernatremia likely due to hypovolemia     · Continue to follow with BMP, repeat in for 4 pm today  · Avoid nephrotoxic agents and hypotension

## 2022-01-13 NOTE — ANESTHESIA POSTPROCEDURE EVALUATION
Post-Op Assessment Note    CV Status:  Stable    Pain management: adequate     Mental Status:  Sleepy and arousable   Hydration Status:  Euvolemic   PONV Controlled:  Controlled   Airway Patency:  Patent      Post Op Vitals Reviewed: Yes      Staff: Anesthesiologist         There were no known complications for this encounter      /58 (01/13/22 1649)    Temp 98 5 °F (36 9 °C) (01/13/22 1649)    Pulse 79 (01/13/22 1649)   Resp 20 (01/13/22 1649)    SpO2 98 % (01/13/22 1649)

## 2022-01-13 NOTE — CASE MANAGEMENT
Case Management Discharge Planning Note    Patient name Meghann Osei  Location [address] 413-08 MRN [id number]  : 1934 Date 2022       Current Admission Date: 1/10/2022  Current Admission Diagnosis:Subdural hematoma [facility])   Patient Active Problem List    Diagnosis Date Noted    Urinary retention 2022    Hypernatremia 2022    Dysphagia 2022    Fall 2022    Subdural hematoma ([facility] [address] [facility] 2022    Diarrhea 2022    FRANK (acute kidney injury) ([facility] [address] ) 2022    History of DVT (deep vein thrombosis) 2022    Altered mental status 2022    Cardiac arrhythmia 2022    Chronic anticoagulation 2021    Anemia 2021    DVT (deep venous thrombosis) ([facility] [address] ) 2021    Pulmonary HTN ([facility] [address] ) 2021    Paraesophageal hernia 2021    Acute pulmonary embolism with acute cor pulmonale ([facility] [address] ) 2021    Severe sepsis ([facility] [address] ) 2021    Pneumonia 2021    Elevated troponin 2021    BMI 26 0-26 9,adult 2019    Cortical age-related cataract of left eye 05/10/2019    Pre-op exam 05/10/2019    Medicare annual wellness visit, subsequent 2018    Intertrigo 2018    External hemorrhoids 2017    Acquired hypothyroidism 10/26/2012    Generalized anxiety disorder 10/24/2012    Headache 10/24/2012    Mixed hyperlipidemia 10/24/2012    Essential hypertension 10/24/2012      LOS (days): 3  Geometric Mean LOS (GMLOS) (days): 4 60  Days to GMLOS:1 8     OBJECTIVE:  Risk of Unplanned Readmission Score: 22         Current admission status: Inpatient   Preferred Pharmacy:   [address]  [address]  [address]    [facility] [address]  Phone: [phone] Fax: [phone]    Primary Care Provider: Joann Allen MD    Primary Insurance: Bhavna Brown  [address] REP  Secondary Insurance:     DISCHARGE DETAILS:                      Pt clinically accepted to [facility] SNF, once medically stable  Cm left two voicemails for pt's son Shyla Haddad to inform him of this   Pt not medically stable at this time

## 2022-01-13 NOTE — NUTRITION
01/13/22 1510   Adequacy of Intake   Estimated calorie intake compared to estimated need NPO per SLP recommendation; Inability for PO at this time; Not meeting est needs   Recommendations/Interventions   Nutrition Recommendations Tube feeding recs provided; Initiate EN  (TF Recs: Jevity 1 0 @ 60ml/hr + 2pkt of Banatrol (80kcal) - Provides: 1479kcal, 58g PRO, 1102ml H2O + Additional H2O Flush of 40ml every 4hrs (Total H2O = 1342ml);  Start at 20ml/hr, Advance 10ml every 4hrs till goal rate (60ml/hr) is reached)

## 2022-01-13 NOTE — ASSESSMENT & PLAN NOTE
· Krystal Kim on day prior to admission and day of admission, questionable LOC, coumadin daily  · Sustained the below stated injuries  · PT/OT recommending inpatient rehab  · CM following for disposition planning  Patient not medically stable for discharge at this time

## 2022-01-13 NOTE — ASSESSMENT & PLAN NOTE
- due to dehydration from multiple liquid bowel movements  - transition IVFs to D51/2 NSS today and repeat BMP at 4 pm

## 2022-01-13 NOTE — ASSESSMENT & PLAN NOTE
· Confusion likely multi factorial given age and concomitant TBI as well as diarrhea/dehydration  · Improving today, GCS 14, more alert and conversational, periods of lucidity as well, though speech is still garbled, so difficult to understand  · Afebrile  UA negative for signs of infection  · Spot EEG on 1/11 negative for seizure activity  · LFTs normal  · Afebrile, no leukocytosis  · Continue neuro checks and redirection as able

## 2022-01-13 NOTE — ASSESSMENT & PLAN NOTE
· 2-3 week history of diarrhea  · 1/11: stool sample submitted for c diff, negative  Flagyl and PO vanc discontinued, per ID  Repeat C  Diff culture pending  · ID evaluation and recommendations appreciated  Further stool cultures sent  Patient continues to have multiple episodes of diarrhea  · CT A/P shows pan-colitis on 1/11  · Due to rising lactic acid despite 2 liters IVF bolus and mIVF as well as ongoing liquid bowel movements  Will consult GI to evaluate for flex-sig today  Abdominal exam is benign, will continue to monitor

## 2022-01-13 NOTE — PROGRESS NOTES
Progress Note - Infectious Disease   Rose Habermann 80 y o  female MRN: [id number]  Unit/Bed#: [facility] 604-01 Encounter: [id number]      Impression/Plan:    1  Diarrhea, colitis   Diarrhea occurring for 2-3 weeks prior to admission  Concern by outpatient PCP and GI about C Dif colitis given treatment for pneumonia with antibiotics in November, however stool testing was not completed  C Dif PCR here is negative and CT A/P shows pancolitis  She did have elevated bands on initial CBC with diff, but has no fevers and is hemodynamically stable  C Dif PCR testing is very sensitive and false negative tests are extremely rare so low suspicion she has CDif, but given clinical picture will repeat testing  Also consider ischemic colitis given atherosclerosis, dehydration and relative hypotension on admission  Lactate remains elevated despite IVF  Consider another infectious cause of diarrhea               -given worsening lactate and imaging findings, start systemic antibiotics for colitis with Ceftriaxone 1g IV daily and metronidazole 500mg PO q8hr   -recommend GI evaluation and consideration of flex sigmoidoscopy for further evaluation  If pseudomembranes are seen would restart treatment for CDif               -repeat C Dif PCR              -follow up stool enteric panel, giardia EIA and stool O and P              -trend lactate              -recheck CBC with Diff tomorrow              -supportive care for diarrhea with IVF                   2  Acute subdural hematoma  Occurred after a fall  Patient chronically on coumadin, elevated INR on admission  Given Kcentra  Repeat CTH x 2 stable  -neurosurgery following              -monitor mental status     3  Fall  Complicated by SDH  Patient with diarrhea and dehydration prior to admission      4  FRANK  Present on admission, improved with IVF  -continue to monitor creatinine     5  Encephalopathy  Likely due to TBI/SDH and dehydration, hyponatremia   Spot EEG on  negative for seizure activity  UA with 1-2 WBC not consistent with infection, COVID PCR negative  Mental status somewhat improved today  -UA not consistent with UTI              -monitor mental status     I have discussed the above management plan in detail with the Trauma surgery PA  ID will follow  Antibiotics:  Off antibiotics     Subjective:  Patient more awake today, still having multiple daily episodes of diarrhea  Having abdominal pain only with palpation  Cleared for oral intake by speech today  No fever, chills  Lactate continues to increase up to 3 6 today despite IVF  Objective:  Vitals:  Temp:  [97 5 °F (36 4 °C)-98 4 °F (36 9 °C)] 97 5 °F (36 4 °C)  HR:  [] 100  Resp:  [16-18] 18  BP: (105-134)/(63-96) 134/77  SpO2:  [94 %-97 %] 96 %  Temp (24hrs), Av °F (36 7 °C), Min:97 5 °F (36 4 °C), Max:98 4 °F (36 9 °C)  Current: Temperature: 97 5 °F (36 4 °C)    Physical Exam:   General Appearance:  Frail appearing, more awake today, no distress   Throat: Oropharynx moist without lesions  Lungs:   Clear to auscultation bilaterally; no wheezes, rhonchi or rales; respirations unlabored   Heart:  RRR; no murmur, rub or gallop   Abdomen:   Soft, non-distended, positive bowel sounds  Diffuse tenderness to deep palpation   Extremities: No clubbing, cyanosis or edema   Skin: No new rashes or lesions  No draining wounds noted  Neuro: more awake today, moving extremities spontaneously    Labs:    All pertinent labs and imaging studies were personally reviewed  Results from last 7 days   Lab Units 22  04422  0341   WBC Thousand/uL 4 97 4 67 4 27*   HEMOGLOBIN g/dL 10 2* 11 8 11 7   PLATELETS Thousands/uL 358 398* 364     Results from last 7 days   Lab Units 22  0441 22  1401 22  1401 22  0447 22  0447 01/10/22  1915 01/10/22  1323 22  1117 22  1117   SODIUM mmol/L 153*  --  148*  --  146*   < > 132*   < > 131* POTASSIUM mmol/L 3 5   < > 3 4*   < > 3 6   < > 4 2   < > 3 3*   CHLORIDE mmol/L 125*   < > 119*   < > 116*   < > 97*   < > 95*   CO2 mmol/L 21   < > 19*   < > 20*   < > 25   < > 23   BUN mg/dL 28*   < > 31*   < > 34*   < > 53*   < > 19   CREATININE mg/dL 0 71   < > 0 84   < > 0 78   < > 2 02*   < > 1 05   EGFR ml/min/1 73sq m 76   < > 62   < > 68   < > 21   < > 47   CALCIUM mg/dL 7 7*   < > 8 2*   < > 7 7*   < > 8 4   < > 8 7   AST U/L 24  --   --   --   --   --  55*  --  48*   ALT U/L 26  --   --   --   --   --  49   < > 54   ALK PHOS U/L 60  --   --   --   --   --  76   < > 75    < > = values in this interval not displayed  Results from last 7 days   Lab Units 01/07/22  1117   CRP mg/L 204 5*               Micro:  Results from last 7 days   Lab Units 01/11/22  0751   C DIFF TOXIN B BY PCR  Negative       Imaging:  I have personally reviewed pertinent imaging study reports and images in PACS      CT A/P 1/12/22: pancolitis, most pronounced in the hepatic flexure and proximal to mid transverse colon        Other Studies:   I have personally reviewed pertinent reports

## 2022-01-13 NOTE — CONSULTS
[facility] - Inpatient Consultation  Дмитрий Cuellar 80 y o  female MRN: [id number]  Unit/Bed#: [facility] 604-01 Encounter: [id number]    Reason for Consult / Principal Problem:     Diarrhea, pancolitis    ASSESSMENT & PLAN:      25-year-old female with hypertension, hyperlipidemia, history of pulmonary embolism and DVT previously on anticoagulation who presented following a fall with findings of a subdural hematoma  GI consultation requested for evaluation of diarrhea and pancolitis seen on CT  1  Diarrhea, pancolitis - low suspicion for C diff colitis given recent negative test on 01/11  I suspect that her preceding diarrhea symptoms were secondary to an acute viral gastroenteritis and 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 which may have resulted in dehydration and subsequent orthostasis which may have been the reason for her fall  I suspect the findings on CT are suggestive of ischemic colitis which would cause mucosal sloughing and bloody stools as well although an infectious colitis is not completely ruled out  Low suspicion for IBD  Other etiologies include possible microscopic colitis although less likely    · Called patient's son/BARB Guerrier) and had extensive discussion regarding current clinical status and plan for colonoscopy versus flexible sigmoidoscopy and approach to preparation  · Given findings of colitis primarily involving the hepatic flexure and proximal transverse colon, I believe that a flexible sigmoidoscopy would be of low yield  · Patient would benefit from a full colonoscopy given suspicion for underlying ischemic colitis; unfortunately, given her current clinical status, patient unlikely to be able to safely tolerate a bowel prep for colonoscopy  · Offered option of placing NG tube and administering bowel prep, however, son would like to avoid this for patient's comfort  · Alternatively, I offered the option of administering tap water enemas and proceeding with colonoscopy with the understanding that the evaluation may be limited; patient's son prefers this option  · Plan for unprepped colonoscopy with anesthesia this afternoon with tap water enema x2 with goal of evaluating for cause of diarrhea and area noted on CT with biopsies  · Keep NPO for procedure today  · Repeat C diff testing per ID  · Antibiotics per primary team  · Resume diet postprocedure per speech therapy recommendations with added lactose restriction  · Check celiac antibody panel  · IV fluid hydration and supportive care per primary team    2  Subdural hematoma, dysphagia - secondary to fall while on anticoagulation  INR now normalized after correction  Subsequent dysphagia and dysarthria secondary to her neurologic injury  · Management per primary team   · GI recommendations otherwise as noted above  · Speech therapy following; resume dysphagia diet per speech therapy recommendations post-procedure with added lactose restriction    ______________________________________________________________________    HISTORY OF PRESENT ILLNESS:  55-year-old female with hypertension, hyperlipidemia, history of pulmonary embolism and DVT previously on anticoagulation who presented following a fall to SL upper buttocks  Patient was found to have subdural hematoma and was subsequently transferred to [facility] under the Trauma Service  Initial workup also included findings of pancolitis primarily involving the hepatic flexure and proximal to mid transverse colon on CT imaging  Patient reportedly has been suffering from diarrhea for the past few weeks prior to her fall  GI consultation was requested for evaluation of diarrhea with need for possible endoscopic evaluation  Prior to hospitalization, patient had seen GI in the office for evaluation of diarrhea and low appetite  Patient reportedly had 2 weeks of diarrhea associated with significant loss of appetite and subsequent weight loss    Patient had tried Imodium and Lomotil without success  Patient's PCP had prescribed metronidazole empirically for possible underlying C diff infection due to recent hospitalization in November while she was on antibiotics for pneumonia  There was question of possible blood in her stools although not clear she was having actual rectal bleeding  During that office visit, patient reported that she had abdominal pain previously but that had resolved  During her hospitalization, she has continued to have diarrhea and reported bloody stools  Patient unable to provide any information regarding HPI due to incompetency secondary to subdural hematoma  Majority of information regarding HPI obtained from review of medical chart  REVIEW OF SYSTEMS:    Unable to obtain  Patient is incompetent due to subdural hematoma      Historical Information   Past Medical History:   Diagnosis Date    Anxiety     Disease of thyroid gland      Past Surgical History:   Procedure Laterality Date    CATARACT EXTRACTION Right     FOOT SURGERY      JOINT REPLACEMENT       Social History   Social History     Substance and Sexual Activity   Alcohol Use No     Social History     Substance and Sexual Activity   Drug Use No     Social History     Tobacco Use   Smoking Status Never Smoker   Smokeless Tobacco Never Used     Family History   Problem Relation Age of Onset    No Known Problems Mother     Stroke Father     Colon cancer Neg Hx     Colon polyps Neg Hx        Meds/Allergies   Medications Prior to Admission   Medication    amLODIPine (NORVASC) 10 mg tablet    cholestyramine (QUESTRAN) 4 g packet    diphenoxylate-atropine (LOMOTIL) 2 5-0 025 mg per tablet    LORazepam (ATIVAN) 1 mg tablet    metoprolol tartrate (LOPRESSOR) 50 mg tablet    Multiple Vitamins-Minerals (VITEYES AREDS ADVANCED PO)    pantoprazole (PROTONIX) 40 mg tablet    vancomycin (VANCOCIN) 125 MG capsule    warfarin (Coumadin) 1 mg tablet     Current Facility-Administered Medications   Medication Dose Route Frequency    amLODIPine (NORVASC) tablet 5 mg  5 mg Oral Daily    cefTRIAXone (ROCEPHIN) 1,000 mg in dextrose 5 % 50 mL IVPB  1,000 mg Intravenous Q24H    dextrose 5 % and sodium chloride 0 45 % infusion  125 mL/hr Intravenous Continuous    heparin (porcine) subcutaneous injection 5,000 Units  5,000 Units Subcutaneous Q8H [facility]    levETIRAcetam (KEPPRA) 500 mg in sodium chloride 0 9 % 100 mL IVPB  500 mg Intravenous BID    LORazepam (ATIVAN) injection 0 5 mg  0 5 mg Intravenous HS    metoprolol (LOPRESSOR) injection 5 mg  5 mg Intravenous BID    metroNIDAZOLE (FLAGYL) tablet 500 mg  500 mg Oral Q8H [facility]    pantoprazole (PROTONIX) injection 40 mg  40 mg Intravenous Q24H [facility]     Allergies   Allergen Reactions    Preservision Areds [B-Plex Plus] Tremor    Tramadol      Reaction Date: 22Aug2011;        PHYSICAL EXAM:      Objective   Blood pressure 134/77, pulse 100, temperature 97 5 °F (36 4 °C), resp  rate 18, weight 53 2 kg (117 lb 3 2 oz), SpO2 96 %  Body mass index is 24 49 kg/m²  Intake/Output Summary (Last 24 hours) at 1/13/2022 1157  Last data filed at 1/13/2022 5767  Gross per 24 hour   Intake 1780 ml   Output 331 ml   Net 1449 ml       General Appearance:   Alert, frail, elderly female   HEENT:   Normocephalic, anicteric, small abrasion on right forehead   Neck:   Supple, symmetrical, trachea midline   Lungs:   Equal chest rise, respirations unlabored    Heart:   Regular rhythm, tachycardic   Abdomen:   Soft, non-tender, non-distended; normal bowel sounds; no masses, no organomegaly    Rectal:   Deferred    Extremities:   No cyanosis, clubbing or edema    Neuro:    Moves all 4 extremities    Skin:   No jaundice, rashes, or lesions      LAB RESULTS:     Admission on 01/10/2022   Component Date Value    Sodium 01/10/2022 140     Potassium 01/10/2022 3 2*    Chloride 01/10/2022 111*    CO2 01/10/2022 18*    ANION GAP 01/10/2022 11     BUN 01/10/2022 44*    Creatinine 01/10/2022 1 06     Glucose 01/10/2022 92     Calcium 01/10/2022 6 5*    eGFR 01/10/2022 47     WBC 01/10/2022 4 86     RBC 01/10/2022 4 10     Hemoglobin 01/10/2022 11 5     Hematocrit 01/10/2022 33 8*    MCV 01/10/2022 82     MCH 01/10/2022 28 0     MCHC 01/10/2022 34 0     RDW 01/10/2022 15 5*    MPV 01/10/2022 10 6     Platelets 66/37/3592 318     nRBC 01/10/2022 0     Protime 01/11/2022 14 3     INR 01/11/2022 1 15     PTT 01/11/2022 23     ABO Grouping 01/10/2022 A     Rh Factor 01/10/2022 Positive     Antibody Screen 01/10/2022 Negative     Specimen Expiration Date 01/10/2022 05146760     SARS-CoV-2 01/10/2022 Negative     INFLUENZA A PCR 01/10/2022 Negative     INFLUENZA B PCR 01/10/2022 Negative     RSV PCR 01/10/2022 Negative     Sodium 01/11/2022 137     Potassium 01/11/2022 3 9     Chloride 01/11/2022 106     CO2 01/11/2022 21     ANION GAP 01/11/2022 10     BUN 01/11/2022 53*    Creatinine 01/11/2022 1 20     Glucose 01/11/2022 111     Calcium 01/11/2022 8 3     eGFR 01/11/2022 40     WBC 01/11/2022 4 27*    RBC 01/11/2022 4 19     Hemoglobin 01/11/2022 11 7     Hematocrit 01/11/2022 35 0     MCV 01/11/2022 84     MCH 01/11/2022 27 9     MCHC 01/11/2022 33 4     RDW 01/11/2022 15 3*    MPV 01/11/2022 9 7     Platelets 78/15/6794 364      C difficile toxin by PC* 01/11/2022 Negative     Ventricular Rate 01/10/2022 105     Atrial Rate 01/10/2022 300     QRSD Interval 01/10/2022 70     QT Interval 01/10/2022 312     QTC Interval 01/10/2022 412     QRS Axis 01/10/2022 62     T Wave Axis 01/10/2022 80     Color, UA 01/11/2022 Yellow     Clarity, UA 01/11/2022 Clear     Specific Gravity, UA 01/11/2022 1 020     pH, UA 01/11/2022 6 0     Leukocytes, UA 01/11/2022 Negative     Nitrite, UA 01/11/2022 Negative     Protein, UA 01/11/2022 Trace*    Glucose, UA 01/11/2022 Negative     Ketones, UA 01/11/2022 15 (1+)*    Urobilinogen, UA 01/11/2022 0 2     Bilirubin, UA 01/11/2022 Negative     Blood, UA 01/11/2022 Moderate     RBC, UA 01/11/2022 4-10*    WBC, UA 01/11/2022 1-2*    Epithelial Cells 01/11/2022 None Seen     Bacteria, UA 01/11/2022 Moderate*    OTHER CRYSTALS 01/11/2022 Occasional     OTHER OBSERVATIONS 01/11/2022 Yeast Cells Present     WBC 01/12/2022 4 67     RBC 01/12/2022 4 22     Hemoglobin 01/12/2022 11 8     Hematocrit 01/12/2022 35 4     MCV 01/12/2022 84     MCH 01/12/2022 28 0     MCHC 01/12/2022 33 3     RDW 01/12/2022 15 8*    MPV 01/12/2022 10 0     Platelets 74/41/4001 398*    Sodium 01/12/2022 146*    Potassium 01/12/2022 3 6     Chloride 01/12/2022 116*    CO2 01/12/2022 20*    ANION GAP 01/12/2022 10     BUN 01/12/2022 34*    Creatinine 01/12/2022 0 78     Glucose 01/12/2022 104     Calcium 01/12/2022 7 7*    eGFR 01/12/2022 68     LACTIC ACID 01/12/2022 2 7*    Sodium 01/12/2022 148*    Potassium 01/12/2022 3 4*    Chloride 01/12/2022 119*    CO2 01/12/2022 19*    ANION GAP 01/12/2022 10     BUN 01/12/2022 31*    Creatinine 01/12/2022 0 84     Glucose 01/12/2022 177*    Calcium 01/12/2022 8 2*    eGFR 01/12/2022 62     LACTIC ACID 01/12/2022 2 2*    WBC 01/13/2022 4 97     RBC 01/13/2022 3 60*    Hemoglobin 01/13/2022 10 2*    Hematocrit 01/13/2022 29 7*    MCV 01/13/2022 83     MCH 01/13/2022 28 3     MCHC 01/13/2022 34 3     RDW 01/13/2022 15 7*    MPV 01/13/2022 9 3     Platelets 03/52/9255 358     LACTIC ACID 01/13/2022 2 8*    Sodium 01/13/2022 153*    Potassium 01/13/2022 3 5     Chloride 01/13/2022 125*    CO2 01/13/2022 21     ANION GAP 01/13/2022 7     BUN 01/13/2022 28*    Creatinine 01/13/2022 0 71     Glucose 01/13/2022 203*    Calcium 01/13/2022 7 7*    Corrected Calcium 01/13/2022 9 7     AST 01/13/2022 24     ALT 01/13/2022 26     Alkaline Phosphatase 01/13/2022 60     Total Protein 01/13/2022 5 1*    Albumin 01/13/2022 1 5*    Total Bilirubin 01/13/2022 0 75     eGFR 01/13/2022 76     LACTIC ACID 01/13/2022 3 6*       RADIOLOGY RESULTS: I have personally reviewed pertinent imaging studies  ROSY Valdes  Gastroenterology Fellow  [facility]  Available on [email or web address]

## 2022-01-14 PROBLEM — K52.9 COLITIS: Status: ACTIVE | Noted: 2022-01-14

## 2022-01-14 LAB
ANION GAP SERPL CALCULATED.3IONS-SCNC: 7 MMOL/L (ref 4–13)
ANION GAP SERPL CALCULATED.3IONS-SCNC: 8 MMOL/L (ref 4–13)
ANISOCYTOSIS BLD QL SMEAR: PRESENT
ARTIFACT: PRESENT
BASOPHILS # BLD MANUAL: 0 THOUSAND/UL (ref 0–0.1)
BASOPHILS NFR MAR MANUAL: 0 % (ref 0–1)
BUN SERPL-MCNC: 16 MG/DL (ref 5–25)
BUN SERPL-MCNC: 18 MG/DL (ref 5–25)
BURR CELLS BLD QL SMEAR: PRESENT
C DIFF TOX A+B STL QL IA: NEGATIVE
C DIFF TOX GENS STL QL NAA+PROBE: POSITIVE
CALCIUM SERPL-MCNC: 7.3 MG/DL (ref 8.3–10.1)
CALCIUM SERPL-MCNC: 7.5 MG/DL (ref 8.3–10.1)
CHLORIDE SERPL-SCNC: 120 MMOL/L (ref 100–108)
CHLORIDE SERPL-SCNC: 122 MMOL/L (ref 100–108)
CO2 SERPL-SCNC: 19 MMOL/L (ref 21–32)
CO2 SERPL-SCNC: 21 MMOL/L (ref 21–32)
CREAT SERPL-MCNC: 0.81 MG/DL (ref 0.6–1.3)
CREAT SERPL-MCNC: 0.83 MG/DL (ref 0.6–1.3)
EOSINOPHIL # BLD MANUAL: 0 THOUSAND/UL (ref 0–0.4)
EOSINOPHIL NFR BLD MANUAL: 0 % (ref 0–6)
ERYTHROCYTE [DISTWIDTH] IN BLOOD BY AUTOMATED COUNT: 16.1 % (ref 11.6–15.1)
GFR SERPL CREATININE-BSD FRML MDRD: 63 ML/MIN/1.73SQ M
GFR SERPL CREATININE-BSD FRML MDRD: 65 ML/MIN/1.73SQ M
GLUCOSE SERPL-MCNC: 175 MG/DL (ref 65–140)
GLUCOSE SERPL-MCNC: 228 MG/DL (ref 65–140)
HCT VFR BLD AUTO: 29 % (ref 34.8–46.1)
HGB BLD-MCNC: 9.9 G/DL (ref 11.5–15.4)
LACTATE SERPL-SCNC: 2.3 MMOL/L (ref 0.5–2)
LACTATE SERPL-SCNC: 2.8 MMOL/L (ref 0.5–2)
LACTATE SERPL-SCNC: 4.2 MMOL/L (ref 0.5–2)
LYMPHOCYTES # BLD AUTO: 0.41 THOUSAND/UL (ref 0.6–4.47)
LYMPHOCYTES # BLD AUTO: 7 % (ref 14–44)
MAGNESIUM SERPL-MCNC: 2 MG/DL (ref 1.6–2.6)
MCH RBC QN AUTO: 28.2 PG (ref 26.8–34.3)
MCHC RBC AUTO-ENTMCNC: 34.1 G/DL (ref 31.4–37.4)
MCV RBC AUTO: 83 FL (ref 82–98)
METAMYELOCYTES NFR BLD MANUAL: 2 % (ref 0–1)
MONOCYTES # BLD AUTO: 0.35 THOUSAND/UL (ref 0–1.22)
MONOCYTES NFR BLD: 6 % (ref 4–12)
MYELOCYTES NFR BLD MANUAL: 2 % (ref 0–1)
NEUTROPHILS # BLD MANUAL: 4.82 THOUSAND/UL (ref 1.85–7.62)
NEUTS BAND NFR BLD MANUAL: 13 % (ref 0–8)
NEUTS SEG NFR BLD AUTO: 70 % (ref 43–75)
NRBC BLD AUTO-RTO: 1 /100 WBC (ref 0–2)
PHOSPHATE SERPL-MCNC: 0.9 MG/DL (ref 2.3–4.1)
PLATELET # BLD AUTO: 314 THOUSANDS/UL (ref 149–390)
PLATELET BLD QL SMEAR: ADEQUATE
PMV BLD AUTO: 9.8 FL (ref 8.9–12.7)
POIKILOCYTOSIS BLD QL SMEAR: PRESENT
POLYCHROMASIA BLD QL SMEAR: PRESENT
POTASSIUM SERPL-SCNC: 3.1 MMOL/L (ref 3.5–5.3)
POTASSIUM SERPL-SCNC: 3.4 MMOL/L (ref 3.5–5.3)
RBC # BLD AUTO: 3.51 MILLION/UL (ref 3.81–5.12)
RBC MORPH BLD: PRESENT
SODIUM SERPL-SCNC: 148 MMOL/L (ref 136–145)
SODIUM SERPL-SCNC: 149 MMOL/L (ref 136–145)
TARGETS BLD QL SMEAR: PRESENT
WBC # BLD AUTO: 5.81 THOUSAND/UL (ref 4.31–10.16)

## 2022-01-14 PROCEDURE — 87209 SMEAR COMPLEX STAIN: CPT | Performed by: STUDENT IN AN ORGANIZED HEALTH CARE EDUCATION/TRAINING PROGRAM

## 2022-01-14 PROCEDURE — 99232 SBSQ HOSP IP/OBS MODERATE 35: CPT | Performed by: EMERGENCY MEDICINE

## 2022-01-14 PROCEDURE — 82784 ASSAY IGA/IGD/IGG/IGM EACH: CPT | Performed by: STUDENT IN AN ORGANIZED HEALTH CARE EDUCATION/TRAINING PROGRAM

## 2022-01-14 PROCEDURE — 99232 SBSQ HOSP IP/OBS MODERATE 35: CPT | Performed by: INTERNAL MEDICINE

## 2022-01-14 PROCEDURE — 87329 GIARDIA AG IA: CPT | Performed by: STUDENT IN AN ORGANIZED HEALTH CARE EDUCATION/TRAINING PROGRAM

## 2022-01-14 PROCEDURE — NC001 PR NO CHARGE: Performed by: STUDENT IN AN ORGANIZED HEALTH CARE EDUCATION/TRAINING PROGRAM

## 2022-01-14 PROCEDURE — C9113 INJ PANTOPRAZOLE SODIUM, VIA: HCPCS | Performed by: EMERGENCY MEDICINE

## 2022-01-14 PROCEDURE — 84100 ASSAY OF PHOSPHORUS: CPT | Performed by: PHYSICIAN ASSISTANT

## 2022-01-14 PROCEDURE — 83605 ASSAY OF LACTIC ACID: CPT | Performed by: PHYSICIAN ASSISTANT

## 2022-01-14 PROCEDURE — 85007 BL SMEAR W/DIFF WBC COUNT: CPT | Performed by: PHYSICIAN ASSISTANT

## 2022-01-14 PROCEDURE — 83735 ASSAY OF MAGNESIUM: CPT | Performed by: PHYSICIAN ASSISTANT

## 2022-01-14 PROCEDURE — 86364 TISS TRNSGLTMNASE EA IG CLAS: CPT | Performed by: STUDENT IN AN ORGANIZED HEALTH CARE EDUCATION/TRAINING PROGRAM

## 2022-01-14 PROCEDURE — 99232 SBSQ HOSP IP/OBS MODERATE 35: CPT | Performed by: STUDENT IN AN ORGANIZED HEALTH CARE EDUCATION/TRAINING PROGRAM

## 2022-01-14 PROCEDURE — 86258 DGP ANTIBODY EACH IG CLASS: CPT | Performed by: STUDENT IN AN ORGANIZED HEALTH CARE EDUCATION/TRAINING PROGRAM

## 2022-01-14 PROCEDURE — 85027 COMPLETE CBC AUTOMATED: CPT | Performed by: PHYSICIAN ASSISTANT

## 2022-01-14 PROCEDURE — 80048 BASIC METABOLIC PNL TOTAL CA: CPT | Performed by: PHYSICIAN ASSISTANT

## 2022-01-14 PROCEDURE — 86231 EMA EACH IG CLASS: CPT | Performed by: STUDENT IN AN ORGANIZED HEALTH CARE EDUCATION/TRAINING PROGRAM

## 2022-01-14 PROCEDURE — 87177 OVA AND PARASITES SMEARS: CPT | Performed by: STUDENT IN AN ORGANIZED HEALTH CARE EDUCATION/TRAINING PROGRAM

## 2022-01-14 PROCEDURE — 83605 ASSAY OF LACTIC ACID: CPT

## 2022-01-14 RX ORDER — SODIUM CHLORIDE, SODIUM GLUCONATE, SODIUM ACETATE, POTASSIUM CHLORIDE, MAGNESIUM CHLORIDE, SODIUM PHOSPHATE, DIBASIC, AND POTASSIUM PHOSPHATE .53; .5; .37; .037; .03; .012; .00082 G/100ML; G/100ML; G/100ML; G/100ML; G/100ML; G/100ML; G/100ML
1000 INJECTION, SOLUTION INTRAVENOUS ONCE
Status: COMPLETED | OUTPATIENT
Start: 2022-01-14 | End: 2022-01-14

## 2022-01-14 RX ORDER — POTASSIUM CHLORIDE 14.9 MG/ML
20 INJECTION INTRAVENOUS ONCE
Status: COMPLETED | OUTPATIENT
Start: 2022-01-14 | End: 2022-01-14

## 2022-01-14 RX ORDER — POTASSIUM CHLORIDE 20 MEQ/1
40 TABLET, EXTENDED RELEASE ORAL ONCE
Status: DISCONTINUED | OUTPATIENT
Start: 2022-01-14 | End: 2022-01-14

## 2022-01-14 RX ORDER — POTASSIUM CHLORIDE 20 MEQ/1
40 TABLET, EXTENDED RELEASE ORAL ONCE
Status: COMPLETED | OUTPATIENT
Start: 2022-01-14 | End: 2022-01-14

## 2022-01-14 RX ORDER — SODIUM CHLORIDE, SODIUM GLUCONATE, SODIUM ACETATE, POTASSIUM CHLORIDE, MAGNESIUM CHLORIDE, SODIUM PHOSPHATE, DIBASIC, AND POTASSIUM PHOSPHATE .53; .5; .37; .037; .03; .012; .00082 G/100ML; G/100ML; G/100ML; G/100ML; G/100ML; G/100ML; G/100ML
125 INJECTION, SOLUTION INTRAVENOUS CONTINUOUS
Status: DISCONTINUED | OUTPATIENT
Start: 2022-01-14 | End: 2022-01-15

## 2022-01-14 RX ORDER — SODIUM CHLORIDE, SODIUM GLUCONATE, SODIUM ACETATE, POTASSIUM CHLORIDE, MAGNESIUM CHLORIDE, SODIUM PHOSPHATE, DIBASIC, AND POTASSIUM PHOSPHATE .53; .5; .37; .037; .03; .012; .00082 G/100ML; G/100ML; G/100ML; G/100ML; G/100ML; G/100ML; G/100ML
500 INJECTION, SOLUTION INTRAVENOUS ONCE
Status: COMPLETED | OUTPATIENT
Start: 2022-01-14 | End: 2022-01-14

## 2022-01-14 RX ADMIN — METRONIDAZOLE 500 MG: 500 TABLET ORAL at 05:14

## 2022-01-14 RX ADMIN — METRONIDAZOLE 500 MG: 500 TABLET ORAL at 13:37

## 2022-01-14 RX ADMIN — PANTOPRAZOLE SODIUM 40 MG: 40 INJECTION, POWDER, FOR SOLUTION INTRAVENOUS at 05:14

## 2022-01-14 RX ADMIN — DEXTROSE AND SODIUM CHLORIDE 125 ML/HR: 5; .45 INJECTION, SOLUTION INTRAVENOUS at 03:54

## 2022-01-14 RX ADMIN — AMLODIPINE BESYLATE 5 MG: 5 TABLET ORAL at 08:03

## 2022-01-14 RX ADMIN — SODIUM CHLORIDE, SODIUM GLUCONATE, SODIUM ACETATE, POTASSIUM CHLORIDE, MAGNESIUM CHLORIDE, SODIUM PHOSPHATE, DIBASIC, AND POTASSIUM PHOSPHATE 500 ML: .53; .5; .37; .037; .03; .012; .00082 INJECTION, SOLUTION INTRAVENOUS at 21:39

## 2022-01-14 RX ADMIN — SODIUM CHLORIDE, SODIUM GLUCONATE, SODIUM ACETATE, POTASSIUM CHLORIDE, MAGNESIUM CHLORIDE, SODIUM PHOSPHATE, DIBASIC, AND POTASSIUM PHOSPHATE 1000 ML: .53; .5; .37; .037; .03; .012; .00082 INJECTION, SOLUTION INTRAVENOUS at 16:35

## 2022-01-14 RX ADMIN — LORAZEPAM 0.5 MG: 2 INJECTION INTRAMUSCULAR; INTRAVENOUS at 01:52

## 2022-01-14 RX ADMIN — SODIUM CHLORIDE, SODIUM GLUCONATE, SODIUM ACETATE, POTASSIUM CHLORIDE, MAGNESIUM CHLORIDE, SODIUM PHOSPHATE, DIBASIC, AND POTASSIUM PHOSPHATE 125 ML/HR: .53; .5; .37; .037; .03; .012; .00082 INJECTION, SOLUTION INTRAVENOUS at 19:43

## 2022-01-14 RX ADMIN — SODIUM PHOSPHATE, MONOBASIC, MONOHYDRATE AND SODIUM PHOSPHATE, DIBASIC, ANHYDROUS 30 MMOL: 276; 142 INJECTION, SOLUTION INTRAVENOUS at 18:01

## 2022-01-14 RX ADMIN — Medication 125 MG: at 21:45

## 2022-01-14 RX ADMIN — POTASSIUM CHLORIDE 40 MEQ: 1500 TABLET, EXTENDED RELEASE ORAL at 11:01

## 2022-01-14 RX ADMIN — SODIUM CHLORIDE, SODIUM GLUCONATE, SODIUM ACETATE, POTASSIUM CHLORIDE, MAGNESIUM CHLORIDE, SODIUM PHOSPHATE, DIBASIC, AND POTASSIUM PHOSPHATE 1000 ML: .53; .5; .37; .037; .03; .012; .00082 INJECTION, SOLUTION INTRAVENOUS at 18:27

## 2022-01-14 RX ADMIN — POTASSIUM & SODIUM PHOSPHATES POWDER PACK 280-160-250 MG 2 PACKET: 280-160-250 PACK at 20:14

## 2022-01-14 RX ADMIN — Medication 125 MG: at 13:40

## 2022-01-14 RX ADMIN — DEXTROSE AND SODIUM CHLORIDE 125 ML/HR: 5; .45 INJECTION, SOLUTION INTRAVENOUS at 12:33

## 2022-01-14 RX ADMIN — HEPARIN SODIUM 5000 UNITS: 5000 INJECTION INTRAVENOUS; SUBCUTANEOUS at 21:45

## 2022-01-14 RX ADMIN — LEVETIRACETAM 500 MG: 100 INJECTION, SOLUTION INTRAVENOUS at 17:09

## 2022-01-14 RX ADMIN — POTASSIUM & SODIUM PHOSPHATES POWDER PACK 280-160-250 MG 2 PACKET: 280-160-250 PACK at 21:40

## 2022-01-14 RX ADMIN — LEVETIRACETAM 500 MG: 100 INJECTION, SOLUTION INTRAVENOUS at 08:01

## 2022-01-14 RX ADMIN — METOROPROLOL TARTRATE 5 MG: 5 INJECTION, SOLUTION INTRAVENOUS at 08:02

## 2022-01-14 RX ADMIN — METOROPROLOL TARTRATE 5 MG: 5 INJECTION, SOLUTION INTRAVENOUS at 21:45

## 2022-01-14 RX ADMIN — METRONIDAZOLE 500 MG: 500 TABLET ORAL at 21:45

## 2022-01-14 RX ADMIN — HEPARIN SODIUM 5000 UNITS: 5000 INJECTION INTRAVENOUS; SUBCUTANEOUS at 05:14

## 2022-01-14 RX ADMIN — POTASSIUM CHLORIDE 20 MEQ: 14.9 INJECTION, SOLUTION INTRAVENOUS at 07:57

## 2022-01-14 RX ADMIN — HEPARIN SODIUM 5000 UNITS: 5000 INJECTION INTRAVENOUS; SUBCUTANEOUS at 13:40

## 2022-01-14 RX ADMIN — CEFTRIAXONE SODIUM 1000 MG: 10 INJECTION, POWDER, FOR SOLUTION INTRAVENOUS at 11:01

## 2022-01-14 NOTE — ASSESSMENT & PLAN NOTE
- Patient with 2-3 week history of diarrhea  Patient had been started on treatment for suspected C diff as an outpatient  - Continue current antibiotic regimen under the direction of ID   - Continue to monitor abdominal exam and bowel function

## 2022-01-14 NOTE — ASSESSMENT & PLAN NOTE
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

## 2022-01-14 NOTE — PROGRESS NOTES
Progress Note - Geriatric Medicine   [facility] [place] 80 y o  female MRN: [id number]  [facility] Encounter: [id number]      Assessment/Plan:    Acute metabolic encephalopathy  -multifactorial including pancolitis, ischemic bowel, subdural hematoma, worsening lactic acidosis, hypophosphatemia, dehydration, and hypernatremia  -home lorazepam resumed at reduced dose to prevent withdrawal symptoms  -continue treatment underlying infection, hydration, correction electrolyte derangements, lactic acidosis and acute pain    Subdural hematoma  -noted on [facility] on admission - reportedly stable on follow-up CTH  -AC/AP on hold - follow-up with Nsx two weeks from dx to ensure stability prior to consideration of resumption of systemic AC for recent DVT with PE    DVT/PE   -dx 11/2021 - presumably unprovoked  -coumadin on hold due to acute subdural hematoma    Pancolitis  -noted on CT abd - urgent C-scope finding suggestive of ischemic colitis   -GI following - biopsies obtained in c-scope pending   -Per ID - C   Diff suspected colonization- recommend restart PO Vanc ppx, and continue rocephin and flagyl     Lactic acidosis   -LA 4 2 from 2 8 yesterday  -continue IVF resuscitation, repeat LA pending      Atrial fibrillation  -home regimen includes metoprolol and coumadin - coumadin on hold for acute SDH    Generalized anxiety  -maintained on lorazepam 1mg HS as o/p, resumed at reduced dose to reduce risk withdrawal symptoms, recommend hold parameters     Impaired vision  -encourage use of corrective lenses at appropriate times   -consider large font for printed materials provided to patient     Frailty syndrome in geriatric patient  -given age and co-morbidities patient is at least mildly frail at baseline with very physiologic and metabolic reserve however now severity metabolic derangements patient in already frail state, patient is high risk rapid decompensation with any additional metabolic insults and may not tolerate extensive invasive procedures and discussion should be with family regarding risk vs benefit in patients current frail state to ensure that any interventions/procedures are with patient and family's wishes and goals of care    Home medication review  -PDMP review reveals patient on Lorazepam 1mg HS chronically despite not being on list obtained from patient's pharmacy on admission - consider restart at reduced dose to prevent withdrawal symptoms    Care coordination: rounded with Caleb Zapata (RN) and Giancarlo (Trauma AP)    Subjective:     Patient seen and examined at bedside where she is lying resting, she is awake and alert but confused much more alert and impulsive than yesterday  Underwent urgent colonoscopy with GI late yesterday  Nursing reports no acute events overnight  Review of Systems   Unable to perform ROS: Mental status change     Objective:     Vitals: Blood pressure 119/75, pulse 101, temperature 97 8 °F (36 6 °C), temperature source Axillary, resp  rate 20, weight 53 2 kg (117 lb 3 2 oz), SpO2 92 %  ,Body mass index is 24 49 kg/m²  Intake/Output Summary (Last 24 hours) at 1/14/2022 0653  Last data filed at 1/14/2022 0410  Gross per 24 hour   Intake 2380 83 ml   Output 885 ml   Net 1495 83 ml     Current Medications: Reviewed    Physical Exam:   Physical Exam  Vitals and nursing note reviewed  Constitutional:       Appearance: She is ill-appearing  Comments: Frail elderly appearing female   HENT:      Head: Normocephalic and atraumatic  Nose: Nose normal       Mouth/Throat:      Mouth: Mucous membranes are dry  Eyes:      General:         Right eye: No discharge  Left eye: No discharge  Conjunctiva/sclera: Conjunctivae normal    Cardiovascular:      Rate and Rhythm: Normal rate  Pulses: Normal pulses  Pulmonary:      Breath sounds: No wheezing        Comments: Shallow respirations, no rales or rhonchi  Abdominal:      Comments: Distended and tender   Musculoskeletal: Cervical back: Neck supple  Right lower leg: No edema  Left lower leg: No edema  Comments: Severe arthritic changes of hands bilaterally   Skin:     General: Skin is dry  Coloration: Skin is pale  Neurological:      Comments: Mentation fluctuates, speech mostly garbled and dysarthric incomprehensible      Intermittently following direct single step commands   Psychiatric:         Attention and Perception: She is inattentive  Cognition and Memory: Cognition is impaired  Judgment: Judgment is impulsive  Invasive Devices  Report    Peripheral Intravenous Line            Peripheral IV 01/13/22 Right Forearm 1 day          Drain            Urethral Catheter 16 Fr  <1 day              Lab, Imaging and other studies: I have personally reviewed pertinent reports

## 2022-01-14 NOTE — PROGRESS NOTES
Progress Note - Infectious Disease   Dharmesh Gomez 80 y o  female MRN: [id number]  Unit/Bed#: [facility] 604-01 Encounter: [id number]      Impression/Plan:    1  Diarrhea, pancolitis   Diarrhea occurring for 2-3 weeks prior to admission  Concern by outpatient PCP and GI about C Dif colitis given treatment for pneumonia with antibiotics in November, however stool testing was not completed  C Dif PCR here is negative and CT A/P shows pancolitis  She did have elevated bands on initial CBC with diff, but has no fevers and is hemodynamically stable  C Dif PCR testing is very sensitive and false negative tests are extremely rare so low suspicion she has CDif  Repeat testing in PCR positive but EIA negative, likely colonization  Stool enteric PCR panel negative  Consider ischemic colitis given atherosclerosis, dehydration and relative hypotension on admission, along with persistent lactate elevation  Status post colonoscopy 1/13 showing severe pancolitis with ulcerated, hemorrhagic, and edematous mucosa with pseudopolyps, findings suggestive of severe ischemic colitis vs IBD  -given severe colonic inflammation, continue systemic antibiotics with Ceftriaxone 1g IV daily and metronidazole 500mg PO q8hr  Anticipate a 5-7 day course   -GI following              -follow up giardia EIA and stool O and P   -follow up colon biopsies              -trend lactate              -recheck CBC with Diff tomorrow              -supportive care for diarrhea with IVF   -start PO vancomycin 125mg q12hr for prophylaxis in setting of likely C Dif colonization     2  Acute subdural hematoma  Occurred after a fall  Patient chronically on coumadin, elevated INR on admission  Given Kcentra  Repeat CTH x 2 stable  -neurosurgery following              -monitor mental status     3  Fall  Complicated by SDH  Patient with diarrhea and dehydration prior to admission      4  FRANK  Present on admission, improved with IVF  -continue to monitor creatinine     5  Encephalopathy  Likely due to TBI/SDH and dehydration, hyponatremia  Spot EEG on  negative for seizure activity  UA with 1-2 WBC not consistent with infection, COVID PCR negative  Mental status somewhat improved today  -UA not consistent with UTI              -monitor mental status     I have discussed the above management plan in detail with the Trauma surgery PA  ID will see again 22, please call on call ID with questions  Antibiotics:  Ceftriaxone/Flagyl day 2    Subjective:  Patient more alert today, but speech is still slurred  She ate breakfast  She denies abdominal pain  Still having diarrhea  No vomiting  No fever or chills  Objective:  Vitals:  Temp:  [97 6 °F (36 4 °C)-98 7 °F (37 1 °C)] 97 8 °F (36 6 °C)  HR:  [] 95  Resp:  [16-20] 16  BP: (103-148)/(58-88) 118/73  SpO2:  [92 %-98 %] 93 %  Temp (24hrs), Av 1 °F (36 7 °C), Min:97 6 °F (36 4 °C), Max:98 7 °F (37 1 °C)  Current: Temperature: 97 8 °F (36 6 °C)    Physical Exam:   General Appearance:  Frail appearing, more awake today, no distress   Throat: Oropharynx moist without lesions  Lungs:   Clear to auscultation bilaterally; no wheezes, rhonchi or rales; respirations unlabored   Heart:  RRR; no murmur, rub or gallop   Abdomen:   Soft, non-distended, positive bowel sounds  Diffuse tenderness to deep palpation   Extremities: No clubbing, cyanosis or edema   Skin: No new rashes or lesions  No draining wounds noted  Neuro: more awake today, moving extremities spontaneously    Labs:    All pertinent labs and imaging studies were personally reviewed  Results from last 7 days   Lab Units 22  0453 01/13/22  0441 01/12/22  0447   WBC Thousand/uL 5 81 4 97 4 67   HEMOGLOBIN g/dL 9 9* 10 2* 11 8   PLATELETS Thousands/uL 314 358 398*     Results from last 7 days   Lab Units 22  0453 22  1818 22  1818 22  0441 22  0441 01/10/22  1915 01/10/22  1323 01/07/22  1117 01/07/22  1117   SODIUM mmol/L 148*  --  153*  --  153*   < > 132*   < > 131*   POTASSIUM mmol/L 3 1*   < > 3 4*   < > 3 5   < > 4 2   < > 3 3*   CHLORIDE mmol/L 120*   < > 124*   < > 125*   < > 97*   < > 95*   CO2 mmol/L 21   < > 23   < > 21   < > 25   < > 23   BUN mg/dL 18   < > 22   < > 28*   < > 53*   < > 19   CREATININE mg/dL 0 83   < > 0 70   < > 0 71   < > 2 02*   < > 1 05   EGFR ml/min/1 73sq m 63   < > 78   < > 76   < > 21   < > 47   CALCIUM mg/dL 7 3*   < > 7 4*   < > 7 7*   < > 8 4   < > 8 7   AST U/L  --   --   --   --  24  --  55*  --  48*   ALT U/L  --   --   --   --  26  --  49   < > 54   ALK PHOS U/L  --   --   --   --  60  --  76   < > 75    < > = values in this interval not displayed  Results from last 7 days   Lab Units 01/07/22  1117   CRP mg/L 204 5*               Micro:  Results from last 7 days   Lab Units 01/11/22  0751   C DIFF TOXIN B BY PCR  Negative       Imaging:  I have personally reviewed pertinent imaging study reports and images in PACS      CT A/P 1/12/22: pancolitis, most pronounced in the hepatic flexure and proximal to mid transverse colon        Other Studies:   I have personally reviewed pertinent reports

## 2022-01-14 NOTE — PROGRESS NOTES
[address] [facility]  Progress Note - Jovani Amador 1934, 80 y o  female MRN: [id number]  Unit/Bed#: [facility] 604-01 Encounter: [id number]  Primary Care Provider: Chad Glover MD   Date and time admitted to hospital: 1/10/2022  4:35 PM    Pancolitis  Assessment & Plan  - GI following, appreciate input  - status post colonoscopy  - patient will receive 7 day total course of ceftriaxone and Flagyl per GI  - will continue to monitor    Urinary retention  Assessment & Plan  - rothman catheter placed due to retention and strict I/O's   - will attempt voiding trial once ambulatory    Dysphagia  Assessment & Plan  - Speech cleared her for diet, purred with NTL   - dysphagia likely related to confusion/altered mental status from brain injury which is improving    Hypernatremia  Assessment & Plan  - due to dehydration from multiple liquid bowel movements  - transition IVFs to D51/2 NSS and repeat BMP at 2 pm      Altered mental status  Assessment & Plan  · Confusion likely multi factorial given age and concomitant TBI as well as diarrhea/dehydration  · Improving today, GCS 14, more alert and conversational, periods of lucidity as well, though speech is still garbled, so difficult to understand  · Afebrile  UA negative for signs of infection  · Spot EEG on 1/11 negative for seizure activity  · LFTs normal  · Afebrile, no leukocytosis  · Continue neuro checks and redirection as able  History of DVT (deep vein thrombosis)  Assessment & Plan  · History of RLE DVT and PE on 11/4/21  · B/l duplex: chronic DVT of left leg, no new DVT  · Home Coumadin held and reversed due to [address]  Started on [facility] for DVT ppx on 1/11  FRANK (acute kidney injury) ([facility] [address] )  Assessment & Plan  · Cr of 2 on admission  This is resolved, Cr this morning is 0 68  · IVF hydration to continue   Transition D5LR to D51/2NSS at 125 ml/hr due to rising sodium and ongoing liquid bowel movements, as hypernatremia likely due to hypovolemia  · Continue to follow with BMP, repeat in for 2 pm today  · Avoid nephrotoxic agents and hypotension    Diarrhea  Assessment & Plan  · 2-3 week history of diarrhea  · 1/11: stool sample submitted for c diff, negative  Flagyl and PO vanc discontinued, per ID at that time  Repeat C  Diff culture pending  · ID evaluation and recommendations appreciated  Further stool cultures sent  Patient continues to have multiple episodes of diarrhea  · However after discussion with GI patient was restarted on ceftriaxone and Flagyl and will be on this for total 7 days secondary to pancolitis   · CT A/P shows pan-colitis on 1/11; which was confirmed on colonoscopy   · GI following, appreciate input    Fall  Assessment & Plan  · Ismael Lynsey on day prior to admission and day of admission, questionable LOC, coumadin daily  · Sustained the below stated injuries  · PT/OT recommending inpatient rehab  · CM following for disposition planning  Patient not medically stable for discharge at this time  * Subdural hematoma (HCC)  Assessment & Plan  · CTH 1/10: Small left frontal acute subdural hematoma with some intermixed chronic components, no greater than 8 mm in overall total thickness with minimal sulcal effacement in the frontal region but otherwise no significant mass effect  Specifically, no midline shift or narrowing of the ventricles  Minimal subdural hemorrhage seen along the anterior interhemispheric fissure, no greater than 1 mm in thickness   No parenchymal hemorrhage   No calvarial fracture  · Reversed with Kcentra on admission  INR improved from 3 74 to 1  15    · Repeat CTH x2: stable SDH  · Keppra for seizure prophylaxis for 7 days  · GCS 14 (4, 4, 6), non-focal  Swallow eval completed, tolerating a diet this AM  · NSx: stable on CTH, hold AC/AP, f/u in 2 weeks with repeat scan at that time  DVT prophylaxis: SCDs and SQH  PT and OT:  Eval and treat    Disposition:  Trend BMP as well as Mag and phos with repeat labs this afternoon at 2:00 p m  Joan Barnes Follow-up gastroenterology consultation  Continue IV antibiotics  Patient more awake and alert this morning  Tolerated breakfast     SUBJECTIVE:  Chief Complaint:  No new complaints    Subjective:  Patient is resting in bed following commands offering no new complaints  OBJECTIVE:     Meds/Allergies     Current Facility-Administered Medications:     amLODIPine (NORVASC) tablet 5 mg, 5 mg, Oral, Daily, Gay Dorado MD, 5 mg at 01/14/22 0803    cefTRIAXone (ROCEPHIN) 1,000 mg in dextrose 5 % 50 mL IVPB, 1,000 mg, Intravenous, Q24H, Teresa Dick MD, Last Rate: 100 mL/hr at 01/13/22 1215, 1,000 mg at 01/13/22 1215    dextrose 5 % and sodium chloride 0 45 % infusion, 125 mL/hr, Intravenous, Continuous, Ena Claire PA-C, Last Rate: 125 mL/hr at 01/14/22 0354, 125 mL/hr at 01/14/22 0354    heparin (porcine) subcutaneous injection 5,000 Units, 5,000 Units, Subcutaneous, Q8H [facility], Marisela Byrnes PA-C, 5,000 Units at 01/14/22 0514    levETIRAcetam (KEPPRA) 500 mg in sodium chloride 0 9 % 100 mL IVPB, 500 mg, Intravenous, BID, Gay Dorado MD, Last Rate: 400 mL/hr at 01/14/22 0801, 500 mg at 01/14/22 0801    LORazepam (ATIVAN) injection 0 5 mg, 0 5 mg, Intravenous, HS, Merari Zuluaga MD, 0 5 mg at 01/14/22 0152    metoprolol (LOPRESSOR) injection 5 mg, 5 mg, Intravenous, BID, Merari Zuluaga MD, 5 mg at 01/14/22 0802    metroNIDAZOLE (FLAGYL) tablet 500 mg, 500 mg, Oral, Q8H [facility], Teresa Dick MD, 500 mg at 01/14/22 0514    pantoprazole (PROTONIX) injection 40 mg, 40 mg, Intravenous, Q24H [facility], Merari Zuluaga MD, 40 mg at 01/14/22 0514    potassium chloride (K-DUR,KLOR-CON) CR tablet 40 mEq, 40 mEq, Oral, Once, [place] JARRELL Byrnes     Vitals:   Vitals:    01/14/22 0718   BP: 119/74   Pulse: 102   Resp: 16   Temp:    SpO2: 93%       Intake/Output:  I/O       01/12 0701  01/13 0700 01/13 0701  01/14 0700 01/14 0701  01/15 0700    P  O  0 160 120    I  V  (mL/kg) 1680 (31 6) 2220 8 (41 7)     IV Piggyback 100      Total Intake(mL/kg) 1780 (33 5) 2380 8 (44 8) 120 (2 3)    Urine (mL/kg/hr) 331 (0 3) 885 (0 7)     Stool 0      Total Output 331 885     Net +1449 +1495 8 +120           Unmeasured Urine Occurrence 5 x      Unmeasured Stool Occurrence 4 x             Nutrition/GI Proph/Bowel Reg:  Continue current diet    Physical Exam:   GENERAL APPEARANCE: NAD  NEURO: GCS 14  HEENT: Normocephalic  CV: RRR  LUNGS: CTA b/l  GI: Non-tender, non-distended  : rothman in place  MSK: Moving all extremities  SKIN: warm, dry, intact           Invasive Devices  Report    Peripheral Intravenous Line            Peripheral IV 01/13/22 Right Forearm 1 day          Drain            Urethral Catheter 16 Fr  1 day                 Lab Results:   Results: I have personally reviewed pertinent reports   , BMP/CMP:   Lab Results   Component Value Date    SODIUM 148 (H) 01/14/2022    K 3 1 (L) 01/14/2022     (H) 01/14/2022    CO2 21 01/14/2022    BUN 18 01/14/2022    CREATININE 0 83 01/14/2022    CALCIUM 7 3 (L) 01/14/2022    EGFR 63 01/14/2022    and CBC:   Lab Results   Component Value Date    WBC 5 81 01/14/2022    HGB 9 9 (L) 01/14/2022    HCT 29 0 (L) 01/14/2022    MCV 83 01/14/2022     01/14/2022    MCH 28 2 01/14/2022    MCHC 34 1 01/14/2022    RDW 16 1 (H) 01/14/2022    MPV 9 8 01/14/2022     Imaging/EKG Studies: Results: I have personally reviewed pertinent reports      Other Studies: no other studies  VTE Prophylaxis: SCDs and SQH

## 2022-01-14 NOTE — ASSESSMENT & PLAN NOTE
· CTH 1/10: Small left frontal acute subdural hematoma with some intermixed chronic components, no greater than 8 mm in overall total thickness with minimal sulcal effacement in the frontal region but otherwise no significant mass effect  Specifically, no midline shift or narrowing of the ventricles  Minimal subdural hemorrhage seen along the anterior interhemispheric fissure, no greater than 1 mm in thickness   No parenchymal hemorrhage   No calvarial fracture  · Reversed with Kcentra on admission  INR improved from 3 74 to 1  15    · Repeat CTH x2: stable SDH  · Keppra for seizure prophylaxis for 7 days  · GCS 14 (4, 4, 6), non-focal  Swallow eval completed, tolerating a diet this AM  · NSx: stable on CTH, hold AC/AP, f/u in 2 weeks with repeat scan at that time

## 2022-01-14 NOTE — ASSESSMENT & PLAN NOTE
- Patient with history of right lower extremity DVT and PE on 11/4/2021   - Bilateral lower extremity venous duplex demonstrated chronic DVT of left leg, no new DVT  - Home Coumadin held and reversed due to [address]  Started on [facility] for DVT ppx on 1/11/2022   - Resume anticoagulation when cleared from a neurosurgical standpoint   - Outpatient follow-up with PCP

## 2022-01-14 NOTE — ASSESSMENT & PLAN NOTE
- Patient with altered mental status that is likely multi factorial given age and concomitant TBI as well as diarrhea/dehydration    - Mental status appears to be slowly improving   - Continue to monitor mental status and maintain delirium precautions  - Frequent reorientation   - Continue treatment traumatic brain injury and infectious etiology  - Spot EEG on 1/11/2022 negative for seizure activity

## 2022-01-14 NOTE — ASSESSMENT & PLAN NOTE
- Dysphagia, present on presentation   - Appreciate Speech therapy evaluation and recommendations  - Patient was cleared for level 1 dysphagia diet with honey thick liquids  - Maintain aspiration precautions  - Patient started on a liquid diet today

## 2022-01-14 NOTE — CASE MANAGEMENT
Case Management Discharge Planning Note    Patient name Danita Ramirez  [facility] [address] [facility] [id number]  : 1934 Date 2022       Current Admission Date: 1/10/2022  Current Admission Diagnosis:Subdural hematoma [facility])   Patient Active Problem List    Diagnosis Date Noted    Pancolitis 2022    Urinary retention 2022    Hypernatremia 2022    Dysphagia 2022    Fall 2022    Subdural hematoma ([facility] [address] ) 2022    Diarrhea 2022    FRANK (acute kidney injury) ([facility] [address] ) 2022    History of DVT (deep vein thrombosis) 2022    Altered mental status 2022    Cardiac arrhythmia 2022    Chronic anticoagulation 2021    Anemia 2021    DVT (deep venous thrombosis) ([facility] [address] ) 2021    Pulmonary HTN ([facility] [address] ) 2021    Paraesophageal hernia 2021    Acute pulmonary embolism with acute cor pulmonale ([facility] [address] ) 2021    Severe sepsis ([facility] [address] ) 2021    Pneumonia 2021    Elevated troponin 2021    BMI 26 0-26 9,adult 2019    Cortical age-related cataract of left eye 05/10/2019    Pre-op exam 05/10/2019    Medicare annual wellness visit, subsequent 2018    Intertrigo 2018    External hemorrhoids 2017    Acquired hypothyroidism 10/26/2012    Generalized anxiety disorder 10/24/2012    Headache 10/24/2012    Mixed hyperlipidemia 10/24/2012    Essential hypertension 10/24/2012      LOS (days): 4  Geometric Mean LOS (GMLOS) (days): 4 60  Days to GMLOS:0 9     OBJECTIVE:  Risk of Unplanned Readmission Score: 20         Current admission status: Inpatient   Preferred Pharmacy:   [facility] [address] [facility]  [facility] [address] - [address]  [address]  [facility] [id number]  Phone: [phone] Fax: [phone]    Primary Care Provider: Tejal Christiansen MD    Primary Insurance: Shawn Dias [facility]  Secondary Insurance:     DISCHARGE DETAILS:                   CM spoke to pt's son [facility]  CM informed him of the pt's eventual d/c to [facility] once medically stable  Cm inquired about the pt's COVID vaccination status as the pt's chart only shows one shot  Pt's son states he "isn't sure" but will obtain the information for CM   Pt will need to be fully vaccinated in order for [facility] to accept

## 2022-01-14 NOTE — ASSESSMENT & PLAN NOTE
- Status post fall with questionable loss of consciousness and with the below noted injuries/issues  - Fall precautions  - Geriatric Medicine consultation for evaluation, medication review and recommendations   - PT and OT evaluation and treatment as indicated  - Case Management consultation for disposition planning

## 2022-01-14 NOTE — ASSESSMENT & PLAN NOTE
- Patient with urinary retention   - Urinary catheter placed due to retention   - Will attempt voiding trial once ambulatory status improved  - Outpatient follow-up with PCP

## 2022-01-14 NOTE — CONSULTS
Consultation - General Surgery  Eric Rapp 80 y o  female MRN: [id number]  Unit/Bed#: [facility] 604-01 Encounter: [id number]        Assessment/Plan     Assessment:  87yoF presenting after multiple falls at home, found to have SDH on imaging, also recent bout of diarrheal illness in the last 2-3 weeks, C diff negative originally on 01/11 followed by positive C diff test on 01/13, now with increasing lactic acidosis, severe possibly ischemic pancolitis seen on CT abdomen/pelvis and emergent colonoscopy done by GI in 1/13 1/12 CT a/p: Pancolitis most pronounced in the region of the hepatic flexure and proximal to mid transverse colon; thickened endometrium to 9mm; small b/l pleural effusions; small pericardial effusion; large hiatal hernia/predominantly intrathoracic stomach; cholelithiasis, hepatic steatosis  1/13 colonoscopy:  Severe pancolonic edematous, erythematous, friable, hemorrhagic and ulcerated mucosa with loss of vascular pattern, cold forceps biopsies performed; pseudopolyp; normal TI    Lactate 4 2 from 3 6 from 2 8  Phosphorus 0 9  WBC 5 81 from 4 97, hemoglobin 9 9 from 10 2  Hypernatremia last 149 from 148 from 153, possibly at least in part 2/2 dehydration/hypovolemia  Hypokalemia 3 4  Hyperchloremia 122  Hyperglycemia 175  C diff toxin originally negative on 01/11, subsequently positive on 01/13, toxins A and B, EIA negative  Stool enteric bacterial panel including Salmonella, Shigella, Campylobacter, Shiga toxin negative  Giardia and celiac antibody pending    Plan:  - NPO, sips with meds  - c/w mIVF at 125, isolyte 1 L bolus currently running,   - lactate q2h  - p o   Vancomycin for positive C diff test in 1/13, per ID likely colonization rather than acute infection given negative toxins A/B/EIA  - continue IV ceftriaxone, metronidazole  - monitor for any changes in abdominal exam  - phosphorus found to be 0 9, aggressive repletion and recheck levels tomorrow morning 1/15  - possible need for CTA to assess for ischemic bowel/atherosclerosis/arterial insult  - follow-up pathology results from 01/13 colonoscopy with GI, per GI IBD and infectious colitis are also considered    History of Present Illness     HPI:  Lai Mcknight is a 80 y o  female  w PMH of HTN, 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, HLD, hypothyroidism, originally presenting to [facility] as a trauma transfer on 01/10 after multiple falls at home, found to have SDH on imaging, neurosurgery consulted and subsequent CT head scans stable  Also noted to have recent 2-3 week bout of diarrheal illness in the outpatient setting, possibly contributing to hypovolemic status and falls, C diff PCR negative on 01/11 then positive on 01/13, per ID this is likely colonization rather than acute infection but patient started on p o  Vancomycin nonetheless, stool enteric bacterial panel otherwise negative, Giardia and celiac disease tests pending  CT scan of the abdomen/pelvis on 01/12 showed pancolitis most pronounced in the region of the hepatic flexure and proximal to mid transverse colon  Started having bloody bowel movements on 01/13 was then taken for emergent colonoscopy with GI showing severe pancolitis with ulcerated, hemorrhagic, and edematous mucosa with pseudopolyps, consistent with severe ischemic colitis, was subsequently on started on IV ceftriaxone and metronidazole  Has since had an up trending lactic acidosis last 4 2 from 3 6 from 2 8, now status post multiple 1 L fluid boluses, remains on maintenance IVF at 125, also hypernatremic to 153 possibly at least partly 2/2 hypovolemia  Of note patient has not had a white count, has been afebrile, normotensive  Review of Systems   Constitutional: Negative for chills and fever  Respiratory: Negative for shortness of breath  Cardiovascular: Negative for chest pain  Gastrointestinal: Positive for abdominal distention, abdominal pain, blood in stool and diarrhea  Negative for nausea and vomiting  Musculoskeletal: Negative for back pain, neck pain and neck stiffness  Neurological: Negative for dizziness, light-headedness and headaches  All other systems reviewed and are negative  Historical Information   Past Medical History:   Diagnosis Date    Anxiety     Disease of thyroid gland      Past Surgical History:   Procedure Laterality Date    CATARACT EXTRACTION Right     FOOT SURGERY      JOINT REPLACEMENT       Social History   Social History     Substance and Sexual Activity   Alcohol Use No     Social History     Substance and Sexual Activity   Drug Use No     Social History     Tobacco Use   Smoking Status Never Smoker   Smokeless Tobacco Never Used     Family History: non-contributory    Meds/Allergies   all medications and allergies reviewed  Allergies   Allergen Reactions    Preservision Areds [B-Plex Plus] Tremor    Tramadol      Reaction Date: 22Aug2011;        Objective   First Vitals:   Blood Pressure: 109/59 (01/10/22 1639)  Pulse: 94 (01/10/22 1637)  Temperature: 97 6 °F (36 4 °C) (01/10/22 1640)  Temp Source: Oral (01/10/22 1640)  Respirations: 18 (01/10/22 1637)  Weight - Scale: 53 2 kg (117 lb 3 2 oz) (01/11/22 0317)  SpO2: 95 % (01/10/22 1637)    Current Vitals:   Blood Pressure: 115/68 (01/14/22 1537)  Pulse: 102 (01/14/22 1537)  Temperature: 97 8 °F (36 6 °C) (01/14/22 0232)  Temp Source: Axillary (01/14/22 0232)  Respirations: 19 (01/14/22 1537)  Weight - Scale: 53 2 kg (117 lb 3 2 oz) (01/11/22 0317)  SpO2: 95 % (01/14/22 1537)      Intake/Output Summary (Last 24 hours) at 1/14/2022 1654  Last data filed at 1/14/2022 0848  Gross per 24 hour   Intake 2300 83 ml   Output 235 ml   Net 2065 83 ml       Invasive Devices  Report    Peripheral Intravenous Line            Peripheral IV 01/13/22 Right Forearm 1 day          Drain            Urethral Catheter 16 Fr  1 day                Physical Exam  Vitals reviewed     Constitutional:       General: She is not in acute distress  HENT:      Head: Normocephalic and atraumatic  Mouth/Throat:      Mouth: Mucous membranes are moist    Cardiovascular:      Rate and Rhythm: Normal rate and regular rhythm  Pulses: Normal pulses  Heart sounds: Normal heart sounds  Pulmonary:      Effort: Pulmonary effort is normal       Breath sounds: Normal breath sounds  Abdominal:      General: There is distension  Palpations: Abdomen is soft  Tenderness: There is abdominal tenderness  There is no guarding or rebound  Comments: Abdomen is soft, minimally distended, minimally tender to palpation in the epigastric region but otherwise abdominal exam is unremarkable, no guarding, no rigidity, peritonitic   Musculoskeletal:         General: No tenderness or signs of injury  Normal range of motion  Cervical back: Normal range of motion  No tenderness  Skin:     General: Skin is warm  Capillary Refill: Capillary refill takes less than 2 seconds  Neurological:      Mental Status: She is alert  Lab Results:   I have personally reviewed pertinent lab results  , CBC:   Lab Results   Component Value Date    WBC 5 81 01/14/2022    HGB 9 9 (L) 01/14/2022    HCT 29 0 (L) 01/14/2022    MCV 83 01/14/2022     01/14/2022    MCH 28 2 01/14/2022    MCHC 34 1 01/14/2022    RDW 16 1 (H) 01/14/2022    MPV 9 8 01/14/2022    NRBC 1 01/14/2022   , CMP:   Lab Results   Component Value Date    SODIUM 149 (H) 01/14/2022    K 3 4 (L) 01/14/2022     (H) 01/14/2022    CO2 19 (L) 01/14/2022    BUN 16 01/14/2022    CREATININE 0 81 01/14/2022    CALCIUM 7 5 (L) 01/14/2022    EGFR 65 01/14/2022   , Coagulation: No results found for: PT, INR, APTT, Urinalysis: No results found for: Mandy Porteous, SPECGRAV, PHUR, LEUKOCYTESUR, NITRITE, PROTEINUA, GLUCOSEU, KETONESU, BILIRUBINUR, BLOODU  Imaging: I have personally reviewed pertinent reports      EKG, Pathology, and Other Studies: I have personally reviewed pertinent reports        Code Status: Level 3 - DNAR and DNI  Advance Directive and Living Will: Yes    Power of :    POLST:

## 2022-01-14 NOTE — ASSESSMENT & PLAN NOTE
- Speech cleared her for diet, purred with NTL   - dysphagia likely related to confusion/altered mental status from brain injury which is improving

## 2022-01-14 NOTE — ASSESSMENT & PLAN NOTE
· Zia Mccray on day prior to admission and day of admission, questionable LOC, coumadin daily  · Sustained the below stated injuries  · PT/OT recommending inpatient rehab  · CM following for disposition planning  Patient not medically stable for discharge at this time

## 2022-01-14 NOTE — ASSESSMENT & PLAN NOTE
- Patient with acute kidney injury on presentation, now resolved  This was likely secondary to volume depletion in setting of recent diarrhea  - Continue to monitor volume status and urine output   - Continue to monitor renal function   - Continue IV fluids until tolerating oral diet well    - Avoid nephrotoxic agents and hypotension

## 2022-01-14 NOTE — CASE MANAGEMENT
Case Management Discharge Planning Note    Patient name Fabienne Mortimer  Location [address] 604/PPHP 494-74 MRN [id number]  : 1934 Date 2022       Current Admission Date: 1/10/2022  Current Admission Diagnosis:Subdural hematoma [facility])   Patient Active Problem List    Diagnosis Date Noted    Pancolitis 2022    Urinary retention 2022    Hypernatremia 2022    Dysphagia 2022    Fall 2022    Subdural hematoma ([facility] ) 2022    Diarrhea 2022    FRANK (acute kidney injury) ([facility] [facility] ) 2022    History of DVT (deep vein thrombosis) 2022    Altered mental status 2022    Cardiac arrhythmia 2022    Chronic anticoagulation 2021    Anemia 2021    DVT (deep venous thrombosis) ([facility] [address] [facility] ) 2021    Pulmonary HTN ([facility] [address] [facility] ) 2021    Paraesophageal hernia 2021    Acute pulmonary embolism with acute cor pulmonale ([address] ) 2021    Severe sepsis ([facility] [address] ) 2021    Pneumonia 2021    Elevated troponin 2021    BMI 26 0-26 9,adult 2019    Cortical age-related cataract of left eye 05/10/2019    Pre-op exam 05/10/2019    Medicare annual wellness visit, subsequent 2018    Intertrigo 2018    External hemorrhoids 2017    Acquired hypothyroidism 10/26/2012    Generalized anxiety disorder 10/24/2012    Headache 10/24/2012    Mixed hyperlipidemia 10/24/2012    Essential hypertension 10/24/2012      LOS (days): 4  Geometric Mean LOS (GMLOS) (days): 4 60  Days to GMLOS:0 7     OBJECTIVE:  Risk of Unplanned Readmission Score: 20         Current admission status: Inpatient   Preferred Pharmacy:   [address]  [address] - [address]  [address]    [facility] [address]  Phone: [phone] Fax: [phone]    Primary Care Provider: Stanley Hutchins MD    Primary Insurance: Aisha Camp[facility]  Secondary Insurance:     DISCHARGE DETAILS:                   Pt's son brought her covid vaccination card  CM made copy to send to [facility] and sent a copy to medical records

## 2022-01-14 NOTE — ASSESSMENT & PLAN NOTE
· Cr of 2 on admission  This is resolved, Cr this morning is 0 68  · IVF hydration to continue  Transition D5LR to D51/2NSS at 125 ml/hr due to rising sodium and ongoing liquid bowel movements, as hypernatremia likely due to hypovolemia     · Continue to follow with BMP, repeat in for 2 pm today  · Avoid nephrotoxic agents and hypotension

## 2022-01-14 NOTE — ASSESSMENT & PLAN NOTE
· 2-3 week history of diarrhea  · 1/11: stool sample submitted for c diff, negative  Flagyl and PO vanc discontinued, per ID  Repeat C  Diff culture pending  · ID evaluation and recommendations appreciated  Further stool cultures sent  Patient continues to have multiple episodes of diarrhea  · CT A/P shows pan-colitis on 1/11     · GI following, appreciate input

## 2022-01-14 NOTE — PROGRESS NOTES
[facility] - Progress Note  Michelle Morel 80 y o  female MRN: [id number]  Unit/Bed#: [facility] 604-01 Encounter: [id number]      ASSESSMENT & PLAN:    49-year-old female with hypertension, hyperlipidemia, history of pulmonary embolism and DVT previously on anticoagulation who presented following a fall with findings of a subdural hematoma  GI consultation requested for evaluation of diarrhea and pancolitis seen on CT      1  Pancolitis, diarrhea - low suspicion for C diff colitis given recent negative test on 01/11  I suspect that her preceding diarrhea symptoms were secondary to an acute viral gastroenteritis and 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 that may have resulted in dehydration and subsequent orthostasis which may have been the reason for her fall  Colonoscopy on 01/14 showed severe pancolitis suggestive of severe ischemic colitis vs possibly IBD although the latter is less likely  Stool enteric panel negative  · Follow-up repeat C diff testing although I anticipate this will be negative  · Follow-up ova and parasites  · Follow-up biopsy results  · Follow-up celiac antibody testing  · Avoid constipation  · Avoid hypotension  · Continue antibiotics for total course of 7 days given risk of bacterial translocation in setting of compromised colonic mucosal barrier  · Supportive care  · Serial abdominal exams  · Follow clinically  · Remainder of care per primary team     2  Subdural hematoma, dysphagia - secondary to fall while on anticoagulation  INR now normalized after correction  Subsequent dysphagia and dysarthria secondary to her neurologic injury  Appears better today  · Management per primary team   · Speech therapy following; resume dysphagia diet per speech therapy recommendations post-procedure with added lactose restriction    ______________________________________________________________________    SUBJECTIVE:     Patient seen and examined at bedside   Much more alert this morning  Still confused but speech is more coherent  Denies any abdominal pain       Medication Administration - last 24 hours from 01/13/2022 0734 to 01/14/2022 0734       Date/Time Order Dose Route Action Action by     01/13/2022 1718 levETIRAcetam (KEPPRA) 500 mg in sodium chloride 0 9 % 100 mL IVPB 500 mg Intravenous New Bag [facility]     01/13/2022 0848 levETIRAcetam (KEPPRA) 500 mg in sodium chloride 0 9 % 100 mL IVPB 500 mg Intravenous New Bag [facility]     01/13/2022 0910 amLODIPine (NORVASC) tablet 5 mg 5 mg Oral Not Given [facility]     01/14/2022 0514 heparin (porcine) subcutaneous injection 5,000 Units 5,000 Units Subcutaneous Given Ifeanyi Tucker, RN     01/13/2022 2213 heparin (porcine) subcutaneous injection 5,000 Units 5,000 Units Subcutaneous Given Ifeanyi Tucker, RN     01/13/2022 1427 heparin (porcine) subcutaneous injection 5,000 Units 0 Units Subcutaneous Hold [facility]     01/14/2022 0152 LORazepam (ATIVAN) injection 0 5 mg 0 5 mg Intravenous Given Ifeanyi Tucker, RN     01/13/2022 2222 metoprolol (LOPRESSOR) injection 5 mg 0 mg Intravenous Hold Ifeanyi Tucker RN     01/13/2022 0848 metoprolol (LOPRESSOR) injection 5 mg 5 mg Intravenous Given [facility]     01/14/2022 0514 pantoprazole (PROTONIX) injection 40 mg 40 mg Intravenous Given Ifeanyi Tucker, DELORES     01/13/2022 1008 dextrose 5 % in lactated Ringer's infusion 0 mL/hr Intravenous Stopped [facility]     01/14/2022 0354 dextrose 5 % and sodium chloride 0 45 % infusion 125 mL/hr Intravenous 14 [facility], RN     01/14/2022 0352 dextrose 5 % and sodium chloride 0 45 % infusion 0 mL/hr Intravenous Stopped Ifeanyi Tucker, RN     01/13/2022 1958 dextrose 5 % and sodium chloride 0 45 % infusion 125 mL/hr Intravenous 14 [facility], RN     01/13/2022 1957 dextrose 5 % and sodium chloride 0 45 % infusion 0 mL/hr Intravenous Stopped Ifeanyi Tucker RN     01/13/2022 1017 dextrose 5 % and sodium chloride 0 45 % infusion 125 mL/hr Intravenous Rate/Dose Change [facility]     01/13/2022 1009 dextrose 5 % and sodium chloride 0 45 % infusion 100 mL/hr Intravenous New Bag [facility]     01/13/2022 1215 cefTRIAXone (ROCEPHIN) 1,000 mg in dextrose 5 % 50 mL IVPB 1,000 mg Intravenous New Bag [facility]     01/14/2022 0514 metroNIDAZOLE (FLAGYL) tablet 500 mg 500 mg Oral Given Jose G Hurd RN     01/13/2022 2216 metroNIDAZOLE (FLAGYL) tablet 500 mg 500 mg Oral Given Jose G Hurd, DELORES     01/13/2022 1723 metroNIDAZOLE (FLAGYL) tablet 500 mg 500 mg Oral Not Given [facility]          OBJECTIVE:     Objective   Blood pressure 119/74, pulse 102, temperature 97 8 °F (36 6 °C), temperature source Axillary, resp  rate 16, weight 53 2 kg (117 lb 3 2 oz), SpO2 93 %  Body mass index is 24 49 kg/m²      Intake/Output Summary (Last 24 hours) at 1/14/2022 0734  Last data filed at 1/14/2022 0410  Gross per 24 hour   Intake 2380 83 ml   Output 885 ml   Net 1495 83 ml       PHYSICAL EXAM:   General Appearance: Awake and alert, chronically ill-appearing, frail  Abdomen: Soft, non-tender, non-distended; bowel sounds normal; no masses or no organomegaly    Invasive Devices  Report    Peripheral Intravenous Line            Peripheral IV 01/13/22 Right Forearm 1 day          Drain            Urethral Catheter 16 Fr  <1 day                LAB RESULTS:  Admission on 01/10/2022   Component Date Value    Sodium 01/10/2022 140     Potassium 01/10/2022 3 2*    Chloride 01/10/2022 111*    CO2 01/10/2022 18*    ANION GAP 01/10/2022 11     BUN 01/10/2022 44*    Creatinine 01/10/2022 1 06     Glucose 01/10/2022 92     Calcium 01/10/2022 6 5*    eGFR 01/10/2022 47     WBC 01/10/2022 4 86     RBC 01/10/2022 4 10     Hemoglobin 01/10/2022 11 5     Hematocrit 01/10/2022 33 8*    MCV 01/10/2022 82     MCH 01/10/2022 28 0     MCHC 01/10/2022 34 0     RDW 01/10/2022 15 5*    MPV 01/10/2022 10 6     Platelets 28/08/5063 318     nRBC 01/10/2022 0     Protime 01/11/2022 14 3     INR 01/11/2022 1 15     PTT 01/11/2022 23     ABO Grouping 01/10/2022 A     Rh Factor 01/10/2022 Positive     Antibody Screen 01/10/2022 Negative     Specimen Expiration Date 01/10/2022 92168862     SARS-CoV-2 01/10/2022 Negative     INFLUENZA A PCR 01/10/2022 Negative     INFLUENZA B PCR 01/10/2022 Negative     RSV PCR 01/10/2022 Negative     Sodium 01/11/2022 137     Potassium 01/11/2022 3 9     Chloride 01/11/2022 106     CO2 01/11/2022 21     ANION GAP 01/11/2022 10     BUN 01/11/2022 53*    Creatinine 01/11/2022 1 20     Glucose 01/11/2022 111     Calcium 01/11/2022 8 3     eGFR 01/11/2022 40     WBC 01/11/2022 4 27*    RBC 01/11/2022 4 19     Hemoglobin 01/11/2022 11 7     Hematocrit 01/11/2022 35 0     MCV 01/11/2022 84     MCH 01/11/2022 27 9     MCHC 01/11/2022 33 4     RDW 01/11/2022 15 3*    MPV 01/11/2022 9 7     Platelets 98/93/5609 364      C difficile toxin by PC* 01/11/2022 Negative     Ventricular Rate 01/10/2022 105     Atrial Rate 01/10/2022 300     QRSD Interval 01/10/2022 70     QT Interval 01/10/2022 312     QTC Interval 01/10/2022 412     QRS Axis 01/10/2022 62     T Wave Axis 01/10/2022 80     Color, UA 01/11/2022 Yellow     Clarity, UA 01/11/2022 Clear     Specific Gravity, UA 01/11/2022 1 020     pH, UA 01/11/2022 6 0     Leukocytes, UA 01/11/2022 Negative     Nitrite, UA 01/11/2022 Negative     Protein, UA 01/11/2022 Trace*    Glucose, UA 01/11/2022 Negative     Ketones, UA 01/11/2022 15 (1+)*    Urobilinogen, UA 01/11/2022 0 2     Bilirubin, UA 01/11/2022 Negative     Blood, UA 01/11/2022 Moderate     RBC, UA 01/11/2022 4-10*    WBC, UA 01/11/2022 1-2*    Epithelial Cells 01/11/2022 None Seen     Bacteria, UA 01/11/2022 Moderate*    OTHER CRYSTALS 01/11/2022 Occasional     OTHER OBSERVATIONS 01/11/2022 Yeast Cells Present     WBC 01/12/2022 4 67     RBC 01/12/2022 4 22     Hemoglobin 01/12/2022 11 8     Hematocrit 01/12/2022 35 4     MCV 01/12/2022 84     MCH 01/12/2022 28 0     MCHC 01/12/2022 33 3     RDW 01/12/2022 15 8*    MPV 01/12/2022 10 0     Platelets 67/90/8022 398*    Sodium 01/12/2022 146*    Potassium 01/12/2022 3 6     Chloride 01/12/2022 116*    CO2 01/12/2022 20*    ANION GAP 01/12/2022 10     BUN 01/12/2022 34*    Creatinine 01/12/2022 0 78     Glucose 01/12/2022 104     Calcium 01/12/2022 7 7*    eGFR 01/12/2022 68     Salmonella sp PCR 01/12/2022 None Detected     Shigella sp/Enteroinvasi* 01/12/2022 None Detected     Campylobacter sp (jejuni* 01/12/2022 None Detected     Shiga toxin 1/Shiga toxi* 01/12/2022 None Detected     LACTIC ACID 01/12/2022 2 7*    Sodium 01/12/2022 148*    Potassium 01/12/2022 3 4*    Chloride 01/12/2022 119*    CO2 01/12/2022 19*    ANION GAP 01/12/2022 10     BUN 01/12/2022 31*    Creatinine 01/12/2022 0 84     Glucose 01/12/2022 177*    Calcium 01/12/2022 8 2*    eGFR 01/12/2022 62     LACTIC ACID 01/12/2022 2 2*    WBC 01/13/2022 4 97     RBC 01/13/2022 3 60*    Hemoglobin 01/13/2022 10 2*    Hematocrit 01/13/2022 29 7*    MCV 01/13/2022 83     MCH 01/13/2022 28 3     MCHC 01/13/2022 34 3     RDW 01/13/2022 15 7*    MPV 01/13/2022 9 3     Platelets 14/55/1053 358     LACTIC ACID 01/13/2022 2 8*    Sodium 01/13/2022 153*    Potassium 01/13/2022 3 5     Chloride 01/13/2022 125*    CO2 01/13/2022 21     ANION GAP 01/13/2022 7     BUN 01/13/2022 28*    Creatinine 01/13/2022 0 71     Glucose 01/13/2022 203*    Calcium 01/13/2022 7 7*    Corrected Calcium 01/13/2022 9 7     AST 01/13/2022 24     ALT 01/13/2022 26     Alkaline Phosphatase 01/13/2022 60     Total Protein 01/13/2022 5 1*    Albumin 01/13/2022 1 5*    Total Bilirubin 01/13/2022 0 75     eGFR 01/13/2022 76     LACTIC ACID 01/13/2022 3 6*    Sodium 01/13/2022 153*    Potassium 01/13/2022 3 4*    Chloride 01/13/2022 124*    CO2 01/13/2022 23     ANION GAP 01/13/2022 6     BUN 01/13/2022 22     Creatinine 01/13/2022 0 70     Glucose 01/13/2022 186*    Calcium 01/13/2022 7 4*    eGFR 01/13/2022 78     Sodium 01/14/2022 148*    Potassium 01/14/2022 3 1*    Chloride 01/14/2022 120*    CO2 01/14/2022 21     ANION GAP 01/14/2022 7     BUN 01/14/2022 18     Creatinine 01/14/2022 0 83     Glucose 01/14/2022 228*    Calcium 01/14/2022 7 3*    eGFR 01/14/2022 63     WBC 01/14/2022 5 81     RBC 01/14/2022 3 51*    Hemoglobin 01/14/2022 9 9*    Hematocrit 01/14/2022 29 0*    MCV 01/14/2022 83     MCH 01/14/2022 28 2     MCHC 01/14/2022 34 1     RDW 01/14/2022 16 1*    MPV 01/14/2022 9 8     Platelets 50/41/4645 314        RADIOLOGY RESULTS: I have personally reviewed pertinent imaging studies  ROSY Mendez  Gastroenterology Fellow  [facility]  Available on [email or web address]

## 2022-01-14 NOTE — ASSESSMENT & PLAN NOTE
- due to dehydration from multiple liquid bowel movements  - transition IVFs to D51/2 NSS and repeat BMP at 2 pm

## 2022-01-14 NOTE — ASSESSMENT & PLAN NOTE
- Patient with pan colitis and diarrhea on presentation  Patient positive for C diff, but ID suspects it is colonization  Colitis likely ischemic in nature  - Appreciate both ID and GI evaluation, recommendations and intervention  Patient is status post colonoscopy on 01/13/2022   - Continue current antibiotic regimen as well as oral vancomycin under direction of ID   - Continue to monitor abdominal exam, which is improving and is benign at this time   - Lactic acidosis resolved following fluid resuscitation and treatment with IV antibiotics  - Advance diet to clear liquid today  Maintain aspiration precautions  - Continue to follow abdominal exam and bowel function

## 2022-01-15 LAB
ANION GAP SERPL CALCULATED.3IONS-SCNC: 7 MMOL/L (ref 4–13)
BUN SERPL-MCNC: 10 MG/DL (ref 5–25)
CALCIUM SERPL-MCNC: 6.7 MG/DL (ref 8.3–10.1)
CHLORIDE SERPL-SCNC: 119 MMOL/L (ref 100–108)
CO2 SERPL-SCNC: 21 MMOL/L (ref 21–32)
CREAT SERPL-MCNC: 0.59 MG/DL (ref 0.6–1.3)
ENDOMYSIUM IGA SER QL: NEGATIVE
ERYTHROCYTE [DISTWIDTH] IN BLOOD BY AUTOMATED COUNT: 17.2 % (ref 11.6–15.1)
GFR SERPL CREATININE-BSD FRML MDRD: 82 ML/MIN/1.73SQ M
GLIADIN PEPTIDE IGA SER-ACNC: 11 UNITS (ref 0–19)
GLIADIN PEPTIDE IGG SER-ACNC: 3 UNITS (ref 0–19)
GLUCOSE SERPL-MCNC: 107 MG/DL (ref 65–140)
HCT VFR BLD AUTO: 29.5 % (ref 34.8–46.1)
HGB BLD-MCNC: 9.7 G/DL (ref 11.5–15.4)
IGA SERPL-MCNC: 249 MG/DL (ref 64–422)
LACTATE SERPL-SCNC: 1.8 MMOL/L (ref 0.5–2)
LACTATE SERPL-SCNC: 1.8 MMOL/L (ref 0.5–2)
LACTATE SERPL-SCNC: 2 MMOL/L (ref 0.5–2)
LACTATE SERPL-SCNC: 2.2 MMOL/L (ref 0.5–2)
MAGNESIUM SERPL-MCNC: 2.1 MG/DL (ref 1.6–2.6)
MCH RBC QN AUTO: 28 PG (ref 26.8–34.3)
MCHC RBC AUTO-ENTMCNC: 32.9 G/DL (ref 31.4–37.4)
MCV RBC AUTO: 85 FL (ref 82–98)
NRBC BLD AUTO-RTO: 1 /100 WBCS
PHOSPHATE SERPL-MCNC: 2.2 MG/DL (ref 2.3–4.1)
PLATELET # BLD AUTO: 190 THOUSANDS/UL (ref 149–390)
PMV BLD AUTO: 11.4 FL (ref 8.9–12.7)
POTASSIUM SERPL-SCNC: 3.7 MMOL/L (ref 3.5–5.3)
RBC # BLD AUTO: 3.46 MILLION/UL (ref 3.81–5.12)
SODIUM SERPL-SCNC: 147 MMOL/L (ref 136–145)
TTG IGA SER-ACNC: <2 U/ML (ref 0–3)
TTG IGG SER-ACNC: <2 U/ML (ref 0–5)
WBC # BLD AUTO: 4.67 THOUSAND/UL (ref 4.31–10.16)

## 2022-01-15 PROCEDURE — 97112 NEUROMUSCULAR REEDUCATION: CPT

## 2022-01-15 PROCEDURE — 83605 ASSAY OF LACTIC ACID: CPT

## 2022-01-15 PROCEDURE — 99233 SBSQ HOSP IP/OBS HIGH 50: CPT | Performed by: SURGERY

## 2022-01-15 PROCEDURE — 84100 ASSAY OF PHOSPHORUS: CPT | Performed by: PHYSICIAN ASSISTANT

## 2022-01-15 PROCEDURE — NC001 PR NO CHARGE: Performed by: SURGERY

## 2022-01-15 PROCEDURE — C9113 INJ PANTOPRAZOLE SODIUM, VIA: HCPCS | Performed by: EMERGENCY MEDICINE

## 2022-01-15 PROCEDURE — 85027 COMPLETE CBC AUTOMATED: CPT | Performed by: PHYSICIAN ASSISTANT

## 2022-01-15 PROCEDURE — 97535 SELF CARE MNGMENT TRAINING: CPT

## 2022-01-15 PROCEDURE — 97116 GAIT TRAINING THERAPY: CPT

## 2022-01-15 PROCEDURE — 80048 BASIC METABOLIC PNL TOTAL CA: CPT | Performed by: PHYSICIAN ASSISTANT

## 2022-01-15 PROCEDURE — 83735 ASSAY OF MAGNESIUM: CPT | Performed by: PHYSICIAN ASSISTANT

## 2022-01-15 RX ORDER — SODIUM CHLORIDE, SODIUM GLUCONATE, SODIUM ACETATE, POTASSIUM CHLORIDE, MAGNESIUM CHLORIDE, SODIUM PHOSPHATE, DIBASIC, AND POTASSIUM PHOSPHATE .53; .5; .37; .037; .03; .012; .00082 G/100ML; G/100ML; G/100ML; G/100ML; G/100ML; G/100ML; G/100ML
50 INJECTION, SOLUTION INTRAVENOUS CONTINUOUS
Status: DISCONTINUED | OUTPATIENT
Start: 2022-01-15 | End: 2022-01-18

## 2022-01-15 RX ADMIN — LEVETIRACETAM 500 MG: 100 INJECTION, SOLUTION INTRAVENOUS at 17:12

## 2022-01-15 RX ADMIN — LORAZEPAM 0.5 MG: 2 INJECTION INTRAMUSCULAR; INTRAVENOUS at 21:50

## 2022-01-15 RX ADMIN — METRONIDAZOLE 500 MG: 500 TABLET ORAL at 05:32

## 2022-01-15 RX ADMIN — AMLODIPINE BESYLATE 5 MG: 5 TABLET ORAL at 09:03

## 2022-01-15 RX ADMIN — LEVETIRACETAM 500 MG: 100 INJECTION, SOLUTION INTRAVENOUS at 09:11

## 2022-01-15 RX ADMIN — HEPARIN SODIUM 5000 UNITS: 5000 INJECTION INTRAVENOUS; SUBCUTANEOUS at 21:49

## 2022-01-15 RX ADMIN — SODIUM CHLORIDE, SODIUM GLUCONATE, SODIUM ACETATE, POTASSIUM CHLORIDE, MAGNESIUM CHLORIDE, SODIUM PHOSPHATE, DIBASIC, AND POTASSIUM PHOSPHATE 75 ML/HR: .53; .5; .37; .037; .03; .012; .00082 INJECTION, SOLUTION INTRAVENOUS at 10:29

## 2022-01-15 RX ADMIN — PANTOPRAZOLE SODIUM 40 MG: 40 INJECTION, POWDER, FOR SOLUTION INTRAVENOUS at 05:32

## 2022-01-15 RX ADMIN — CEFTRIAXONE SODIUM 1000 MG: 10 INJECTION, POWDER, FOR SOLUTION INTRAVENOUS at 10:29

## 2022-01-15 RX ADMIN — HEPARIN SODIUM 5000 UNITS: 5000 INJECTION INTRAVENOUS; SUBCUTANEOUS at 05:32

## 2022-01-15 RX ADMIN — Medication 125 MG: at 21:49

## 2022-01-15 RX ADMIN — METOROPROLOL TARTRATE 5 MG: 5 INJECTION, SOLUTION INTRAVENOUS at 21:50

## 2022-01-15 RX ADMIN — SODIUM CHLORIDE, SODIUM GLUCONATE, SODIUM ACETATE, POTASSIUM CHLORIDE, MAGNESIUM CHLORIDE, SODIUM PHOSPHATE, DIBASIC, AND POTASSIUM PHOSPHATE 75 ML/HR: .53; .5; .37; .037; .03; .012; .00082 INJECTION, SOLUTION INTRAVENOUS at 21:49

## 2022-01-15 RX ADMIN — Medication 125 MG: at 09:04

## 2022-01-15 RX ADMIN — METRONIDAZOLE 500 MG: 500 TABLET ORAL at 21:49

## 2022-01-15 RX ADMIN — METOROPROLOL TARTRATE 5 MG: 5 INJECTION, SOLUTION INTRAVENOUS at 09:04

## 2022-01-15 RX ADMIN — HEPARIN SODIUM 5000 UNITS: 5000 INJECTION INTRAVENOUS; SUBCUTANEOUS at 14:05

## 2022-01-15 RX ADMIN — METRONIDAZOLE 500 MG: 500 TABLET ORAL at 14:05

## 2022-01-15 NOTE — PLAN OF CARE
Problem: PHYSICAL THERAPY ADULT  Goal: Performs mobility at highest level of function for planned discharge setting  See evaluation for individualized goals  Description: Treatment/Interventions: Functional transfer training,LE strengthening/ROM,Therapeutic exercise,Endurance training,Patient/family training,Equipment eval/education,Bed mobility,Gait training  Equipment Recommended: Min Maguire       See flowsheet documentation for full assessment, interventions and recommendations  Note: Prognosis: Fair  Problem List: Decreased strength,Decreased endurance,Impaired balance,Decreased mobility,Decreased coordination,Decreased cognition,Decreased safety awareness,Impaired judgement,Pain  Assessment: Pt is an 81 yo female presenting for PT treatment session today 1/15/22 with cognitive deficits, balance impairments, decreased strength and endurance, decreased mobility, fatigue, fall risk  These impairments are currently limiting her from returning to PLOF  Greeted supine in bed this morning with no complaints from previous session  Pt agreeable to treatment session  Currently able to perform bed mobility mod x2, functional transfers and ambulation requiring max x2 with use of b/l HHA  Pt requiring increased time and cueing in order to perform any tasks, perseverates on her desire to have a drink of water and then unwilling to relinquish cup  Able to demonstrate fair - sitting balance requiring Linda to (S) to sit EOB x15 minutes during session  Pt was able to ambulate 3' from EOB to recliner chair with maxA x2 and b/l HHA  She required increased verbal and tactile cues for safety and proper technique  Placed OOB in recliner chair at end of session with all needs in reach, chair alarm on  The patient's AM-PAC Basic Mobility Inpatient Short Form Raw Score is 6  A Raw score of less than or equal to 16 suggests the patient may benefit from discharge to post-acute rehabilitation services   Please also refer to the recommendation of the Physical Therapist for safe discharge planning  Pt would continue to benefit from skilled PT within the acute care setting to enhance safety and improve function  PT recommends rehab at this time following d/c    Barriers to Discharge: Inaccessible home environment,Decreased caregiver support        PT Discharge Recommendation: Post acute rehabilitation services          See flowsheet documentation for full assessment

## 2022-01-15 NOTE — PLAN OF CARE
Problem: OCCUPATIONAL THERAPY ADULT  Goal: Performs self-care activities at highest level of function for planned discharge setting  See evaluation for individualized goals  Description: Treatment Interventions: ADL retraining,Functional transfer training,Endurance training,Cognitive reorientation,Patient/family training,Equipment evaluation/education,Compensatory technique education,Energy conservation,Activityengagement          See flowsheet documentation for full assessment, interventions and recommendations  Note: Limitation: Decreased ADL status,Decreased Safe judgement during ADL,Decreased cognition,Decreased endurance,Decreased self-care trans,Decreased high-level ADLs  Prognosis: Fair,Guarded  Assessment: Patient participated in Skilled OT session this date with interventions consisting of self care tasks, functional transfers/mobility   Patient agreeable to OT treatment session, upon arrival patient was found supine in bed    Patient requiring frequent re direction, verbal cues for safety, verbal cues for correct technique, verbal cues for pacing thru activity steps, cognitive assistance to anticipate next step, one step directives and frequent rest periods  Patient continues to be functioning below baseline level, occupational performance remains limited secondary to factors listed above and increased risk for falls and injury  The patient's raw score on the AM-PAC Daily Activity inpatient short form is 15, standardized score is 34 69, less than 39 4  Patients at this level are likely to benefit from discharge to post-acute rehabilitation services  Please refer to the recommendation of the Occupational Therapist for safe discharge planning  From OT standpoint, recommendation at time of d/c would be Short Term Rehab     Patient to benefit from continued Occupational Therapy treatment while in the hospital to address deficits as defined above and maximize level of functional independence with ADLs and functional mobility  Recommendation: Geriatric Consult  OT Discharge Recommendation: Post acute rehabilitation services  OT - OK to Discharge:  Yes

## 2022-01-15 NOTE — PHYSICAL THERAPY NOTE
Physical Therapy Treatment Session    Patient's Name: Nilson More    Admitting Diagnosis  [facility]dural hematoma ([facility] ) [[id number]]    Problem List  Patient Active Problem List   Diagnosis    External hemorrhoids    Generalized anxiety disorder    Headache    Mixed hyperlipidemia    Essential hypertension    Acquired hypothyroidism    [facility]    Medicare annual wellness visit, subsequent    Cortical age-related cataract of left eye    Pre-op exam    BMI 26 0-26 9,adult    Severe sepsis (HCC)    Pneumonia    Elevated troponin    Acute pulmonary embolism with acute cor pulmonale (HCC)    Paraesophageal hernia    Anemia    DVT (deep venous thrombosis) (HCC)    Pulmonary HTN (HCC)    Chronic anticoagulation    Cardiac arrhythmia    Fall    Subdural hematoma (HCC)    Diarrhea    FRANK (acute kidney injury) ([facility] )    History of DVT (deep vein thrombosis)    Altered mental status    Hypernatremia    Dysphagia    Urinary retention    Pancolitis       Past Medical History  Past Medical History:   Diagnosis Date    Anxiety     Disease of thyroid gland        Past Surgical History  Past Surgical History:   Procedure Laterality Date    CATARACT EXTRACTION Right     FOOT SURGERY      JOINT REPLACEMENT          01/15/22 1015   PT Last Visit   PT Visit Date 01/15/22   Note Type   Note Type Treatment   Pain Assessment   Pain Assessment Tool 0-10   Pain Score No Pain   Restrictions/Precautions   Weight Bearing Precautions Per Order No   Other Precautions Contact/isolation;Cognitive; Chair Alarm; Bed Alarm;Multiple lines; Fall Risk;Pain   General   Chart Reviewed Yes   Family/Caregiver Present No   Cognition   Overall Cognitive Status Impaired   Arousal/Participation Responsive   Attention Attends with cues to redirect   Orientation Level Oriented to person;Oriented to place;Oriented to time;Disoriented to situation   Memory Decreased short term memory;Decreased recall of recent events;Decreased recall of precautions   Following Commands Follows one step commands without difficulty   Comments Pt presents frustrated at inability to turn the television on, slightly tearful  Warms with encouragement and as session progresses  Attempts to verbalize but pt is difficult to understand at times   Bed Mobility   Supine to Sit 3  Moderate assistance   Additional items Assist x 2; Increased time required;Verbal cues;LE management   Sit to Supine 3  Moderate assistance   Additional items Assist x 2; Increased time required;Verbal cues;LE management   Additional Comments Verbal cues for safety and proper technique  Able to sit EOB with Linda to (S) for trunk control and sitting balance x 15 mins   Transfers   Sit to Stand 2  Maximal assistance   Additional items Assist x 2; Increased time required;Verbal cues   Stand to Sit 2  Maximal assistance   Additional items Assist x 2; Increased time required;Verbal cues   Stand pivot 2  Maximal assistance   Additional items Assist x 2; Increased time required;Verbal cues   Additional Comments with b/l HHA  Pt requiring increased time and cues to achieve full standing position  Ambulation/Elevation   Gait pattern Narrow BERENICE; Forward Flexion;Decreased foot clearance;Shuffling; Short stride; Step to;Excessively slow   Gait Assistance 2  Maximal assist   Additional items Assist x 2;Verbal cues; Tactile cues   Assistive Device Other (Comment)  (b/l HHA)   Distance 3'    Ambulation/Elevation Additional Comments Pt requiring verbal and tactile cues for safety and proper technique with steps from EOB to recliner chair  Balance   Static Sitting Poor +   Dynamic Sitting Poor   Static Standing Poor   Dynamic Standing Poor -   Ambulatory Poor -   Activity Tolerance   Activity Tolerance Patient limited by fatigue   Medical Staff Made Aware Seen with OT  PT focus on functional transfers, gait, ane endurance training     Nurse Made Aware ok to see per RN   Assessment   Prognosis Fair   Problem List Decreased strength;Decreased endurance; Impaired balance;Decreased mobility; Decreased coordination;Decreased cognition;Decreased safety awareness; Impaired judgement;Pain   Assessment Pt is an 79 yo female presenting for PT treatment session today 1/15/22 with cognitive deficits, balance impairments, decreased strength and endurance, decreased mobility, fatigue, fall risk  These impairments are currently limiting her from returning to PLOF  Greeted supine in bed this morning with no complaints from previous session  Pt agreeable to treatment session  Currently able to perform bed mobility mod x2, functional transfers and ambulation requiring max x2 with use of b/l HHA  Pt requiring increased time and cueing in order to perform any tasks, perseverates on her desire to have a drink of water and then unwilling to relinquish cup  Able to demonstrate fair - sitting balance requiring Linda to (S) to sit EOB x15 minutes during session  Pt was able to ambulate 3' from EOB to recliner chair with maxA x2 and b/l HHA  She required increased verbal and tactile cues for safety and proper technique  Placed OOB in recliner chair at end of session with all needs in reach, chair alarm on  The patient's AM-PAC Basic Mobility Inpatient Short Form Raw Score is 6  A Raw score of less than or equal to 16 suggests the patient may benefit from discharge to post-acute rehabilitation services  Please also refer to the recommendation of the Physical Therapist for safe discharge planning  Pt would continue to benefit from skilled PT within the acute care setting to enhance safety and improve function  PT recommends rehab at this time following d/c     Barriers to Discharge Inaccessible home environment;Decreased caregiver support   Goals   Patient Goals To have some water   Plan   Treatment/Interventions Functional transfer training;LE strengthening/ROM; Therapeutic exercise; Endurance training;Patient/family training;Equipment eval/education; Bed mobility;Gait training   Progress Slow progress, decreased activity tolerance   PT Frequency Other (Comment)  (3-6x/wk)   Recommendation   PT Discharge Recommendation Post acute rehabilitation services   Equipment Recommended [facility] Recommended Wheeled walker   AM-PAC Basic Mobility Inpatient   Turning in Bed Without Bedrails 1   Lying on Back to Sitting on Edge of Flat Bed 1   Moving Bed to Chair 1   Standing Up From Chair 1   Walk in Room 1   Climb 3-5 Stairs 1   Basic Mobility Inpatient Raw Score 6   Highest Level Of Mobility   [facility] Goal 2: Bed activities/Dependent transfer   End of Consult   Patient Position at End of Consult Bedside chair;Bed/Chair alarm activated; All needs within reach           [facility], [facility]

## 2022-01-15 NOTE — PLAN OF CARE
Problem: Potential for Falls  Goal: Patient will remain free of falls  Description: INTERVENTIONS:  - Educate patient/family on patient safety including physical limitations  - Instruct patient to call for assistance with activity   - Consult OT/PT to assist with strengthening/mobility   - Keep Call bell within reach  - Keep bed low and locked with side rails adjusted as appropriate  - Keep care items and personal belongings within reach  - Initiate and maintain comfort rounds  - Make Fall Risk Sign visible to staff  - Offer Toileting every  Hours, in advance of need  - Initiate/Maintain alarm  - Obtain necessary fall risk management equipment:   - Apply yellow socks and bracelet for high fall risk patients  - Consider moving patient to room near nurses station  Outcome: Progressing     Problem: Prexisting or High Potential for Compromised Skin Integrity  Goal: Skin integrity is maintained or improved  Description: INTERVENTIONS:  - Identify patients at risk for skin breakdown  - Assess and monitor skin integrity  - Assess and monitor nutrition and hydration status  - Monitor labs   - Assess for incontinence   - Turn and reposition patient  - Assist with mobility/ambulation  - Relieve pressure over bony prominences  - Avoid friction and shearing  - Provide appropriate hygiene as needed including keeping skin clean and dry  - Evaluate need for skin moisturizer/barrier cream  - Collaborate with interdisciplinary team   - Patient/family teaching  - Consider wound care consult   Outcome: Progressing     Problem: MOBILITY - ADULT  Goal: Maintain or return to baseline ADL function  Description: INTERVENTIONS:  -  Assess patient's ability to carry out ADLs; assess patient's baseline for ADL function and identify physical deficits which impact ability to perform ADLs (bathing, care of mouth/teeth, toileting, grooming, dressing, etc )  - Assess/evaluate cause of self-care deficits   - Assess range of motion  - Assess patient's mobility; develop plan if impaired  - Assess patient's need for assistive devices and provide as appropriate  - Encourage maximum independence but intervene and supervise when necessary  - Involve family in performance of ADLs  - Assess for home care needs following discharge   - Consider OT consult to assist with ADL evaluation and planning for discharge  - Provide patient education as appropriate  Outcome: Progressing  Goal: Maintains/Returns to pre admission functional level  Description: INTERVENTIONS:  - Perform BMAT or MOVE assessment daily    - Set and communicate daily mobility goal to care team and patient/family/caregiver  - Collaborate with rehabilitation services on mobility goals if consulted  - Perform Range of Motion  times a day  - Reposition patient every  hours  - Dangle patient  times a day  - Stand patient  times a day  - Ambulate patient  times a day  - Out of bed to chair  times a day   - Out of bed for meals times a day  - Out of bed for toileting  - Record patient progress and toleration of activity level   Outcome: Progressing     Problem: NEUROSENSORY - ADULT  Goal: Achieves stable or improved neurological status  Description: INTERVENTIONS  - Monitor and report changes in neurological status  - Monitor vital signs such as temperature, blood pressure, glucose, and any other labs ordered   - Initiate measures to prevent increased intracranial pressure  - Monitor for seizure activity and implement precautions if appropriate      Outcome: Progressing  Goal: Achieves maximal functionality and self care  Description: INTERVENTIONS  - Monitor swallowing and airway patency with patient fatigue and changes in neurological status  - Encourage and assist patient to increase activity and self care     - Encourage visually impaired, hearing impaired and aphasic patients to use assistive/communication devices  Outcome: Progressing     Problem: GASTROINTESTINAL - ADULT  Goal: Maintains or returns to baseline bowel function  Description: INTERVENTIONS:  - Assess bowel function  - Encourage oral fluids to ensure adequate hydration  - Administer IV fluids if ordered to ensure adequate hydration  - Administer ordered medications as needed  - Encourage mobilization and activity  - Consider nutritional services referral to assist patient with adequate nutrition and appropriate food choices  Outcome: Progressing  Goal: Maintains adequate nutritional intake  Description: INTERVENTIONS:  - Monitor percentage of each meal consumed  - Identify factors contributing to decreased intake, treat as appropriate  - Assist with meals as needed  - Monitor I&O, weight, and lab values if indicated  - Obtain nutrition services referral as needed  Outcome: Progressing     Problem: Nutrition/Hydration-ADULT  Goal: Nutrient/Hydration intake appropriate for improving, restoring or maintaining nutritional needs  Description: Monitor and assess patient's nutrition/hydration status for malnutrition  Collaborate with interdisciplinary team and initiate plan and interventions as ordered  Monitor patient's weight and dietary intake as ordered or per policy  Utilize nutrition screening tool and intervene as necessary  Determine patient's food preferences and provide high-protein, high-caloric foods as appropriate       INTERVENTIONS:  - Monitor oral intake, urinary output, labs, and treatment plans  - Assess nutrition and hydration status and recommend course of action  - Evaluate amount of meals eaten  - Assist patient with eating if necessary   - Allow adequate time for meals  - Recommend/ encourage appropriate diets, oral nutritional supplements, and vitamin/mineral supplements  - Order, calculate, and assess calorie counts as needed  - Recommend, monitor, and adjust tube feedings and TPN/PPN based on assessed needs  - Assess need for intravenous fluids  - Provide specific nutrition/hydration education as appropriate  - Include patient/family/caregiver in decisions related to nutrition  Outcome: Progressing     Problem: BEHAVIOR  Goal: Pt/Family maintain appropriate behavior and adhere to behavioral management agreement, if implemented  Description: INTERVENTIONS:  - Assess the family dynamic   - Encourage verbalization of thoughts and concerns in a socially appropriate manner  - Assess patient/family's coping skills and non-compliant behavior (including use of illegal substances)  - Utilize positive, consistent limit setting strategies supporting safety of patient, staff and others  - Initiate consult with Case Management, Spiritual Care or other ancillary services as appropriate  - If a patient's/visitor's behavior jeopardizes the safety of the patient, staff, or others, refer to organization procedure     - Notify Security of behavior or suspected illegal substances which indicate the need for search of the patient and/or belongings  - Encourage participation in the decision making process about a behavioral management agreement; implement if patient meets criteria  Outcome: Progressing

## 2022-01-15 NOTE — OCCUPATIONAL THERAPY NOTE
Occupational Therapy Progress Note     Patient Name: Oseas Soto  PCQMI'R Date: 1/15/2022  Problem List  Principal Problem:    Subdural hematoma ([facility] )  Active Problems:    Fall    Diarrhea    FRANK (acute kidney injury) ([facility] )    History of DVT (deep vein thrombosis)    Altered mental status    Hypernatremia    Dysphagia    Urinary retention    Pancolitis       01/15/22 0950   OT Last Visit   OT Visit Date 01/15/22   Note Type   Note Type Treatment   Restrictions/Precautions   Weight Bearing Precautions Per Order No   Other Precautions Contact/isolation; Bed Alarm; Chair Alarm;Telemetry;Multiple lines; Fall Risk  (+C-diff/contact)   Lifestyle   Autonomy PT IS A POOR HISTORIAN 2' OVERALL COGNITIVE STATUS  INFORMATION OBTAINED FROM PT'S CHART  INDEPENDENT WITH ADLS/IADLS AT BASELINE    Reciprocal Relationships LIVES WITH SON- UNKNOWN LEVEL OF ASSIST/SUPPORT AVAILABLE    Service to Others HOMEMAKER    Intrinsic Gratification ENJOYS PUZZLES    Pain Assessment   Pain Assessment Tool 0-10   Pain Score No Pain   ADL   Eating Assistance 5  Supervision/Setup   Eating Deficit (S/set-up to scoop honey thickened liquids +spillage noted)   Grooming Assistance 2  Maximal Assistance   Grooming Deficit Brushing hair   UB Dressing Assistance 2  Maximal Assistance   LB Dressing Assistance 1  Total Assistance   LB Dressing Deficit Don/doff L sock; Don/doff R sock   Bed Mobility   Supine to Sit 3  Moderate assistance   Additional items Assist x 2   Sit to Supine 3  Moderate assistance   Additional items Assist x 2   Additional Comments verbal cues for safety -impaired carryover of safe technique     Transfers   Sit to Stand 2  Maximal assistance   Additional items Assist x 2   Stand to Sit 2  Maximal assistance   Additional items Assist x 2   Additional Comments +B/L HHA   Functional Mobility   Functional Mobility 3  Moderate assistance   Additional Comments mod a x 2 with B/L HHA 2 small steps to chair and verbal cues for safety Additional items   +B/L HHA   Coordination   Gross Motor impaired spillage with grasping water cup with left UE  Fine Motor impaired tremulous   Dexterity impaired   Cognition   Overall Cognitive Status Impaired   Arousal/Participation Alert; Responsive   Attention Difficulty attending to directions   Orientation Level Oriented to person;Disoriented to place; Disoriented to time;Disoriented to situation   Memory Decreased recall of precautions;Decreased recall of recent events;Decreased short term memory   Following Commands Follows one step commands inconsistently   Comments pt agreeable to therapy, confused, disoriented, peservating on "drinking water", dysarthric speech, impaired safety/self awareness  Activity Tolerance   Activity Tolerance Patient limited by fatigue; Other (Comment)  (confusion)   Medical Staff Made Aware RN cleared pt for therapy   Assessment   Assessment Patient participated in Skilled OT session this date with interventions consisting of self care tasks, functional transfers/mobility   Patient agreeable to OT treatment session, upon arrival patient was found supine in bed    Patient requiring frequent re direction, verbal cues for safety, verbal cues for correct technique, verbal cues for pacing thru activity steps, cognitive assistance to anticipate next step, one step directives and frequent rest periods  Patient continues to be functioning below baseline level, occupational performance remains limited secondary to factors listed above and increased risk for falls and injury  The patient's raw score on the AM-PAC Daily Activity inpatient short form is 15, standardized score is 34 69, less than 39 4  Patients at this level are likely to benefit from discharge to post-acute rehabilitation services  Please refer to the recommendation of the Occupational Therapist for safe discharge planning  From OT standpoint, recommendation at time of d/c would be Short Term Rehab     Patient to benefit from continued Occupational Therapy treatment while in the hospital to address deficits as defined above and maximize level of functional independence with ADLs and functional mobility  Plan   Treatment Interventions ADL retraining;Functional transfer training; Endurance training;UE strengthening/ROM; Cognitive reorientation;Patient/family training;Equipment evaluation/education; Compensatory technique education; Energy conservation; Activityengagement   Goal Expiration Date 01/25/22   OT Treatment Day 2   OT Frequency 3-5x/wk   Recommendation   OT Discharge Recommendation Post acute rehabilitation services   OT - OK to Discharge Yes   AM-PAC Daily Activity Inpatient   Lower Body Dressing 2   Bathing 2   Toileting 2   Upper Body Dressing 3   Grooming 3   Eating 3   Daily Activity Raw Score 15   Daily Activity Standardized Score (Calc for Raw Score >=11) 34 69   AM-PAC Applied Cognition Inpatient   Following a Speech/Presentation 1   Understanding Ordinary Conversation 3   Taking Medications 2   Remembering Where Things Are Placed or Put Away 2   Remembering List of 4-5 Errands 2   Taking Care of Complicated Tasks 1   Applied Cognition Raw Score 11   Applied Cognition Standardized Score 27 03      Jessica Alberts MOT, OTR/L

## 2022-01-15 NOTE — PROGRESS NOTES
Progress Note - Gregg Aguirre 80 y o  female MRN: [id number]    Unit/Bed#: [facility] 604-01 Encounter: [id number]      Assessment:  79 y/o F w SDH, C diff, and pan colitis  Afebrile  Vitals stable  Lactate trending down  abd soft, mild distension, non tender  Plan:  Start clear liquids  Continue ivf  Continue abx per ID  Monitor serial exam  Monitor endpoints  No surgical intervention  Subjective:   Confused this morning but Reported bowel mvt  Denied any pain  Objective:     Vitals: Blood pressure 118/64, pulse 87, temperature 97 8 °F (36 6 °C), temperature source Axillary, resp  rate 15, weight 53 2 kg (117 lb 3 2 oz), SpO2 94 %  ,Body mass index is 24 49 kg/m²  Intake/Output Summary (Last 24 hours) at 1/15/2022 0111  Last data filed at 1/14/2022 2239  Gross per 24 hour   Intake 5413 75 ml   Output 485 ml   Net 4928 75 ml       Physical Exam  General: NAD  HEENT: NC/AT  MMM  Cv: RRR  Lungs: normal effort  Ab: Soft, NT/ND  Ex: no CCE  Neuro: oriented to self only  Invasive Devices  Report    Peripheral Intravenous Line            Peripheral IV 01/13/22 Right Forearm 1 day    Peripheral IV 01/14/22 Left Forearm <1 day          Drain            Urethral Catheter 16 Fr  1 day                Lab, Imaging and other studies: I have personally reviewed pertinent reports      VTE Pharmacologic Prophylaxis: Sequential compression device (Venodyne)   VTE Mechanical Prophylaxis: sequential compression device

## 2022-01-15 NOTE — QUICK NOTE
Surgery  Quick note    Spoke with Pt's Son, and clarified pt's Level 3 code status  We discussed that if colitis were to continue to worsen she may eventually need surgical intervention  He understood and would like to proceed with non operative management  He will discuss more with his other siblings but there current wish is to pursue non operative management  We discussed that we will continue to follow her lab work and abdominal exam closely, continue iv fluids and iv abx  Will continue to follow       Marylee Man, MD  7:48 PM  1/14/22

## 2022-01-15 NOTE — PROGRESS NOTES
[address] [facility]  Progress Note - Дмитрий Cuellar 1934, 80 y o  female MRN: [id number]  Unit/Bed#: [facility] 604-01 Encounter: [id number]  Primary Care Provider: Janette Bradshaw MD   Date and time admitted to hospital: 1/10/2022  4:35 PM    Fall  Assessment & Plan  - Status post fall with questionable loss of consciousness and with the below noted injuries/issues  - Fall precautions  - Geriatric Medicine consultation for evaluation, medication review and recommendations   - PT and OT evaluation and treatment as indicated  - Case Management consultation for disposition planning  * Subdural hematoma (HCC)  Assessment & Plan  - 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, present on admission   - Neurosurgery evaluation and recommendations appreciated  - Patient's coagulopathy corrected with Kcentra on presentation  Hold anti-platelet and anticoagulant medications for least 2 weeks and/or until cleared by Neurosurgery  - Continue Keppra for 7 days for seizure prophylaxis  - Monitor neurologic exam   - Continue symptomatic management with analgesia as needed  - PT and OT evaluation and treatment as indicated  - Outpatient Neurosurgery follow-up in 2 weeks with repeat CT scan of the head prior to follow-up appointment  Pancolitis  Assessment & Plan  - Patient with pan colitis and diarrhea on presentation  Patient positive for C diff, but ID suspects it is colonization  Colitis likely ischemic in nature  - Appreciate both ID and GI evaluation, recommendations and intervention  Patient is status post colonoscopy on 01/13/2022   - Continue current antibiotic regimen as well as oral vancomycin under direction of ID   - Continue to monitor abdominal exam, which is improving and is benign at this time   - Lactic acidosis resolved following fluid resuscitation and treatment with IV antibiotics  - Advance diet to clear liquid today    Maintain aspiration precautions  - Continue to follow abdominal exam and bowel function  Diarrhea  Assessment & Plan  - Patient with 2-3 week history of diarrhea  Patient had been started on treatment for suspected C diff as an outpatient  - Continue current antibiotic regimen under the direction of ID   - Continue to monitor abdominal exam and bowel function  FRANK (acute kidney injury) [facility])  Assessment & Plan  - Patient with acute kidney injury on presentation, now resolved  This was likely secondary to volume depletion in setting of recent diarrhea  - Continue to monitor volume status and urine output   - Continue to monitor renal function   - Continue IV fluids until tolerating oral diet well  - Avoid nephrotoxic agents and hypotension    Altered mental status  Assessment & Plan  - Patient with altered mental status that is likely multi factorial given age and concomitant TBI as well as diarrhea/dehydration    - Mental status appears to be slowly improving   - Continue to monitor mental status and maintain delirium precautions  - Frequent reorientation   - Continue treatment traumatic brain injury and infectious etiology  - Spot EEG on 1/11/2022 negative for seizure activity  Dysphagia  Assessment & Plan  - Dysphagia, present on presentation   - Appreciate Speech therapy evaluation and recommendations  - Patient was cleared for level 1 dysphagia diet with honey thick liquids  - Maintain aspiration precautions  - Patient started on a liquid diet today  History of DVT (deep vein thrombosis)  Assessment & Plan  - Patient with history of right lower extremity DVT and PE on 11/4/2021   - Bilateral lower extremity venous duplex demonstrated chronic DVT of left leg, no new DVT  - Home Coumadin held and reversed due to [address]  Started on [facility] for DVT ppx on 1/11/2022   - Resume anticoagulation when cleared from a neurosurgical standpoint   - Outpatient follow-up with PCP        Urinary retention  Assessment & Plan  - Patient with urinary retention   - Urinary catheter placed due to retention   - Will attempt voiding trial once ambulatory status improved  - Outpatient follow-up with PCP  Hypernatremia  Assessment & Plan  - Patient with hypernatremia likely secondary to dehydration from multiple liquid bowel movements  - Continue current IV fluid therapy  - Continue to monitor BMP with repeat labs on 01/16/2022   - Encephalopathy improving  Disposition:  Continue current level of care  Not yet appropriate for discharge  SUBJECTIVE:  Chief Complaint:  I feel okay      Subjective:  Patient is feeling okay this morning  She offers no complaints and denies any pain at this time  She denies any headaches, visual changes, lightheadedness or dizziness  She denies having any pain at this time  Nursing staff notes that the patient appears to be improving and is more alert this morning        OBJECTIVE:     Meds/Allergies     Current Facility-Administered Medications:     amLODIPine (NORVASC) tablet 5 mg, 5 mg, Oral, Daily, Tori Castelan MD, 5 mg at 01/15/22 0903    cefTRIAXone (ROCEPHIN) 1,000 mg in dextrose 5 % 50 mL IVPB, 1,000 mg, Intravenous, Q24H, Teresa Dick MD, Last Rate: 100 mL/hr at 01/15/22 1029, 1,000 mg at 01/15/22 1029    heparin (porcine) subcutaneous injection 5,000 Units, 5,000 Units, Subcutaneous, Q8H [facility], Antoinette Jones PA-C, 5,000 Units at 01/15/22 1405    levETIRAcetam (KEPPRA) 500 mg in sodium chloride 0 9 % 100 mL IVPB, 500 mg, Intravenous, BID, Tori Castelan MD, Last Rate: 400 mL/hr at 01/15/22 1712, 500 mg at 01/15/22 1712    LORazepam (ATIVAN) injection 0 5 mg, 0 5 mg, Intravenous, HS, Brenda Hooks MD, 0 5 mg at 01/14/22 0152    metoprolol (LOPRESSOR) injection 5 mg, 5 mg, Intravenous, BID, Brenda Hooks MD, 5 mg at 01/15/22 0904    metroNIDAZOLE (FLAGYL) tablet 500 mg, 500 mg, Oral, Q8H [facility], Lashawn Perla MD, 500 mg at 01/15/22 1405    multi-electrolyte (PLASMALYTE-A/ISOLYTE-S PH 7 4) IV solution, 75 mL/hr, Intravenous, Continuous, Elton Carr PA-C, Last Rate: 75 mL/hr at 01/15/22 1029, 75 mL/hr at 01/15/22 1029    pantoprazole (PROTONIX) injection 40 mg, 40 mg, Intravenous, Q24H [address], Romaine Espinosa MD, 40 mg at 01/15/22 0532    vancomycin (VANCOCIN) oral solution 125 mg, 125 mg, Oral, Q12H [address], Gregory Frazier MD, 125 mg at 01/15/22 0904     Vitals:   Vitals:    01/15/22 1423   BP: 122/84   Pulse: 102   Resp: 18   Temp: 98 °F (36 7 °C)   SpO2: 94%       Intake/Output:  I/O       01/13 0701  01/14 0700 01/14 0701  01/15 0700 01/15 0701  01/16 0700    P  O  160 120 120    I V  (mL/kg) 2220 8 (41 7) 5512 5 (103 6) 145 8 (2 7)    IV Piggyback  250     Total Intake(mL/kg) 2380 8 (44 8) 5882 5 (110 6) 265 8 (5)    Urine (mL/kg/hr) 885 (0 7) 625 (0 5) 700 (1 2)    Stool  0 0    Total Output 885 625 700    Net +1495 8 +5257 5 -434 2           Unmeasured Urine Occurrence   1 x    Unmeasured Stool Occurrence  4 x 2 x           Nutrition/GI Proph/Bowel Reg:  Honey thick liquid diet initiated today  On Protonix for GI regimen  Physical Exam:   GENERAL APPEARANCE: Patient in no acute distress  HEENT: NCAT; PERRL, EOMs intact; Mucous membranes moist  NECK / BACK:  Nontender  CV: Regular rate and rhythm; no murmur/gallops/rubs appreciated  CHEST / LUNGS: Clear to auscultation; no wheezes/rales/rhonci  ABD: NABS; soft; non-distended; non-tender  :  Urinary catheter in place draining clear yellow urine  EXT: +2 pulses bilaterally upper & lower extremities; no edema  NEURO: GCS 15 with patient now alert oriented x3; no focal neurologic deficits; neurovascularly intact  SKIN: Warm, dry and well perfused; no rash; no jaundice        Invasive Devices  Report    Peripheral Intravenous Line            Peripheral IV 01/13/22 Right Forearm 2 days    Peripheral IV 01/14/22 Left Forearm 1 day          Drain            Urethral Catheter 16 Fr  2 days                 Lab Results:   Results: I have personally reviewed pertinent reports   , BMP/CMP:   Lab Results   Component Value Date    SODIUM 147 (H) 01/15/2022    K 3 7 01/15/2022     (H) 01/15/2022    CO2 21 01/15/2022    BUN 10 01/15/2022    CREATININE 0 59 (L) 01/15/2022    CALCIUM 6 7 (L) 01/15/2022    EGFR 82 01/15/2022    and CBC:   Lab Results   Component Value Date    WBC 4 67 01/15/2022    HGB 9 7 (L) 01/15/2022    HCT 29 5 (L) 01/15/2022    MCV 85 01/15/2022     01/15/2022    MCH 28 0 01/15/2022    MCHC 32 9 01/15/2022    RDW 17 2 (H) 01/15/2022    MPV 11 4 01/15/2022    NRBC 1 01/15/2022     Imaging/EKG Studies: Results: I have personally reviewed pertinent reports      Other Studies: N/A  VTE Prophylaxis: Sequential compression device (Venodyne)  and Heparin       Brittani Verduzco PA-C  1/15/2022 08:49 AM

## 2022-01-16 LAB
ANION GAP SERPL CALCULATED.3IONS-SCNC: 7 MMOL/L (ref 4–13)
BUN SERPL-MCNC: 9 MG/DL (ref 5–25)
CALCIUM SERPL-MCNC: 6.5 MG/DL (ref 8.3–10.1)
CHLORIDE SERPL-SCNC: 120 MMOL/L (ref 100–108)
CO2 SERPL-SCNC: 23 MMOL/L (ref 21–32)
CREAT SERPL-MCNC: 0.54 MG/DL (ref 0.6–1.3)
ERYTHROCYTE [DISTWIDTH] IN BLOOD BY AUTOMATED COUNT: 16.8 % (ref 11.6–15.1)
GFR SERPL CREATININE-BSD FRML MDRD: 85 ML/MIN/1.73SQ M
GLUCOSE SERPL-MCNC: 119 MG/DL (ref 65–140)
HCT VFR BLD AUTO: 30.1 % (ref 34.8–46.1)
HGB BLD-MCNC: 10.1 G/DL (ref 11.5–15.4)
MCH RBC QN AUTO: 27.9 PG (ref 26.8–34.3)
MCHC RBC AUTO-ENTMCNC: 33.6 G/DL (ref 31.4–37.4)
MCV RBC AUTO: 83 FL (ref 82–98)
PLATELET # BLD AUTO: 276 THOUSANDS/UL (ref 149–390)
PMV BLD AUTO: 10.3 FL (ref 8.9–12.7)
POTASSIUM SERPL-SCNC: 3.1 MMOL/L (ref 3.5–5.3)
RBC # BLD AUTO: 3.62 MILLION/UL (ref 3.81–5.12)
SODIUM SERPL-SCNC: 150 MMOL/L (ref 136–145)
WBC # BLD AUTO: 4.57 THOUSAND/UL (ref 4.31–10.16)

## 2022-01-16 PROCEDURE — 99232 SBSQ HOSP IP/OBS MODERATE 35: CPT | Performed by: INTERNAL MEDICINE

## 2022-01-16 PROCEDURE — 85027 COMPLETE CBC AUTOMATED: CPT | Performed by: PHYSICIAN ASSISTANT

## 2022-01-16 PROCEDURE — 99232 SBSQ HOSP IP/OBS MODERATE 35: CPT | Performed by: STUDENT IN AN ORGANIZED HEALTH CARE EDUCATION/TRAINING PROGRAM

## 2022-01-16 PROCEDURE — 80048 BASIC METABOLIC PNL TOTAL CA: CPT | Performed by: PHYSICIAN ASSISTANT

## 2022-01-16 PROCEDURE — C9113 INJ PANTOPRAZOLE SODIUM, VIA: HCPCS | Performed by: EMERGENCY MEDICINE

## 2022-01-16 RX ORDER — POTASSIUM CHLORIDE 20 MEQ/1
40 TABLET, EXTENDED RELEASE ORAL ONCE
Status: DISCONTINUED | OUTPATIENT
Start: 2022-01-16 | End: 2022-01-17

## 2022-01-16 RX ORDER — POTASSIUM CHLORIDE 20 MEQ/1
40 TABLET, EXTENDED RELEASE ORAL ONCE
Status: COMPLETED | OUTPATIENT
Start: 2022-01-16 | End: 2022-01-16

## 2022-01-16 RX ORDER — MAGNESIUM SULFATE HEPTAHYDRATE 40 MG/ML
2 INJECTION, SOLUTION INTRAVENOUS ONCE
Status: COMPLETED | OUTPATIENT
Start: 2022-01-16 | End: 2022-01-16

## 2022-01-16 RX ORDER — PANTOPRAZOLE SODIUM 40 MG/1
40 TABLET, DELAYED RELEASE ORAL
Status: DISCONTINUED | OUTPATIENT
Start: 2022-01-17 | End: 2022-01-20 | Stop reason: HOSPADM

## 2022-01-16 RX ADMIN — CEFTRIAXONE SODIUM 1000 MG: 10 INJECTION, POWDER, FOR SOLUTION INTRAVENOUS at 11:49

## 2022-01-16 RX ADMIN — HEPARIN SODIUM 5000 UNITS: 5000 INJECTION INTRAVENOUS; SUBCUTANEOUS at 05:15

## 2022-01-16 RX ADMIN — LEVETIRACETAM 500 MG: 100 INJECTION, SOLUTION INTRAVENOUS at 08:57

## 2022-01-16 RX ADMIN — MAGNESIUM SULFATE HEPTAHYDRATE 2 G: 40 INJECTION, SOLUTION INTRAVENOUS at 09:43

## 2022-01-16 RX ADMIN — METRONIDAZOLE 500 MG: 500 TABLET ORAL at 22:07

## 2022-01-16 RX ADMIN — HEPARIN SODIUM 5000 UNITS: 5000 INJECTION INTRAVENOUS; SUBCUTANEOUS at 22:07

## 2022-01-16 RX ADMIN — SODIUM CHLORIDE, SODIUM GLUCONATE, SODIUM ACETATE, POTASSIUM CHLORIDE, MAGNESIUM CHLORIDE, SODIUM PHOSPHATE, DIBASIC, AND POTASSIUM PHOSPHATE 75 ML/HR: .53; .5; .37; .037; .03; .012; .00082 INJECTION, SOLUTION INTRAVENOUS at 13:11

## 2022-01-16 RX ADMIN — Medication 125 MG: at 22:07

## 2022-01-16 RX ADMIN — POTASSIUM CHLORIDE 40 MEQ: 1500 TABLET, EXTENDED RELEASE ORAL at 13:07

## 2022-01-16 RX ADMIN — HEPARIN SODIUM 5000 UNITS: 5000 INJECTION INTRAVENOUS; SUBCUTANEOUS at 13:07

## 2022-01-16 RX ADMIN — PANTOPRAZOLE SODIUM 40 MG: 40 INJECTION, POWDER, FOR SOLUTION INTRAVENOUS at 05:14

## 2022-01-16 RX ADMIN — Medication 125 MG: at 08:57

## 2022-01-16 RX ADMIN — LEVETIRACETAM 500 MG: 100 INJECTION, SOLUTION INTRAVENOUS at 17:33

## 2022-01-16 RX ADMIN — METRONIDAZOLE 500 MG: 500 TABLET ORAL at 05:15

## 2022-01-16 RX ADMIN — METOPROLOL TARTRATE 25 MG: 25 TABLET, FILM COATED ORAL at 22:10

## 2022-01-16 RX ADMIN — METRONIDAZOLE 500 MG: 500 TABLET ORAL at 13:07

## 2022-01-16 NOTE — PROGRESS NOTES
[facility] - Progress Note  Danita Ramirez 80 y o  female MRN: [id number]  Unit/Bed#: [facility] 721-49 Encounter: [id number]      ASSESSMENT & PLAN:    26-year-old female with hypertension, hyperlipidemia, history of pulmonary embolism and DVT previously on anticoagulation who presented following a fall with findings of a subdural hematoma   GI consultation requested for evaluation of diarrhea and pancolitis seen on CT      1  Pancolitis, diarrhea - suspect that her preceding diarrhea symptoms were secondary to an acute viral gastroenteritis and 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 that may have resulted in dehydration and subsequent orthostasis which may have been a factor causing her fall  Suspect this also resulted in hypoperfusion and ischemic injury to her colon  Colonoscopy on 01/14 showed severe pancolitis suggestive of severe ischemic colitis vs possibly IBD although the latter is less likely  Possible that she had multiple ischemic insults to the colon given the severity of colitis seen  Stool enteric panel negative  Had negative C diff on 1/11 but subsequent positive C diff PCR on 1/13 with negative EIA  I suspect this is indicative of colonization as her colon did not appear consistent with C diff colitis  On antibiotics including PO vancomycin given her positive C diff PCR  · Follow-up colon biopsies  · Continue antibiotics for total course of 7 days (day 4/7); can transition to PO to complete course  · Okay with continuing PO vancomycin for positive C diff PCR; appreciate ID recommendations  · ADAT  · Follow clinically and monitor abdominal exams  · Avoid hypotension  · Avoid constipating agents  · Supportive care per primary team  · Stable for discharge from GI standpoint      2  Subdural hematoma, dysphagia - secondary to fall while on anticoagulation  INR now normalized after correction  Subsequent dysphagia and dysarthria secondary to her neurologic injury   More alert and tolerating dysphagia diet  · Continue dysphagia diet per speech therapy recommendations      GI will sign off  Please contact the GI fellow on-call with any questions or concerns  ______________________________________________________________________    SUBJECTIVE:     Patient seen and examined at bedside  More alert and conversant, pleasantly confused  Eating breakfast  Denies any abdominal pain, nausea, vomiting  Complains of dry lips  Had BM      Medication Administration - last 24 hours from 01/15/2022 0734 to 01/16/2022 0734       Date/Time Order Dose Route Action Action by     01/15/2022 1712 levETIRAcetam (KEPPRA) 500 mg in sodium chloride 0 9 % 100 mL IVPB 500 mg Intravenous New Bag Darren Yang RN     01/15/2022 0911 levETIRAcetam (KEPPRA) 500 mg in sodium chloride 0 9 % 100 mL IVPB 500 mg Intravenous New Bag Darren Yang RN     01/15/2022 0903 amLODIPine (NORVASC) tablet 5 mg 5 mg Oral Given Ijeoma Resendiz RN     01/16/2022 0515 heparin (porcine) subcutaneous injection 5,000 Units 5,000 Units Subcutaneous Given Ana Norton RN     01/15/2022 2149 heparin (porcine) subcutaneous injection 5,000 Units 5,000 Units Subcutaneous Given Ana Norton RN     01/15/2022 1405 heparin (porcine) subcutaneous injection 5,000 Units 5,000 Units Subcutaneous Given Darren Yang RN     01/15/2022 2150 LORazepam (ATIVAN) injection 0 5 mg 0 5 mg Intravenous Given Ana Norton RN     01/15/2022 2150 metoprolol (LOPRESSOR) injection 5 mg 5 mg Intravenous Given Ana Norton RN     01/15/2022 0904 metoprolol (LOPRESSOR) injection 5 mg 5 mg Intravenous Given Ijeoma Resendiz RN     01/16/2022 0514 pantoprazole (PROTONIX) injection 40 mg 40 mg Intravenous Given Ana Norton RN     01/15/2022 1029 cefTRIAXone (ROCEPHIN) 1,000 mg in dextrose 5 % 50 mL IVPB 1,000 mg Intravenous New Bag Darren Yang, DELORES     01/16/2022 0515 metroNIDAZOLE (FLAGYL) tablet 500 mg 500 mg Oral Given Ana Norton RN     01/15/2022 3391 metroNIDAZOLE (FLAGYL) tablet 500 mg 500 mg Oral Given Gus Camacho RN     01/15/2022 1405 metroNIDAZOLE (FLAGYL) tablet 500 mg 500 mg Oral Given Amie France RN     01/15/2022 2149 vancomycin (VANCOCIN) oral solution 125 mg 125 mg Oral Given Gus Camacho RN     01/15/2022 0904 vancomycin (VANCOCIN) oral solution 125 mg 125 mg Oral Given Amie France RN     01/15/2022 1028 multi-electrolyte (PLASMALYTE-A/ISOLYTE-S PH 7 4) IV solution 0 mL/hr Intravenous Stopped Darren Yang RN     01/15/2022 2149 multi-electrolyte (PLASMALYTE-A/ISOLYTE-S PH 7 4) IV solution 75 mL/hr Intravenous New [id number] Tarsha Smith RN     01/15/2022 2148 multi-electrolyte (PLASMALYTE-A/ISOLYTE-S PH 7 4) IV solution 0 mL/hr Intravenous Stopped Gus Camacho RN     01/15/2022 1029 multi-electrolyte (PLASMALYTE-A/ISOLYTE-S PH 7 4) IV solution 75 mL/hr Intravenous New Bag Darren Yang RN          OBJECTIVE:     Objective   Blood pressure 122/74, pulse 84, temperature 97 8 °F (36 6 °C), resp  rate 16, weight 53 2 kg (117 lb 3 2 oz), SpO2 93 %  Body mass index is 24 49 kg/m²  Intake/Output Summary (Last 24 hours) at 1/16/2022 0734  Last data filed at 1/16/2022 0515  Gross per 24 hour   Intake 1676 25 ml   Output 1085 ml   Net 591 25 ml       PHYSICAL EXAM:   General Appearance: Awake and alert, in no acute distress; elderly female, frail-appearing  Abdomen: Soft, non-tender, non-distended; bowel sounds normal; no masses or no organomegaly    Invasive Devices  Report    Peripheral Intravenous Line            Peripheral IV 01/14/22 Left Forearm 1 day          Drain            Urethral Catheter 16 Fr  2 days                LAB RESULTS:  No results displayed because visit has over 200 results  RADIOLOGY RESULTS: I have personally reviewed pertinent imaging studies  ROSY Cruz  Gastroenterology Fellow  [facility]  Available on [email or web address]

## 2022-01-16 NOTE — ASSESSMENT & PLAN NOTE
- Patient with pan colitis and diarrhea on presentation  Patient positive for C diff, but ID suspects it is colonization  Colitis likely ischemic in nature  - Appreciate both ID and GI evaluation, recommendations and intervention  Patient is status post colonoscopy on 01/13/2022   - Continue current antibiotic regimen as well as oral vancomycin under direction of ID   - Continue to monitor abdominal exam, which is improving and is benign at this time   - Lactic acidosis resolved following fluid resuscitation and treatment with IV antibiotics  - Advance diet as tolerated to level 1 dysphagia diet with thickened liquids  Maintain aspiration precautions  - Continue to follow abdominal exam and bowel function

## 2022-01-16 NOTE — ASSESSMENT & PLAN NOTE
- Dysphagia, present on presentation   - Appreciate Speech therapy evaluation and recommendations  - Patient was cleared for level 1 dysphagia diet with honey thick liquids  - Maintain aspiration precautions

## 2022-01-16 NOTE — ASSESSMENT & PLAN NOTE
- Patient with hypernatremia likely secondary to dehydration from multiple liquid bowel movements  - Continue current IV fluid therapy  - Continue to monitor BMP with repeat labs on 01/17/2022   - Encephalopathy improving

## 2022-01-16 NOTE — PLAN OF CARE
Problem: Potential for Falls  Goal: Patient will remain free of falls  Description: INTERVENTIONS:  - Educate patient/family on patient safety including physical limitations  - Instruct patient to call for assistance with activity   - Consult OT/PT to assist with strengthening/mobility   - Keep Call bell within reach  - Keep bed low and locked with side rails adjusted as appropriate  - Keep care items and personal belongings within reach  - Initiate and maintain comfort rounds  - Make Fall Risk Sign visible to staff  - Offer Toileting every 2 Hours, in advance of need  - Initiate/Maintain alarm  - Obtain necessary fall risk management equipment:   - Apply yellow socks and bracelet for high fall risk patients  - Consider moving patient to room near nurses station  Outcome: Not Progressing     Problem: Prexisting or High Potential for Compromised Skin Integrity  Goal: Skin integrity is maintained or improved  Description: INTERVENTIONS:  - Identify patients at risk for skin breakdown  - Assess and monitor skin integrity  - Assess and monitor nutrition and hydration status  - Monitor labs   - Assess for incontinence   - Turn and reposition patient  - Assist with mobility/ambulation  - Relieve pressure over bony prominences  - Avoid friction and shearing  - Provide appropriate hygiene as needed including keeping skin clean and dry  - Evaluate need for skin moisturizer/barrier cream  - Collaborate with interdisciplinary team   - Patient/family teaching  - Consider wound care consult   Outcome: Not Progressing     Problem: MOBILITY - ADULT  Goal: Maintain or return to baseline ADL function  Description: INTERVENTIONS:  -  Assess patient's ability to carry out ADLs; assess patient's baseline for ADL function and identify physical deficits which impact ability to perform ADLs (bathing, care of mouth/teeth, toileting, grooming, dressing, etc )  - Assess/evaluate cause of self-care deficits   - Assess range of motion  - Assess patient's mobility; develop plan if impaired  - Assess patient's need for assistive devices and provide as appropriate  - Encourage maximum independence but intervene and supervise when necessary  - Involve family in performance of ADLs  - Assess for home care needs following discharge   - Consider OT consult to assist with ADL evaluation and planning for discharge  - Provide patient education as appropriate  Outcome: Not Progressing  Goal: Maintains/Returns to pre admission functional level  Description: INTERVENTIONS:  - Perform BMAT or MOVE assessment daily    - Set and communicate daily mobility goal to care team and patient/family/caregiver  - Collaborate with rehabilitation services on mobility goals if consulted  - Perform Range of Motion 3 times a day  - Reposition patient every 3 hours  - Dangle patient 3 times a day  - Stand patient 3 times a day  - Ambulate patient 3 times a day  - Out of bed to chair 3 times a day   - Out of bed for meals 3 times a day  - Out of bed for toileting  - Record patient progress and toleration of activity level   Outcome: Not Progressing     Problem: NEUROSENSORY - ADULT  Goal: Achieves stable or improved neurological status  Description: INTERVENTIONS  - Monitor and report changes in neurological status  - Monitor vital signs such as temperature, blood pressure, glucose, and any other labs ordered   - Initiate measures to prevent increased intracranial pressure  - Monitor for seizure activity and implement precautions if appropriate      Outcome: Not Progressing  Goal: Achieves maximal functionality and self care  Description: INTERVENTIONS  - Monitor swallowing and airway patency with patient fatigue and changes in neurological status  - Encourage and assist patient to increase activity and self care     - Encourage visually impaired, hearing impaired and aphasic patients to use assistive/communication devices  Outcome: Not Progressing     Problem: GASTROINTESTINAL - ADULT  Goal: Maintains or returns to baseline bowel function  Description: INTERVENTIONS:  - Assess bowel function  - Encourage oral fluids to ensure adequate hydration  - Administer IV fluids if ordered to ensure adequate hydration  - Administer ordered medications as needed  - Encourage mobilization and activity  - Consider nutritional services referral to assist patient with adequate nutrition and appropriate food choices  Outcome: Not Progressing  Goal: Maintains adequate nutritional intake  Description: INTERVENTIONS:  - Monitor percentage of each meal consumed  - Identify factors contributing to decreased intake, treat as appropriate  - Assist with meals as needed  - Monitor I&O, weight, and lab values if indicated  - Obtain nutrition services referral as needed  Outcome: Not Progressing     Problem: Nutrition/Hydration-ADULT  Goal: Nutrient/Hydration intake appropriate for improving, restoring or maintaining nutritional needs  Description: Monitor and assess patient's nutrition/hydration status for malnutrition  Collaborate with interdisciplinary team and initiate plan and interventions as ordered  Monitor patient's weight and dietary intake as ordered or per policy  Utilize nutrition screening tool and intervene as necessary  Determine patient's food preferences and provide high-protein, high-caloric foods as appropriate       INTERVENTIONS:  - Monitor oral intake, urinary output, labs, and treatment plans  - Assess nutrition and hydration status and recommend course of action  - Evaluate amount of meals eaten  - Assist patient with eating if necessary   - Allow adequate time for meals  - Recommend/ encourage appropriate diets, oral nutritional supplements, and vitamin/mineral supplements  - Order, calculate, and assess calorie counts as needed  - Recommend, monitor, and adjust tube feedings and TPN/PPN based on assessed needs  - Assess need for intravenous fluids  - Provide specific nutrition/hydration education as appropriate  - Include patient/family/caregiver in decisions related to nutrition  Outcome: Not Progressing     Problem: CONFUSION/THOUGHT DISTURBANCE  Goal: Thought disturbances (confusion, delirium, depression, dementia or psychosis) are managed to maintain or return to baseline mental status and functional level  Description: INTERVENTIONS:  - Assess for possible contributors to  thought disturbance, including but not limited to medications, infection, impaired vision or hearing, underlying metabolic abnormalities, dehydration, respiratory compromise,  psychiatric diagnoses and notify attending PHYSICAN/AP  - Monitor and intervene to maintain adequate nutrition, hydration, elimination, sleep and activity  - Decrease environmental stimuli, including noise as appropriate  - Provide frequent contacts to provide refocusing, direction and reassurance as needed  Approach patient calmly with eye contact and at their level  - [place] high risk fall precautions, aspiration precautions and other safety measures, as indicated  - If delirium suspected, notify physician/AP of change in condition and request immediate in-person evaluation  - Pursue consults as appropriate including Geriatric (campus dependent), OT for cognitive evaluation/activity planning, psychiatric, pastoral care, etc   Outcome: Not Progressing     Problem: BEHAVIOR  Goal: Pt/Family maintain appropriate behavior and adhere to behavioral management agreement, if implemented  Description: INTERVENTIONS:  - Assess the family dynamic   - Encourage verbalization of thoughts and concerns in a socially appropriate manner  - Assess patient/family's coping skills and non-compliant behavior (including use of illegal substances)    - Utilize positive, consistent limit setting strategies supporting safety of patient, staff and others  - Initiate consult with Case Management, Spiritual Care or other ancillary services as appropriate  - If a patient's/visitor's behavior jeopardizes the safety of the patient, staff, or others, refer to organization procedure     - Notify Security of behavior or suspected illegal substances which indicate the need for search of the patient and/or belongings  - Encourage participation in the decision making process about a behavioral management agreement; implement if patient meets criteria  Outcome: Not Progressing

## 2022-01-16 NOTE — PROGRESS NOTES
The pantoprazole has been converted to Oral per [facility] IV-to-PO Auto-Conversion Protocol for Adults as approved by the Pharmacy and Therapeutics Committee  The patient met all eligible criteria:  3 Age = 25years old   2) Received at least one dose of the IV form   3) Receiving at least one other scheduled oral/enteral medication   4) Tolerating an oral/enteral diet   and did not have any exclusions:   1) Critical care patient   2) Active GI bleed (IF assessing H2RAs or PPIs)   3) Continuous tube feeding (IF assessing cipro, doxycycline, levofloxacin, minocycline, rifampin, or voriconazole)   4) Receiving PO vancomycin (IF assessing metronidazole)   5) Persistent nausea and/or vomiting   6) Ileus or gastrointestinal obstruction   7) Mane/nasogastric tube set for continuous suction   8) Specific order not to automatically convert to PO (in the order's comments or if discussed in the most recent Infectious Disease or primary team's progress notes)       Micah Urrutia, PharmD, [address] and Internal Medicine Clinical Pharmacist  [phone] or via [facility]

## 2022-01-16 NOTE — PROGRESS NOTES
[facility]  Progress Note - Lea Lofton 1934, 80 y o  female MRN: [id number]  Unit/Bed#: [facility] 604-01 Encounter: [id number]  Primary Care Provider: Star Rodrigues MD   Date and time admitted to hospital: 1/10/2022  4:35 PM    Fall  Assessment & Plan  - Status post fall with questionable loss of consciousness and with the below noted injuries/issues  - Fall precautions  - Geriatric Medicine consultation for evaluation, medication review and recommendations   - PT and OT evaluation and treatment as indicated  - Case Management consultation for disposition planning  * Subdural hematoma (HCC)  Assessment & Plan  - 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, present on admission   - Neurosurgery evaluation and recommendations appreciated  - Patient's coagulopathy corrected with Kcentra on presentation  Hold anti-platelet and anticoagulant medications for least 2 weeks and/or until cleared by Neurosurgery  - Continue Keppra for 7 days for seizure prophylaxis  - Monitor neurologic exam   - Continue symptomatic management with analgesia as needed  - PT and OT evaluation and treatment as indicated  - Outpatient Neurosurgery follow-up in 2 weeks with repeat CT scan of the head prior to follow-up appointment  Pancolitis  Assessment & Plan  - Patient with pan colitis and diarrhea on presentation  Patient positive for C diff, but ID suspects it is colonization  Colitis likely ischemic in nature  - Appreciate both ID and GI evaluation, recommendations and intervention  Patient is status post colonoscopy on 01/13/2022   - Continue current antibiotic regimen as well as oral vancomycin under direction of ID   - Continue to monitor abdominal exam, which is improving and is benign at this time   - Lactic acidosis resolved following fluid resuscitation and treatment with IV antibiotics    - Advance diet as tolerated to level 1 dysphagia diet with thickened liquids  Maintain aspiration precautions  - Continue to follow abdominal exam and bowel function  Diarrhea  Assessment & Plan  - Patient with 2-3 week history of diarrhea  Patient had been started on treatment for suspected C diff as an outpatient  - Continue current antibiotic regimen under the direction of ID   - Continue to monitor abdominal exam and bowel function  FRANK (acute kidney injury) [facility])  Assessment & Plan  - Patient with acute kidney injury on presentation, now resolved  This was likely secondary to volume depletion in setting of recent diarrhea  - Continue to monitor volume status and urine output   - Continue to monitor renal function   - Continue IV fluids until tolerating oral diet well  - Avoid nephrotoxic agents and hypotension    Altered mental status  Assessment & Plan  - Patient with altered mental status that is likely multi factorial given age and concomitant TBI as well as diarrhea/dehydration    - Mental status appears to be slowly improving   - Continue to monitor mental status and maintain delirium precautions  - Frequent reorientation   - Continue treatment traumatic brain injury and infectious etiology  - Spot EEG on 1/11/2022 negative for seizure activity  Dysphagia  Assessment & Plan  - Dysphagia, present on presentation   - Appreciate Speech therapy evaluation and recommendations  - Patient was cleared for level 1 dysphagia diet with honey thick liquids  - Maintain aspiration precautions  History of DVT (deep vein thrombosis)  Assessment & Plan  - Patient with history of right lower extremity DVT and PE on 11/4/2021   - Bilateral lower extremity venous duplex demonstrated chronic DVT of left leg, no new DVT  - Home Coumadin held and reversed due to [address]  Started on [facility] for DVT ppx on 1/11/2022   - Resume anticoagulation when cleared from a neurosurgical standpoint   - Outpatient follow-up with PCP        Urinary retention  Assessment & Plan  - Patient with urinary retention   - Urinary catheter placed due to retention   - Will attempt voiding trial once ambulatory status improved  - Outpatient follow-up with PCP  Hypernatremia  Assessment & Plan  - Patient with hypernatremia likely secondary to dehydration from multiple liquid bowel movements  - Continue current IV fluid therapy  - Continue to monitor BMP with repeat labs on 01/17/2022   - Encephalopathy improving  Disposition: Continue current level of care  Not yet appropriate for discharge  SUBJECTIVE:  Chief Complaint:  I feel good      Subjective:  Patient is feeling good at this time  She offers no complaints and denies having any pain  She states her abdomen feels good and is not uncomfortable  She does feel hungry and is asking for food this morning  Nursing staff reports she was on and off confused overnight and picking at her IV, but improving this morning and there were no issues yesterday evening  She did tolerate her clear liquid diet without any nausea vomiting per nursing staff  She has continued to have loose bowel movements per the nursing staff        OBJECTIVE:     Meds/Allergies     Current Facility-Administered Medications:     amLODIPine (NORVASC) tablet 5 mg, 5 mg, Oral, Daily, Bronson Rubin MD, 5 mg at 01/15/22 0903    cefTRIAXone (ROCEPHIN) 1,000 mg in dextrose 5 % 50 mL IVPB, 1,000 mg, Intravenous, Q24H, Teresa Dick MD, Last Rate: 100 mL/hr at 01/15/22 1029, 1,000 mg at 01/15/22 1029    heparin (porcine) subcutaneous injection 5,000 Units, 5,000 Units, Subcutaneous, Q8H [facility], Meme Velasquez PA-C, 5,000 Units at 01/16/22 0515    levETIRAcetam (KEPPRA) 500 mg in sodium chloride 0 9 % 100 mL IVPB, 500 mg, Intravenous, BID, Bronson Rubin MD, Last Rate: 400 mL/hr at 01/15/22 1712, 500 mg at 01/15/22 1712    LORazepam (ATIVAN) injection 0 5 mg, 0 5 mg, Intravenous, HS, Meek Mcclendon MD, 0 5 mg at 01/15/22 2150    magnesium sulfate 2 g/50 mL IVPB (premix) 2 g, 2 g, Intravenous, Once, Elton Carr PA-C    metoprolol (LOPRESSOR) injection 5 mg, 5 mg, Intravenous, BID, April Castro MD, 5 mg at 01/15/22 2150    metroNIDAZOLE (FLAGYL) tablet 500 mg, 500 mg, Oral, Q8H [facility], Teresa Dick MD, 500 mg at 01/16/22 0515    multi-electrolyte (PLASMALYTE-A/ISOLYTE-S PH 7 4) IV solution, 75 mL/hr, Intravenous, Continuous, Elton Carr PA-C, Last Rate: 75 mL/hr at 01/15/22 2149, 75 mL/hr at 01/15/22 2149    pantoprazole (PROTONIX) injection 40 mg, 40 mg, Intravenous, Q24H [facility], April Castro MD, 40 mg at 01/16/22 0514    potassium chloride (K-DUR,KLOR-CON) CR tablet 40 mEq, 40 mEq, Oral, Once, Elton Carr PA-C    potassium chloride (K-DUR,KLOR-CON) CR tablet 40 mEq, 40 mEq, Oral, Once, Elton Carr PA-C    vancomycin (VANCOCIN) oral solution 125 mg, 125 mg, Oral, Q12H [facility], Melany Candelaria MD, 125 mg at 01/15/22 2149     Vitals:   Vitals:    01/16/22 0654   BP: 122/74   Pulse: 84   Resp: 16   Temp: 97 8 °F (36 6 °C)   SpO2: 93%       Intake/Output:  I/O       01/14 0701  01/15 0700 01/15 0701  01/16 0700 01/16 0701  01/17 0700    P  O  120 270     I V  (mL/kg) 5512 5 (103 6) 1552 1 (29 2)     IV Piggyback 250      Total Intake(mL/kg) 5882 5 (110 6) 1822 1 (34 2)     Urine (mL/kg/hr) 625 (0 5) 1085 (0 8)     Stool 0 0     Total Output 625 1085     Net +5257 5 +737 1            Unmeasured Urine Occurrence  1 x     Unmeasured Stool Occurrence 4 x 4 x            Nutrition/GI Proph/Bowel Reg: Advancing to level 1 dysphagia diet with honey thick liquids  On Protonix for GI prophylaxis  Physical Exam:   GENERAL APPEARANCE: Patient in no acute distress  HEENT: NCAT; PERRL, EOMs intact; Mucous membranes moist  CV: Regular rate and rhythm; no murmur/gallops/rubs appreciated  CHEST / LUNGS: Clear to auscultation; no wheezes/rales/rhonci  ABD: NABS; soft; mildly distended, but not tender    :  Urinary catheter in place draining clear yellow urine  EXT: +2 pulses bilaterally upper & lower extremities; no edema  NEURO: GCS 15 with patient alert oriented to person, place (did not know was [facility], but was able to say was a hospital) and time (year); no focal neurologic deficits; neurovascularly intact  SKIN: Warm, dry and well perfused; no rash; no jaundice  Invasive Devices  Report    Peripheral Intravenous Line            Peripheral IV 01/14/22 Left Forearm 1 day          Drain            Urethral Catheter 16 Fr  2 days                 Lab Results:   Results: I have personally reviewed pertinent reports   , BMP/CMP:   Lab Results   Component Value Date    SODIUM 150 (H) 01/16/2022    K 3 1 (L) 01/16/2022     (H) 01/16/2022    CO2 23 01/16/2022    BUN 9 01/16/2022    CREATININE 0 54 (L) 01/16/2022    CALCIUM 6 5 (L) 01/16/2022    EGFR 85 01/16/2022    and CBC:   Lab Results   Component Value Date    WBC 4 57 01/16/2022    HGB 10 1 (L) 01/16/2022    HCT 30 1 (L) 01/16/2022    MCV 83 01/16/2022     01/16/2022    MCH 27 9 01/16/2022    MCHC 33 6 01/16/2022    RDW 16 8 (H) 01/16/2022    MPV 10 3 01/16/2022     Imaging/EKG Studies: Results: I have personally reviewed pertinent reports      Other Studies: N/A  VTE Prophylaxis: Sequential compression device (Venodyne)  and Heparin     Fabiana Lopez PA-C  1/16/2022 08:39 AM

## 2022-01-17 PROBLEM — N17.9 AKI (ACUTE KIDNEY INJURY) (HCC): Status: RESOLVED | Noted: 2022-01-11 | Resolved: 2022-01-17

## 2022-01-17 LAB
ANION GAP SERPL CALCULATED.3IONS-SCNC: 5 MMOL/L (ref 4–13)
BUN SERPL-MCNC: 6 MG/DL (ref 5–25)
CALCIUM SERPL-MCNC: 6.9 MG/DL (ref 8.3–10.1)
CHLORIDE SERPL-SCNC: 119 MMOL/L (ref 100–108)
CO2 SERPL-SCNC: 23 MMOL/L (ref 21–32)
CREAT SERPL-MCNC: 0.47 MG/DL (ref 0.6–1.3)
GFR SERPL CREATININE-BSD FRML MDRD: 89 ML/MIN/1.73SQ M
GLUCOSE SERPL-MCNC: 117 MG/DL (ref 65–140)
MAGNESIUM SERPL-MCNC: 2.6 MG/DL (ref 1.6–2.6)
POTASSIUM SERPL-SCNC: 3.2 MMOL/L (ref 3.5–5.3)
SODIUM SERPL-SCNC: 147 MMOL/L (ref 136–145)

## 2022-01-17 PROCEDURE — 92526 ORAL FUNCTION THERAPY: CPT

## 2022-01-17 PROCEDURE — 99232 SBSQ HOSP IP/OBS MODERATE 35: CPT | Performed by: INTERNAL MEDICINE

## 2022-01-17 PROCEDURE — 97535 SELF CARE MNGMENT TRAINING: CPT

## 2022-01-17 PROCEDURE — 99232 SBSQ HOSP IP/OBS MODERATE 35: CPT | Performed by: STUDENT IN AN ORGANIZED HEALTH CARE EDUCATION/TRAINING PROGRAM

## 2022-01-17 PROCEDURE — 83735 ASSAY OF MAGNESIUM: CPT | Performed by: PHYSICIAN ASSISTANT

## 2022-01-17 PROCEDURE — 80048 BASIC METABOLIC PNL TOTAL CA: CPT | Performed by: PHYSICIAN ASSISTANT

## 2022-01-17 PROCEDURE — 99232 SBSQ HOSP IP/OBS MODERATE 35: CPT | Performed by: SURGERY

## 2022-01-17 RX ORDER — POTASSIUM CHLORIDE 14.9 MG/ML
20 INJECTION INTRAVENOUS
Status: COMPLETED | OUTPATIENT
Start: 2022-01-17 | End: 2022-01-17

## 2022-01-17 RX ORDER — POTASSIUM CHLORIDE 20 MEQ/1
40 TABLET, EXTENDED RELEASE ORAL ONCE
Status: COMPLETED | OUTPATIENT
Start: 2022-01-17 | End: 2022-01-17

## 2022-01-17 RX ADMIN — METRONIDAZOLE 500 MG: 500 TABLET ORAL at 14:36

## 2022-01-17 RX ADMIN — SODIUM CHLORIDE, SODIUM GLUCONATE, SODIUM ACETATE, POTASSIUM CHLORIDE, MAGNESIUM CHLORIDE, SODIUM PHOSPHATE, DIBASIC, AND POTASSIUM PHOSPHATE 50 ML/HR: .53; .5; .37; .037; .03; .012; .00082 INJECTION, SOLUTION INTRAVENOUS at 05:24

## 2022-01-17 RX ADMIN — POTASSIUM CHLORIDE 20 MEQ: 14.9 INJECTION, SOLUTION INTRAVENOUS at 08:45

## 2022-01-17 RX ADMIN — LEVETIRACETAM 500 MG: 100 INJECTION, SOLUTION INTRAVENOUS at 08:41

## 2022-01-17 RX ADMIN — METOPROLOL TARTRATE 25 MG: 25 TABLET, FILM COATED ORAL at 08:41

## 2022-01-17 RX ADMIN — Medication 125 MG: at 08:41

## 2022-01-17 RX ADMIN — POTASSIUM CHLORIDE 20 MEQ: 14.9 INJECTION, SOLUTION INTRAVENOUS at 11:35

## 2022-01-17 RX ADMIN — METRONIDAZOLE 500 MG: 500 TABLET ORAL at 22:05

## 2022-01-17 RX ADMIN — LEVETIRACETAM 500 MG: 100 INJECTION, SOLUTION INTRAVENOUS at 17:36

## 2022-01-17 RX ADMIN — HEPARIN SODIUM 5000 UNITS: 5000 INJECTION INTRAVENOUS; SUBCUTANEOUS at 22:05

## 2022-01-17 RX ADMIN — AMLODIPINE BESYLATE 5 MG: 5 TABLET ORAL at 08:41

## 2022-01-17 RX ADMIN — POTASSIUM CHLORIDE 40 MEQ: 1500 TABLET, EXTENDED RELEASE ORAL at 08:41

## 2022-01-17 RX ADMIN — Medication 125 MG: at 22:05

## 2022-01-17 RX ADMIN — HEPARIN SODIUM 5000 UNITS: 5000 INJECTION INTRAVENOUS; SUBCUTANEOUS at 14:36

## 2022-01-17 RX ADMIN — HEPARIN SODIUM 5000 UNITS: 5000 INJECTION INTRAVENOUS; SUBCUTANEOUS at 05:25

## 2022-01-17 RX ADMIN — METOPROLOL TARTRATE 25 MG: 25 TABLET, FILM COATED ORAL at 22:07

## 2022-01-17 RX ADMIN — METRONIDAZOLE 500 MG: 500 TABLET ORAL at 06:50

## 2022-01-17 RX ADMIN — LORAZEPAM 0.5 MG: 2 INJECTION INTRAMUSCULAR; INTRAVENOUS at 22:05

## 2022-01-17 RX ADMIN — CEFTRIAXONE SODIUM 1000 MG: 10 INJECTION, POWDER, FOR SOLUTION INTRAVENOUS at 12:41

## 2022-01-17 NOTE — PROGRESS NOTES
Pastoral Care Progress Note    2022  Patient: Ade Martinez : 1934  Admission Date & Time: 1/10/2022 1635  MRN: [id number] CSN: [id number]      Donned appropriate PPE for introductory visit with pt "Lyla Dumont made statements like "I want to go home" and asked this  to take her home  Provided supportive conversation and space to process her desire to leave [facility] and to return to where she lives  Informed RN "Chris Elmore" of her hopes to return to her facility  Will follow as requested  If Sugey/ support system desire spiritual care, please contact [employer]  via [email or web address]  Thank you!                Chaplaincy Interventions Utilized:   Empowerment: Encouraged focus on present, Encouraged self-care and Provided chaplaincy education    Exploration: Facilitated story telling     Relationship Building: Cultivated a relationship of care and support, Listened empathically and Provided silent and supportive presence    Chaplaincy Outcomes Achieved:  Expressed gratitude and Identified meaningful connections    Spiritual Coping Strategies Utilized:   Connectedness       22 1100   Clinical Encounter Type   Visited With Patient   Routine Visit Introduction

## 2022-01-17 NOTE — CASE MANAGEMENT
Case Management Discharge Planning Note    Patient name Carmelina Yanez  [facility] [address] [facility] [id number]  : 1934 Date 2022       Current Admission Date: 1/10/2022  Current Admission Diagnosis:Subdural hematoma [facility])   Patient Active Problem List    Diagnosis Date Noted    Pancolitis 2022    Urinary retention 2022    Hypernatremia 2022    Dysphagia 2022    Fall 2022    Subdural hematoma ([facility] [address] ) 2022    Diarrhea 2022    History of DVT (deep vein thrombosis) 2022    Altered mental status 2022    Cardiac arrhythmia 2022    Chronic anticoagulation 2021    Anemia 2021    DVT (deep venous thrombosis) ([facility] [address] ) 2021    Pulmonary HTN ([facility] [address] ) 2021    Paraesophageal hernia 2021    Acute pulmonary embolism with acute cor pulmonale ([address] ) 2021    Severe sepsis ([facility] [address] ) 2021    Pneumonia 2021    Elevated troponin 2021    BMI 26 0-26 9,adult 2019    Cortical age-related cataract of left eye 05/10/2019    Pre-op exam 05/10/2019    Medicare annual wellness visit, subsequent 2018    Intertrigo 2018    External hemorrhoids 2017    Acquired hypothyroidism 10/26/2012    Generalized anxiety disorder 10/24/2012    Headache 10/24/2012    Mixed hyperlipidemia 10/24/2012    Essential hypertension 10/24/2012      LOS (days): 7  Geometric Mean LOS (GMLOS) (days): 4 60  Days to GMLOS:-2 2     OBJECTIVE:  Risk of Unplanned Readmission Score: 22         Current admission status: Inpatient   Preferred Pharmacy:   [facility] [address] [facility]  [facility], [place] - [address]  [address]    [place] [address]  Phone: [phone] Fax: [phone]    Primary Care Provider: Joon Gonzalez MD    Primary Insurance: Rui Turpin  W Gopi Lares REP  Secondary Insurance:     DISCHARGE DETAILS:  [facility] has a bed for pt today, CM informed them that pt will be ready for DC either tomorrow or Wednesday

## 2022-01-17 NOTE — PLAN OF CARE
Problem: Potential for Falls  Goal: Patient will remain free of falls  Description: INTERVENTIONS:  - Educate patient/family on patient safety including physical limitations  - Instruct patient to call for assistance with activity   - Consult OT/PT to assist with strengthening/mobility   - Keep Call bell within reach  - Keep bed low and locked with side rails adjusted as appropriate  - Keep care items and personal belongings within reach  - Initiate and maintain comfort rounds  - Make Fall Risk Sign visible to staff  - Offer Toileting every  Hours, in advance of need  - Initiate/Maintain alarm  - Obtain necessary fall risk management equipment:   - Apply yellow socks and bracelet for high fall risk patients  - Consider moving patient to room near nurses station  Outcome: Progressing     Problem: Prexisting or High Potential for Compromised Skin Integrity  Goal: Skin integrity is maintained or improved  Description: INTERVENTIONS:  - Identify patients at risk for skin breakdown  - Assess and monitor skin integrity  - Assess and monitor nutrition and hydration status  - Monitor labs   - Assess for incontinence   - Turn and reposition patient  - Assist with mobility/ambulation  - Relieve pressure over bony prominences  - Avoid friction and shearing  - Provide appropriate hygiene as needed including keeping skin clean and dry  - Evaluate need for skin moisturizer/barrier cream  - Collaborate with interdisciplinary team   - Patient/family teaching  - Consider wound care consult   Outcome: Progressing     Problem: MOBILITY - ADULT  Goal: Maintain or return to baseline ADL function  Description: INTERVENTIONS:  -  Assess patient's ability to carry out ADLs; assess patient's baseline for ADL function and identify physical deficits which impact ability to perform ADLs (bathing, care of mouth/teeth, toileting, grooming, dressing, etc )  - Assess/evaluate cause of self-care deficits   - Assess range of motion  - Assess patient's mobility; develop plan if impaired  - Assess patient's need for assistive devices and provide as appropriate  - Encourage maximum independence but intervene and supervise when necessary  - Involve family in performance of ADLs  - Assess for home care needs following discharge   - Consider OT consult to assist with ADL evaluation and planning for discharge  - Provide patient education as appropriate  Outcome: Progressing  Goal: Maintains/Returns to pre admission functional level  Description: INTERVENTIONS:  - Perform BMAT or MOVE assessment daily    - Set and communicate daily mobility goal to care team and patient/family/caregiver  - Collaborate with rehabilitation services on mobility goals if consulted  - Perform Range of Motion times a day  - Reposition patient every  hours  - Dangle patient times a day  - Stand patient  times a day  - Ambulate patient  times a day  - Out of bed to chair times a day   - Out of bed times a day  - Out of bed for toileting  - Record patient progress and toleration of activity level   Outcome: Progressing     Problem: NEUROSENSORY - ADULT  Goal: Achieves stable or improved neurological status  Description: INTERVENTIONS  - Monitor and report changes in neurological status  - Monitor vital signs such as temperature, blood pressure, glucose, and any other labs ordered   - Initiate measures to prevent increased intracranial pressure  - Monitor for seizure activity and implement precautions if appropriate      Outcome: Progressing  Goal: Achieves maximal functionality and self care  Description: INTERVENTIONS  - Monitor swallowing and airway patency with patient fatigue and changes in neurological status  - Encourage and assist patient to increase activity and self care     - Encourage visually impaired, hearing impaired and aphasic patients to use assistive/communication devices  Outcome: Progressing     Problem: GASTROINTESTINAL - ADULT  Goal: Maintains or returns to baseline bowel function  Description: INTERVENTIONS:  - Assess bowel function  - Encourage oral fluids to ensure adequate hydration  - Administer IV fluids if ordered to ensure adequate hydration  - Administer ordered medications as needed  - Encourage mobilization and activity  - Consider nutritional services referral to assist patient with adequate nutrition and appropriate food choices  Outcome: Progressing  Goal: Maintains adequate nutritional intake  Description: INTERVENTIONS:  - Monitor percentage of each meal consumed  - Identify factors contributing to decreased intake, treat as appropriate  - Assist with meals as needed  - Monitor I&O, weight, and lab values if indicated  - Obtain nutrition services referral as needed  Outcome: Progressing     Problem: Nutrition/Hydration-ADULT  Goal: Nutrient/Hydration intake appropriate for improving, restoring or maintaining nutritional needs  Description: Monitor and assess patient's nutrition/hydration status for malnutrition  Collaborate with interdisciplinary team and initiate plan and interventions as ordered  Monitor patient's weight and dietary intake as ordered or per policy  Utilize nutrition screening tool and intervene as necessary  Determine patient's food preferences and provide high-protein, high-caloric foods as appropriate       INTERVENTIONS:  - Monitor oral intake, urinary output, labs, and treatment plans  - Assess nutrition and hydration status and recommend course of action  - Evaluate amount of meals eaten  - Assist patient with eating if necessary   - Allow adequate time for meals  - Recommend/ encourage appropriate diets, oral nutritional supplements, and vitamin/mineral supplements  - Order, calculate, and assess calorie counts as needed  - Recommend, monitor, and adjust tube feedings and TPN/PPN based on assessed needs  - Assess need for intravenous fluids  - Provide specific nutrition/hydration education as appropriate  - Include patient/family/caregiver in decisions related to nutrition  Outcome: Progressing     Problem: CONFUSION/THOUGHT DISTURBANCE  Goal: Thought disturbances (confusion, delirium, depression, dementia or psychosis) are managed to maintain or return to baseline mental status and functional level  Description: INTERVENTIONS:  - Assess for possible contributors to  thought disturbance, including but not limited to medications, infection, impaired vision or hearing, underlying metabolic abnormalities, dehydration, respiratory compromise,  psychiatric diagnoses and notify attending PHYSICAN/AP  - Monitor and intervene to maintain adequate nutrition, hydration, elimination, sleep and activity  - Decrease environmental stimuli, including noise as appropriate  - Provide frequent contacts to provide refocusing, direction and reassurance as needed  Approach patient calmly with eye contact and at their level    - [place] high risk fall precautions, aspiration precautions and other safety measures, as indicated  - If delirium suspected, notify physician/AP of change in condition and request immediate in-person evaluation  - Pursue consults as appropriate including Geriatric (campus dependent), OT for cognitive evaluation/activity planning, psychiatric, pastoral care, etc   Outcome: Progressing

## 2022-01-17 NOTE — PROGRESS NOTES
[address] [facility]  Progress Note - Shakira Kaba 1934, 80 y o  female MRN: [id number]  Unit/Bed#: [facility] 604-01 Encounter: [id number]  Primary Care Provider: Cipriano Glez MD   Date and time admitted to hospital: 1/10/2022  4:35 PM    Fall  Assessment & Plan  - Status post fall with questionable loss of consciousness and with the below noted injuries/issues  - Fall precautions  - Geriatric Medicine consultation for evaluation, medication review and recommendations   - PT and OT evaluation and treatment as indicated  - Case Management consultation for disposition planning  * Subdural hematoma (HCC)  Assessment & Plan  - 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, present on admission   - Neurosurgery evaluation and recommendations appreciated  - Patient's coagulopathy corrected with Kcentra on presentation  Hold anti-platelet and anticoagulant medications for least 2 weeks and/or until cleared by Neurosurgery  - Continue Keppra for 7 days for seizure prophylaxis  - Monitor neurologic exam   - Continue symptomatic management with analgesia as needed  - PT and OT evaluation and treatment as indicated  - Outpatient Neurosurgery follow-up in 2 weeks with repeat CT scan of the head prior to follow-up appointment  Pancolitis  Assessment & Plan  - Patient with pan colitis and diarrhea on presentation  Patient positive for C diff, but ID suspects it is colonization  Colitis likely ischemic in nature  - Appreciate both ID and GI evaluation, recommendations and intervention  Patient is status post colonoscopy on 01/13/2022   - Continue current antibiotic regimen as well as oral vancomycin under direction of ID   - Continue to monitor abdominal exam, which is improving and is benign at this time   - Lactic acidosis resolved following fluid resuscitation and treatment with IV antibiotics    - Advance diet as tolerated to level 1 dysphagia diet with thickened liquids  Maintain aspiration precautions  - Continue to follow abdominal exam and bowel function  Diarrhea  Assessment & Plan  - Patient with 2-3 week history of diarrhea  Patient had been started on treatment for suspected C diff as an outpatient  - Continue current antibiotic regimen under the direction of ID   - Continue to monitor abdominal exam and bowel function  Hypernatremia  Assessment & Plan  - Patient with hypernatremia likely secondary to dehydration from multiple liquid bowel movements  - Continue current IV fluid therapy  - Continue to monitor BMP with repeat labs on 01/18/2022   - Encephalopathy improving  Altered mental status  Assessment & Plan  - Patient with altered mental status that is likely multi factorial given age and concomitant TBI as well as diarrhea/dehydration    - Mental status appears to be slowly improving   - Continue to monitor mental status and maintain delirium precautions  - Frequent reorientation   - Continue treatment traumatic brain injury and infectious etiology  - Spot EEG on 1/11/2022 negative for seizure activity  Dysphagia  Assessment & Plan  - Dysphagia, present on presentation   - Appreciate Speech therapy evaluation and recommendations  - Patient was cleared for level 1 dysphagia diet with honey thick liquids  - Maintain aspiration precautions  History of DVT (deep vein thrombosis)  Assessment & Plan  - Patient with history of right lower extremity DVT and PE on 11/4/2021   - Bilateral lower extremity venous duplex demonstrated chronic DVT of left leg, no new DVT  - Home Coumadin held and reversed due to [address]  Started on [facility] for DVT ppx on 1/11/2022   - Resume anticoagulation when cleared from a neurosurgical standpoint   - Outpatient follow-up with PCP  Urinary retention  Assessment & Plan  - Patient with urinary retention   - Urinary catheter placed due to retention   - Will attempt voiding trial once ambulatory status improved    - Outpatient follow-up with PCP  FRANK (acute kidney injury) (HCC)-resolved as of 1/17/2022  Assessment & Plan  - Patient with acute kidney injury on presentation, now resolved  This was likely secondary to volume depletion in setting of recent diarrhea  - Continue to monitor volume status and urine output   - Continue to monitor renal function   - Continue IV fluids until tolerating oral diet well  - Avoid nephrotoxic agents and hypotension          Disposition:  Continue current level of care  SUBJECTIVE:  Chief Complaint:  I am in a mess      Subjective:  Patient notes that she had a bowel movement is asking for assistance getting cleaned up  Otherwise, she denies any abdominal pain, nausea, vomiting, headaches, visual changes, lightheadedness or dizziness  She has no other new complaints at this time  She is tolerating her diet is since yesterday  No significant events overnight per nursing staff        OBJECTIVE:     Meds/Allergies     Current Facility-Administered Medications:     amLODIPine (NORVASC) tablet 5 mg, 5 mg, Oral, Daily, Marisa Aguilar MD, 5 mg at 01/15/22 0903    cefTRIAXone (ROCEPHIN) 1,000 mg in dextrose 5 % 50 mL IVPB, 1,000 mg, Intravenous, Q24H, Teresa Dick MD, Last Rate: 100 mL/hr at 01/16/22 1149, 1,000 mg at 01/16/22 1149    heparin (porcine) subcutaneous injection 5,000 Units, 5,000 Units, Subcutaneous, Q8H [address], Miguel Zavaleta PA-C, 5,000 Units at 01/17/22 0525    levETIRAcetam (KEPPRA) 500 mg in sodium chloride 0 9 % 100 mL IVPB, 500 mg, Intravenous, BID, Marisa Aguilar MD, Last Rate: 400 mL/hr at 01/16/22 1733, 500 mg at 01/16/22 1733    LORazepam (ATIVAN) injection 0 5 mg, 0 5 mg, Intravenous, HS, Elif Watts MD, 0 5 mg at 01/15/22 2150    metoprolol tartrate (LOPRESSOR) tablet 25 mg, 25 mg, Oral, Q12H [address], Bethanyaldemili Dire, DO, 25 mg at 01/16/22 2210    metroNIDAZOLE (FLAGYL) tablet 500 mg, 500 mg, Oral, Q8H [address], Chet Vora MD, 500 mg at 01/16/22 8476   multi-electrolyte (PLASMALYTE-A/ISOLYTE-S PH 7 4) IV solution, 50 mL/hr, Intravenous, Continuous, Anaya Rose DO, Last Rate: 50 mL/hr at 01/17/22 0524, 50 mL/hr at 01/17/22 0524    pantoprazole (PROTONIX) EC tablet 40 mg, 40 mg, Oral, Early Morning, Anaya Rose DO    potassium chloride (K-DUR,KLOR-CON) CR tablet 40 mEq, 40 mEq, Oral, Once, Elton Carr PA-C    vancomycin (VANCOCIN) oral solution 125 mg, 125 mg, Oral, Q12H [address], Doretha Jamison MD, 125 mg at 01/16/22 2207     Vitals:   Vitals:    01/17/22 0634   BP: 130/72   Pulse: 93   Resp: 19   Temp:    SpO2: 94%       Intake/Output:  I/O       01/15 0701  01/16 0700 01/16 0701  01/17 0700 01/17 0701  01/18 0700    P  O  270 420     I V  (mL/kg) 1552 1 (29 2) 1124 2 (21 1)     IV Piggyback       Total Intake(mL/kg) 1822 1 (34 2) 1544 2 (29)     Urine (mL/kg/hr) 1085 (0 8) 820 (0 6)     Stool 0 0     Total Output 1085 820     Net +737 1 +724 2            Unmeasured Urine Occurrence 1 x      Unmeasured Stool Occurrence 4 x 1 x            Nutrition/GI Proph/Bowel Reg:  Level 1 dysphagia diet with honey thickened liquids  Protonix for GI prophylaxis  Physical Exam:   GENERAL APPEARANCE: Patient in no acute distress  HEENT: NCAT; PERRL, EOMs intact; Mucous membranes moist  NECK / BACK:  No tenderness noted today  CV: Regular rate and rhythm; no murmur/gallops/rubs appreciated  CHEST / LUNGS: Clear to auscultation; no wheezes/rales/rhonci  ABD: NABS; soft; stable mild distension, but remains non-tender  :  Urinary catheter remains in place secondary to retention draining clear yellow urine  EXT: +2 pulses bilaterally upper & lower extremities; no edema  NEURO: GCS 14 (E4, V4, M6) with patient multiple from all confusion this morning  She remains oriented to person and place, but not time  She has no focal neurologic deficits; neurovascularly intact  SKIN: Warm, dry and well perfused; no rash; no jaundice        Invasive Devices  Report Peripheral Intravenous Line            Peripheral IV 01/14/22 Left Forearm 2 days          Drain            Urethral Catheter 16 Fr  3 days                 Lab Results:   Results: I have personally reviewed pertinent reports   , BMP/CMP:   Lab Results   Component Value Date    SODIUM 147 (H) 01/17/2022    K 3 2 (L) 01/17/2022     (H) 01/17/2022    CO2 23 01/17/2022    BUN 6 01/17/2022    CREATININE 0 47 (L) 01/17/2022    CALCIUM 6 9 (L) 01/17/2022    EGFR 89 01/17/2022    and CBC: No results found for: WBC, HGB, HCT, MCV, PLT, ADJUSTEDWBC, MCH, MCHC, RDW, MPV, NRBC  Imaging/EKG Studies: Results: I have personally reviewed pertinent reports      Other Studies: N/A  VTE Prophylaxis: Sequential compression device (Venodyne)  and Heparin       Sanjay Garcia PA-C  1/17/2022 06:34 AM

## 2022-01-17 NOTE — PROGRESS NOTES
Progress Note - Infectious Disease   Lea Lofton 80 y o  female MRN: [id number]  Unit/Bed#: [facility] 604-01 Encounter: [id number]      Impression/Plan:    1  Diarrhea, pancolitis   Diarrhea occurring for 2-3 weeks prior to admission  Concern by outpatient PCP and GI about C Dif colitis given treatment for pneumonia with antibiotics in November, however stool testing was not completed  C Dif PCR here was negative on admission and CT A/P shows pancolitis  She did have elevated bands on initial CBC with diff, but has no fevers and is hemodynamically stable  Repeat testing in PCR positive but EIA negative, likely colonization  Stool enteric PCR panel negative  Likely ischemic colitis given atherosclerosis, dehydration and relative hypotension on admission, along with persistent lactate elevation  Status post colonoscopy 1/13 showing severe pancolitis with ulcerated, hemorrhagic, and edematous mucosa with pseudopolyps, findings suggestive of severe ischemic colitis vs IBD  Lactate resolved with IV fluids and the patient is clinically stable               -given severe colonic inflammation, continue systemic antibiotics with Ceftriaxone 1g IV daily and metronidazole 500mg PO q8hr for 7 days total, through 1/19   -GI following              -follow up giardia EIA and stool O and P   -follow up colon biopsies              -recheck CBC with Diff tomorrow              -supportive care for diarrhea with IVF   -continue PO vancomycin 125mg q12hr for prophylaxis in setting of likely C Dif colonization  Continue for 72 hours after completion systemic antibiotics      2  CDif colonization  Initial CDif negative, repeat testing PCR positive but EIA negative  Likely consistent with colonization  Findings on colonoscopy consistent with ischemic colitis  3  Acute subdural hematoma  Occurred after a fall  Patient chronically on coumadin, elevated INR on admission  Given Kcentra  Repeat CTH x 2 stable                -neurosurgery following              -monitor mental status     4  Fall  Complicated by SDH  Patient with diarrhea and dehydration prior to admission      5  FRANK  Present on admission, improved with IVF  -continue to monitor creatinine     6  Encephalopathy  Likely due to TBI/SDH and dehydration, hyponatremia  Spot EEG on  negative for seizure activity  UA with 1-2 WBC not consistent with infection, COVID PCR negative  Mental status somewhat improving               -UA not consistent with UTI              -monitor mental status     I have discussed the above management plan in detail with the primary service  ID will follow  Antibiotics:  Ceftriaxone/Flagyl day 5    Subjective:  Patient more alert today, but speech is still slurred/garbled  She did not know she was in the hospital  Still having loose stools  Denies fever, chills, abdominal pain  Objective:  Vitals:  Temp:  [97 2 °F (36 2 °C)-98 2 °F (36 8 °C)] 98 °F (36 7 °C)  HR:  [81-93] 81  Resp:  [17-19] 18  BP: (114-130)/() 124/95  SpO2:  [93 %-95 %] 94 %  Temp (24hrs), Av 8 °F (36 6 °C), Min:97 2 °F (36 2 °C), Max:98 2 °F (36 8 °C)  Current: Temperature: 98 °F (36 7 °C)    Physical Exam:   General Appearance:  Frail appearing, more awake today, no distress   Throat: Oropharynx moist without lesions  Lungs:   Clear to auscultation bilaterally; no wheezes, rhonchi or rales; respirations unlabored   Heart:  RRR; no murmur, rub or gallop   Abdomen:   Soft, non-distended, positive bowel sounds  No tenderness to palpation   Extremities: No clubbing, cyanosis or edema   Skin: No new rashes or lesions  No draining wounds noted  Neuro: more awake today, moving extremities spontaneously  Oriented to person only     Labs:    All pertinent labs and imaging studies were personally reviewed  Results from last 7 days   Lab Units 22  0438 01/15/22  0557 22  0453   WBC Thousand/uL 4 57 4 67 5 81   HEMOGLOBIN g/dL 10 1* 9 7* 9 9*   PLATELETS Thousands/uL 276 190 314     Results from last 7 days   Lab Units 01/17/22  0454 01/16/22  0438 01/16/22  0438 01/15/22  0557 01/15/22  0557 01/13/22  1818 01/13/22  0441 01/10/22  1915 01/10/22  1323   SODIUM mmol/L 147*  --  150*  --  147*   < > 153*   < > 132*   POTASSIUM mmol/L 3 2*   < > 3 1*   < > 3 7   < > 3 5   < > 4 2   CHLORIDE mmol/L 119*   < > 120*   < > 119*   < > 125*   < > 97*   CO2 mmol/L 23   < > 23   < > 21   < > 21   < > 25   BUN mg/dL 6   < > 9   < > 10   < > 28*   < > 53*   CREATININE mg/dL 0 47*   < > 0 54*   < > 0 59*   < > 0 71   < > 2 02*   EGFR ml/min/1 73sq m 89   < > 85   < > 82   < > 76   < > 21   CALCIUM mg/dL 6 9*   < > 6 5*   < > 6 7*   < > 7 7*   < > 8 4   AST U/L  --   --   --   --   --   --  24  --  55*   ALT U/L  --   --   --   --   --   --  26  --  49   ALK PHOS U/L  --   --   --   --   --   --  60  --  76    < > = values in this interval not displayed  Micro:  Results from last 7 days   Lab Units 01/13/22  1312 01/11/22  0751   C DIFF TOXIN B BY PCR  Positive* Negative       Imaging:  I have personally reviewed pertinent imaging study reports and images in PACS      CT A/P 1/12/22: pancolitis, most pronounced in the hepatic flexure and proximal to mid transverse colon        Other Studies:   I have personally reviewed pertinent reports

## 2022-01-17 NOTE — ASSESSMENT & PLAN NOTE
- Patient with hypernatremia likely secondary to dehydration from multiple liquid bowel movements  - Continue current IV fluid therapy  - Continue to monitor BMP with repeat labs on 01/18/2022   - Encephalopathy improving

## 2022-01-17 NOTE — SPEECH THERAPY NOTE
Speech Language/Pathology    Speech/Language Pathology Progress Note    Patient Name: Danita BUNDY'H Date: 1/17/2022     Problem List  Principal Problem:    Subdural hematoma ([address] )  Active Problems:    Fall    Diarrhea    History of DVT (deep vein thrombosis)    Altered mental status    Hypernatremia    Dysphagia    Urinary retention    Pancolitis       Past Medical History  Past Medical History:   Diagnosis Date    Anxiety     Disease of thyroid gland         Past Surgical History  Past Surgical History:   Procedure Laterality Date    CATARACT EXTRACTION Right     FOOT SURGERY      JOINT REPLACEMENT           Subjective:  "Take these things off my legs! I can't move them!" Patient OOB in chair  She is awake and alert  Objective: The patient is seen for f/u dysphagia therapy  She is seen at lunch meal with puree solids and is trialed with thin liquids  SLP feeds patient  She has good oral closure for tsp  Transfer time is min prolonged, but no oral residue is observed  The patient takes small, consecutive sips of thin liquids via straw  Strength to draw from straw is reduced, with some "backwash" noted  Despite this, the patient tolerates small sips without overt s/s aspiration  Towards end of meal, the patient attempts to feed herself magic cup  She states "I'm not really a big eater"  Intake is ~25%  Patient educated that diet will be upgraded as able  Assessment:  The patient is tolerating puree solids well  Intake continues to be poor  Patient tolerated sips of thin liquids without s/s aspiration  Plan/Recommendations:  Recommend diet change to puree with thin liquids  ST will continue to further assess tolerance and trial upgrades

## 2022-01-17 NOTE — PROGRESS NOTES
Progress Note - Geriatric Medicine   Shakira Kaba 80 y o  female MRN: [id number]  Unit/Bed#: [facility] 604-01 Encounter: [id number]      Assessment/Plan:    Acute metabolic encephalopathy  -mentation continues to wax and wane to some mild degree however overall slowly improving  -diarrhea, pancolitis, hypernatremia improving  -continue home lorazepam at reduced dose to minimize withdrawal risk   -reorient frequently as appropriate    Ambulatory dysfunction with fall  -remains high risk future falls - continue fall precautions  -assist with transfers  -maintain environment free of fall hazards  -PT/OT following    Subdural hematoma  -home Coumadin reversed with Kcentra and vitamin K on admission  -systemic anticoagulation remains on hold for at least two weeks until cleared by Nsx   -currently on prophylaxis with Keppra  -neuro checks per protocol     Pancolitis, diarrhea  -s/p colonoscopy 1/14 suggestive of ischemic insults  -C   Diff colonization per GI/ID- antibiotic regimen and PO vancomycin as per ID  -monitor electrolytes and continue to replete as indicated    DVT with Pulmonary Embolism  -initially diagnosed 11/2021 - tx with Coumadin, Eliquis cost prohibitive - now reversed and on hold 2/2 SDH as above    Atrial fibrillation  -rate controlled metoprolol, him Coumadin hold due to SDH as outlined above  -close outpatient follow-up with Cardiology    Generalized anxiety disorder  -home lorazepam regimen resumed at reduced dose to reduce risk over-sedation  -continue psychosocial supports and maintain calm/quiet environment reduce risk overstimulation    Frailty syndrome in geriatric patient  -continue optimization chronic conditions  -continue optimization nutritional intake - currently on dysphagia diet, appreciate speech therapy recommendations    Care coordination:  Rounded with Kendy Nguyen (RN)    Subjective:     Patient seen examined bedside where she is sitting resting comfortably, she remains pleasantly confused, repeatedly asks for SCDs to be removed so that she can "give them to [employer]"  Nursing reports no acute events overnight  Review of Systems   Unable to perform ROS: Mental status change     Objective:     Vitals: Blood pressure 124/95, pulse 81, temperature 98 °F (36 7 °C), temperature source Axillary, resp  rate 18, weight 53 2 kg (117 lb 3 2 oz), SpO2 94 %  ,Body mass index is 24 49 kg/m²  Intake/Output Summary (Last 24 hours) at 1/17/2022 1049  Last data filed at 1/17/2022 0905  Gross per 24 hour   Intake 1544 16 ml   Output 670 ml   Net 874 16 ml     Current Medications: Reviewed    Physical Exam:   Physical Exam  Vitals and nursing note reviewed  Constitutional:       General: She is not in acute distress  Comments: Elderly female appears stated stated age, nontoxic   HENT:      Head: Normocephalic  Nose: Nose normal       Mouth/Throat:      Mouth: Mucous membranes are dry  Comments: Dentition is poor with thick dried on film   Eyes:      General:         Right eye: No discharge  Left eye: No discharge  Conjunctiva/sclera: Conjunctivae normal    Neck:      Comments: Trachea midline  Cardiovascular:      Rate and Rhythm: Normal rate  Rhythm irregular  Pulmonary:      Effort: Pulmonary effort is normal  No respiratory distress  Abdominal:      General: Bowel sounds are normal  There is no distension  Palpations: Abdomen is soft  Tenderness: There is no abdominal tenderness  Comments: No grimacing with abdominal palpation   Musculoskeletal:      Cervical back: Neck supple  Right lower leg: No edema  Left lower leg: Edema present  Comments: Advanced arthritic changes and deformities of hands bilaterally   Skin:     General: Skin is warm and dry  Comments: Thin and friable   Neurological:      Mental Status: She is alert        Comments: Awake and alert, speech somewhat dysarthric    Confused   Psychiatric:         Attention and Perception: She is inattentive  Cognition and Memory: Cognition is impaired  Judgment: Judgment is impulsive  Invasive Devices  Report    Peripheral Intravenous Line            Peripheral IV 01/14/22 Left Forearm 2 days          Drain            Urethral Catheter 16 Fr  4 days              Lab, Imaging and other studies: I have personally reviewed pertinent reports

## 2022-01-17 NOTE — PLAN OF CARE
Problem: OCCUPATIONAL THERAPY ADULT  Goal: Performs self-care activities at highest level of function for planned discharge setting  See evaluation for individualized goals  Description: Treatment Interventions: ADL retraining,Functional transfer training,Endurance training,Cognitive reorientation,Patient/family training,Equipment evaluation/education,Compensatory technique education,Energy conservation,Activityengagement          See flowsheet documentation for full assessment, interventions and recommendations  Outcome: Progressing  Note: Limitation: Decreased ADL status,Decreased Safe judgement during ADL,Decreased cognition,Decreased endurance,Decreased self-care trans,Decreased high-level ADLs  Prognosis: Fair,Guarded  Assessment: pt participated in am ot session and was seen focusing on adl tasks, bed mobility stand pivot transfers and activity tolerence  pt is motivated and alert this session  improvement noted in pts speech and was less confused  pt also oriented to month  pt was however diffy with supine to sit and sit to stand although b/p was wfl  resolved when in chair c legs up  pt with 2 loose stooks during session  pt was able to stand pivot to chair with asst x 1 although is very weak  waffle cushion provided for when in chair  spoke with nsg prior to and post session about medical and mobility status  pt was left in chair with all needs within reach  will follow focusing on goals from ie  minimal swelling ntoed b ue's  Recommendation: Geriatric Consult  OT Discharge Recommendation: Post acute rehabilitation services  OT - OK to Discharge:  Yes     DAHLIA Olivas

## 2022-01-17 NOTE — OCCUPATIONAL THERAPY NOTE
Occupational Therapy Treatment Note:       01/17/22 1025   OT Last Visit   OT Visit Date 01/17/22   Note Type   Note Type Treatment   Restrictions/Precautions   Other Precautions Contact/isolation;Cognitive; Chair Alarm; Bed Alarm;Aspiration; Fall Risk  (cdiff +,  nectar thick liquids, issues with speech,[place])   Pain Assessment   Pain Assessment Tool 0-10   Pain Score No Pain   ADL   Where Assessed Supine, bed  (++ incont bowel, limited endurance, grooming oob in chair)   Grooming Assistance 4  Minimal Assistance   UB Bathing Assistance 4  Minimal Assistance   LB Bathing Assistance 2  Maximal Assistance   UB Dressing Assistance 3  Moderate Assistance   LB Dressing Assistance 2  Maximal Assistance   Toileting Assistance  1  Total Assistance   Toileting Comments incont of bowel x 2 this session   Functional Standing Tolerance   Time pt standing tolerence and balance with spt to chair  Bed Mobility   Supine to Sit 3  Moderate assistance   Additional items   (mod to max a x 1, increased time)   Transfers   Sit to Stand 3  Moderate assistance   Additional items Assist x 1   Stand pivot 2  Maximal assistance  (ax1)   Additional items   (no device, ++ weaskness  pt did take small steps c guidance)   Coordination   Fine Motor pt noted with arthritic changes in fingers  pt also held onto 2  chapsticks during session using b hands individually at different times      pt was able to hold and manipulate washcloth for adl task with incrased time  pt with tremmors and weakness noted c gross and fine motor   Cognition   Overall Cognitive Status Impaired  (appears to be improving)   Arousal/Participation Alert; Cooperative   Attention Within functional limits   Orientation Level Oriented to person;Oriented to time  (grossly to month)   Memory Decreased short term memory;Decreased recall of recent events;Decreased recall of precautions   Following Commands Follows one step commands without difficulty   Comments pt was able to follow adl tasks without cues for sequencing  pt is [place] and needs things repeated  pt is able to be understood more easily during conversation this session buy this therapist    Activity Tolerance   Activity Tolerance Patient tolerated treatment well   Assessment   Assessment pt participated in am ot session and was seen focusing on adl tasks, bed mobility stand pivot transfers and activity tolerence  pt is motivated and alert this session  improvement noted in pts speech and was less confused  pt also oriented to month  pt was however diffy with supine to sit and sit to stand although b/p was wfl  resolved when in chair c legs up  pt with 2 loose stooks during session  pt was able to stand pivot to chair with asst x 1 although is very weak  waffle cushion provided for when in chair  spoke with nsg prior to and post session about medical and mobility status  pt was left in chair with all needs within reach  will follow focusing on goals from ie  minimal swelling ntoed b ue's   Plan   Treatment Interventions ADL retraining;Functional transfer training;UE strengthening/ROM; Endurance training;Cognitive reorientation;Patient/family training;Equipment evaluation/education; Activityengagement   Goal Expiration Date 01/25/22   OT Treatment Day 3   OT Frequency 3-5x/wk   Recommendation   OT Discharge Recommendation Post acute rehabilitation services   OT - OK to Discharge Yes   AM-PAC Daily Activity Inpatient   Lower Body Dressing 2   Bathing 2   Toileting 1  (catheter, incont of bowel)   Upper Body Dressing 3   Grooming 3   Eating 3   Daily Activity Raw Score 14   Daily Activity Standardized Score (Calc for Raw Score >=11) 33 39   AM-PAC Applied Cognition Inpatient   Following a Speech/Presentation 2   Understanding Ordinary Conversation 3   Taking Medications 2   Remembering Where Things Are Placed or Put Away 2   Remembering List of 4-5 Errands 2   Taking Care of Complicated Tasks 2   Applied Cognition Raw Score 13   Applied Cognition Standardized Score 30 46   April A Antony, VILLAGOMEZ

## 2022-01-17 NOTE — ASSESSMENT & PLAN NOTE
- Patient with altered mental status that is likely multi factorial given age and concomitant TBI as well as diarrhea/dehydration    - Mental status appears to be slowly improving   - Continue to monitor mental status and maintain delirium precautions  - Frequent reorientation   - Continue treatment traumatic brain injury and infectious etiology  - Spot EEG on 1/11/2022 negative for seizure activity  response to care/treatments:

## 2022-01-18 LAB
ANION GAP SERPL CALCULATED.3IONS-SCNC: 1 MMOL/L (ref 4–13)
ANION GAP SERPL CALCULATED.3IONS-SCNC: 4 MMOL/L (ref 4–13)
BASOPHILS # BLD AUTO: 0 THOUSANDS/ΜL (ref 0–0.1)
BASOPHILS NFR BLD AUTO: 0 % (ref 0–1)
BUN SERPL-MCNC: 8 MG/DL (ref 5–25)
BUN SERPL-MCNC: 8 MG/DL (ref 5–25)
CALCIUM SERPL-MCNC: 6.6 MG/DL (ref 8.3–10.1)
CALCIUM SERPL-MCNC: 7.1 MG/DL (ref 8.3–10.1)
CHLORIDE SERPL-SCNC: 118 MMOL/L (ref 100–108)
CHLORIDE SERPL-SCNC: 122 MMOL/L (ref 100–108)
CO2 SERPL-SCNC: 26 MMOL/L (ref 21–32)
CO2 SERPL-SCNC: 27 MMOL/L (ref 21–32)
CREAT SERPL-MCNC: 0.52 MG/DL (ref 0.6–1.3)
CREAT SERPL-MCNC: 0.66 MG/DL (ref 0.6–1.3)
EOSINOPHIL # BLD AUTO: 0 THOUSAND/ΜL (ref 0–0.61)
EOSINOPHIL NFR BLD AUTO: 0 % (ref 0–6)
ERYTHROCYTE [DISTWIDTH] IN BLOOD BY AUTOMATED COUNT: 17.3 % (ref 11.6–15.1)
G LAMBLIA AG STL QL IA: NEGATIVE
GFR SERPL CREATININE-BSD FRML MDRD: 79 ML/MIN/1.73SQ M
GFR SERPL CREATININE-BSD FRML MDRD: 86 ML/MIN/1.73SQ M
GLUCOSE SERPL-MCNC: 129 MG/DL (ref 65–140)
GLUCOSE SERPL-MCNC: 270 MG/DL (ref 65–140)
HCT VFR BLD AUTO: 29.8 % (ref 34.8–46.1)
HGB BLD-MCNC: 9.7 G/DL (ref 11.5–15.4)
IMM GRANULOCYTES # BLD AUTO: 0.05 THOUSAND/UL (ref 0–0.2)
IMM GRANULOCYTES NFR BLD AUTO: 1 % (ref 0–2)
LYMPHOCYTES # BLD AUTO: 1.57 THOUSANDS/ΜL (ref 0.6–4.47)
LYMPHOCYTES NFR BLD AUTO: 40 % (ref 14–44)
MAGNESIUM SERPL-MCNC: 2.4 MG/DL (ref 1.6–2.6)
MCH RBC QN AUTO: 28.1 PG (ref 26.8–34.3)
MCHC RBC AUTO-ENTMCNC: 32.6 G/DL (ref 31.4–37.4)
MCV RBC AUTO: 86 FL (ref 82–98)
MONOCYTES # BLD AUTO: 0.17 THOUSAND/ΜL (ref 0.17–1.22)
MONOCYTES NFR BLD AUTO: 4 % (ref 4–12)
NEUTROPHILS # BLD AUTO: 2.15 THOUSANDS/ΜL (ref 1.85–7.62)
NEUTS SEG NFR BLD AUTO: 55 % (ref 43–75)
NRBC BLD AUTO-RTO: 1 /100 WBCS
O+P STL CONC: NORMAL
PLATELET # BLD AUTO: 255 THOUSANDS/UL (ref 149–390)
PMV BLD AUTO: 10.9 FL (ref 8.9–12.7)
POTASSIUM SERPL-SCNC: 3.3 MMOL/L (ref 3.5–5.3)
POTASSIUM SERPL-SCNC: 5.2 MMOL/L (ref 3.5–5.3)
RBC # BLD AUTO: 3.45 MILLION/UL (ref 3.81–5.12)
SODIUM SERPL-SCNC: 145 MMOL/L (ref 136–145)
SODIUM SERPL-SCNC: 153 MMOL/L (ref 136–145)
WBC # BLD AUTO: 3.94 THOUSAND/UL (ref 4.31–10.16)

## 2022-01-18 PROCEDURE — 99232 SBSQ HOSP IP/OBS MODERATE 35: CPT | Performed by: INTERNAL MEDICINE

## 2022-01-18 PROCEDURE — 99232 SBSQ HOSP IP/OBS MODERATE 35: CPT | Performed by: SURGERY

## 2022-01-18 PROCEDURE — 99232 SBSQ HOSP IP/OBS MODERATE 35: CPT | Performed by: STUDENT IN AN ORGANIZED HEALTH CARE EDUCATION/TRAINING PROGRAM

## 2022-01-18 PROCEDURE — 80048 BASIC METABOLIC PNL TOTAL CA: CPT | Performed by: PHYSICIAN ASSISTANT

## 2022-01-18 PROCEDURE — 97535 SELF CARE MNGMENT TRAINING: CPT

## 2022-01-18 PROCEDURE — 97112 NEUROMUSCULAR REEDUCATION: CPT

## 2022-01-18 PROCEDURE — 97530 THERAPEUTIC ACTIVITIES: CPT

## 2022-01-18 PROCEDURE — 85025 COMPLETE CBC W/AUTO DIFF WBC: CPT | Performed by: PHYSICIAN ASSISTANT

## 2022-01-18 PROCEDURE — 83735 ASSAY OF MAGNESIUM: CPT | Performed by: PHYSICIAN ASSISTANT

## 2022-01-18 RX ORDER — POTASSIUM CHLORIDE 20 MEQ/1
40 TABLET, EXTENDED RELEASE ORAL ONCE
Status: COMPLETED | OUTPATIENT
Start: 2022-01-18 | End: 2022-01-18

## 2022-01-18 RX ORDER — DEXTROSE AND SODIUM CHLORIDE 5; .45 G/100ML; G/100ML
75 INJECTION, SOLUTION INTRAVENOUS CONTINUOUS
Status: DISCONTINUED | OUTPATIENT
Start: 2022-01-18 | End: 2022-01-20

## 2022-01-18 RX ORDER — DEXTROSE, SODIUM CHLORIDE, AND POTASSIUM CHLORIDE 5; .2; .15 G/100ML; G/100ML; G/100ML
100 INJECTION INTRAVENOUS CONTINUOUS
Status: DISCONTINUED | OUTPATIENT
Start: 2022-01-18 | End: 2022-01-18

## 2022-01-18 RX ADMIN — METRONIDAZOLE 500 MG: 500 TABLET ORAL at 21:13

## 2022-01-18 RX ADMIN — DEXTROSE AND SODIUM CHLORIDE 75 ML/HR: 5; .45 INJECTION, SOLUTION INTRAVENOUS at 19:18

## 2022-01-18 RX ADMIN — METOPROLOL TARTRATE 25 MG: 25 TABLET, FILM COATED ORAL at 21:13

## 2022-01-18 RX ADMIN — HEPARIN SODIUM 5000 UNITS: 5000 INJECTION INTRAVENOUS; SUBCUTANEOUS at 05:34

## 2022-01-18 RX ADMIN — DEXTROSE, SODIUM CHLORIDE, AND POTASSIUM CHLORIDE 100 ML/HR: 5; .2; .15 INJECTION INTRAVENOUS at 10:40

## 2022-01-18 RX ADMIN — METRONIDAZOLE 500 MG: 500 TABLET ORAL at 14:39

## 2022-01-18 RX ADMIN — LEVETIRACETAM 500 MG: 100 INJECTION, SOLUTION INTRAVENOUS at 08:27

## 2022-01-18 RX ADMIN — Medication 125 MG: at 08:28

## 2022-01-18 RX ADMIN — SODIUM CHLORIDE, SODIUM GLUCONATE, SODIUM ACETATE, POTASSIUM CHLORIDE, MAGNESIUM CHLORIDE, SODIUM PHOSPHATE, DIBASIC, AND POTASSIUM PHOSPHATE 50 ML/HR: .53; .5; .37; .037; .03; .012; .00082 INJECTION, SOLUTION INTRAVENOUS at 04:14

## 2022-01-18 RX ADMIN — LEVETIRACETAM 500 MG: 100 INJECTION, SOLUTION INTRAVENOUS at 17:27

## 2022-01-18 RX ADMIN — METOPROLOL TARTRATE 25 MG: 25 TABLET, FILM COATED ORAL at 08:27

## 2022-01-18 RX ADMIN — LORAZEPAM 0.5 MG: 2 INJECTION INTRAMUSCULAR; INTRAVENOUS at 21:13

## 2022-01-18 RX ADMIN — POTASSIUM CHLORIDE 40 MEQ: 1500 TABLET, EXTENDED RELEASE ORAL at 08:27

## 2022-01-18 RX ADMIN — AMLODIPINE BESYLATE 5 MG: 5 TABLET ORAL at 08:27

## 2022-01-18 RX ADMIN — PANTOPRAZOLE SODIUM 40 MG: 40 TABLET, DELAYED RELEASE ORAL at 05:34

## 2022-01-18 RX ADMIN — Medication 125 MG: at 21:13

## 2022-01-18 RX ADMIN — CEFTRIAXONE SODIUM 1000 MG: 10 INJECTION, POWDER, FOR SOLUTION INTRAVENOUS at 12:15

## 2022-01-18 RX ADMIN — HEPARIN SODIUM 5000 UNITS: 5000 INJECTION INTRAVENOUS; SUBCUTANEOUS at 21:13

## 2022-01-18 RX ADMIN — METRONIDAZOLE 500 MG: 500 TABLET ORAL at 05:34

## 2022-01-18 RX ADMIN — HEPARIN SODIUM 5000 UNITS: 5000 INJECTION INTRAVENOUS; SUBCUTANEOUS at 14:39

## 2022-01-18 NOTE — PLAN OF CARE
Problem: PHYSICAL THERAPY ADULT  Goal: Performs mobility at highest level of function for planned discharge setting  See evaluation for individualized goals  Description: Treatment/Interventions: Functional transfer training,LE strengthening/ROM,Therapeutic exercise,Endurance training,Patient/family training,Equipment eval/education,Bed mobility,Gait training  Equipment Recommended: Lisseth Jordan       See flowsheet documentation for full assessment, interventions and recommendations  1/18/2022 1248 by Sierra Andrew PTA  Outcome: Progressing  Note: Prognosis: Fair  Problem List: Decreased strength,Decreased endurance,Impaired balance,Decreased mobility,Decreased coordination,Decreased cognition,Decreased safety awareness,Impaired judgement,Pain  Assessment: Pt continues to require Ax1-2 for all tasks due to weakness, impaired balance & muscular fatigue that place her at high risks for falls  pt was able to participate in all tasks with instrucitons, and was able to maintain balance in static standing without LE buckling  Did require increased assist to pivot, but did so with appropriate stepping  Fatigued quickly after each trial, requiring return to sitting  Use of RW deferred due to concerns regarding LE weakness, but may be appropriate for it's use in upcoming sessions pending improved endurance  Pt remains appropriate for rehab at this time due to these functional deficits & need for increased assist comapred to baseline mobility  Barriers to Discharge: Inaccessible home environment,Decreased caregiver support        PT Discharge Recommendation: Post acute rehabilitation services          See flowsheet documentation for full assessment       1/18/2022 1247 by Sierra Andrew PTA  Note: Prognosis: Fair  Problem List: Decreased strength,Decreased endurance,Impaired balance,Decreased mobility,Decreased coordination,Decreased cognition,Decreased safety awareness,Impaired judgement,Pain  Assessment: Pt continues to require Ax1-2 for all tasks due to weakness, impaired balance & muscular fatigue that place her at high risks for falls  pt was able to participate in all tasks with instrucitons, and was able to maintain balance in static standing without LE buckling  Did require increased assist to pivot, but did so with appropriate stepping  Fatigued quickly after each trial, requiring return to sitting  Use of RW deferred due to concerns regarding LE weakness, but may be appropriate for it's use in upcoming sessions pending improved endurance  Pt remains appropriate for rehab at this time due to these functional deficits & need for increased assist comapred to baseline mobility  Barriers to Discharge: Inaccessible home environment,Decreased caregiver support        PT Discharge Recommendation: Post acute rehabilitation services          See flowsheet documentation for full assessment

## 2022-01-18 NOTE — OCCUPATIONAL THERAPY NOTE
Occupational Therapy Treatment Note:       01/18/22 1109   OT Last Visit   OT Visit Date 01/18/22   Note Type   Note Type Treatment   Restrictions/Precautions   Other Precautions Contact/isolation;Cognitive; Chair Alarm; Bed Alarm; Fall Risk;Hard of hearing   Pain Assessment   Pain Assessment Tool 0-10   Pain Score No Pain   ADL   Eating Assistance 4  Minimal Assistance   Eating Comments to place cup in hands and to angle staw for pt inorder to drink honey thick water   Grooming Assistance 3  Moderate Assistance   UB Dressing Assistance 4  Minimal Assistance   LB Dressing Assistance 2  Maximal Assistance   Toileting Assistance  1  Total Assistance   Functional Standing Tolerance   Time p+ standing balance during transfers   Bed Mobility   Supine to Sit 2  Maximal assistance   Additional items Assist x 1   Transfers   Sit to Stand 3  Moderate assistance   Stand to Sit 3  Moderate assistance   Stand pivot 3  Moderate assistance   Additional items Assist x 2   Coordination   Gross Motor pt with ataxic and weak gross motor movements of b ue's  Cognition   Overall Cognitive Status Impaired   Arousal/Participation Alert   Attention Attends with cues to redirect   Orientation Level Disoriented to time   Memory Decreased short term memory;Decreased recall of recent events;Decreased recall of precautions   Following Commands Follows one step commands without difficulty   Activity Tolerance   Activity Tolerance Patient tolerated treatment well   Assessment   Assessment pt participated in am ot session and was seen focusing on bed mobility, toileting / incont management, activity tolerence and functional transfers  pt continues with weakness  pt is noteably slightly more confused this session  not oriented to place nor month  talking about her family members as if they were known to therapist  will follow focusing on goals from ie  Plan   Treatment Interventions ADL retraining;Functional transfer training; Endurance training;Cognitive reorientation;Patient/family training;Equipment evaluation/education; Activityengagement   Goal Expiration Date 01/25/22   OT Treatment Day 4   OT Frequency 3-5x/wk   Recommendation   OT Discharge Recommendation Post acute rehabilitation services   OT - OK to Discharge Yes   AM-PAC Daily Activity Inpatient   Lower Body Dressing 2   Bathing 2   Toileting 2   Upper Body Dressing 3   Grooming 3   Eating 3   Daily Activity Raw Score 15   Daily Activity Standardized Score (Calc for Raw Score >=11) 34 69   AM-PAC Applied Cognition Inpatient   Following a Speech/Presentation 2   Understanding Ordinary Conversation 3   Taking Medications 2   Remembering Where Things Are Placed or Put Away 2   Remembering List of 4-5 Errands 2   Taking Care of Complicated Tasks 2   Applied Cognition Raw Score 13   Applied Cognition Standardized Score 30 46   April A Beena Min

## 2022-01-18 NOTE — PLAN OF CARE
Problem: NEUROSENSORY - ADULT  Goal: Achieves stable or improved neurological status  Description: INTERVENTIONS  - Monitor and report changes in neurological status  - Monitor vital signs such as temperature, blood pressure, glucose, and any other labs ordered   - Initiate measures to prevent increased intracranial pressure  - Monitor for seizure activity and implement precautions if appropriate      Outcome: Progressing  Goal: Achieves maximal functionality and self care  Description: INTERVENTIONS  - Monitor swallowing and airway patency with patient fatigue and changes in neurological status  - Encourage and assist patient to increase activity and self care  - Encourage visually impaired, hearing impaired and aphasic patients to use assistive/communication devices  Outcome: Progressing     Problem: GASTROINTESTINAL - ADULT  Goal: Maintains or returns to baseline bowel function  Description: INTERVENTIONS:  - Assess bowel function  - Encourage oral fluids to ensure adequate hydration  - Administer IV fluids if ordered to ensure adequate hydration  - Administer ordered medications as needed  - Encourage mobilization and activity  - Consider nutritional services referral to assist patient with adequate nutrition and appropriate food choices  Outcome: Progressing  Goal: Maintains adequate nutritional intake  Description: INTERVENTIONS:  - Monitor percentage of each meal consumed  - Identify factors contributing to decreased intake, treat as appropriate  - Assist with meals as needed  - Monitor I&O, weight, and lab values if indicated  - Obtain nutrition services referral as needed  Outcome: Progressing     Problem: Nutrition/Hydration-ADULT  Goal: Nutrient/Hydration intake appropriate for improving, restoring or maintaining nutritional needs  Description: Monitor and assess patient's nutrition/hydration status for malnutrition   Collaborate with interdisciplinary team and initiate plan and interventions as ordered  Monitor patient's weight and dietary intake as ordered or per policy  Utilize nutrition screening tool and intervene as necessary  Determine patient's food preferences and provide high-protein, high-caloric foods as appropriate       INTERVENTIONS:  - Monitor oral intake, urinary output, labs, and treatment plans  - Assess nutrition and hydration status and recommend course of action  - Evaluate amount of meals eaten  - Assist patient with eating if necessary   - Allow adequate time for meals  - Recommend/ encourage appropriate diets, oral nutritional supplements, and vitamin/mineral supplements  - Order, calculate, and assess calorie counts as needed  - Recommend, monitor, and adjust tube feedings and TPN/PPN based on assessed needs  - Assess need for intravenous fluids  - Provide specific nutrition/hydration education as appropriate  - Include patient/family/caregiver in decisions related to nutrition  Outcome: Progressing     Problem: METABOLIC, FLUID AND ELECTROLYTES - ADULT  Goal: Electrolytes maintained within normal limits  Description: INTERVENTIONS:  - Monitor labs and assess patient for signs and symptoms of electrolyte imbalances  - Administer electrolyte replacement as ordered  - Monitor response to electrolyte replacements, including repeat lab results as appropriate  - Instruct patient on fluid and nutrition as appropriate  Outcome: Progressing  Goal: Fluid balance maintained  Description: INTERVENTIONS:  - Monitor labs   - Monitor I/O and WT  - Instruct patient on fluid and nutrition as appropriate  - Assess for signs & symptoms of volume excess or deficit  Outcome: Progressing  Goal: Glucose maintained within target range  Description: INTERVENTIONS:  - Monitor Blood Glucose as ordered  - Assess for signs and symptoms of hyperglycemia and hypoglycemia  - Administer ordered medications to maintain glucose within target range  - Assess nutritional intake and initiate nutrition service referral as needed  Outcome: Progressing

## 2022-01-18 NOTE — CASE MANAGEMENT
Case Management Discharge Planning Note    Patient name Shakira Kaba  Location [address] 604/[address] 110-90 MRN [id number]  : 1934 Date 2022       Current Admission Date: 1/10/2022  Current Admission Diagnosis:Subdural hematoma [facility])   Patient Active Problem List    Diagnosis Date Noted    Pancolitis 2022    Urinary retention 2022    Hypernatremia 2022    Dysphagia 2022    Fall 2022    Subdural hematoma ([facility] [address] ) 2022    Diarrhea 2022    History of DVT (deep vein thrombosis) 2022    Altered mental status 2022    Cardiac arrhythmia 2022    Chronic anticoagulation 2021    Anemia 2021    DVT (deep venous thrombosis) ([facility] [address] ) 2021    Pulmonary HTN ([facility] [address] ) 2021    Paraesophageal hernia 2021    Acute pulmonary embolism with acute cor pulmonale ([address] ) 2021    Severe sepsis ([address] ) 2021    Pneumonia 2021    Elevated troponin 2021    BMI 26 0-26 9,adult 2019    Cortical age-related cataract of left eye 05/10/2019    Pre-op exam 05/10/2019    Medicare annual wellness visit, subsequent 2018    Intertrigo 2018    External hemorrhoids 2017    Acquired hypothyroidism 10/26/2012    Generalized anxiety disorder 10/24/2012    Headache 10/24/2012    Mixed hyperlipidemia 10/24/2012    Essential hypertension 10/24/2012      LOS (days): 8  Geometric Mean LOS (GMLOS) (days): 4 60  Days to GMLOS:-3 1     OBJECTIVE:  Risk of Unplanned Readmission Score: 21         Current admission status: Inpatient   Preferred Pharmacy:   [facility] [address]  [place] [address], [place] - [address]  [address]  [place] [address]  Phone: [phone] Fax: [phone]    Primary Care Provider: Cipriano Glez MD    Primary Insurance: Erla Antu [facility] REP  Secondary Insurance:     DISCHARGE DETAILS:          Pt not medically stable for d/c today   Plan remains to d/c to Replaced by [facility] once cleared

## 2022-01-18 NOTE — ASSESSMENT & PLAN NOTE
- Patient with hypernatremia likely secondary to dehydration from multiple liquid bowel movements  Sodium increased to 153 this morning   - Continue current IV fluid therapy and transition to D5 0 2 NS + 20 KCl at 100 mL/hr   - Continue to monitor BMP with repeat draw at 2 PM on 01/18/2022 and again on 01/19/2022   - Encephalopathy improving

## 2022-01-18 NOTE — APP STUDENT NOTE
ROSENDA STUDENT  Inpatient Progress Note for TRAINING ONLY  Not Part of Legal Medical Record       Progress Note - Itzel Lou 80 y o  female MRN: [id number]    Unit/Bed#: [facility] 604-01 Encounter: [id number]      Assessment & Plan:  Fernando Daniels on day prior to admission and day of admission, unclear of LOC   - Sustained below stated injuries   - PT/OT consulted   - CM following for disposition planning  Subdural hematoma   - CTH 1/10: Small left frontal acute subdural hematoma with some intermixed chronic components, no greater than 8 mm in overall total thickness with minimal sulcal effacement in the frontal region but otherwise no significant mass effect  Specifically, no midline shift or narrowing of the ventricles  Minimal subdural hemorrhage seen along the anterior interhemispheric fissure, no greater than 1 mm in thickness   No parenchymal hemorrhage   No calvarial fracture  - On anticoagulation, reversed with kcentra on admission   - repeated CTH shows stable SDH   - GCS 15   -neurosurgery consulted   - stable CTH, holding AC/AP   - F/u with neurosurgery in 2 weeks w/ repeat CT scan at that time    Diarrhea   - 2-3 week hx of diarrhea   - was being tx outpt for c diff w/ vancomycin   - 1/11 stool sample sent for c diff PCR which was negative  Further stool samples sent via ID consult   - discontinued vancomycin and iv metronidazole   - CT A/P shows pancolitis  LA 2 7 ->3 6 after 2 separate 1 liter boluses   Continuing IVF hydration   - GI consulted for scope on 01/13   - Biopsies sent from scope, currently pending   - Continue systemic Rocephin 1 g IV daily and metronidazole 500 mg PO q8hr for 7 days total   - Follow giardia EIA, and stool O&P   - Monitor WBC   - Continue PO vancomycin 125 mg q12hr for c diff prophylaxis in setting of colonization    FRANK   - Creatinine of 2 on admission   - resolved, 0 68 1/13, 0 52 1/18   - Trending  Hypernatremia   - due to dehydration from diarrhea and effect of NSS   - transitioned to D5 half NSS   - Trending   - 153 on 1/18, given D5W and NaCl 0 2% w/ KCL 20 100 mL/hr   Dysphagia   - Cognition has significantly improved but still nonsensical speech   - Speech therapy consulted, recommendations appreciated   - Trialed again today on 1/13, recommend to begin conservative diet of puree and honey thick liquids  Needs to be feed  - As per speech on 1/18, tolerating puree solids well  Intake continues to be poor  Patient tolerated sips of thin liquids without s/s aspiration  Rec ommends diet changed to puree with thin liquids  AMS   - Likely multifactorial given age, TBI, and dehydration   - Improving today, more alert and conversational   - afebrile, no leukocytosis   - continue neuro checks  History of DVT   - hx of RLE DVT and PE on 11/04/21   - B/I duplex: chronic DVT of left leg, no new DVY   - Outpt coumadin held and reversed due to SDH   - Started on sqh for DVT ppx  Subjective:   Pt presents in her hospital bed  She is sitting upright and has significantly improved over the course of her stay  She is still confused and speech continues to be difficult to understand  She denies fevers, chills, and abdominal pain but is still having loose stools      Objective:       Current Facility-Administered Medications:     amLODIPine (NORVASC) tablet 5 mg, 5 mg, Oral, Daily, Gilberto Robledo MD, 5 mg at 01/18/22 0827    cefTRIAXone (ROCEPHIN) 1,000 mg in dextrose 5 % 50 mL IVPB, 1,000 mg, Intravenous, Q24H, Sung Huang MD, Stopped at 01/17/22 1311    dextrose 5 % and sodium chloride 0 2 % with KCl 20 mEq/L infusion, 100 mL/hr, Intravenous, Continuous, Elton Carr PA-C, Last Rate: 100 mL/hr at 01/18/22 1040, 100 mL/hr at 01/18/22 1040    heparin (porcine) subcutaneous injection 5,000 Units, 5,000 Units, Subcutaneous, Q8H [address], Joann Green PA-C, 5,000 Units at 01/18/22 0534    levETIRAcetam (KEPPRA) 500 mg in sodium chloride 0 9 % 100 mL IVPB, 500 mg, Intravenous, BID, Deshawn Court Gowers, MD, Stopped at 01/18/22 8832    LORazepam (ATIVAN) injection 0 5 mg, 0 5 mg, Intravenous, HS, Neeraj Spears MD, 0 5 mg at 01/17/22 2205    metoprolol tartrate (LOPRESSOR) tablet 25 mg, 25 mg, Oral, Q12H [address], Cl Maudlin, DO, 25 mg at 01/18/22 0827    metroNIDAZOLE (FLAGYL) tablet 500 mg, 500 mg, Oral, Q8H [address], Teresa Dick MD, 500 mg at 01/18/22 0534    pantoprazole (PROTONIX) EC tablet 40 mg, 40 mg, Oral, Early Morning, Cl Maudlin, DO, 40 mg at 01/18/22 0534    vancomycin (VANCOCIN) oral solution 125 mg, 125 mg, Oral, Q12H [address], Avery Friedman MD, 125 mg at 01/18/22 1306    Vitals: Blood pressure 158/89, pulse 98, temperature 98 °F (36 7 °C), resp  rate 16, weight 53 2 kg (117 lb 3 2 oz), SpO2 95 %  ,Body mass index is 24 49 kg/m²  Intake/Output Summary (Last 24 hours) at 1/18/2022 1132  Last data filed at 1/18/2022 2964  Gross per 24 hour   Intake 1696 67 ml   Output 900 ml   Net 796 67 ml       Physical Exam: Physical Exam  Constitutional:       General: She is not in acute distress  Appearance: Normal appearance  She is not ill-appearing  HENT:      Head: Normocephalic  Right Ear: External ear normal       Left Ear: External ear normal       Nose: Nose normal       Mouth/Throat:      Mouth: Mucous membranes are dry  Pharynx: Oropharynx is clear  Eyes:      General:         Right eye: No discharge  Left eye: No discharge  Extraocular Movements: Extraocular movements intact  Pupils: Pupils are equal, round, and reactive to light  Cardiovascular:      Rate and Rhythm: Normal rate and regular rhythm  Pulses: Normal pulses  Heart sounds: Normal heart sounds  No murmur heard  No friction rub  No gallop  Pulmonary:      Effort: Pulmonary effort is normal  No respiratory distress  Breath sounds: Normal breath sounds  No stridor  No wheezing, rhonchi or rales  Abdominal:      General: Abdomen is flat   Bowel sounds are normal  There is no distension  Palpations: Abdomen is soft  Tenderness: There is no abdominal tenderness  There is no guarding  Skin:     General: Skin is warm and dry  Neurological:      Mental Status: She is alert  Invasive Devices  Report    Peripheral Intravenous Line            Peripheral IV 01/14/22 Left Forearm 3 days          Drain            Urethral Catheter 16 Fr  5 days                Lab, Imaging and other studies: I have personally reviewed pertinent reports      VTE Pharmacologic Prophylaxis: Heparin  VTE Mechanical Prophylaxis: sequential compression device

## 2022-01-18 NOTE — PHYSICAL THERAPY NOTE
Physical Therapy Progress Note     01/18/22 1110   PT Last Visit   PT Visit Date 01/18/22   Note Type   Note Type Treatment   Pain Assessment   Pain Score No Pain   Restrictions/Precautions   Other Precautions Contact/isolation; Fall Risk;Multiple lines  (C diff, John catheter, IV)   Subjective   Subjective pt encountered supine in bed, pleasant and agreeable to OOB mobiilty  Noted to be in concontinent of bowel prior to activity  Otherwise reports no complaints  Bed Mobility   Supine to Sit 3  Moderate assistance   Additional items Assist x 1  (+ standby for trunk control)   Transfers   Sit to Stand 3  Moderate assistance   Additional items Assist x 2   Stand to Sit 3  Moderate assistance   Additional items Assist x 2   Stand pivot 3  Moderate assistance   Additional items Assist x 2   Ambulation/Elevation   Gait pattern Excessively slow; Step to;Short stride; Foward flexed; Inconsistent sveta; Shuffling;Decreased foot clearance; Improper Weight shift;Narrow BERENICE   Gait Assistance 3  Moderate assist   Additional items Assist x 2   Assistive Device Other (Comment)  (WALDEMAR HHRUDDY)   Distance 3' to pivot, then 3' forwards   Balance   Static Sitting Poor +   Static Standing Poor   Ambulatory Poor   Endurance Deficit   Endurance Deficit Yes   Endurance Deficit Description fatigue, weakness   Activity Tolerance   Activity Tolerance Patient limited by fatigue;Patient tolerated treatment well   Nurse [address] [place] [address] Lu, RN   Exercises   Knee AROM Long Arc Quad Sitting;20 reps;AROM; Bilateral   Marching Sitting;20 reps;AAROM; Bilateral  (assist for increased AROM)   Assessment   Prognosis Fair   Problem List Decreased strength;Decreased endurance; Impaired balance;Decreased mobility; Decreased coordination;Decreased cognition;Decreased safety awareness; Impaired judgement;Pain   Assessment Pt continues to require Ax1-2 for all tasks due to weakness, impaired balance & muscular fatigue that place her at high risks for falls    pt was able to participate in all tasks with instrucitons, and was able to maintain balance in static standing without LE buckling  Did require increased assist to pivot, but did so with appropriate stepping  Fatigued quickly after each trial, requiring return to sitting  Use of RW deferred due to concerns regarding LE weakness, but may be appropriate for it's use in upcoming sessions pending improved endurance  Pt remains appropriate for rehab at this time due to these functional deficits & need for increased assist comapred to baseline mobility  Goals   Patient Goals to drink some water   STG Expiration Date 01/25/22   PT Treatment Day 1   Plan   Treatment/Interventions Functional transfer training;LE strengthening/ROM; Therapeutic exercise; Endurance training;Equipment eval/education;Patient/family training;Bed mobility;Gait training   Progress Slow progress, decreased activity tolerance   PT Frequency Other (Comment)  (3-6x/week)   Recommendation   PT Discharge Recommendation Post acute rehabilitation services   Equipment Recommended [facility] Recommended Wheeled walker   AM-PAC Basic Mobility Inpatient   Turning in Bed Without Bedrails 2   Lying on Back to Sitting on Edge of Flat Bed 2   Moving Bed to Chair 1   Standing Up From Chair 1   Walk in Room 1   Climb 3-5 Stairs 1   Basic Mobility Inpatient Raw Score 8   Turning Head Towards Sound 3   Follow Simple Instructions 3   Low Function Basic Mobility Raw Score 14   Low Function Basic Mobility Standardized Score 22 01   Highest Level Of Mobility   -HL Goal 3: Sit at edge of bed   -HLM Highest Level of Mobility 4: Move to chair/commode   The patient's AM-PAC Basic Mobility Inpatient Short Form Raw Score is 8  A Raw score of less than or equal to 16 suggests the patient may benefit from discharge to post-acute rehabilitation services  Please also refer to the recommendation of the Physical Therapist for safe discharge planning            Natalia Fuller PTA

## 2022-01-18 NOTE — PROGRESS NOTES
[facility]  Progress Note - Hood Coto 1934, 80 y o  female MRN: [id number]  Unit/Bed#: [facility] 604-01 Encounter: [id number]  Primary Care Provider: Jamie Karimi MD   Date and time admitted to hospital: 1/10/2022  4:35 PM    Fall  Assessment & Plan  - Status post fall with questionable loss of consciousness and with the below noted injuries/issues  - Fall precautions  - Geriatric Medicine consultation for evaluation, medication review and recommendations   - PT and OT evaluation and treatment as indicated  - Case Management consultation for disposition planning  * Subdural hematoma (HCC)  Assessment & Plan  - 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, present on admission   - Neurosurgery evaluation and recommendations appreciated  - Patient's coagulopathy corrected with Kcentra on presentation  Hold anti-platelet and anticoagulant medications for least 2 weeks and/or until cleared by Neurosurgery  - Continue Keppra for 7 days for seizure prophylaxis  - Monitor neurologic exam   - Continue symptomatic management with analgesia as needed  - PT and OT evaluation and treatment as indicated  - Outpatient Neurosurgery follow-up in 2 weeks with repeat CT scan of the head prior to follow-up appointment  Pancolitis  Assessment & Plan  - Patient with pan colitis and diarrhea on presentation  Patient positive for C diff, but ID suspects it is colonization  Colitis likely ischemic in nature  - Appreciate both ID and GI evaluation, recommendations and intervention  Patient is status post colonoscopy on 01/13/2022   - Continue current antibiotic regimen as well as oral vancomycin under direction of ID   - Continue to monitor abdominal exam, which is improving and is benign at this time   - Lactic acidosis resolved following fluid resuscitation and treatment with IV antibiotics    - Advance diet as tolerated to level 1 dysphagia diet with thickened liquids  Maintain aspiration precautions  - Continue to follow abdominal exam and bowel function  Diarrhea  Assessment & Plan  - Patient with 2-3 week history of diarrhea  Patient had been started on treatment for suspected C diff as an outpatient  - Continue current antibiotic regimen under the direction of ID   - Continue to monitor abdominal exam and bowel function  Hypernatremia  Assessment & Plan  - Patient with hypernatremia likely secondary to dehydration from multiple liquid bowel movements  Sodium increased to 153 this morning   - Continue current IV fluid therapy and transition to D5 0 2 NS + 20 KCl at 100 mL/hr   - Continue to monitor BMP with repeat draw at 2 PM on 01/18/2022 and again on 01/19/2022   - Encephalopathy improving  Altered mental status  Assessment & Plan  - Patient with altered mental status that is likely multi factorial given age and concomitant TBI as well as diarrhea/dehydration    - Mental status appears to be slowly improving   - Continue to monitor mental status and maintain delirium precautions  - Frequent reorientation   - Continue treatment traumatic brain injury and infectious etiology  - Spot EEG on 1/11/2022 negative for seizure activity  Dysphagia  Assessment & Plan  - Dysphagia, present on presentation   - Appreciate Speech therapy evaluation and recommendations  - Patient was cleared for level 1 dysphagia diet with honey thick liquids  - Maintain aspiration precautions  History of DVT (deep vein thrombosis)  Assessment & Plan  - Patient with history of right lower extremity DVT and PE on 11/4/2021   - Bilateral lower extremity venous duplex demonstrated chronic DVT of left leg, no new DVT  - Home Coumadin held and reversed due to [address]  Started on [facility] for DVT ppx on 1/11/2022   - Resume anticoagulation when cleared from a neurosurgical standpoint   - Outpatient follow-up with PCP        Urinary retention  Assessment & Plan  - Patient with urinary retention   - Urinary catheter placed due to retention   - Will attempt voiding trial once ambulatory status improved  - Outpatient follow-up with PCP  Disposition:  Not yet appropriate for discharge secondary to persistent hypernatremia and ongoing IV antibiotic therapy per ID recommendations  Anticipate discharge in next 48 hours pending ability to transition off of antibiotics and improvement and hyponatremia  Case Management following for disposition planning  SUBJECTIVE:  Chief Complaint:  I feel okay      Subjective:  Patient is feeling okay this morning  She remains confused with periods of lucidity per nursing staff  There were no significant events overnight  She denies having any pain this morning  She is tolerating her diet without any nausea or vomiting, but continues to have loose bowel movements        OBJECTIVE:     Meds/Allergies     Current Facility-Administered Medications:     amLODIPine (NORVASC) tablet 5 mg, 5 mg, Oral, Daily, Damien Pedersen MD, 5 mg at 01/18/22 0827    cefTRIAXone (ROCEPHIN) 1,000 mg in dextrose 5 % 50 mL IVPB, 1,000 mg, Intravenous, Q24H, Garfield Upton MD, Stopped at 01/17/22 1311    dextrose 5 % and sodium chloride 0 2 % with KCl 20 mEq/L infusion, 100 mL/hr, Intravenous, Continuous, Elton Carr PA-C, Last Rate: 100 mL/hr at 01/18/22 1040, 100 mL/hr at 01/18/22 1040    heparin (porcine) subcutaneous injection 5,000 Units, 5,000 Units, Subcutaneous, Q8H [address], Bird Chapman PA-C, 5,000 Units at 01/18/22 0534    levETIRAcetam (KEPPRA) 500 mg in sodium chloride 0 9 % 100 mL IVPB, 500 mg, Intravenous, BID, Damien Pedersen MD, Stopped at 01/18/22 0842    LORazepam (ATIVAN) injection 0 5 mg, 0 5 mg, Intravenous, HS, Sean Hayes MD, 0 5 mg at 01/17/22 2205    metoprolol tartrate (LOPRESSOR) tablet 25 mg, 25 mg, Oral, Q12H [address], Bishnu Contreras DO, 25 mg at 01/18/22 0827    metroNIDAZOLE (FLAGYL) tablet 500 mg, 500 mg, Oral, Q8H [address], Teresa Dick, MD, 500 mg at 01/18/22 0534    pantoprazole (PROTONIX) EC tablet 40 mg, 40 mg, Oral, Early Morning, Annmarie Joe DO, 40 mg at 01/18/22 0534    vancomycin (VANCOCIN) oral solution 125 mg, 125 mg, Oral, Q12H [address], Malachi Bansal MD, 125 mg at 01/18/22 0828     Vitals:   Vitals:    01/18/22 0540   BP: 158/89   Pulse: 98   Resp: 16   Temp: 98 °F (36 7 °C)   SpO2: 95%       Intake/Output:  I/O       01/16 0701  01/17 0700 01/17 0701  01/18 0700 01/18 0701 01/19 0700    P  O  420 120     I V  (mL/kg) 1124 2 (21 1) 680 8 (12 8) 665 8 (12 5)    IV Piggyback  450 100    Total Intake(mL/kg) 1544 2 (29) 1250 8 (23 5) 765 8 (14 4)    Urine (mL/kg/hr) 820 (0 6) 900 (0 7)     Stool 0 0     Total Output 820 900     Net +724 2 +350 8 +765 8           Unmeasured Stool Occurrence 1 x 3 x 1 x           Nutrition/GI Proph/Bowel Reg:  Level 1 dysphagia diet with thin liquids and Magic cup supplements twice daily  On Protonix for GI prophylaxis  Physical Exam:   GENERAL APPEARANCE: Patient in no acute distress  HEENT: NCAT; EOMs intact; Mucous membranes moist  CV: Regular rate and rhythm; no murmur/gallops/rubs appreciated  CHEST / LUNGS: Clear to auscultation; no wheezes/rales/rhonci  ABD: NABS; soft; still distended, but improved  Abdomen remains non-tender  :  Urinary catheter in place draining clear yellow urine  EXT: +2 pulses bilaterally upper & lower extremities; no edema  NEURO: GCS 14 (E4, V4, M6) with patient alert to person and place again this morning; no focal neurologic deficits; neurovascularly intact  SKIN: Warm, dry and well perfused; no rash; no jaundice           Invasive Devices  Report    Peripheral Intravenous Line            Peripheral IV 01/14/22 Left Forearm 3 days          Drain            Urethral Catheter 16 Fr  5 days                 Lab Results:   Results: I have personally reviewed pertinent reports   , BMP/CMP:   Lab Results   Component Value Date    SODIUM 153 (H) 01/18/2022    K 3 3 (L) 01/18/2022     (H) 01/18/2022    CO2 27 01/18/2022    BUN 8 01/18/2022    CREATININE 0 52 (L) 01/18/2022    CALCIUM 7 1 (L) 01/18/2022    EGFR 86 01/18/2022    and CBC:   Lab Results   Component Value Date    WBC 3 94 (L) 01/18/2022    HGB 9 7 (L) 01/18/2022    HCT 29 8 (L) 01/18/2022    MCV 86 01/18/2022     01/18/2022    MCH 28 1 01/18/2022    MCHC 32 6 01/18/2022    RDW 17 3 (H) 01/18/2022    MPV 10 9 01/18/2022    NRBC 1 01/18/2022     Imaging/EKG Studies: Results: I have personally reviewed pertinent reports      Other Studies: N/A  VTE Prophylaxis: Sequential compression device (Venodyne)  and Heparin       Wilmer Agrawal PA-C  1/18/2022  08:18 AM

## 2022-01-18 NOTE — PROGRESS NOTES
Progress Note - Geriatric Medicine   Дмитрий Cuellar 80 y o  female MRN: [id number]  Unit/Bed#: [facility] 604-01 Encounter: [id number]      Assessment/Plan:    Acute metabolic encephalopathy  -slowly improving, now with brief periods of lucidity intermittently however overall remains off from baseline  -continue treatment of contributing metabolic derangements and supportive cares  -reorient frequently    Ambulatory dysfunction with fall  -remains at high risk of recurrent falls - continue fall precautions  -assist with transfers, encourage good body mechanics, maintain environment free of fall hazards  -PT OT following - anticipate need for STR on discharge    Subdural hematoma  -home Coumadin reversed with Kcentra and vitamin K on admission  -AC/AP remains on hold for at least 2 weeks and until cleared by Neurosurgery  -currently on Keppra for prophylaxis  -neuro checks per protocol    Pancolitis/diarrhea  -s/p colonoscopy 1/14/22 suggestive of ischemic insults  -currently on antibiotic regimen and PO vancomycin as recommend by ID for C diff colonization  -monitor I/Os    DVT with Pulmonary Embolism  -initially diagnosed 11/21 - tx with coumadin - currently on hold 2/2 SDH  -continue close outpatient follow-up with cardiology    Atrial fibrillation  -right well controlled on metoprolol  -Coumadin on hold due to SDH as above    Generalized anxiety disorder  -lorazepam regimen reduced dose to reduce risk over-sedation - given tolerance of and good symptom control with reduced dose continue current reduced dose, may not require resumption of higher home dose on discharge    Frailty syndrome in geriatric patient  -oral intake improving continue monitor ensure adequate nutritional intake  -ongoing optimization of chronic conditions    Care coordination: rounded with Sunday Rosetta (RN)    Subjective:     Patient seen examined bedside where she sitting resting comfortably watching television, alertness and speech slowly improving daily  Nursing reports no acute events overnight  Review of Systems   Unable to perform ROS: Mental status change     Objective:     Vitals: Blood pressure 158/89, pulse 98, temperature 98 °F (36 7 °C), resp  rate 16, weight 53 2 kg (117 lb 3 2 oz), SpO2 95 %  ,Body mass index is 24 49 kg/m²  Intake/Output Summary (Last 24 hours) at 1/18/2022 1150  Last data filed at 1/18/2022 0842  Gross per 24 hour   Intake 1696 67 ml   Output 900 ml   Net 796 67 ml     Current Medications: Reviewed    Physical Exam:   Physical Exam  Vitals and nursing note reviewed  Constitutional:       General: She is not in acute distress  Appearance: Normal appearance  She is not ill-appearing  Comments: Thin, frail, elderly female   HENT:      Head: Normocephalic  Nose: Nose normal       Mouth/Throat:      Mouth: Mucous membranes are dry  Comments: Poor dentition, multiple upper teeth missing, membranes very dry  Eyes:      General:         Right eye: No discharge  Left eye: No discharge  Conjunctiva/sclera: Conjunctivae normal       Comments: Pupils equal round   Neck:      Comments: Voice remains slightly hoarse  Cardiovascular:      Rate and Rhythm: Normal rate  Rhythm irregular  Comments: Cool distal extremities  Pulmonary:      Effort: Pulmonary effort is normal  No respiratory distress  Breath sounds: No wheezing  Abdominal:      General: There is distension (Mildly distended, nontender to palpation)  Tenderness: There is no abdominal tenderness  Comments: Bowel sounds present   Musculoskeletal:      Cervical back: Neck supple  Right lower leg: Edema present  Left lower leg: Edema present  Comments: Diffuse subcutaneous fat and muscle wasting   Skin:     General: Skin is dry  Coloration: Skin is pale  Comments: Thin and friable   Neurological:      Mental Status: She is alert        Comments: Awake and alert, mentation slowly improving, more consistently answering questions appropriately, speech still somewhat dysarthric but overall improved from yesterday   Psychiatric:         Mood and Affect: Mood normal          Behavior: Behavior normal       Comments: Pleasant and cooperative       Invasive Devices  Report    Peripheral Intravenous Line            Peripheral IV 01/14/22 Left Forearm 3 days          Drain            Urethral Catheter 16 Fr  5 days              Lab, Imaging and other studies: I have personally reviewed pertinent reports

## 2022-01-18 NOTE — PLAN OF CARE
Problem: OCCUPATIONAL THERAPY ADULT  Goal: Performs self-care activities at highest level of function for planned discharge setting  See evaluation for individualized goals  Description: Treatment Interventions: ADL retraining,Functional transfer training,Endurance training,Cognitive reorientation,Patient/family training,Equipment evaluation/education,Compensatory technique education,Energy conservation,Activityengagement          See flowsheet documentation for full assessment, interventions and recommendations  Outcome: Progressing  Note: Limitation: Decreased ADL status,Decreased Safe judgement during ADL,Decreased cognition,Decreased endurance,Decreased self-care trans,Decreased high-level ADLs  Prognosis: Fair,Guarded  Assessment: pt participated in am ot session and was seen focusing on bed mobility, toileting / incont management, activity tolerence and functional transfers  pt continues with weakness  pt is noteably slightly more confused this session  not oriented to place nor month  talking about her family members as if they were known to therapist  will follow focusing on goals from ie  Recommendation: Geriatric Consult  OT Discharge Recommendation: Post acute rehabilitation services  OT - OK to Discharge:  Yes  April A DAHLIA Hardy

## 2022-01-18 NOTE — PROGRESS NOTES
Progress Note - Infectious Disease   Ade Martinez 80 y o  female MRN: [id number]  [facility] Encounter: [id number]      Impression/Plan:    1  Diarrhea, pancolitis   Diarrhea occurring for 2-3 weeks prior to admission  Concern by outpatient PCP and GI about C Dif colitis given treatment for pneumonia with antibiotics in November, however stool testing was not completed  C Dif PCR here was negative on admission and CT A/P shows pancolitis  She did have elevated bands on initial CBC with diff, but has no fevers and is hemodynamically stable  Repeat testing in PCR positive but EIA negative, likely colonization  Stool enteric PCR panel negative  Likely ischemic colitis given atherosclerosis, dehydration and relative hypotension on admission, along with persistent lactate elevation  Status post colonoscopy 1/13 showing severe pancolitis with ulcerated, hemorrhagic, and edematous mucosa with pseudopolyps, findings suggestive of severe ischemic colitis vs IBD  Lactate resolved with IV fluids and the patient is clinically stable               -given severe colonic inflammation, continue systemic antibiotics with Ceftriaxone 1g IV daily and metronidazole 500mg PO q8hr for 7 days total, through 1/19   -GI follow up              -follow up giardia EIA and stool O and P   -follow up colon biopsies              -monitor CBC with diff              -supportive care for diarrhea with IVF   -continue PO vancomycin 125mg q12hr for prophylaxis in setting of likely C Dif colonization  Continue for 72 hours after completion systemic antibiotics, through 1/22/22     2  CDif colonization  Initial CDif negative, repeat testing PCR positive but EIA negative  Likely consistent with colonization  Findings on colonoscopy consistent with ischemic colitis  -PO vancomycin as above for ppx    3  Acute subdural hematoma  Occurred after a fall  Patient chronically on coumadin, elevated INR on admission  Given Kcentra   Repeat CTH x 2 stable  -neurosurgery following              -monitor mental status     4  Fall  Complicated by SDH  Patient with diarrhea and dehydration prior to admission      5  FRANK  Present on admission, improved with IVF  -continue to monitor creatinine     6  Encephalopathy  Likely due to TBI/SDH and dehydration, hyponatremia  Spot EEG on  negative for seizure activity  UA with 1-2 WBC not consistent with infection, COVID PCR negative  Mental status somewhat improving               -UA not consistent with UTI              -monitor mental status     I have discussed the above management plan in detail with the primary service  ID will follow  Antibiotics:  Ceftriaxone/Flagyl day 6    Subjective:  Patient awake and alert today, but she is confused and speech is difficult to understand  Patient denies fever, chills, abdominal pain  Still having loose stools  Objective:  Vitals:  Temp:  [97 9 °F (36 6 °C)-98 3 °F (36 8 °C)] 98 °F (36 7 °C)  HR:  [] 98  Resp:  [16-20] 16  BP: (109-162)/(63-97) 158/89  SpO2:  [94 %-96 %] 95 %  Temp (24hrs), Av °F (36 7 °C), Min:97 9 °F (36 6 °C), Max:98 3 °F (36 8 °C)  Current: Temperature: 98 °F (36 7 °C)    Physical Exam:   General Appearance:  Frail appearing, awake and alert, no distress   Throat: Oropharynx moist without lesions  Lungs:   Clear to auscultation bilaterally; no wheezes, rhonchi or rales; respirations unlabored   Heart:  RRR; no murmur, rub or gallop   Abdomen:   Soft, non-distended, positive bowel sounds  No tenderness to palpation   Extremities: No clubbing, cyanosis or edema   Skin: No new rashes or lesions  No draining wounds noted  Neuro: awake and alert, moving extremities spontaneously  Oriented to person only but speech garbled    Labs:    All pertinent labs and imaging studies were personally reviewed  Results from last 7 days   Lab Units 22  0449 22  0438 01/15/22  0557   WBC Thousand/uL 3 94* 4 57 4 67 HEMOGLOBIN g/dL 9 7* 10 1* 9 7*   PLATELETS Thousands/uL 255 276 190     Results from last 7 days   Lab Units 01/18/22  0449 01/17/22  0454 01/17/22  0454 01/16/22  0438 01/16/22  0438 01/13/22  1818 01/13/22  0441   SODIUM mmol/L 153*  --  147*  --  150*   < > 153*   POTASSIUM mmol/L 3 3*   < > 3 2*   < > 3 1*   < > 3 5   CHLORIDE mmol/L 122*   < > 119*   < > 120*   < > 125*   CO2 mmol/L 27   < > 23   < > 23   < > 21   BUN mg/dL 8   < > 6   < > 9   < > 28*   CREATININE mg/dL 0 52*   < > 0 47*   < > 0 54*   < > 0 71   EGFR ml/min/1 73sq m 86   < > 89   < > 85   < > 76   CALCIUM mg/dL 7 1*   < > 6 9*   < > 6 5*   < > 7 7*   AST U/L  --   --   --   --   --   --  24   ALT U/L  --   --   --   --   --   --  26   ALK PHOS U/L  --   --   --   --   --   --  60    < > = values in this interval not displayed  Micro:  Results from last 7 days   Lab Units 01/13/22  1312   C DIFF TOXIN B BY PCR  Positive*       Imaging:  I have personally reviewed pertinent imaging study reports and images in PACS      CT A/P 1/12/22: pancolitis, most pronounced in the hepatic flexure and proximal to mid transverse colon      Other Studies:   I have personally reviewed pertinent reports

## 2022-01-18 NOTE — TELEPHONE ENCOUNTER
01/24/2022-CT SCHEDULED ON 01/28/2022 01/21/2022-PLEASE SEE Jesus Manuel Saldaña 01/21 TELEPHONE NOTE     WAITING FOR CT TO BE SCHEDULED      01/20/2022-PT STILL IN HOSPITAL    01/19/2022-PT STILL IN HOSPITAL    01/18/2022-PT Enma Velásquez

## 2022-01-19 LAB
ANION GAP SERPL CALCULATED.3IONS-SCNC: 3 MMOL/L (ref 4–13)
BUN SERPL-MCNC: 7 MG/DL (ref 5–25)
CALCIUM SERPL-MCNC: 7.6 MG/DL (ref 8.3–10.1)
CHLORIDE SERPL-SCNC: 119 MMOL/L (ref 100–108)
CO2 SERPL-SCNC: 27 MMOL/L (ref 21–32)
CREAT SERPL-MCNC: 0.61 MG/DL (ref 0.6–1.3)
FLUAV RNA RESP QL NAA+PROBE: NEGATIVE
FLUBV RNA RESP QL NAA+PROBE: NEGATIVE
GFR SERPL CREATININE-BSD FRML MDRD: 81 ML/MIN/1.73SQ M
GLUCOSE SERPL-MCNC: 147 MG/DL (ref 65–140)
POTASSIUM SERPL-SCNC: 4 MMOL/L (ref 3.5–5.3)
RSV RNA RESP QL NAA+PROBE: NEGATIVE
SARS-COV-2 RNA RESP QL NAA+PROBE: NEGATIVE
SODIUM SERPL-SCNC: 149 MMOL/L (ref 136–145)

## 2022-01-19 PROCEDURE — 99232 SBSQ HOSP IP/OBS MODERATE 35: CPT | Performed by: INTERNAL MEDICINE

## 2022-01-19 PROCEDURE — 97112 NEUROMUSCULAR REEDUCATION: CPT

## 2022-01-19 PROCEDURE — 99232 SBSQ HOSP IP/OBS MODERATE 35: CPT | Performed by: SURGERY

## 2022-01-19 PROCEDURE — 97110 THERAPEUTIC EXERCISES: CPT

## 2022-01-19 PROCEDURE — 97530 THERAPEUTIC ACTIVITIES: CPT

## 2022-01-19 PROCEDURE — 80048 BASIC METABOLIC PNL TOTAL CA: CPT | Performed by: STUDENT IN AN ORGANIZED HEALTH CARE EDUCATION/TRAINING PROGRAM

## 2022-01-19 PROCEDURE — 99232 SBSQ HOSP IP/OBS MODERATE 35: CPT | Performed by: STUDENT IN AN ORGANIZED HEALTH CARE EDUCATION/TRAINING PROGRAM

## 2022-01-19 PROCEDURE — 0241U HB NFCT DS VIR RESP RNA 4 TRGT: CPT | Performed by: PHYSICIAN ASSISTANT

## 2022-01-19 RX ADMIN — Medication 125 MG: at 09:08

## 2022-01-19 RX ADMIN — LEVETIRACETAM 500 MG: 100 INJECTION, SOLUTION INTRAVENOUS at 10:06

## 2022-01-19 RX ADMIN — AMLODIPINE BESYLATE 5 MG: 5 TABLET ORAL at 09:08

## 2022-01-19 RX ADMIN — HEPARIN SODIUM 5000 UNITS: 5000 INJECTION INTRAVENOUS; SUBCUTANEOUS at 15:02

## 2022-01-19 RX ADMIN — METRONIDAZOLE 500 MG: 500 TABLET ORAL at 06:29

## 2022-01-19 RX ADMIN — HEPARIN SODIUM 5000 UNITS: 5000 INJECTION INTRAVENOUS; SUBCUTANEOUS at 22:22

## 2022-01-19 RX ADMIN — CEFTRIAXONE SODIUM 1000 MG: 10 INJECTION, POWDER, FOR SOLUTION INTRAVENOUS at 12:01

## 2022-01-19 RX ADMIN — METOPROLOL TARTRATE 25 MG: 25 TABLET, FILM COATED ORAL at 09:08

## 2022-01-19 RX ADMIN — METRONIDAZOLE 500 MG: 500 TABLET ORAL at 15:02

## 2022-01-19 RX ADMIN — METOPROLOL TARTRATE 25 MG: 25 TABLET, FILM COATED ORAL at 22:21

## 2022-01-19 RX ADMIN — Medication 125 MG: at 22:21

## 2022-01-19 RX ADMIN — HEPARIN SODIUM 5000 UNITS: 5000 INJECTION INTRAVENOUS; SUBCUTANEOUS at 06:29

## 2022-01-19 RX ADMIN — METRONIDAZOLE 500 MG: 500 TABLET ORAL at 22:21

## 2022-01-19 NOTE — CASE MANAGEMENT
Case Management Discharge Planning Note    Patient name Tor Majors  Location [address] 604/PPHP 166-19 MRN [id number]  : 1934 Date 2022       Current Admission Date: 1/10/2022  Current Admission Diagnosis:Subdural hematoma [facility])   Patient Active Problem List    Diagnosis Date Noted    Pancolitis 2022    Urinary retention 2022    Hypernatremia 2022    Dysphagia 2022    Fall 2022    Subdural hematoma ([facility] ) 2022    Diarrhea 2022    History of DVT (deep vein thrombosis) 2022    Altered mental status 2022    Cardiac arrhythmia 2022    Chronic anticoagulation 2021    Anemia 2021    DVT (deep venous thrombosis) ([facility] ) 2021    Pulmonary HTN ([facility] ) 2021    Paraesophageal hernia 2021    Acute pulmonary embolism with acute cor pulmonale ([facility] ) 2021    Severe sepsis ([facility] ) 2021    Pneumonia 2021    Elevated troponin 2021    BMI 26 0-26 9,adult 2019    Cortical age-related cataract of left eye 05/10/2019    Pre-op exam 05/10/2019    Medicare annual wellness visit, subsequent 2018    Intertrigo 2018    External hemorrhoids 2017    Acquired hypothyroidism 10/26/2012    Generalized anxiety disorder 10/24/2012    Headache 10/24/2012    Mixed hyperlipidemia 10/24/2012    Essential hypertension 10/24/2012      LOS (days): 9  Geometric Mean LOS (GMLOS) (days): 4 60  Days to GMLOS:-4 1     OBJECTIVE:  Risk of Unplanned Readmission Score: 21         Current admission status: Inpatient   Preferred Pharmacy:   [facility] [address]ieira  [address] - [address]  [address]    [facility] [address]  Phone: [phone] Fax: [phone]    Primary Care Provider: Anum Dash MD    Primary Insurance: Leighann Rebolledo [facility] REP  Secondary Insurance:     DISCHARGE DETAILS:                     Accepting Facility Name, [facility] : Damion Freeman [address] [place] [address] Number: [id number]      Pt likely stable for d/c tomorrow  CM contacted pt's insurance and obtained authorization  # [id number]  Skilled Level  F/U [phone]    Transport auth  # [id number]    Plan for d/c to [facility] tomorrow  CM will submit for transport when appropriate

## 2022-01-19 NOTE — PHYSICAL THERAPY NOTE
Physical Therapy Progress Note     01/19/22 0935   PT Last Visit   PT Visit Date 01/19/22   Note Type   Note Type Treatment   Pain Assessment   Pain Assessment Tool 0-10   Pain Score No Pain   Restrictions/Precautions   Other Precautions Contact/isolation; Chair Alarm; Bed Alarm;Cognitive; Fall Risk   Subjective   Subjective The pt  is wanting to eat her breakfast, and she is agreeable to sit out in the chair  Transfers   Sit to Stand 3  Moderate assistance   Additional items Assist x 1;Assist x 2; Increased time required;Verbal cues  (Ax2 for the first trial, Ax1 for all subsequent trials )   Stand to Sit 4  Minimal assistance   Additional items Assist x 1; Increased time required;Verbal cues   Ambulation/Elevation   Gait pattern Step to;Short stride; Inconsistent sveta; Shuffling;Decreased foot clearance; Forward Flexion   Gait Assistance 3  Moderate assist   Additional items Assist x 1;Verbal cues; Tactile cues   Assistive Device Rolling walker   Distance 3 feet, 4 feet x 2  Balance   Static Sitting Fair -   Dynamic Sitting Poor +   Static Standing Poor +   Ambulatory Poor  (Poor for one loss of balance )   Activity Tolerance   Activity Tolerance Patient tolerated treatment well;Patient limited by fatigue   Nurse [address] [place] [address], RN  Exercises   TKR Sitting;Bilateral;AROM;20 reps   Assessment   Prognosis Fair   Problem List Decreased strength;Decreased endurance; Impaired balance;Decreased mobility; Decreased coordination;Decreased cognition;Decreased safety awareness; Impaired judgement;Pain   Assessment The pt  was able to progress to assistance of 1 person today  She does require extended time for mobility as she requests frequent rests  She was notably incontinent, and she was able to maintain standing while being cleaned  She took several steps to the chair, and then was able to take a few steps forward twice  She was again incontinent, and was able to stand while being cleaned again   She readily performed therapeutic exercise with only instruction  She had one loss of balance with gait today, but otherwise was only requiring minimal assistance in stance  She was in the chair on an [facility] cushion with all needs within reach  Barriers to Discharge Inaccessible home environment;Decreased caregiver support   Goals   Patient Goals To eat breakfast    STG Expiration Date 01/25/22   PT Treatment Day 2   Plan   Treatment/Interventions Functional transfer training;LE strengthening/ROM; Therapeutic exercise; Endurance training;Cognitive reorientation;Patient/family training;Bed mobility;Gait training   Progress Progressing toward goals   PT Frequency   (3-6x a week )   Recommendation   PT Discharge Recommendation Post acute rehabilitation services   Equipment Recommended [facility] Recommended Wheeled walker   AM-PAC Basic Mobility Inpatient   Turning in Bed Without Bedrails 2   Lying on Back to Sitting on Edge of Flat Bed 2   Moving Bed to Chair 2   Standing Up From Chair 3   Walk in Room 2   Climb 3-5 Stairs 1   Basic Mobility Inpatient Raw Score 12   Basic Mobility Standardized Score 32 23   Turning Head Towards Sound 4   Follow Simple Instructions 4   Low Function Basic Mobility Raw Score 20   Low Function Basic Mobility Standardized Score 32 8   Highest Level Of Mobility   JH-HLM Goal 4: Move to chair/commode   JH-HLM Highest Level of Mobility 5: Stand (1 or more minutes)   JH-HLM Goal Achieved Yes     An AM-PAC Basic Mobility standardized score less than 40 78 suggests the patient may benefit from discharge to post-acute rehab services      Siva Mercer, PTA

## 2022-01-19 NOTE — ASSESSMENT & PLAN NOTE
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

## 2022-01-19 NOTE — PROGRESS NOTES
[address] [facility]  Progress Note - Hood Coto 1934, 80 y o  female MRN: [id number]  Unit/Bed#: [facility] 604-01 Encounter: [id number]  Primary Care Provider: Jamie Karimi MD   Date and time admitted to hospital: 1/10/2022  4:35 PM    Pancolitis  Assessment & Plan  - Patient with pan colitis and diarrhea on presentation  Patient positive for C diff, but ID suspects it is colonization  Colitis likely ischemic in nature  - Appreciate both ID and GI evaluation, recommendations and intervention  Patient is status post colonoscopy on 01/13/2022   - Continue current antibiotic regimen as well as oral vancomycin under direction of ID   - Continue to monitor abdominal exam, which is improving and is benign at this time   - Lactic acidosis resolved following fluid resuscitation and treatment with IV antibiotics  - Advance diet as tolerated to level 1 dysphagia diet with thickened liquids  Maintain aspiration precautions  - Continue to follow abdominal exam and bowel function  Urinary retention  Assessment & Plan  - Patient with urinary retention   - Urinary catheter placed due to retention   - Will attempt voiding trial once ambulatory status improved  - Outpatient follow-up with PCP  Dysphagia  Assessment & Plan  - Dysphagia, present on presentation   - Appreciate Speech therapy evaluation and recommendations  - Patient was cleared for level 1 dysphagia diet with honey thick liquids  - Maintain aspiration precautions  Hypernatremia  Assessment & Plan  - Patient with hypernatremia likely secondary to dehydration from multiple liquid bowel movements  - Sodium currently 149  - Continue current IV fluid therapy and transition to D5 0 45 NS at 75 mL/hr   - Continue to monitor BMP with repeat in AM  - Encephalopathy improving      Altered mental status  Assessment & Plan  - Patient with altered mental status that is likely multi factorial given age and concomitant TBI as well as diarrhea/dehydration    - Mental status appears to be slowly improving   - Continue to monitor mental status and maintain delirium precautions  - Frequent reorientation   - Continue treatment traumatic brain injury and infectious etiology  - Spot EEG on 1/11/2022 negative for seizure activity  History of DVT (deep vein thrombosis)  Assessment & Plan  - Patient with history of right lower extremity DVT and PE on 11/4/2021   - Bilateral lower extremity venous duplex demonstrated chronic DVT of left leg, no new DVT  - Home Coumadin held and reversed due to [address]  Started on [facility] for DVT ppx on 1/11/2022   - Resume anticoagulation when cleared from a neurosurgical standpoint   - Outpatient follow-up with PCP  Diarrhea  Assessment & Plan  - Patient with 2-3 week history of diarrhea  Patient had been started on treatment for suspected C diff as an outpatient  - Continue current antibiotic regimen under the direction of ID   - Continue to monitor abdominal exam and bowel function  Fall  Assessment & Plan  - Status post fall with questionable loss of consciousness and with the below noted injuries/issues  - Fall precautions  - Geriatric Medicine consultation for evaluation, medication review and recommendations   - PT and OT evaluation and treatment as indicated  - Case Management consultation for disposition planning  * Subdural hematoma (HCC)  Assessment & Plan  - 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, present on admission   - Neurosurgery evaluation and recommendations appreciated  - Patient's coagulopathy corrected with Kcentra on presentation  Hold anti-platelet and anticoagulant medications for least 2 weeks and/or until cleared by Neurosurgery  - completed Keppra course for 7 days  - Monitor neurologic exam   - Continue symptomatic management with analgesia as needed  - PT and OT evaluation and treatment as indicated    - Outpatient Neurosurgery follow-up in 2 weeks with repeat CT scan of the head prior to follow-up appointment  DVT prophylaxis: SCDs and SQH  PT and OT: eval and treat    Disposition:  DC planning  Continue antibiotics per ID double complete on 01/19/2022  Patient will continue on vancomycin until 01/22/2022  Anticipate discharge tomorrow on 01/20/2022  RUDDY ALEJANDRE to re-evaluate sodium  SUBJECTIVE:  Chief Complaint: "No new complaints "    Subjective:  Patient is resting in bed offering no new complaints  Currently tolerating a diet  Following commands  OBJECTIVE:     Meds/Allergies     Current Facility-Administered Medications:     amLODIPine (NORVASC) tablet 5 mg, 5 mg, Oral, Daily, Matilde Boyle MD, 5 mg at 01/19/22 0908    dextrose 5 % and sodium chloride 0 45 % infusion, 75 mL/hr, Intravenous, Continuous, Harsh Quintanilla MD, Last Rate: 75 mL/hr at 01/19/22 1007, 75 mL/hr at 01/19/22 1007    heparin (porcine) subcutaneous injection 5,000 Units, 5,000 Units, Subcutaneous, Q8H [address], Doroteo Comer PA-C, 5,000 Units at 01/19/22 1336    LORazepam (ATIVAN) injection 0 5 mg, 0 5 mg, Intravenous, HS, Mauro Miller MD, 0 5 mg at 01/18/22 2113    metoprolol tartrate (LOPRESSOR) tablet 25 mg, 25 mg, Oral, Q12H [address], Yobani Chew, DO, 25 mg at 01/19/22 0908    metroNIDAZOLE (FLAGYL) tablet 500 mg, 500 mg, Oral, Q8H [address], Teresa Dick MD, 500 mg at 01/19/22 0629    pantoprazole (PROTONIX) EC tablet 40 mg, 40 mg, Oral, Early Morning, Yobani Chew, DO, 40 mg at 01/18/22 0534    vancomycin (VANCOCIN) oral solution 125 mg, 125 mg, Oral, Q12H [address], Alecia Love MD, 125 mg at 01/19/22 0908     Vitals:   Vitals:    01/19/22 1028   BP: 118/70   Pulse: 74   Resp: 16   Temp: 97 9 °F (36 6 °C)   SpO2: 96%       Intake/Output:  I/O       01/17 0701 01/18 0700 01/18 0701 01/19 0700 01/19 0701 01/20 0700    P  O  120      I V  (mL/kg) 680 8 (12 8) 1525 8 (28 7)     IV Piggyback 450 250     Total Intake(mL/kg) 1250 8 (23 5) 1775 8 (33 4)     Urine (mL/kg/hr) 900 (0 7) 500 (0 4)     Stool 0 0     Total Output 900 500     Net +350 8 +1275 8            Unmeasured Urine Occurrence   1 x    Unmeasured Stool Occurrence 3 x 1 x 2 x           Nutrition/GI Proph/Bowel Reg:  Continue current diet    Physical Exam:   GENERAL APPEARANCE:  No acute distress  NEURO:  GCS 14; following commands  HEENT:  Normocephalic  CV:  Regular rate and rhythm  LUNGS:  CTA bilaterally  GI:  Nontender, nondistended  :  John  MSK:  Moving all extremities, neurovascular intact  SKIN:  Warm, dry, intact           Invasive Devices  Report    Peripheral Intravenous Line            Peripheral IV 01/18/22 Left Forearm <1 day    Peripheral IV 01/19/22 Right Arm <1 day          Drain            Urethral Catheter 16 Fr  6 days                 Lab Results:   Results: I have personally reviewed pertinent reports   , BMP/CMP:   Lab Results   Component Value Date    SODIUM 149 (H) 01/19/2022    K 4 0 01/19/2022     (H) 01/19/2022    CO2 27 01/19/2022    BUN 7 01/19/2022    CREATININE 0 61 01/19/2022    CALCIUM 7 6 (L) 01/19/2022    EGFR 81 01/19/2022    and CBC: No results found for: WBC, HGB, HCT, MCV, PLT, ADJUSTEDWBC, MCH, MCHC, RDW, MPV, NRBC  Imaging/EKG Studies: Results: I have personally reviewed pertinent reports      Other Studies:  No other studies  VTE Prophylaxis:  SCDs and subcutaneous heparin

## 2022-01-19 NOTE — PROGRESS NOTES
Progress Note - Geriatric Medicine   Lea Lofton 80 y o  female MRN: [id number]  Unit/Bed#: [facility] 604-01 Encounter: [id number]      Assessment/Plan:    Acute metabolic encephalopathy   -slowly improving, fluctuations in mentation or less frequent and less severe  -remains high risk of recurrence/worsening -strict delirium precautions should be continued    Ambulatory dysfunction with fall  -remains high risk recurrent falls - fall precautions   -continue to encourage good body mechanics and assist with transfers  -bed and chair alarms for early intervention and assistance    Subdural hematoma  -s/p reversal Coumadin with Kcentra and mechanical  -AC/AP on hold for 2 weeks until cleared by Neurosurgery  -currently on Keppra for prophylaxis  -neuro checks per protocol    Pancolitis/diarrhea  -c-scope 1/14/22 suggestive of ischemic colitis  -antibiotic regimen and PO vancomycin as directed by ID  -tolerating diet advancement - continue aspiration precautions    DVT with Pulmonary Embolism  -initially diagnosed 11/21 - tx with Coumadin which is currently on hold due to SDH  -close follow-up with PCP and Cardiology    Atrial fibrillation  -rate well controlled with metoprolol  -anticoagulation hold as above    Generalized anxiety disorder  -symptoms appear to be well controlled with lorazepam at reduced dose from home regimen  -continue psychosocial supports and reassurance    High risk of malnutrition  -encourage well-balanced nutrition  -will start Magic cup ice cream supplements to improve protein intake  -nutrition following, appreciate recommendations    Care coordination: rounded with Tyrone Alfaro (RN) and Giancarlo (Trauma AP)    Subjective:     Patient seen and examined at bedside where she is resting comfortably, she denies pain or dyspnea but is tearful stating that she misses her family and wants to go home  She is easily reassured and offers no additional concerns, nursing reports no acute events overnight      Review of Systems   Constitutional: Negative  HENT: Negative  Eyes: Negative  Respiratory: Negative  Cardiovascular: Negative  Gastrointestinal: Negative  Endocrine: Negative  Genitourinary: Negative  Skin: Negative  Allergic/Immunologic: Negative  Neurological: Positive for weakness  Hematological: Negative  Psychiatric/Behavioral: Positive for dysphoric mood  Negative for sleep disturbance  All other systems reviewed and are negative  Objective:     Vitals: Blood pressure 123/67, pulse 83, temperature 97 7 °F (36 5 °C), resp  rate 16, weight 53 2 kg (117 lb 3 2 oz), SpO2 94 %  ,Body mass index is 24 49 kg/m²  Intake/Output Summary (Last 24 hours) at 1/19/2022 1020  Last data filed at 1/18/2022 2300  Gross per 24 hour   Intake 1010 ml   Output 500 ml   Net 510 ml     Current Medications: Reviewed    Physical Exam:   Physical Exam  Vitals and nursing note reviewed  Constitutional:       General: She is not in acute distress  Comments: Thin, frail, elderly female in no acute distress   HENT:      Head: Normocephalic  Nose: Nose normal       Mouth/Throat:      Mouth: Mucous membranes are dry  Comments: Poor dentition, multiple teeth missing, membranes exceedingly dry  Eyes:      General:         Right eye: No discharge  Left eye: No discharge  Conjunctiva/sclera: Conjunctivae normal       Comments: Wearing glasses   Neck:      Comments: Phonation normal  Cardiovascular:      Rate and Rhythm: Normal rate  Comments: Difficulty palpate DPs due to extensive edema  Pulmonary:      Effort: Pulmonary effort is normal  No respiratory distress  Abdominal:      General: There is no distension  Palpations: Abdomen is soft  Tenderness: There is no abdominal tenderness  Musculoskeletal:      Cervical back: Neck supple  Right lower leg: Edema present  Left lower leg: Edema present  Skin:     General: Skin is dry        Coloration: Skin is pale       Comments: Cold   Neurological:      Mental Status: She is alert  Comments: Awake and alert, oriented to self, overall much more easily engaged and converses, speech improved becoming easier to understand   Psychiatric:      Comments: Labile mood and affect, tearful at times - easily reassured        Invasive Devices  Report    Peripheral Intravenous Line            Peripheral IV 01/18/22 Left Forearm <1 day    Peripheral IV 01/19/22 Right Arm <1 day          Drain            Urethral Catheter 16 Fr  6 days              Lab, Imaging and other studies: I have personally reviewed pertinent reports

## 2022-01-19 NOTE — PLAN OF CARE
Problem: Potential for Falls  Goal: Patient will remain free of falls  Description: INTERVENTIONS:  - Educate patient/family on patient safety including physical limitations  - Instruct patient to call for assistance with activity   - Consult OT/PT to assist with strengthening/mobility   - Keep Call bell within reach  - Keep bed low and locked with side rails adjusted as appropriate  - Keep care items and personal belongings within reach  - Initiate and maintain comfort rounds  - Make Fall Risk Sign visible to staff  - Offer Toileting every  Hours, in advance of need  - Initiate/Maintain alarm  - Obtain necessary fall risk management equipment:   - Apply yellow socks and bracelet for high fall risk patients  - Consider moving patient to room near nurses station  Outcome: Progressing     Problem: Prexisting or High Potential for Compromised Skin Integrity  Goal: Skin integrity is maintained or improved  Description: INTERVENTIONS:  - Identify patients at risk for skin breakdown  - Assess and monitor skin integrity  - Assess and monitor nutrition and hydration status  - Monitor labs   - Assess for incontinence   - Turn and reposition patient  - Assist with mobility/ambulation  - Relieve pressure over bony prominences  - Avoid friction and shearing  - Provide appropriate hygiene as needed including keeping skin clean and dry  - Evaluate need for skin moisturizer/barrier cream  - Collaborate with interdisciplinary team   - Patient/family teaching  - Consider wound care consult   Outcome: Progressing     Problem: MOBILITY - ADULT  Goal: Maintain or return to baseline ADL function  Description: INTERVENTIONS:  -  Assess patient's ability to carry out ADLs; assess patient's baseline for ADL function and identify physical deficits which impact ability to perform ADLs (bathing, care of mouth/teeth, toileting, grooming, dressing, etc )  - Assess/evaluate cause of self-care deficits   - Assess range of motion  - Assess patient's mobility; develop plan if impaired  - Assess patient's need for assistive devices and provide as appropriate  - Encourage maximum independence but intervene and supervise when necessary  - Involve family in performance of ADLs  - Assess for home care needs following discharge   - Consider OT consult to assist with ADL evaluation and planning for discharge  - Provide patient education as appropriate  Outcome: Progressing  Goal: Maintains/Returns to pre admission functional level  Description: INTERVENTIONS:  - Perform BMAT or MOVE assessment daily    - Set and communicate daily mobility goal to care team and patient/family/caregiver  - Collaborate with rehabilitation services on mobility goals if consulted  - Perform Range of Motion times a day  - Reposition patient every  hours  - Dangle patient  times a day  - Stand patient times a day  - Ambulate patient  times a day  - Out of bed to chair  times a day   - Out of bed for meals times a day  - Out of bed for toileting  - Record patient progress and toleration of activity level   Outcome: Progressing     Problem: NEUROSENSORY - ADULT  Goal: Achieves stable or improved neurological status  Description: INTERVENTIONS  - Monitor and report changes in neurological status  - Monitor vital signs such as temperature, blood pressure, glucose, and any other labs ordered   - Initiate measures to prevent increased intracranial pressure  - Monitor for seizure activity and implement precautions if appropriate      Outcome: Progressing  Goal: Achieves maximal functionality and self care  Description: INTERVENTIONS  - Monitor swallowing and airway patency with patient fatigue and changes in neurological status  - Encourage and assist patient to increase activity and self care     - Encourage visually impaired, hearing impaired and aphasic patients to use assistive/communication devices  Outcome: Progressing     Problem: GASTROINTESTINAL - ADULT  Goal: Maintains or returns to baseline bowel function  Description: INTERVENTIONS:  - Assess bowel function  - Encourage oral fluids to ensure adequate hydration  - Administer IV fluids if ordered to ensure adequate hydration  - Administer ordered medications as needed  - Encourage mobilization and activity  - Consider nutritional services referral to assist patient with adequate nutrition and appropriate food choices  Outcome: Progressing  Goal: Maintains adequate nutritional intake  Description: INTERVENTIONS:  - Monitor percentage of each meal consumed  - Identify factors contributing to decreased intake, treat as appropriate  - Assist with meals as needed  - Monitor I&O, weight, and lab values if indicated  - Obtain nutrition services referral as needed  Outcome: Progressing     Problem: Nutrition/Hydration-ADULT  Goal: Nutrient/Hydration intake appropriate for improving, restoring or maintaining nutritional needs  Description: Monitor and assess patient's nutrition/hydration status for malnutrition  Collaborate with interdisciplinary team and initiate plan and interventions as ordered  Monitor patient's weight and dietary intake as ordered or per policy  Utilize nutrition screening tool and intervene as necessary  Determine patient's food preferences and provide high-protein, high-caloric foods as appropriate       INTERVENTIONS:  - Monitor oral intake, urinary output, labs, and treatment plans  - Assess nutrition and hydration status and recommend course of action  - Evaluate amount of meals eaten  - Assist patient with eating if necessary   - Allow adequate time for meals  - Recommend/ encourage appropriate diets, oral nutritional supplements, and vitamin/mineral supplements  - Order, calculate, and assess calorie counts as needed  - Recommend, monitor, and adjust tube feedings and TPN/PPN based on assessed needs  - Assess need for intravenous fluids  - Provide specific nutrition/hydration education as appropriate  - Include patient/family/caregiver in decisions related to nutrition  Outcome: Progressing     Problem: CONFUSION/THOUGHT DISTURBANCE  Goal: Thought disturbances (confusion, delirium, depression, dementia or psychosis) are managed to maintain or return to baseline mental status and functional level  Description: INTERVENTIONS:  - Assess for possible contributors to  thought disturbance, including but not limited to medications, infection, impaired vision or hearing, underlying metabolic abnormalities, dehydration, respiratory compromise,  psychiatric diagnoses and notify attending PHYSICAN/AP  - Monitor and intervene to maintain adequate nutrition, hydration, elimination, sleep and activity  - Decrease environmental stimuli, including noise as appropriate  - Provide frequent contacts to provide refocusing, direction and reassurance as needed  Approach patient calmly with eye contact and at their level    - Lawrence high risk fall precautions, aspiration precautions and other safety measures, as indicated  - If delirium suspected, notify physician/AP of change in condition and request immediate in-person evaluation  - Pursue consults as appropriate including Geriatric (campus dependent), OT for cognitive evaluation/activity planning, psychiatric, pastoral care, etc   Outcome: Progressing     Problem: METABOLIC, FLUID AND ELECTROLYTES - ADULT  Goal: Electrolytes maintained within normal limits  Description: INTERVENTIONS:  - Monitor labs and assess patient for signs and symptoms of electrolyte imbalances  - Administer electrolyte replacement as ordered  - Monitor response to electrolyte replacements, including repeat lab results as appropriate  - Instruct patient on fluid and nutrition as appropriate  Outcome: Progressing  Goal: Fluid balance maintained  Description: INTERVENTIONS:  - Monitor labs   - Monitor I/O and WT  - Instruct patient on fluid and nutrition as appropriate  - Assess for signs & symptoms of volume excess or deficit  Outcome: Progressing  Goal: Glucose maintained within target range  Description: INTERVENTIONS:  - Monitor Blood Glucose as ordered  - Assess for signs and symptoms of hyperglycemia and hypoglycemia  - Administer ordered medications to maintain glucose within target range  - Assess nutritional intake and initiate nutrition service referral as needed  Outcome: Progressing

## 2022-01-19 NOTE — PROGRESS NOTES
Progress Note - Infectious Disease   Fran Phelan 80 y o  female MRN: [id number]  Unit/Bed#: [facility] 604-01 Encounter: [id number]      Impression/Plan:    1  Diarrhea, pancolitis   Diarrhea occurring for 2-3 weeks prior to admission  Concern by outpatient PCP and GI about C Dif colitis given treatment for pneumonia with antibiotics in November, however stool testing was not completed  C Dif PCR here was negative on admission and CT A/P shows pancolitis  She did have elevated bands on initial CBC with diff, but has no fevers and is hemodynamically stable  Repeat testing in PCR positive but EIA negative, likely colonization  Stool enteric PCR panel and O&P negative  Likely ischemic colitis given atherosclerosis, dehydration and relative hypotension on admission, along with persistent lactate elevation  Status post colonoscopy 1/13 showing severe pancolitis with ulcerated, hemorrhagic, and edematous mucosa with pseudopolyps, findings suggestive of severe ischemic colitis vs IBD  Lactate resolved with IV fluids and the patient is clinically stable  Pathology with chronic active colitis with ulceration  Patient clinically improving and bandemia has resolved  -complete 7 day course of Ceftriaxone and metronidazole today   -GI follow up, discuss colon biopsy results with them              -monitor CBC with diff              -supportive care for diarrhea with IVF   -continue PO vancomycin 125mg q12hr for prophylaxis in setting of likely C Dif colonization  Continue for 72 hours after completion systemic antibiotics, through 1/22/22     2  CDif colonization  Initial CDif negative, repeat testing PCR positive but EIA negative  Likely consistent with colonization  Findings on colonoscopy consistent with ischemic colitis  -PO vancomycin as above for ppx    3  Acute subdural hematoma  Occurred after a fall  Patient chronically on coumadin, elevated INR on admission  Given Kcentra  Repeat CTH x 2 stable  -neurosurgery following              -monitor mental status     4  Fall  Complicated by SDH  Patient with diarrhea and dehydration prior to admission      5  FRANK  Present on admission, improved with IVF  -continue to monitor creatinine     6  Encephalopathy  Likely due to TBI/SDH and dehydration, hyponatremia  Spot EEG on  negative for seizure activity  UA with 1-2 WBC not consistent with infection, COVID PCR negative  Mental status improving               -UA not consistent with UTI              -monitor mental status     I have discussed the above management plan in detail with the primary service  ID will follow  Antibiotics:  Ceftriaxone/Flagyl day 7  PO vancomycin D6    Subjective:  Patient doing better today, still having diarrhea but appears to be improving  Denies abdominal pain  Seen sitting up in the chair, more conversant and easier to understand  She denies fever, chills, nausea, vomiting  Objective:  Vitals:  Temp:  [97 2 °F (36 2 °C)-98 3 °F (36 8 °C)] 97 7 °F (36 5 °C)  HR:  [59-96] 74  Resp:  [16-19] 16  BP: ()/(51-87) 118/70  SpO2:  [94 %-96 %] 96 %  Temp (24hrs), Av 7 °F (36 5 °C), Min:97 2 °F (36 2 °C), Max:98 3 °F (36 8 °C)  Current: Temperature: 97 7 °F (36 5 °C)    Physical Exam:   General Appearance:  Frail appearing, awake and alert, no distress   Throat: Oropharynx moist without lesions  Lungs:   Clear to auscultation bilaterally; no wheezes, rhonchi or rales; respirations unlabored   Heart:  RRR; no murmur, rub or gallop   Abdomen:   Soft, non-distended, positive bowel sounds  No tenderness to palpation   Extremities: No clubbing, cyanosis or edema   Skin: No new rashes or lesions  No draining wounds noted  Neuro: awake and alert, moving extremities spontaneously  More interactive today    Labs:    All pertinent labs and imaging studies were personally reviewed  Results from last 7 days   Lab Units 22  0449 22  0438 01/15/22  0557   WBC Thousand/uL 3 94* 4 57 4 67   HEMOGLOBIN g/dL 9 7* 10 1* 9 7*   PLATELETS Thousands/uL 255 276 190     Results from last 7 days   Lab Units 01/19/22  0416 01/18/22  1716 01/18/22  1716 01/18/22  0449 01/18/22  0449 01/13/22  1818 01/13/22  0441   SODIUM mmol/L 149*  --  145  --  153*   < > 153*   POTASSIUM mmol/L 4 0   < > 5 2   < > 3 3*   < > 3 5   CHLORIDE mmol/L 119*   < > 118*   < > 122*   < > 125*   CO2 mmol/L 27   < > 26   < > 27   < > 21   BUN mg/dL 7   < > 8   < > 8   < > 28*   CREATININE mg/dL 0 61   < > 0 66   < > 0 52*   < > 0 71   EGFR ml/min/1 73sq m 81   < > 79   < > 86   < > 76   CALCIUM mg/dL 7 6*   < > 6 6*   < > 7 1*   < > 7 7*   AST U/L  --   --   --   --   --   --  24   ALT U/L  --   --   --   --   --   --  26   ALK PHOS U/L  --   --   --   --   --   --  60    < > = values in this interval not displayed  Micro:  Results from last 7 days   Lab Units 01/13/22  1312   C DIFF TOXIN B BY PCR  Positive*       Imaging:  I have personally reviewed pertinent imaging study reports and images in PACS      CT A/P 1/12/22: pancolitis, most pronounced in the hepatic flexure and proximal to mid transverse colon      Other Studies:   I have personally reviewed pertinent reports

## 2022-01-19 NOTE — ASSESSMENT & PLAN NOTE
- Patient with hypernatremia likely secondary to dehydration from multiple liquid bowel movements  - Sodium currently 149  - Continue current IV fluid therapy and transition to D5 0 45 NS at 75 mL/hr   - Continue to monitor BMP with repeat in AM  - Encephalopathy improving

## 2022-01-19 NOTE — WOUND OSTOMY CARE
Progress Note - Wound   Rose Habermann 80 y o  female MRN: [id number]  Unit/Bed#: [facility] 604-01 Encounter: [id number]      Assessment:  Wound care to see patient for weekly follow-up visit for sacral wound  Patient admitted with subdural hematoma s/p fall  History of - anxiety, HTN, HLD, PE and DVT  Patient seen Genevive Schilder in recliner chair sitting on EHOB waffle cushion  Patient is max assist of two with standing for the assessment  Dependent for care  John catheter in place  Incontinent of bowel per chart review       B/l heels are intact with no redness or wounds         1  Mid sacrum DTI is resolved on assessment as evidenced intact blanchable pink/hyperpigmented skin and scarring  No tenderness noted with palpation  No open aspects or un-blanchable aspects    -due to incontinence, will recommend to continue with moisture barrier cream      No induration, fluctuance, odor, warmth/temperature differences, or purulence noted to the above noted wounds  Patient tolerated assessment well - no s/s of non-verbal pain observed during the assessment  Primary nurse aware of plan of care       Plan:   1  Apply hydraguard to b/l buttocks and sacrum TID and PRN for prevention and protection  2  Apply skin nourishing cream the entire skin daily for moisture  3  Turn and reposition patient every  2 hours   4  Elevate heels off of bed with pillows to offload pressure   5  Apply EHOB waffle cushion to chair when OOB, if able  6  Apply Allevyn foam to heels, nikita w/P, peel foam check skin integrity q-shift  Change q3d and PRN for soilage/dislodgement       Objective:    Vitals: Blood pressure 124/72, pulse 91, temperature 97 7 °F (36 5 °C), resp  rate 18, weight 53 2 kg (117 lb 3 2 oz), SpO2 96 %  ,Body mass index is 24 49 kg/m²  Wound care will sign off at this time, please re-consult if needed    Geovanna Barnett RN BSN CWON

## 2022-01-19 NOTE — PLAN OF CARE
Problem: PHYSICAL THERAPY ADULT  Goal: Performs mobility at highest level of function for planned discharge setting  See evaluation for individualized goals  Description: Treatment/Interventions: Functional transfer training,LE strengthening/ROM,Therapeutic exercise,Endurance training,Patient/family training,Cognitive reorientation,Equipment eval/education,Bed mobility,Gait training,Spoke to nursing          See flowsheet documentation for full assessment, interventions and recommendations  Outcome: Progressing  Note: Prognosis: Fair  Problem List: Decreased strength,Decreased endurance,Impaired balance,Decreased mobility,Decreased coordination,Decreased cognition,Decreased safety awareness,Impaired judgement,Pain  Assessment: The pt  was able to progress to assistance of 1 person today  She does require extended time for mobility as she requests frequent rests  She was notably incontinent, and she was able to maintain standing while being cleaned  She took several steps to the chair, and then was able to take a few steps forward twice  She was again incontinent, and was able to stand while being cleaned again  She readily performed therapeutic exercise with only instruction  She had one loss of balance with gait today, but otherwise was only requiring minimal assistance in stance  She was in the chair on an [facility] cushion with all needs within reach  Barriers to Discharge: Inaccessible home environment,Decreased caregiver support        PT Discharge Recommendation: Post acute rehabilitation services          See flowsheet documentation for full assessment

## 2022-01-20 VITALS
HEART RATE: 73 BPM | DIASTOLIC BLOOD PRESSURE: 60 MMHG | WEIGHT: 117.2 LBS | SYSTOLIC BLOOD PRESSURE: 127 MMHG | BODY MASS INDEX: 24.49 KG/M2 | OXYGEN SATURATION: 96 % | TEMPERATURE: 98.3 F | RESPIRATION RATE: 17 BRPM

## 2022-01-20 LAB
ANION GAP SERPL CALCULATED.3IONS-SCNC: 3 MMOL/L (ref 4–13)
ANISOCYTOSIS BLD QL SMEAR: PRESENT
ARTIFACT: PRESENT
BASOPHILS # BLD MANUAL: 0 THOUSAND/UL (ref 0–0.1)
BASOPHILS NFR MAR MANUAL: 0 % (ref 0–1)
BUN SERPL-MCNC: 9 MG/DL (ref 5–25)
CALCIUM SERPL-MCNC: 8.5 MG/DL (ref 8.3–10.1)
CHLORIDE SERPL-SCNC: 119 MMOL/L (ref 100–108)
CO2 SERPL-SCNC: 28 MMOL/L (ref 21–32)
CREAT SERPL-MCNC: 0.62 MG/DL (ref 0.6–1.3)
EOSINOPHIL # BLD MANUAL: 0 THOUSAND/UL (ref 0–0.4)
EOSINOPHIL NFR BLD MANUAL: 0 % (ref 0–6)
ERYTHROCYTE [DISTWIDTH] IN BLOOD BY AUTOMATED COUNT: 19.3 % (ref 11.6–15.1)
GFR SERPL CREATININE-BSD FRML MDRD: 81 ML/MIN/1.73SQ M
GLUCOSE SERPL-MCNC: 124 MG/DL (ref 65–140)
HCT VFR BLD AUTO: 32.4 % (ref 34.8–46.1)
HGB BLD-MCNC: 10.3 G/DL (ref 11.5–15.4)
LYMPHOCYTES # BLD AUTO: 1.28 THOUSAND/UL (ref 0.6–4.47)
LYMPHOCYTES # BLD AUTO: 28 % (ref 14–44)
MACROCYTES BLD QL AUTO: PRESENT
MAGNESIUM SERPL-MCNC: 2.1 MG/DL (ref 1.6–2.6)
MCH RBC QN AUTO: 27.8 PG (ref 26.8–34.3)
MCHC RBC AUTO-ENTMCNC: 31.8 G/DL (ref 31.4–37.4)
MCV RBC AUTO: 88 FL (ref 82–98)
MONOCYTES # BLD AUTO: 0.27 THOUSAND/UL (ref 0–1.22)
MONOCYTES NFR BLD: 6 % (ref 4–12)
NEUTROPHILS # BLD MANUAL: 2.92 THOUSAND/UL (ref 1.85–7.62)
NEUTS BAND NFR BLD MANUAL: 11 % (ref 0–8)
NEUTS SEG NFR BLD AUTO: 53 % (ref 43–75)
PHOSPHATE SERPL-MCNC: 2 MG/DL (ref 2.3–4.1)
PLATELET # BLD AUTO: 313 THOUSANDS/UL (ref 149–390)
PLATELET BLD QL SMEAR: ADEQUATE
PLATELET CLUMP BLD QL SMEAR: PRESENT
PMV BLD AUTO: 10.8 FL (ref 8.9–12.7)
POLYCHROMASIA BLD QL SMEAR: PRESENT
POTASSIUM SERPL-SCNC: 3.8 MMOL/L (ref 3.5–5.3)
RBC # BLD AUTO: 3.7 MILLION/UL (ref 3.81–5.12)
RBC MORPH BLD: PRESENT
SODIUM SERPL-SCNC: 150 MMOL/L (ref 136–145)
TARGETS BLD QL SMEAR: PRESENT
VARIANT LYMPHS # BLD AUTO: 2 %
WBC # BLD AUTO: 4.56 THOUSAND/UL (ref 4.31–10.16)

## 2022-01-20 PROCEDURE — 85007 BL SMEAR W/DIFF WBC COUNT: CPT | Performed by: PHYSICIAN ASSISTANT

## 2022-01-20 PROCEDURE — NC001 PR NO CHARGE: Performed by: SURGERY

## 2022-01-20 PROCEDURE — 99232 SBSQ HOSP IP/OBS MODERATE 35: CPT | Performed by: INTERNAL MEDICINE

## 2022-01-20 PROCEDURE — 84100 ASSAY OF PHOSPHORUS: CPT | Performed by: PHYSICIAN ASSISTANT

## 2022-01-20 PROCEDURE — 97535 SELF CARE MNGMENT TRAINING: CPT

## 2022-01-20 PROCEDURE — 99231 SBSQ HOSP IP/OBS SF/LOW 25: CPT | Performed by: STUDENT IN AN ORGANIZED HEALTH CARE EDUCATION/TRAINING PROGRAM

## 2022-01-20 PROCEDURE — 80048 BASIC METABOLIC PNL TOTAL CA: CPT | Performed by: PHYSICIAN ASSISTANT

## 2022-01-20 PROCEDURE — 83735 ASSAY OF MAGNESIUM: CPT | Performed by: PHYSICIAN ASSISTANT

## 2022-01-20 PROCEDURE — 85027 COMPLETE CBC AUTOMATED: CPT | Performed by: PHYSICIAN ASSISTANT

## 2022-01-20 PROCEDURE — 99238 HOSP IP/OBS DSCHRG MGMT 30/<: CPT | Performed by: PHYSICIAN ASSISTANT

## 2022-01-20 RX ORDER — LORAZEPAM 2 MG/ML
0.5 INJECTION INTRAMUSCULAR
Qty: 2.5 ML | Refills: 0 | Status: CANCELLED | OUTPATIENT
Start: 2022-01-20 | End: 2022-01-30

## 2022-01-20 RX ORDER — LORAZEPAM 1 MG/1
0.5 TABLET ORAL
Qty: 10 TABLET | Refills: 0 | Status: SHIPPED | OUTPATIENT
Start: 2022-01-20 | End: 2022-03-16 | Stop reason: ALTCHOICE

## 2022-01-20 RX ADMIN — METOPROLOL TARTRATE 25 MG: 25 TABLET, FILM COATED ORAL at 09:03

## 2022-01-20 RX ADMIN — HEPARIN SODIUM 5000 UNITS: 5000 INJECTION INTRAVENOUS; SUBCUTANEOUS at 05:07

## 2022-01-20 RX ADMIN — PANTOPRAZOLE SODIUM 40 MG: 40 TABLET, DELAYED RELEASE ORAL at 05:07

## 2022-01-20 RX ADMIN — Medication 125 MG: at 09:03

## 2022-01-20 RX ADMIN — AMLODIPINE BESYLATE 5 MG: 5 TABLET ORAL at 09:03

## 2022-01-20 RX ADMIN — HEPARIN SODIUM 5000 UNITS: 5000 INJECTION INTRAVENOUS; SUBCUTANEOUS at 13:38

## 2022-01-20 NOTE — CASE MANAGEMENT
Case Management Discharge Planning Note    Patient name Meghann Osei  Location [address] [facility] [id number]  : 1934 Date 2022       Current Admission Date: 1/10/2022  Current Admission Diagnosis:Subdural hematoma [facility])   Patient Active Problem List    Diagnosis Date Noted    Pancolitis 2022    Urinary retention 2022    Hypernatremia 2022    Dysphagia 2022    Fall 2022    Subdural hematoma ([facility] [facility] ) 2022    Diarrhea 2022    History of DVT (deep vein thrombosis) 2022    Altered mental status 2022    Cardiac arrhythmia 2022    Chronic anticoagulation 2021    Anemia 2021    DVT (deep venous thrombosis) ([facility] ) 2021    Pulmonary HTN ([facility] ) 2021    Paraesophageal hernia 2021    Acute pulmonary embolism with acute cor pulmonale ([address] ) 2021    Severe sepsis ([address] ) 2021    Pneumonia 2021    Elevated troponin 2021    BMI 26 0-26 9,adult 2019    Cortical age-related cataract of left eye 05/10/2019    Pre-op exam 05/10/2019    Medicare annual wellness visit, subsequent 2018    Intertrigo 2018    External hemorrhoids 2017    Acquired hypothyroidism 10/26/2012    Generalized anxiety disorder 10/24/2012    Headache 10/24/2012    Mixed hyperlipidemia 10/24/2012    Essential hypertension 10/24/2012      LOS (days): 10  Geometric Mean LOS (GMLOS) (days): 4 60  Days to GMLOS:-5     OBJECTIVE:  Risk of Unplanned Readmission Score: 20         Current admission status: Inpatient   Preferred Pharmacy:   [facility] [address] Vaughanda  [address]  [address]  [facility] [address]  Phone: [phone] Fax: [phone]    Primary Care Provider: Joann Allen MD    Primary Insurance: [employer]ymoon W Gopi Lares REP  Secondary Insurance:     DISCHARGE DETAILS:                   Pt cleared for d/c today   CM submitted for transport via [employer], for BLS any time after 1130   CM will follow up

## 2022-01-20 NOTE — PROGRESS NOTES
[facility] [facility]  Progress Note - Lea Lofton 1934, 80 y o  female MRN: [id number]  Unit/Bed#: [facility] 604-01 Encounter: [id number]  Primary Care Provider: Star Rodrigues MD   Date and time admitted to hospital: 1/10/2022  4:35 PM    Pancolitis  Assessment & Plan  - Patient with pan colitis and diarrhea on presentation  Patient positive for C diff, but ID suspects it is colonization  Colitis likely ischemic in nature  - Appreciate both ID and GI evaluation, recommendations and intervention  Patient is status post colonoscopy on 01/13/2022   - Continue current antibiotic regimen as well as oral vancomycin under direction of ID   - Continue to monitor abdominal exam, which is improving and is benign at this time   - Lactic acidosis resolved following fluid resuscitation and treatment with IV antibiotics  - Advance diet as tolerated to level 1 dysphagia diet with thickened liquids  Maintain aspiration precautions  - Continue to follow abdominal exam and bowel function  Urinary retention  Assessment & Plan  - Patient with urinary retention   - Urinary catheter placed due to retention   - Will attempt voiding trial once ambulatory status improved  - Outpatient follow-up with PCP  Dysphagia  Assessment & Plan  - Dysphagia, present on presentation   - Appreciate Speech therapy evaluation and recommendations  - Patient was cleared for level 1 dysphagia diet with honey thick liquids  - Maintain aspiration precautions  Hypernatremia  Assessment & Plan  - Patient with hypernatremia likely secondary to dehydration from multiple liquid bowel movements  - Sodium currently 150; can repeat in 2 days at facility  - Encephalopathy improving      Altered mental status  Assessment & Plan  - Patient with altered mental status that is likely multi factorial given age and concomitant TBI as well as diarrhea/dehydration    - Mental status appears to be slowly improving   - Continue to monitor mental status and maintain delirium precautions  - Frequent reorientation   - Continue treatment traumatic brain injury and infectious etiology  - Spot EEG on 1/11/2022 negative for seizure activity  History of DVT (deep vein thrombosis)  Assessment & Plan  - Patient with history of right lower extremity DVT and PE on 11/4/2021   - Bilateral lower extremity venous duplex demonstrated chronic DVT of left leg, no new DVT  - Home Coumadin held and reversed due to [address]  Started on [facility] for DVT ppx on 1/11/2022   - Resume anticoagulation when cleared from a neurosurgical standpoint   - Outpatient follow-up with PCP  Diarrhea  Assessment & Plan  - Patient with 2-3 week history of diarrhea  Patient had been started on treatment for suspected C diff as an outpatient  - Continue current antibiotic regimen under the direction of ID   - Continue to monitor abdominal exam and bowel function  Fall  Assessment & Plan  - Status post fall with questionable loss of consciousness and with the below noted injuries/issues  - Fall precautions  - Geriatric Medicine consultation for evaluation, medication review and recommendations   - PT and OT evaluation and treatment as indicated  - Case Management consultation for disposition planning  * Subdural hematoma (HCC)  Assessment & Plan  - 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, present on admission   - Neurosurgery evaluation and recommendations appreciated  - Patient's coagulopathy corrected with Kcentra on presentation  Hold anti-platelet and anticoagulant medications for least 2 weeks and/or until cleared by Neurosurgery  - completed Keppra course for 7 days  - Monitor neurologic exam   - Continue symptomatic management with analgesia as needed  - PT and OT evaluation and treatment as indicated  - Outpatient Neurosurgery follow-up in 2 weeks with repeat CT scan of the head prior to follow-up appointment      DVT prophylaxis: SCDs and [facility]  PT and OT: eval and treat    Disposition:  DC today to facility  Discussed with patient's family over the phone regarding discharge planning process  SUBJECTIVE:  Chief Complaint: "No new complaints "    Subjective:  Patient is resting out of bed in chair  Offering no new complaints  OBJECTIVE:     Meds/Allergies     Current Facility-Administered Medications:     amLODIPine (NORVASC) tablet 5 mg, 5 mg, Oral, Daily, Denzel Vazquez MD, 5 mg at 01/20/22 6368    heparin (porcine) subcutaneous injection 5,000 Units, 5,000 Units, Subcutaneous, Q8H [facility], 5,000 Units at 01/20/22 0507    LORazepam (ATIVAN) injection 0 5 mg, 0 5 mg, Intravenous, HS, Rhonda Boyd MD, 0 5 mg at 01/18/22 2113    metoprolol tartrate (LOPRESSOR) tablet 25 mg, 25 mg, Oral, Q12H [facility], [facility], 25 mg at 01/20/22 0903    pantoprazole (PROTONIX) EC tablet 40 mg, 40 mg, Oral, Early Morning, [facility], 40 mg at 01/20/22 0507    vancomycin (VANCOCIN) oral solution 125 mg, 125 mg, Oral, Q12H [facility], Alesha Davis MD, 125 mg at 01/20/22 0903     Vitals:   Vitals:    01/20/22 1036   BP: 121/59   Pulse: 69   Resp: 17   Temp: (!) 96 8 °F (36 °C)   SpO2: 97%       Intake/Output:  I/O       01/18 0701  01/19 0700 01/19 0701  01/20 0700 01/20 0701  01/21 0700    P  O   100 120    I V  (mL/kg) 1525 8 (28 7) 1097 5 (20 6)     IV Piggyback 250 150     Total Intake(mL/kg) 1775 8 (33 4) 1347 5 (25 3) 120 (2 3)    Urine (mL/kg/hr) 500 (0 4) 400 (0 3)     Stool 0      Total Output 500 400     Net +1275 8 +947 5 +120           Unmeasured Urine Occurrence  1 x     Unmeasured Stool Occurrence 1 x 3 x            Nutrition/GI Proph/Bowel Reg:  Continue current diet    Physical Exam:   GENERAL APPEARANCE:  No acute distress  NEURO:  GCS 14; 1 off for confusion  HEENT:  Normocephalic  CV:  Regular rate and rhythm  LUNGS:  CTA bilaterally  GI:  Nontender, nondistended  :  John  MSK:  +2 pulses  SKIN:  Warm, dry, intact           Invasive Devices  Report    Drain            Urethral Catheter 16 Fr  7 days                 Lab Results:   Results: I have personally reviewed pertinent reports   , BMP/CMP:   Lab Results   Component Value Date    SODIUM 150 (H) 01/20/2022    K 3 8 01/20/2022     (H) 01/20/2022    CO2 28 01/20/2022    BUN 9 01/20/2022    CREATININE 0 62 01/20/2022    CALCIUM 8 5 01/20/2022    EGFR 81 01/20/2022    and CBC:   Lab Results   Component Value Date    WBC 4 56 01/20/2022    HGB 10 3 (L) 01/20/2022    HCT 32 4 (L) 01/20/2022    MCV 88 01/20/2022     01/20/2022    MCH 27 8 01/20/2022    MCHC 31 8 01/20/2022    RDW 19 3 (H) 01/20/2022    MPV 10 8 01/20/2022     Imaging/EKG Studies: Results: I have personally reviewed pertinent reports      Other Studies:  No other studies  VTE Prophylaxis:  SCDs and subcutaneous heparin

## 2022-01-20 NOTE — INCIDENTAL FINDINGS
The following findings require follow up:  Radiographic finding   Findin  Right thyroid lobe cyst or nodule, 5 mm  Incidental discovery of one or more thyroid nodule(s) measuring less than 1 5 cm and without suspicious features is noted in this patient who is above 28years old; according to guidelines published in the 2015 white paper on incidental thyroid nodules in the Journal of the [employer] of [employer]iology Ysabel Aguirre), no further evaluation is recommended  2  Large hiatal hernia/predominantly intrathoracic stomach  3  Mild hepatic steatosis  4  There are gallstone(s) within the gallbladder    5  Left renal and parapelvic cysts  6  The endometrium measures approximately 9 mm thickness  Follow-up is recommended  Pelvic ultrasound is recommended for further evaluation  Gynecology consultation is recommended  7   Small fat-containing ventral and umbilical hernias  Follow up required: Yes   Follow up should be done within 2-4 week(s)    Please notify the following clinician to assist with the follow up:   Primary Care Provider  Discussed with patient's son over the phone  Answered all questions or concerns  He wrote all findings down on a pad  Will follow-up with PCP

## 2022-01-20 NOTE — PROGRESS NOTES
Progress Note - Infectious Disease   Fabienne Mortimer 80 y o  female MRN: [id number]  [facility] Encounter: [id number]      Impression/Plan:    1  Diarrhea, pancolitis   Diarrhea occurring for 2-3 weeks prior to admission  Concern by outpatient PCP and GI about C Dif colitis given treatment for pneumonia with antibiotics in November, however stool testing was not completed  C Dif PCR here was negative on admission and CT A/P shows pancolitis  She did have elevated bands on initial CBC with diff, but has no fevers and is hemodynamically stable  Repeat testing is PCR positive but EIA negative, likely colonization  Stool enteric PCR panel and O&P negative  Likely ischemic colitis given atherosclerosis, dehydration and relative hypotension on admission, along with persistent lactate elevation  Status post colonoscopy 1/13 showing severe pancolitis with ulcerated, hemorrhagic, and edematous mucosa with pseudopolyps, findings suggestive of severe ischemic colitis vs IBD  Lactate resolved with IV fluids and the patient is clinically stable  Pathology with chronic active colitis with ulceration  Patient clinically improving and bandemia has resolved  Completed 7 days Ceftriaxone/flagyl   -GI follow up, discuss colon biopsy results with them   -continue PO vancomycin 125mg q12hr for prophylaxis in setting of likely C Dif colonization  Continue for 72 hours after completion systemic antibiotics, through 1/22/22   -supportive care for diarrhea     2  CDif colonization  Initial CDif negative, repeat testing PCR positive but EIA negative  Likely consistent with colonization  Findings on colonoscopy consistent with ischemic colitis  -PO vancomycin as above for ppx    3  Acute subdural hematoma  Occurred after a fall  Patient chronically on coumadin, elevated INR on admission  Given Kcentra  Repeat CTH x 2 stable  -neurosurgery following              -monitor mental status     4  Fall  Complicated by SDH  Patient with diarrhea and dehydration prior to admission      5  FRANK  Present on admission, improved with IVF  -continue to monitor creatinine     6  Encephalopathy  Likely due to TBI/SDH and dehydration, hyponatremia  Spot EEG on  negative for seizure activity  UA with 1-2 WBC not consistent with infection, COVID PCR negative  Mental status improving               -UA not consistent with UTI              -monitor mental status     I have discussed the above management plan in detail with the primary service  Okay for discharge from ID perspective  Antibiotics:  PO vancomycin D7    Subjective:  Patient doing better today, sitting up in the chair  More alert, less confused  She has no abdominal pain, fever, chills, diarrhea improving  Objective:  Vitals:  Temp:  [96 8 °F (36 °C)-97 7 °F (36 5 °C)] 96 8 °F (36 °C)  HR:  [] 69  Resp:  [17-18] 17  BP: (114-129)/(59-75) 121/59  SpO2:  [95 %-97 %] 97 %  Temp (24hrs), Av 2 °F (36 2 °C), Min:96 8 °F (36 °C), Max:97 7 °F (36 5 °C)  Current: Temperature: (!) 96 8 °F (36 °C)    Physical Exam:   General Appearance:  Frail appearing, awake and alert, no distress   Throat: Oropharynx moist without lesions  Lungs:   Clear to auscultation bilaterally; no wheezes, rhonchi or rales; respirations unlabored   Heart:  RRR; no murmur, rub or gallop   Abdomen:   Soft, non-distended, positive bowel sounds  No tenderness to palpation   Extremities: No clubbing, cyanosis or edema   Skin: No new rashes or lesions  No draining wounds noted  Neuro: awake and alert, moving extremities spontaneously  More interactive today    Labs:    All pertinent labs and imaging studies were personally reviewed  Results from last 7 days   Lab Units 22  0509 22  0449 22  0438   WBC Thousand/uL 4 56 3 94* 4 57   HEMOGLOBIN g/dL 10 3* 9 7* 10 1*   PLATELETS Thousands/uL 313 255 276     Results from last 7 days   Lab Units 22  0509 22  0416 01/19/22  0416 01/18/22  1716 01/18/22  1716   SODIUM mmol/L 150*  --  149*  --  145   POTASSIUM mmol/L 3 8   < > 4 0   < > 5 2   CHLORIDE mmol/L 119*   < > 119*   < > 118*   CO2 mmol/L 28   < > 27   < > 26   BUN mg/dL 9   < > 7   < > 8   CREATININE mg/dL 0 62   < > 0 61   < > 0 66   EGFR ml/min/1 73sq m 81   < > 81   < > 79   CALCIUM mg/dL 8 5   < > 7 6*   < > 6 6*    < > = values in this interval not displayed  Micro:  Results from last 7 days   Lab Units 01/13/22  1312   C DIFF TOXIN B BY PCR  Positive*       Imaging:  I have personally reviewed pertinent imaging study reports and images in PACS      CT A/P 1/12/22: pancolitis, most pronounced in the hepatic flexure and proximal to mid transverse colon      Other Studies:   I have personally reviewed pertinent reports

## 2022-01-20 NOTE — CASE MANAGEMENT
Case Management Discharge Planning Note    Patient name Itzel Lou  [facility] [address] [facility] [id number]  : 1934 Date 2022       Current Admission Date: 1/10/2022  Current Admission Diagnosis:Subdural hematoma [facility])   Patient Active Problem List    Diagnosis Date Noted    Pancolitis 2022    Urinary retention 2022    Hypernatremia 2022    Dysphagia 2022    Fall 2022    Subdural hematoma ([facility] ) 2022    Diarrhea 2022    History of DVT (deep vein thrombosis) 2022    Altered mental status 2022    Cardiac arrhythmia 2022    Chronic anticoagulation 2021    Anemia 2021    DVT (deep venous thrombosis) ([facility] ) 2021    Pulmonary HTN ([facility] ) 2021    Paraesophageal hernia 2021    Acute pulmonary embolism with acute cor pulmonale ([facility] [address] ) 2021    Severe sepsis ([address] ) 2021    Pneumonia 2021    Elevated troponin 2021    BMI 26 0-26 9,adult 2019    Cortical age-related cataract of left eye 05/10/2019    Pre-op exam 05/10/2019    Medicare annual wellness visit, subsequent 2018    Intertrigo 2018    External hemorrhoids 2017    Acquired hypothyroidism 10/26/2012    Generalized anxiety disorder 10/24/2012    Headache 10/24/2012    Mixed hyperlipidemia 10/24/2012    Essential hypertension 10/24/2012      LOS (days): 10  Geometric Mean LOS (GMLOS) (days): 4 60  Days to GMLOS:-5 1     OBJECTIVE:  Risk of Unplanned Readmission Score: 20         Current admission status: Inpatient   Preferred Pharmacy:   [address]  [facility], [place] - [address]  [address]    [place] [address]  Phone: [phone] Fax: [phone]    Primary Care Provider: Leigha Justin MD    Primary Insurance: [facility]  Secondary Insurance:     DISCHARGE DETAILS:                   Discharge Destination Plan[de-identified] SNF [facility])  Transport at Discharge : S Ambulance  Dispatcher Contacted: Yes  Number/Name of Dispatcher: [facility]  Transported by [employer] and Unit #): [facility]  ETA of Transport (Date): 01/20/22  ETA of Transport (Time): 1530     Transfer Mode: Stretcher  Accompanied by: Alone       Pt cleared for a d/c to [facility]  Pt will d/c at 1530 via [facility]   Pt's Eligio Arzate informed and aware

## 2022-01-20 NOTE — TRANSPORTATION MEDICAL NECESSITY
Section I - General Information    Name of Patient: Josh Esparza                 : 1934    Medicare #: [id number]  Transport Date: 22 (PCS is valid for round trips on this date and for all repetitive trips in the 60-day range as noted below )  Origin: [address] [facility]                                                         Destination: [facility]  Is the pt's stay covered under Medicare Part A (PPS/DRG)   []     Closest appropriate facility? If no, why is transport to more distant facility required? Yes  If hospice pt, is this transport related to pt's terminal illness? No       Section II - Medical Necessity Questionnaire  Ambulance transportation is medically necessary only if other means of transport are contraindicated or would be potentially harmful to the patient  To meet this requirement, the patient must either be "bed confined" or suffer from a condition such that transport by means other than ambulance is contraindicated by the patient's condition  The following questions must be answered by the medical professional signing below for this form to be valid:    1)  Describe the MEDICAL CONDITION (physical and/or mental) of this patient AT [address] [place] [address] that requires the patient to be transported in an ambulance and why transport by other means is contraindicated by the patient's condition: fall, SDH, Pancolitis, altered mental state    2) Is the patient "bed confined" as defined below? Yes  To be "be confined" the patient must satisfy all three of the following conditions: (1) unable to get up from bed without Assistance; AND (2) unable to ambulate; AND (3) unable to sit in a chair or wheelchair  3) Can this patient safely be transported by car or wheelchair van (i e , seated during transport without a medical attendant or monitoring)?    No    4) In addition to completing questions 1-3 above, please check any of the following conditions that apply*:   *Note: supporting documentation for any boxes checked must be maintained in the patient's medical records  If hosp-hosp transfer, describe services needed at 2nd facility not available at 1st facility? Patient is confused  Moderate/severe pain on movement   DVT requires elevation of lower extremity   Unable to tolerate seated position for time needed to transport       Section III - Signature of Physician or Healthcare Professional  I certify that the above information is true and correct based on my evaluation of this patient, and represent that the patient requires transport by ambulance and that other forms of transport are contraindicated  I understand that this information will be used by the Centers for Medicare and Medicaid Services (CMS) to support the determination of medical necessity for ambulance services, and I represent that I have personal knowledge of the patient's condition at time of transport  []  If this box is checked, I also certify that the patient is physically or mentally incapable of signing the ambulance service's claim and that the institution with which I am affiliated has furnished care, services, or assistance to the patient  My signature below is made on behalf of the patient pursuant to 42 CFR §424 36(b)(4)  In accordance with 42 CFR §424 37, the specific reason(s) that the patient is physically or mentally incapable of signing the claim form is as follows:       Signature of Physician* or Healthcare Professional______________________________________________________________  Signature Date 01/20/22 (For scheduled repetitive transports, this form is not valid for transports performed more than 60 days after this date)    Printed Name & Credentials of Physician or Healthcare Professional (MD, DO, RN, etc )__Saul SINGH______________________________  *Form must be signed by patient's attending physician for scheduled, repetitive transports   For non-repetitive, unscheduled ambulance transports, if unable to obtain the signature of the attending physician, any of the following may sign (choose appropriate option below)  [] Physician Assistant []  Clinical Nurse Specialist []  Registered Nurse  []  Nurse Practitioner  [] Discharge Planner

## 2022-01-20 NOTE — APP STUDENT NOTE
ROSENDA STUDENT  Inpatient Progress Note for TRAINING ONLY  Not Part of Legal Medical Record       Progress Note - Annette Bethea 80 y o  female MRN: [id number]    Unit/Bed#: [facility] 604-01 Encounter: [id number]      Assessment & Plan:  Jerad Morgan on day prior to admission and day of admission, unclear of LOC   - Sustained below stated injuries   - PT/OT consulted   - CM following for disposition planning  Subdural hematoma   - CTH 1/10: Small left frontal acute subdural hematoma with some intermixed chronic components, no greater than 8 mm in overall total thickness with minimal sulcal effacement in the frontal region but otherwise no significant mass effect  Specifically, no midline shift or narrowing of the ventricles  Minimal subdural hemorrhage seen along the anterior interhemispheric fissure, no greater than 1 mm in thickness   No parenchymal hemorrhage   No calvarial fracture  - On anticoagulation, reversed with kcentra on admission   - repeated CTH shows stable SDH   - GCS 15   -neurosurgery consulted   - stable CTH, holding AC/AP   - Completed keppra course for seizure prophylaxis   - F/u with neurosurgery in 2 weeks w/ repeat CT scan at that time    Diarrhea   - 2-3 week hx of diarrhea   - was being tx outpt for c diff w/ vancomycin   - 1/11 stool sample sent for c diff PCR which was negative  Further stool samples sent via ID consult   - discontinued vancomycin and iv metronidazole   - CT A/P shows pancolitis  LA 2 7 ->3 6 after 2 separate 1 liter boluses   Continuing IVF hydration   - GI consulted for scope on 01/13   - Biopsies sent from scope, currently pending   - D/c systemic Rocephin 1 g IV daily and metronidazole 500 mg PO q8hr for 7 days total   - Follow giardia EIA, and stool O&P - all negative   - Monitor WBC   - Continue PO vancomycin 125 mg q12hr for c diff prophylaxis in setting of colonization    FRANK   - Creatinine of 2 on admission   - resolved, 0 68 1/13, 0 52 1/18   - Trending  Hypernatremia   - due to dehydration from diarrhea and effect of NSS   - transitioned to D5 half NSS   - Trending   - 153 on 1/18, given D5W and NaCl 0 2% w/ KCL 20 100 mL/hr    - 150 on 1/20  Dysphagia   - Cognition has significantly improved but still nonsensical speech   - Speech therapy consulted, recommendations appreciated   - Trialed again today on 1/13, recommend to begin conservative diet of puree and honey thick liquids  Needs to be feed  - As per speech on 1/18, tolerating puree solids well  Intake continues to be poor  Patient tolerated sips of thin liquids without s/s aspiration  Rec ommends diet changed to puree with thin liquids  AMS   - Likely multifactorial given age, TBI, and dehydration   - Improving today, more alert and conversational   - afebrile, no leukocytosis   - continue neuro checks  History of DVT   - hx of RLE DVT and PE on 11/04/21   - B/I duplex: chronic DVT of left leg, no new DVY   - Outpt coumadin held and reversed due to SDH   - Started on sq for DVT ppx    Subjective:   Pt presents in her hospital bed  She is sitting upright in her hospital chair and has significantly improved over the course of her stay  She is still confused and speech continues to be difficult to understand  But she follows commands appropriately  Nursing reports no acute events overnight  She denies fevers, chills, and abdominal pain but is still having loose stools      Objective:       Current Facility-Administered Medications:     amLODIPine (NORVASC) tablet 5 mg, 5 mg, Oral, Daily, Marisa Aguilar MD, 5 mg at 01/20/22 2904    heparin (porcine) subcutaneous injection 5,000 Units, 5,000 Units, Subcutaneous, Q8H [facility], Miguel Zavaleta PA-C, 5,000 Units at 01/20/22 0507    LORazepam (ATIVAN) injection 0 5 mg, 0 5 mg, Intravenous, HS, Elif Watts MD, 0 5 mg at 01/18/22 2113    metoprolol tartrate (LOPRESSOR) tablet 25 mg, 25 mg, Oral, Q12H [facility], Rasheed Malone DO, 25 mg at 01/20/22 0903   pantoprazole (PROTONIX) EC tablet 40 mg, 40 mg, Oral, [facility], Yobani Chew, DO, 40 mg at 01/20/22 0507    vancomycin (VANCOCIN) oral solution 125 mg, 125 mg, Oral, Q12H [facility], Alecia Love MD, 125 mg at 01/20/22 0903    Vitals: Blood pressure 121/59, pulse 69, temperature (!) 96 8 °F (36 °C), resp  rate 17, weight 53 2 kg (117 lb 3 2 oz), SpO2 97 %  ,Body mass index is 24 49 kg/m²  Intake/Output Summary (Last 24 hours) at 1/20/2022 1135  Last data filed at 1/20/2022 0900  Gross per 24 hour   Intake 1367 5 ml   Output 400 ml   Net 967 5 ml       Physical Exam: Physical Exam  Constitutional:       General: She is not in acute distress  Appearance: Normal appearance  She is not ill-appearing  HENT:      Head: Normocephalic  Right Ear: External ear normal       Left Ear: External ear normal       Nose: Nose normal       Mouth/Throat:      Mouth: Mucous membranes are dry  Pharynx: Oropharynx is clear  Eyes:      General:         Right eye: No discharge  Left eye: No discharge  Extraocular Movements: Extraocular movements intact  Pupils: Pupils are equal, round, and reactive to light  Cardiovascular:      Rate and Rhythm: Normal rate and regular rhythm  Pulses: Normal pulses  Heart sounds: Normal heart sounds  No murmur heard  No friction rub  No gallop  Pulmonary:      Effort: Pulmonary effort is normal  No respiratory distress  Breath sounds: Normal breath sounds  No stridor  No wheezing, rhonchi or rales  Abdominal:      General: Abdomen is flat  Bowel sounds are normal  There is no distension  Palpations: Abdomen is soft  Tenderness: There is no abdominal tenderness  There is no guarding  Skin:     General: Skin is warm and dry  Neurological:      Mental Status: She is alert           Invasive Devices  Report    Drain            Urethral Catheter 16 Fr  7 days                Lab, Imaging and other studies: I have personally reviewed pertinent reports      VTE Pharmacologic Prophylaxis: Heparin  VTE Mechanical Prophylaxis: sequential compression device

## 2022-01-20 NOTE — DISCHARGE SUMMARY
Discharge Summary - Shakira Kaba 80 y o  female MRN: [id number]    [facility] Encounter: [id number]    Admission Date:   Admission Orders (From admission, onward)     Ordered        01/10/22 1824  Inpatient Admission  Once                        Admitting Diagnosis: Subdural hematoma ([facility] [address] ) [S06 [id number]]    HPI: Per Cristian Crane, "Shakira Kaba is a 80 y o  female with past medical history of hypertension, hyperlipidemia, and history of pulmonary embolism and DVT who presents with headache following mechanical fall  Patient states that she fell at home the day before yesterday and then again this morning  Patient states that she was walking towards the bathroom due to recent history of multiple bouts of diarrhea, when she fell and hit her head  Questionable loss of consciousness  Patient takes Coumadin daily, and last took it yesterday  She was seen at [address], where a CT head showed a subdural hematoma and Lila Ott was given  Patient denies any associated symptoms, and denies numbness, tingling, loss of appetite, nausea, vomiting  Of note, patient is being worked up for a possible C-diff infection  Mechanism:Fall"    Procedures Performed: No orders of the defined types were placed in this encounter  Summary of Hospital Course:  Patient is an 45-year-old female who comes in for evaluation of multiple falls and subdural hematoma  Patient a prolonged hospital course and was quite complicated  Would recommend after continuation of reading this discharge summary would go to patient chart and assess full course of care  Patient was consulted to Neurosurgery secondary to traumatic brain injury  He was recommended to continue with conservative management and monitor closely  She was noted have a history of pulmonary edema DVT  She was taking Coumadin daily  Neurosurgery stated they would follow up as an outpatient in 2 weeks    During her hospital course her mental status continued to wax and wane  She underwent a significant workup which revealed pan colitis as well as C diff colitis  ID and GI were consulted  GI continued with management with a colonoscope and ID recommended initiation of antibiotics  She would go on to complete a course of antibiotics for the pancolitis  If she would be discharged on vancomycin until 01/22/2022 for completion of her course of antibiotics for the C diff  She would have outpatient follow-up with GI as well as Neurosurgery  She would also follow-up outpatient with her PCP  Prior to discharge her incidental findings reviewed with the patient's son  As well as the hospital course was reviewed with the patient's son prior to discharge  Patient discharged to rehab  Significant Findings, Care, Treatment and Services Provided: Colonoscopy    Result Date: 1/13/2022  Impression: Severe pancolitis with ulcerated, hemorrhagic, and edematous mucosa with pseudopolyps  Findings suggestive of severe ischemic colitis vs IBD  Performed biopsies from right and left colon  Normal terminal ileum  Poor preparation with thick liquid stool throughout the entire colon  Lesions could have been missed  RECOMMENDATION: Await pathology results  Repeat colonoscopy not recommended due to age (age = 77 or greater) and life expectancy < 10 years  Resume dysphagia diet per speech therapy recommendations  Lactose restriction  Avoid hypotension  Continue IV antibiotics  Return to floors for continued care  Please contact the GI fellow on-call via [employer] with any questions or concerns  ATTENDING ATTESTATION:  I was present throughout the entire procedure from insertion to complete withdrawal of the scope  I performed all interventions myself or oversaw the fellow  Rusty Ashton MD     XR Trauma chest portable    Result Date: 1/10/2022  Impression: No focal airspace consolidation identified   Workstation performed: [id number]     CT head wo contrast    Result Date: 1/11/2022  Impression: Stable left frontal acute subdural hematoma  Stable underlying chronic microangiopathic change  Workstation performed: [id number]     CT head wo contrast    Result Date: 1/10/2022  Impression: Small left frontal subdural hematoma, 7 mm in maximal thickness with no mass effect, unchanged since a CT from earlier today  Workstation performed: [id number]     TRAUMA - CT head wo contrast    Result Date: 1/10/2022  Impression: Small left frontal acute subdural hematoma with some intermixed chronic components, no greater than 8 mm in overall total thickness with minimal sulcal effacement in the frontal region but otherwise no significant mass effect  Specifically, no midline shift or narrowing of the ventricles  Minimal subdural hemorrhage seen along the anterior interhemispheric fissure, no greater than 1 mm in thickness  No parenchymal hemorrhage  No calvarial fracture  I reported these results to Cassi Arthur via HIPAA compliant secure electronic messaging on 1/10/2022 at 12:59 PM  Workstation performed: [id number]     TRAUMA - CT spine cervical wo contrast    Result Date: 1/10/2022  Impression: No cervical spine fracture or traumatic malalignment  Workstation performed: [id number]     XR Trauma pelvis ap only 1 or 2 vw    Result Date: 1/10/2022  Impression: No acute osseous abnormality  Workstation performed: [id number]     CT abdomen pelvis w contrast    Result Date: 1/12/2022  Impression: Findings suggesting pancolitis, as described above, most pronounced in the region of the hepatic flexure and proximal to mid transverse colon  Clinical correlation, laboratory correlation and follow-up is recommended  The endometrium appears abnormally prominent for a postmenopausal patient, measuring approximately 9 mm AP thickness  Follow-up is recommended  Pelvic ultrasound is recommended for further evaluation  Gynecology consultation is recommended  Small bilateral pleural effusions    Mild bibasilar atelectasis  Small pericardial effusion  Large hiatal hernia/predominantly intrathoracic stomach  Cholelithiasis  No CT evidence of acute cholecystitis  Mild hepatic steatosis  Atherosclerosis  Coronary artery disease  This examination demonstrates findings for which clinical and imaging follow-up is recommended and was logged as such in [facility]  The study was marked in [facility] for immediate notification  Workstation performed: [id number]       Complications:  No complications    Discharge Diagnosis:    Patient Active Problem List   Diagnosis    External hemorrhoids    Generalized anxiety disorder    Headache    Mixed hyperlipidemia    Essential hypertension    Acquired hypothyroidism    Intertrigo    Medicare annual wellness visit, subsequent    Cortical age-related cataract of left eye    Pre-op exam    BMI 26 0-26 9,adult    Severe sepsis (HCC)    Pneumonia    Elevated troponin    Acute pulmonary embolism with acute cor pulmonale (HCC)    Paraesophageal hernia    Anemia    DVT (deep venous thrombosis) (HCC)    Pulmonary HTN (HCC)    Chronic anticoagulation    Cardiac arrhythmia    Fall    Subdural hematoma (HCC)    Diarrhea    History of DVT (deep vein thrombosis)    Altered mental status    Hypernatremia    Dysphagia    Urinary retention    Pancolitis         Medical Problems             Resolved Problems  Date Reviewed: 1/19/2022          Resolved    FRANK (acute kidney injury) ([facility] [address] ) 1/17/2022     Resolved by  Brittani Verduzco PA-C                Condition at Discharge: good         Discharge instructions/Information to patient and family:   See after visit summary for information provided to patient and family  Provisions for Follow-Up Care:  See after visit summary for information related to follow-up care and any pertinent home health orders        PCP: Demar Malik MD    Disposition: Rehab    Planned Readmission: No      Discharge Statement   I spent 23 minutes discharging the patient  This time was spent on the day of discharge  I had direct contact with the patient on the day of discharge  Additional documentation is required if more than 30 minutes were spent on discharge  Discharge Medications:  See after visit summary for reconciled discharge medications provided to patient and family

## 2022-01-20 NOTE — PLAN OF CARE
Problem: Potential for Falls  Goal: Patient will remain free of falls  Description: INTERVENTIONS:  - Educate patient/family on patient safety including physical limitations  - Instruct patient to call for assistance with activity   - Consult OT/PT to assist with strengthening/mobility   - Keep Call bell within reach  - Keep bed low and locked with side rails adjusted as appropriate  - Keep care items and personal belongings within reach  - Initiate and maintain comfort rounds  - Make Fall Risk Sign visible to staff  - Offer Toileting every  Hours, in advance of need  - Initiate/Maintain alarm  - Obtain necessary fall risk management equipment:   - Apply yellow socks and bracelet for high fall risk patients  - Consider moving patient to room near nurses station  Outcome: Progressing     Problem: Prexisting or High Potential for Compromised Skin Integrity  Goal: Skin integrity is maintained or improved  Description: INTERVENTIONS:  - Identify patients at risk for skin breakdown  - Assess and monitor skin integrity  - Assess and monitor nutrition and hydration status  - Monitor labs   - Assess for incontinence   - Turn and reposition patient  - Assist with mobility/ambulation  - Relieve pressure over bony prominences  - Avoid friction and shearing  - Provide appropriate hygiene as needed including keeping skin clean and dry  - Evaluate need for skin moisturizer/barrier cream  - Collaborate with interdisciplinary team   - Patient/family teaching  - Consider wound care consult   Outcome: Progressing     Problem: MOBILITY - ADULT  Goal: Maintain or return to baseline ADL function  Description: INTERVENTIONS:  -  Assess patient's ability to carry out ADLs; assess patient's baseline for ADL function and identify physical deficits which impact ability to perform ADLs (bathing, care of mouth/teeth, toileting, grooming, dressing, etc )  - Assess/evaluate cause of self-care deficits   - Assess range of motion  - Assess patient's mobility; develop plan if impaired  - Assess patient's need for assistive devices and provide as appropriate  - Encourage maximum independence but intervene and supervise when necessary  - Involve family in performance of ADLs  - Assess for home care needs following discharge   - Consider OT consult to assist with ADL evaluation and planning for discharge  - Provide patient education as appropriate  Outcome: Progressing  Goal: Maintains/Returns to pre admission functional level  Description: INTERVENTIONS:  - Perform BMAT or MOVE assessment daily    - Set and communicate daily mobility goal to care team and patient/family/caregiver  - Collaborate with rehabilitation services on mobility goals if consulted  - Perform Range of Motion  times a day  - Reposition patient every  hours  - Dangle patient  times a day  - Stand patient  times a day  - Ambulate patient  times a day  - Out of bed to chair  times a day   - Out of bed for meals times a day  - Out of bed for toileting  - Record patient progress and toleration of activity level   Outcome: Progressing     Problem: NEUROSENSORY - ADULT  Goal: Achieves stable or improved neurological status  Description: INTERVENTIONS  - Monitor and report changes in neurological status  - Monitor vital signs such as temperature, blood pressure, glucose, and any other labs ordered   - Initiate measures to prevent increased intracranial pressure  - Monitor for seizure activity and implement precautions if appropriate      Outcome: Progressing  Goal: Achieves maximal functionality and self care  Description: INTERVENTIONS  - Monitor swallowing and airway patency with patient fatigue and changes in neurological status  - Encourage and assist patient to increase activity and self care     - Encourage visually impaired, hearing impaired and aphasic patients to use assistive/communication devices  Outcome: Progressing     Problem: GASTROINTESTINAL - ADULT  Goal: Maintains or returns to baseline bowel function  Description: INTERVENTIONS:  - Assess bowel function  - Encourage oral fluids to ensure adequate hydration  - Administer IV fluids if ordered to ensure adequate hydration  - Administer ordered medications as needed  - Encourage mobilization and activity  - Consider nutritional services referral to assist patient with adequate nutrition and appropriate food choices  Outcome: Progressing  Goal: Maintains adequate nutritional intake  Description: INTERVENTIONS:  - Monitor percentage of each meal consumed  - Identify factors contributing to decreased intake, treat as appropriate  - Assist with meals as needed  - Monitor I&O, weight, and lab values if indicated  - Obtain nutrition services referral as needed  Outcome: Progressing     Problem: METABOLIC, FLUID AND ELECTROLYTES - ADULT  Goal: Electrolytes maintained within normal limits  Description: INTERVENTIONS:  - Monitor labs and assess patient for signs and symptoms of electrolyte imbalances  - Administer electrolyte replacement as ordered  - Monitor response to electrolyte replacements, including repeat lab results as appropriate  - Instruct patient on fluid and nutrition as appropriate  Outcome: Progressing  Goal: Fluid balance maintained  Description: INTERVENTIONS:  - Monitor labs   - Monitor I/O and WT  - Instruct patient on fluid and nutrition as appropriate  - Assess for signs & symptoms of volume excess or deficit  Outcome: Progressing  Goal: Glucose maintained within target range  Description: INTERVENTIONS:  - Monitor Blood Glucose as ordered  - Assess for signs and symptoms of hyperglycemia and hypoglycemia  - Administer ordered medications to maintain glucose within target range  - Assess nutritional intake and initiate nutrition service referral as needed  Outcome: Progressing     Problem: Nutrition/Hydration-ADULT  Goal: Nutrient/Hydration intake appropriate for improving, restoring or maintaining nutritional needs  Description: Monitor and assess patient's nutrition/hydration status for malnutrition  Collaborate with interdisciplinary team and initiate plan and interventions as ordered  Monitor patient's weight and dietary intake as ordered or per policy  Utilize nutrition screening tool and intervene as necessary  Determine patient's food preferences and provide high-protein, high-caloric foods as appropriate  INTERVENTIONS:  - Monitor oral intake, urinary output, labs, and treatment plans  - Assess nutrition and hydration status and recommend course of action  - Evaluate amount of meals eaten  - Assist patient with eating if necessary   - Allow adequate time for meals  - Recommend/ encourage appropriate diets, oral nutritional supplements, and vitamin/mineral supplements  - Order, calculate, and assess calorie counts as needed  - Recommend, monitor, and adjust tube feedings and TPN/PPN based on assessed needs  - Assess need for intravenous fluids  - Provide specific nutrition/hydration education as appropriate  - Include patient/family/caregiver in decisions related to nutrition  Outcome: Progressing     Problem: CONFUSION/THOUGHT DISTURBANCE  Goal: Thought disturbances (confusion, delirium, depression, dementia or psychosis) are managed to maintain or return to baseline mental status and functional level  Description: INTERVENTIONS:  - Assess for possible contributors to  thought disturbance, including but not limited to medications, infection, impaired vision or hearing, underlying metabolic abnormalities, dehydration, respiratory compromise,  psychiatric diagnoses and notify attending PHYSICAN/AP  - Monitor and intervene to maintain adequate nutrition, hydration, elimination, sleep and activity  - Decrease environmental stimuli, including noise as appropriate  - Provide frequent contacts to provide refocusing, direction and reassurance as needed  Approach patient calmly with eye contact and at their level    - [place] high risk fall precautions, aspiration precautions and other safety measures, as indicated  - If delirium suspected, notify physician/AP of change in condition and request immediate in-person evaluation  - Pursue consults as appropriate including Geriatric (campus dependent), OT for cognitive evaluation/activity planning, psychiatric, pastoral care, etc   Outcome: Progressing     Problem: BEHAVIOR  Goal: Pt/Family maintain appropriate behavior and adhere to behavioral management agreement, if implemented  Description: INTERVENTIONS:  - Assess the family dynamic   - Encourage verbalization of thoughts and concerns in a socially appropriate manner  - Assess patient/family's coping skills and non-compliant behavior (including use of illegal substances)  - Utilize positive, consistent limit setting strategies supporting safety of patient, staff and others  - Initiate consult with Case Management, Spiritual Care or other ancillary services as appropriate  - If a patient's/visitor's behavior jeopardizes the safety of the patient, staff, or others, refer to organization procedure     - Notify Security of behavior or suspected illegal substances which indicate the need for search of the patient and/or belongings  - Encourage participation in the decision making process about a behavioral management agreement; implement if patient meets criteria  Outcome: Progressing

## 2022-01-20 NOTE — ASSESSMENT & PLAN NOTE
- Patient with hypernatremia likely secondary to dehydration from multiple liquid bowel movements  - Sodium currently 150; can repeat in 2 days at facility  - Encephalopathy improving

## 2022-01-20 NOTE — DISCHARGE INSTRUCTIONS
Neurosurgery discharge instructions following traumatic head bleed:      Do not take any blood thinning medications (ie  No Advil  No motrin  No ibuprofen  No Aleve  No Aspirin  No fishoil  No heparin  No antiplatelet / no anticoagulation medication)   Refrain from activity that increases chance of trauma to head or falls  Recommend you take fall precaution   No strenuous activity or sports   Return to hospital Emergency Room if you experience worsening / new headache, nausea/vomiting, speech/vision change, seizure, confusion / mental status change, weakness, or other neurological changes  Follow-up as scheduled with a repeat CT head without contrast to be completed 2-3 days prior to visit  Prescription has been entered electronically  Please call  to schedule  Please complete course of antibiotics  Will follow up with Gastroenterology as an outpatient  Also please recheck BMP on 01/21/2022  Continue to trend as needed

## 2022-01-20 NOTE — PROGRESS NOTES
Progress Note - Geriatric Medicine   Fran Phelan 80 y o  female MRN: [id number]  Unit/Bed#: [facility] 604-01 Encounter: [id number]      Assessment/Plan:    Acute metabolic encephalopathy  -continues to slowly improve  -correct metabolic derangements as arise  -continue frequent reorientation and redirection    Ambulatory dysfunction with fall  -remains high risk of future falls, continue fall precautions  -encourage good body mechanics and assist with transfers  -PT and OT following - STR on dc    Subdural hematoma  -home Coumadin reversed with Kcentra and vitamin K on admission  -AC/AP on hold for 2 weeks per Neurosurgery  -Keppra for prophylaxis  -neuro checks per protocol    Pancolitis/diarrhea  -colonoscopy 1/14/22 - suggestive of ischemic colitis  -PO Vancomycin and antibiotic regimen as directed by ID    Hypernatremia  -sodium and elevated at 150 with reduced urine output  -given anasarca and 3rd spacing consider albumin transfusion    DVT with Pulmonary Embolism  -initially diagnosed 11/21-risk currently on hold due to subdural hematoma  -continue close outpatient follow-up PCP and Cardiology    AFib  -rate controlled with metoprolol  -hold Coumadin as above    Generalized anxiety disorder  -symptoms well controlled with reduced dose of home lorazepam regimen - continue at reduced dose, do not anticipate need to increase to previous dose given symptoms well controlled at current dose    Care coordination:  Rounded with Jayshree (RN)    Subjective:     Patient seen and examined at bedside, she is more restless and irritable today but not agitated  Nursing reports no acute events overnight  Review of Systems   Unable to perform ROS: Mental status change     Objective:     Vitals: Blood pressure 114/60, pulse 69, temperature (!) 97 1 °F (36 2 °C), resp  rate 18, weight 53 2 kg (117 lb 3 2 oz), SpO2 95 %  ,Body mass index is 24 49 kg/m²        Intake/Output Summary (Last 24 hours) at 1/20/2022 1188  Last data filed at 1/20/2022 0509  Gross per 24 hour   Intake 1347 5 ml   Output 400 ml   Net 947 5 ml     Current Medications: Reviewed    Physical Exam:   Physical Exam  Vitals and nursing note reviewed  Constitutional:       General: She is not in acute distress  Appearance: Normal appearance  She is not ill-appearing  Comments: Appears stated age   HENT:      Head: Normocephalic  Nose: Nose normal       Mouth/Throat:      Mouth: Mucous membranes are dry  Eyes:      General:         Right eye: No discharge  Left eye: No discharge  Conjunctiva/sclera: Conjunctivae normal    Neck:      Comments: Trachea midline  Cardiovascular:      Rate and Rhythm: Normal rate  Rhythm irregular  Pulses: Normal pulses  Pulmonary:      Effort: Pulmonary effort is normal  No respiratory distress  Abdominal:      Palpations: Abdomen is soft  Tenderness: There is no abdominal tenderness  Musculoskeletal:      Cervical back: Neck supple  Right lower leg: Edema present  Left lower leg: Edema present  Comments: Anasarca    Skin:     General: Skin is dry  Coloration: Skin is pale  Neurological:      Mental Status: She is alert  Comments: Awake and alert, confused   Psychiatric:      Comments: Restless and irritable        Invasive Devices  Report    Drain            Urethral Catheter 16 Fr  6 days              Lab, Imaging and other studies: I have personally reviewed pertinent reports

## 2022-01-20 NOTE — OCCUPATIONAL THERAPY NOTE
Occupational Therapy Treatment Note:       01/20/22 1010   OT Last Visit   OT Visit Date 01/20/22   Note Type   Note Type Treatment   Restrictions/Precautions   Other Precautions Contact/isolation;Cognitive; Chair Alarm; Bed Alarm; Fall Risk;Hard of hearing  (cdiff)   Pain Assessment   Pain Assessment Tool 0-10   Pain Score No Pain   ADL   Where Assessed Edge of bed   Grooming Assistance 4  Minimal Assistance   UB Bathing Assistance 4  Minimal Assistance   LB Bathing Assistance 3  Moderate Assistance   UB Dressing Assistance 4  Minimal Assistance   LB Dressing Assistance 2  Maximal [address]  1  Total Assistance   Toileting Comments incontinent of liquid bowel   Cognition   Overall Cognitive Status Impaired   Arousal/Participation Alert   Attention Attends with cues to redirect   Orientation Level Oriented to time   Memory Decreased short term memory;Decreased recall of recent events;Decreased recall of precautions   Following Commands Follows one step commands without difficulty   Activity Tolerance   Activity Tolerance Patient tolerated treatment well   Assessment   Assessment pt participated in am ot sessoin and was seen focusing on adls, activity tolerence and spt bed to chair  pt requires mod to max asst lb care / topileting and min asst ub adls  pt with incontinent of liquid bowel needing total asst to clean self  pt wasa ble to roll with much incrased time instruction and mod asst x 1   Plan   Treatment Interventions ADL retraining;Functional transfer training; Endurance training;Cognitive reorientation;Patient/family training;Equipment evaluation/education; Activityengagement   Goal Expiration Date 01/25/22   OT Treatment Day 5   OT Frequency 3-5x/wk   Recommendation   OT Discharge Recommendation Post acute rehabilitation services   OT - OK to Discharge Yes   AM-PAC Daily Activity Inpatient   Lower Body Dressing 2   Bathing 2   Toileting 2   Upper Body Dressing 3   Grooming 3   Eating 3   Daily Activity Raw Score 15   Daily Activity Standardized Score (Calc for Raw Score >=11) 34 69   AM-PAC Applied Cognition Inpatient   Following a Speech/Presentation 3   Understanding Ordinary Conversation 3   Taking Medications 2   Remembering Where Things Are Placed or Put Away 2   Remembering List of 4-5 Errands 2   Taking Care of Complicated Tasks 2   Applied Cognition Raw Score 14   Applied Cognition Standardized Score 32 02 April A Destin Smith

## 2022-01-21 ENCOUNTER — TELEPHONE (OUTPATIENT)
Dept: NEUROSURGERY | Facility: CLINIC | Age: 87
End: 2022-01-21

## 2022-01-21 ENCOUNTER — NURSING HOME VISIT (OUTPATIENT)
Dept: FAMILY MEDICINE CLINIC | Facility: CLINIC | Age: 87
End: 2022-01-21
Payer: COMMERCIAL

## 2022-01-21 DIAGNOSIS — I26.09 OTHER ACUTE PULMONARY EMBOLISM WITH ACUTE COR PULMONALE (HCC): ICD-10-CM

## 2022-01-21 DIAGNOSIS — R13.10 DYSPHAGIA, UNSPECIFIED TYPE: ICD-10-CM

## 2022-01-21 DIAGNOSIS — S06.5X9A SUBDURAL HEMATOMA (HCC): Primary | ICD-10-CM

## 2022-01-21 DIAGNOSIS — F41.1 GENERALIZED ANXIETY DISORDER: Chronic | ICD-10-CM

## 2022-01-21 DIAGNOSIS — R19.7 DIARRHEA, UNSPECIFIED TYPE: ICD-10-CM

## 2022-01-21 PROCEDURE — 99305 1ST NF CARE MODERATE MDM 35: CPT | Performed by: FAMILY MEDICINE

## 2022-01-21 NOTE — TELEPHONE ENCOUNTER
SPOKE TO SONALI AT  [facility] [phone]-- PT HAS Arabella Siegel 135 F/U WITH A CT HEAD SCHEDULED FOR 1/25/22 WITH LINETTE  PT NEEDS UPDATED CT HEAD, SONALI IS AWARE  SHE WILL BE IN TOUCH AFTER CT HEAD IS SCHEDULED  APPT WITH NSX POSSIBLY WILL NEED TO BE RESCHEDULED

## 2022-01-21 NOTE — PROGRESS NOTES
[address] [place] [address]  Facility: [facility]    NAME: Lai Mcknight  AGE: 80 y o  SEX: female    DATE OF ENCOUNTER: 1/21/2022    Code status:  DNR w/ Hospitalization    Assessment and Plan     1  Subdural hematoma ([address] )    2  Dysphagia, unspecified type    3  Other acute pulmonary embolism with acute cor pulmonale ([address] )    4  Diarrhea, unspecified type    5  Generalized anxiety disorder        All medications and routine orders were reviewed and updated as needed  Plan discussed with: Family member    Chief Complaint     Seen for admission at [address] [place] [address]    History of Present Illness     Is here after sustaining a fall at home resulting in a subdural hematoma  Patient was previously anticoagulated due to history of pulmonary embolism  She has also previously had deep vein thrombosis of the legs  The patient feels weak and has some dyspnea with exertion  She notes some confusion and cloudiness to her mind  She reports a good appetite  She has developed diarrhea since being treated with antibiotics for recent pneumonia  She is completing vancomycin for presumed C diff infection  He normally lives in a one-story home with her family  She requires assistance with her activities of daily living  She denies any swallowing issues  She has a history of arthritis but does not take any medication for this      HISTORY:  Past Medical History:   Diagnosis Date    Anxiety     Disease of thyroid gland      Past Surgical History:   Procedure Laterality Date    CATARACT EXTRACTION Right     FOOT SURGERY      JOINT REPLACEMENT       Family History   Problem Relation Age of Onset    No Known Problems Mother     Stroke Father     Colon cancer Neg Hx     Colon polyps Neg Hx      Social History     Socioeconomic History    Marital status: /Civil Union     Spouse name: None    Number of children: None    Years of education: None    Highest education level: None   Occupational History    None   Tobacco Use    Smoking status: Never Smoker    Smokeless tobacco: Never Used   Vaping Use    Vaping Use: Never used   Substance and Sexual Activity    Alcohol use: No    Drug use: No    Sexual activity: None   Other Topics Concern    None   Social History Narrative    None     Social Determinants of Health     Financial Resource Strain: Not on file   Food Insecurity: No Food Insecurity    Worried About Running Out of Food in the Last Year: Never true    Rhys of Food in the Last Year: Never true   Transportation Needs: No Transportation Needs    Lack of Transportation (Medical): No    Lack of Transportation (Non-Medical): No   Physical Activity: Not on file   Stress: Not on file   Social Connections: Not on file   Intimate Partner Violence: Not on file   Housing Stability: Unknown    Unable to Pay for Housing in the Last Year: No    Number of Places Lived in the Last Year: Not on file    Unstable Housing in the Last Year: No       Allergies: Allergies   Allergen Reactions    Preservision Areds [B-Plex Plus] Tremor    Tramadol      Reaction Date: 22Aug2011;        Review of Systems     Review of Systems   Constitutional: Negative for activity change, appetite change, chills, diaphoresis, fatigue and unexpected weight change  HENT: Positive for hearing loss  Negative for congestion, ear discharge, ear pain, nosebleeds and rhinorrhea  Eyes: Negative for pain, redness, itching and visual disturbance  Respiratory: Negative for cough, choking, chest tightness and shortness of breath  Cardiovascular: Negative for chest pain and leg swelling  Gastrointestinal: Positive for diarrhea  Negative for abdominal pain, blood in stool, constipation and nausea  Endocrine: Negative for cold intolerance, polydipsia and polyphagia  Genitourinary: Negative for dysuria, frequency, hematuria and urgency     Musculoskeletal: Negative for arthralgias, back pain, gait problem, joint swelling, neck pain and neck stiffness  Skin: Negative for color change and rash  Allergic/Immunologic: Negative for environmental allergies and food allergies  Neurological: Positive for weakness  Negative for dizziness, tremors, seizures, speech difficulty, numbness and headaches  Hematological: Negative for adenopathy  Bruises/bleeds easily  Psychiatric/Behavioral: Positive for dysphoric mood  Negative for behavioral problems, hallucinations and self-injury  Medications and orders     All medications reviewed and updated in Nursing Home EMR  Objective     Vitals: per nursing home record    Physical Exam  Constitutional:       Appearance: She is well-developed  HENT:      Head: Normocephalic and atraumatic  Right Ear: External ear normal       Left Ear: External ear normal       Mouth/Throat:      Pharynx: No oropharyngeal exudate  Eyes:      General: No scleral icterus  Right eye: No discharge  Left eye: No discharge  Conjunctiva/sclera: Conjunctivae normal       Pupils: Pupils are equal, round, and reactive to light  Neck:      Thyroid: No thyromegaly  Cardiovascular:      Rate and Rhythm: Normal rate  Rhythm irregular  Heart sounds: Normal heart sounds  No murmur heard  No gallop  Pulmonary:      Effort: Pulmonary effort is normal  No respiratory distress  Breath sounds: Normal breath sounds  No wheezing or rales  Abdominal:      General: Bowel sounds are normal       Palpations: Abdomen is soft  There is no mass  Tenderness: There is no guarding or rebound  Musculoskeletal:         General: No tenderness or deformity  Normal range of motion  Cervical back: Normal range of motion and neck supple  Lymphadenopathy:      Cervical: No cervical adenopathy  Skin:     General: Skin is warm and dry  Findings: No rash  Neurological:      Mental Status: She is alert  She is disoriented  Cranial Nerves:  No cranial nerve deficit  Coordination: Coordination normal       Deep Tendon Reflexes: Reflexes are normal and symmetric  Reflexes normal          Pertinent Laboratory/Diagnostic Studies: The following labs/studies were reviewed please see chart or hospital paperwork for details  Diagnostic studies from the hospital were reviewed    - The patient will complete her course of vancomycin in several days  She will benefit from PT OT and medical therapy to maximize her independent activities of daily living  Will need to monitor her blood count and her neurologic status      Sole Wu DO  1/21/2022 12:11 PM

## 2022-01-21 NOTE — UTILIZATION REVIEW
Notification of Discharge   This is a Notification of Discharge from our facility [address]  Please be advised that this patient has been discharge from our facility  Below you will find the admission and discharge date and time including the patients disposition  UTILIZATION REVIEW CONTACT:  Corazon Hughes  Utilization   Network Utilization Review Department  Phone: [phone] x carefully listen to the prompts  All voicemails are confidential   Email: [email or web address]     PHYSICIAN ADVISORY SERVICES:  FOR [facility] REVIEW - MEDICAL NECESSITY DENIAL  Phone: [phone]  Fax: [phone]  Email: [email or web address]     PRESENTATION DATE: 1/10/2022  4:35 PM  OBERVATION ADMISSION DATE:   INPATIENT ADMISSION DATE: 1/10/22  6:22 PM   DISCHARGE DATE: 1/20/2022  4:30 PM  DISPOSITION: Non [facility]/S[facility] Non [facility] SN[facility]/SNU      IMPORTANT INFORMATION:  Send all requests for admission clinical reviews, approved or denied determinations and any other requests to dedicated fax number below belonging to the campus where the patient is receiving treatment   List of dedicated fax numbers:  [address] [place] [address] DENIALS (Administrative/Medical Necessity) [phone]   [address] (Maternity/NICU/Pediatrics) [phone]   Megan Fines [phone]   Shaina Oms [phone]   Tiny Bengali [phone]   [address] [place] [address],[address] [phone]   [facility]  [phone]   [address] [place] [address] [phone]

## 2022-01-24 ENCOUNTER — TELEPHONE (OUTPATIENT)
Dept: FAMILY MEDICINE CLINIC | Facility: HOSPITAL | Age: 87
End: 2022-01-24

## 2022-01-24 ENCOUNTER — NURSING HOME VISIT (OUTPATIENT)
Dept: FAMILY MEDICINE CLINIC | Facility: CLINIC | Age: 87
End: 2022-01-24
Payer: COMMERCIAL

## 2022-01-24 DIAGNOSIS — S06.5X9A SUBDURAL HEMATOMA (HCC): Primary | ICD-10-CM

## 2022-01-24 DIAGNOSIS — I26.09 OTHER ACUTE PULMONARY EMBOLISM WITH ACUTE COR PULMONALE (HCC): ICD-10-CM
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
## 2022-01-24 DIAGNOSIS — F41.1 GENERALIZED ANXIETY DISORDER: Chronic | ICD-10-CM

## 2022-01-24 DIAGNOSIS — R19.7 DIARRHEA, UNSPECIFIED TYPE: ICD-10-CM

## 2022-01-24 DIAGNOSIS — D50.9 IRON DEFICIENCY ANEMIA, UNSPECIFIED IRON DEFICIENCY ANEMIA TYPE: ICD-10-CM

## 2022-01-24 PROCEDURE — 99308 SBSQ NF CARE LOW MDM 20: CPT | Performed by: FAMILY MEDICINE

## 2022-01-24 NOTE — PROGRESS NOTES
[address]  Facility: [facility]    NAME: Duc Domínguez  AGE: 80 y o  SEX: female    DATE OF ENCOUNTER: 1/24/2022    Code status:  DNR/DNH    Assessment and Plan     1  Subdural hematoma (HCC)    2  Other acute pulmonary embolism with acute cor pulmonale ([address] )    3  Diarrhea, unspecified type    4  Fall, subsequent encounter    5  Generalized anxiety disorder    6  Iron deficiency anemia, unspecified iron deficiency anemia type        All medications and routine orders were reviewed and updated as needed  Plan discussed with: Family member    Chief Complaint     Interim evaluation    History of Present Illness   The patient reports being unhappy with having a shower today  She also notes persistence of her diarrhea  She reports feeling weak and is frustrated with her inability to get up and get out of the chair independently  She remains upset and anxious  Family is here frequently  Vision is stable  No swallowing problems  She is interested in having the catheter removed  The patient reports feeling anxious  She continues to have loose bowels  She is unhappy with the presence of the John catheter  She is upset over having a shower earlier today  The following portions of the patient's history were reviewed and updated as appropriate: current medications, past family history, past medical history, past social history, past surgical history and problem list     Allergies: Allergies   Allergen Reactions    Preservision Areds [B-Plex Plus] Tremor    Tramadol      Reaction Date: 22Aug2011;        Review of Systems     Review of Systems   Constitutional: Negative for activity change, appetite change, chills, diaphoresis, fatigue and unexpected weight change  HENT: Negative for congestion, ear discharge, ear pain, hearing loss, nosebleeds and rhinorrhea  Eyes: Negative for pain, redness, itching and visual disturbance  Respiratory: Negative for cough, choking, chest tightness and shortness of breath  Cardiovascular: Negative for chest pain and leg swelling  Gastrointestinal: Negative for abdominal pain, blood in stool, constipation, diarrhea and nausea  Endocrine: Negative for cold intolerance, polydipsia and polyphagia  Genitourinary: Positive for difficulty urinating  Negative for dysuria, frequency, hematuria and urgency  Musculoskeletal: Positive for gait problem  Negative for arthralgias, back pain, joint swelling, neck pain and neck stiffness  Skin: Negative for color change and rash  Allergic/Immunologic: Negative for environmental allergies and food allergies  Neurological: Positive for speech difficulty and weakness  Negative for dizziness, tremors, seizures, numbness and headaches  Hematological: Negative for adenopathy  Does not bruise/bleed easily  Psychiatric/Behavioral: Positive for confusion  Negative for behavioral problems, dysphoric mood, hallucinations and self-injury  The patient is nervous/anxious  Medications and orders     All medications reviewed and updated in Nursing Home EMR  Objective     Vitals: per nursing home records    Physical Exam  Constitutional:       Appearance: She is well-developed  HENT:      Head: Normocephalic and atraumatic  Right Ear: External ear normal       Left Ear: External ear normal       Mouth/Throat:      Pharynx: No oropharyngeal exudate  Eyes:      General: No scleral icterus  Right eye: No discharge  Left eye: No discharge  Conjunctiva/sclera: Conjunctivae normal       Pupils: Pupils are equal, round, and reactive to light  Neck:      Thyroid: No thyromegaly  Cardiovascular:      Rate and Rhythm: Normal rate  Rhythm irregular  Heart sounds: Normal heart sounds  No murmur heard  No gallop  Pulmonary:      Effort: Pulmonary effort is normal  No respiratory distress        Breath sounds: Normal breath sounds  No wheezing or rales  Abdominal:      General: Bowel sounds are normal       Palpations: Abdomen is soft  There is no mass  Tenderness: There is no guarding or rebound  Genitourinary:     Comments: Catheter in place  Musculoskeletal:         General: No tenderness or deformity  Normal range of motion  Cervical back: Normal range of motion and neck supple  Lymphadenopathy:      Cervical: No cervical adenopathy  Skin:     General: Skin is warm and dry  Findings: No rash  Neurological:      Mental Status: She is alert and oriented to person, place, and time  Cranial Nerves: No cranial nerve deficit  Motor: Weakness present  Coordination: Coordination normal       Deep Tendon Reflexes: Reflexes are normal and symmetric  Reflexes normal    Psychiatric:         Behavior: Behavior normal          Thought Content: Thought content normal          Judgment: Judgment normal          Pertinent Laboratory/Diagnostic Studies: The following studies were reviewed please see chart or hospital paperwork for details      Space for lab dictation no new diagnostic studies available    - We will extend the vancomycin and begin a void trial    Gaye DO Natasha  1/24/2022 11:17 AM

## 2022-01-24 NOTE — TELEPHONE ENCOUNTER
Patient's son asking if you would fill out FMLA forms for him to take time off to care for her  She is currently at [facility], being seen by Dr Jang Generous      She is currently not able to walk so will not be able to on her own     pcb

## 2022-01-28 ENCOUNTER — NURSING HOME VISIT (OUTPATIENT)
Dept: FAMILY MEDICINE CLINIC | Facility: CLINIC | Age: 87
End: 2022-01-28
Payer: COMMERCIAL

## 2022-01-28 ENCOUNTER — HOSPITAL ENCOUNTER (OUTPATIENT)
Dept: CT IMAGING | Facility: HOSPITAL | Age: 87
Discharge: HOME | End: 2022-01-28
Payer: COMMERCIAL

## 2022-01-28 DIAGNOSIS — F41.1 GENERALIZED ANXIETY DISORDER: Chronic | ICD-10-CM

## 2022-01-28 DIAGNOSIS — I26.09 OTHER ACUTE PULMONARY EMBOLISM WITH ACUTE COR PULMONALE (HCC): ICD-10-CM

## 2022-01-28 DIAGNOSIS — J18.9 PNEUMONIA OF RIGHT LOWER LOBE DUE TO INFECTIOUS ORGANISM: ICD-10-CM

## 2022-01-28 DIAGNOSIS — S06.5X9A SUBDURAL HEMATOMA (HCC): Primary | ICD-10-CM

## 2022-01-28 DIAGNOSIS — R33.9 URINARY RETENTION: ICD-10-CM

## 2022-01-28 DIAGNOSIS — S06.5X9A SUBDURAL HEMATOMA (HCC): ICD-10-CM
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
## 2022-01-28 PROCEDURE — 99309 SBSQ NF CARE MODERATE MDM 30: CPT | Performed by: FAMILY MEDICINE

## 2022-01-28 PROCEDURE — 70450 CT HEAD/BRAIN W/O DYE: CPT

## 2022-01-28 PROCEDURE — G1004 CDSM NDSC: HCPCS

## 2022-01-28 NOTE — TELEPHONE ENCOUNTER
Forms faxed to fax # on form,  Eliseo Yudith [phone]  Son aware  Forms scanned in chart  Original in  bin

## 2022-01-28 NOTE — PROGRESS NOTES
[address]  Facility: [facility]    NAME: Josh Esparza  AGE: 80 y o  SEX: female    DATE OF ENCOUNTER: 1/28/2022    Code status:  DNR/DNH    Assessment and Plan     1  Subdural hematoma ([facility] [address] )    2  Pneumonia of right lower lobe due to infectious organism    3  Other acute pulmonary embolism with acute cor pulmonale ([facility] [address] )    4  Urinary retention    5  Fall, subsequent encounter    6  Generalized anxiety disorder        All medications and routine orders were reviewed and updated as needed  Plan discussed with: Family member    Chief Complaint     Interim evaluation    History of Present Illness     The patient has develops significant edema of her extremities  A John catheter is been removed and her postvoid residuals have not exceeded 300 cc but she is not emptying her bladder thoroughly  Her bowels are less frequent but still unformed  She denies dyspnea  She is less anxious  The following portions of the patient's history were reviewed and updated as appropriate: current medications, past family history, past medical history, past social history, past surgical history and problem list     Allergies: Allergies   Allergen Reactions    Preservision Areds [B-Plex Plus] Tremor    Tramadol      Reaction Date: 22Aug2011;        Review of Systems     Review of Systems   Constitutional: Negative for activity change, appetite change, chills, diaphoresis, fatigue and unexpected weight change  HENT: Negative for congestion, ear discharge, ear pain, hearing loss, nosebleeds and rhinorrhea  Eyes: Negative for pain, redness, itching and visual disturbance  Respiratory: Negative for cough, choking, chest tightness and shortness of breath  Cardiovascular: Negative for chest pain and leg swelling  Gastrointestinal: Negative for abdominal pain, blood in stool, constipation, diarrhea and nausea     Endocrine: Negative for cold intolerance, polydipsia and polyphagia  Genitourinary: Negative for dysuria, frequency, hematuria and urgency  Musculoskeletal: Positive for arthralgias  Negative for back pain, gait problem, joint swelling, neck pain and neck stiffness  Skin: Negative for color change and rash  Allergic/Immunologic: Negative for environmental allergies and food allergies  Neurological: Positive for weakness and headaches  Negative for dizziness, tremors, seizures, speech difficulty and numbness  Hematological: Negative for adenopathy  Does not bruise/bleed easily  Psychiatric/Behavioral: Positive for confusion  Negative for behavioral problems, dysphoric mood, hallucinations and self-injury  The patient is nervous/anxious  Medications and orders     All medications reviewed and updated in Nursing Home EMR  Objective     Vitals: per nursing home records    Physical Exam  Constitutional:       Appearance: She is well-developed  HENT:      Head: Normocephalic and atraumatic  Right Ear: External ear normal       Left Ear: External ear normal       Mouth/Throat:      Pharynx: No oropharyngeal exudate  Eyes:      General: No scleral icterus  Right eye: No discharge  Left eye: No discharge  Conjunctiva/sclera: Conjunctivae normal       Pupils: Pupils are equal, round, and reactive to light  Neck:      Thyroid: No thyromegaly  Cardiovascular:      Rate and Rhythm: Normal rate  Rhythm irregular  Heart sounds: Normal heart sounds  No murmur heard  No gallop  Pulmonary:      Effort: Pulmonary effort is normal  No respiratory distress  Breath sounds: Normal breath sounds  No wheezing or rales  Abdominal:      General: Bowel sounds are normal       Palpations: Abdomen is soft  There is no mass  Tenderness: There is no guarding or rebound  Musculoskeletal:         General: No tenderness or deformity  Normal range of motion        Cervical back: Normal range of motion and neck supple  Right lower leg: Edema present  Left lower leg: Edema present  Lymphadenopathy:      Cervical: No cervical adenopathy  Skin:     General: Skin is warm and dry  Findings: No rash  Neurological:      Mental Status: She is alert and oriented to person, place, and time  Cranial Nerves: No cranial nerve deficit  Coordination: Coordination normal       Deep Tendon Reflexes: Reflexes are normal and symmetric  Reflexes normal    Psychiatric:      Comments: Anxious but cooperative         Pertinent Laboratory/Diagnostic Studies: The following labs were reviewed please see chart or hospital paperwork for details  Space for lab dictation no new diagnostic studies    - We will add Lasix 20 mg daily for the next 5 days  We will continue to scan her bladder for postvoid residuals    We will recheck labs on Monday    Fermin Barragan DO  1/28/2022 2:39 PM

## 2022-01-31 ENCOUNTER — NURSING HOME VISIT (OUTPATIENT)
Dept: FAMILY MEDICINE CLINIC | Facility: CLINIC | Age: 87
End: 2022-01-31
Payer: COMMERCIAL

## 2022-01-31 DIAGNOSIS — S06.5X9A SUBDURAL HEMATOMA (HCC): Primary | ICD-10-CM

## 2022-01-31 DIAGNOSIS — R19.7 DIARRHEA, UNSPECIFIED TYPE: ICD-10-CM

## 2022-01-31 DIAGNOSIS — I26.09 OTHER ACUTE PULMONARY EMBOLISM WITH ACUTE COR PULMONALE (HCC): ICD-10-CM

## 2022-01-31 DIAGNOSIS — D50.9 IRON DEFICIENCY ANEMIA, UNSPECIFIED IRON DEFICIENCY ANEMIA TYPE: ICD-10-CM

## 2022-01-31 DIAGNOSIS — R33.9 URINARY RETENTION: ICD-10-CM

## 2022-01-31 PROCEDURE — 99308 SBSQ NF CARE LOW MDM 20: CPT | Performed by: FAMILY MEDICINE

## 2022-01-31 NOTE — PROGRESS NOTES
[address]  Facility[facility]    NAME: Shakira Kaba  AGE: 80 y o  SEX: female    DATE OF ENCOUNTER: 1/31/2022    Code status:  DNR w/ Hospitalization    Assessment and Plan     1  Subdural hematoma (HCC)    2  Other acute pulmonary embolism with acute cor pulmonale ([address] )    3  Urinary retention    4  Diarrhea, unspecified type    5  Iron deficiency anemia, unspecified iron deficiency anemia type        All medications and routine orders were reviewed and updated as needed  Plan discussed with: Family member    Chief Complaint     Interim evaluation    History of Present Illness     The patient reports having less frequent bowel movements but they are still unformed  She denies pain or dyspnea  Her vision is normal and she has no swallowing issues  She remains anxious  She did not sleep well last evening  Her appetite is slowly improving however  She failed her void trial on the catheter is back in place  The following portions of the patient's history were reviewed and updated as appropriate: current medications, past family history, past medical history, past social history, past surgical history and problem list     Allergies: Allergies   Allergen Reactions    Preservision Areds [B-Plex Plus] Tremor    Tramadol      Reaction Date: 22Aug2011;        Review of Systems     Review of Systems   Constitutional: Negative for activity change, appetite change, chills, diaphoresis, fatigue and unexpected weight change  HENT: Negative for congestion, ear discharge, ear pain, hearing loss, nosebleeds and rhinorrhea  Eyes: Negative for pain, redness, itching and visual disturbance  Respiratory: Negative for cough, choking, chest tightness and shortness of breath  Cardiovascular: Negative for chest pain and leg swelling  Gastrointestinal: Positive for diarrhea  Negative for abdominal pain, blood in stool, constipation and nausea  Endocrine: Negative for cold intolerance, polydipsia and polyphagia  Genitourinary: Positive for difficulty urinating  Negative for dysuria, frequency, hematuria and urgency  Musculoskeletal: Positive for gait problem  Negative for back pain, joint swelling, neck pain and neck stiffness  Skin: Negative for color change and rash  Allergic/Immunologic: Negative for environmental allergies and food allergies  Neurological: Positive for weakness and headaches  Negative for dizziness, tremors, seizures, speech difficulty and numbness  Hematological: Negative for adenopathy  Does not bruise/bleed easily  Psychiatric/Behavioral: Negative for behavioral problems, dysphoric mood, hallucinations and self-injury  The patient is nervous/anxious  Medications and orders     All medications reviewed and updated in Nursing Home EMR  Objective     Vitals: per nursing home records    Physical Exam  Constitutional:       Appearance: She is well-developed  HENT:      Head: Normocephalic and atraumatic  Right Ear: External ear normal       Left Ear: External ear normal       Mouth/Throat:      Pharynx: No oropharyngeal exudate  Eyes:      General: No scleral icterus  Right eye: No discharge  Left eye: No discharge  Conjunctiva/sclera: Conjunctivae normal       Pupils: Pupils are equal, round, and reactive to light  Neck:      Thyroid: No thyromegaly  Cardiovascular:      Rate and Rhythm: Normal rate  Rhythm irregular  Heart sounds: Normal heart sounds  No murmur heard  No gallop  Pulmonary:      Effort: Pulmonary effort is normal  No respiratory distress  Breath sounds: Normal breath sounds  No wheezing or rales  Abdominal:      General: Bowel sounds are normal       Palpations: Abdomen is soft  There is no mass  Tenderness: There is no guarding or rebound  Musculoskeletal:         General: No tenderness or deformity  Normal range of motion  Cervical back: Normal range of motion and neck supple  Lymphadenopathy:      Cervical: No cervical adenopathy  Skin:     General: Skin is warm and dry  Findings: No rash  Neurological:      Mental Status: She is alert and oriented to person, place, and time  Cranial Nerves: No cranial nerve deficit  Coordination: Coordination normal       Deep Tendon Reflexes: Reflexes are normal and symmetric  Reflexes normal    Psychiatric:         Behavior: Behavior normal          Thought Content: Thought content normal          Judgment: Judgment normal          Pertinent Laboratory/Diagnostic Studies: The following labs were reviewed please see chart or hospital paperwork for details  Space for lab dictation no new diagnostic studies    - We will maintain the current medical regimen    We should attempt a void trial later in the week    Fermin Barragan DO  1/31/2022 12:11 PM

## 2022-02-04 ENCOUNTER — NURSING HOME VISIT (OUTPATIENT)
Dept: FAMILY MEDICINE CLINIC | Facility: CLINIC | Age: 87
End: 2022-02-04
Payer: COMMERCIAL

## 2022-02-04 DIAGNOSIS — U07.1 PNEUMONIA DUE TO COVID-19 VIRUS: ICD-10-CM

## 2022-02-04 DIAGNOSIS — A04.72 C. DIFFICILE ENTERITIS: ICD-10-CM

## 2022-02-04 DIAGNOSIS — S06.5X9A SUBDURAL HEMATOMA (HCC): Primary | ICD-10-CM

## 2022-02-04 DIAGNOSIS — R33.9 URINARY RETENTION: ICD-10-CM

## 2022-02-04 DIAGNOSIS — J12.82 PNEUMONIA DUE TO COVID-19 VIRUS: ICD-10-CM

## 2022-02-04 PROCEDURE — 99309 SBSQ NF CARE MODERATE MDM 30: CPT | Performed by: NURSE PRACTITIONER

## 2022-02-04 NOTE — PROGRESS NOTES
[address]  [facility]    NAME: Дмитрий Cuellar  AGE: 80 y o  SEX: female    DATE OF ENCOUNTER: 2/4/2022    Code status:  DNR w/ Hospitalization    Assessment and Plan     1  Subdural hematoma (HCC)    2  C  difficile enteritis    3  Urinary retention    4  Pneumonia due to COVID-19 virus        All medications and routine orders were reviewed and updated as needed  Plan discussed with: Patient, nursing staff    Chief Complaint     Interim evaluation    History of Present Illness     Lew Bloom has tested positive for COVID 19 on 2/3/22  She reports a dry hacky cough with intermittent dyspnea  CXR +mild bilateral lower lobe infiltrates  CBC stable, BMP improved with Potassium supplementation, however Ca 7 2  She is currently not AC due to subdural hematoma  CT scan on 1/28/22 notes hematoma has decreased in size slightly  She remains afebrile, HR/Bp stable  Sats high 90s on RA  Denies pain  Bowels remain loose but less occurrence, vanco completed for cdiff on 1/23/22  Joseph Merle Appetite is fair  The following portions of the patient's history were reviewed and updated as appropriate: current medications, past family history, past medical history, past social history, past surgical history and problem list     Allergies: Allergies   Allergen Reactions    Preservision Areds [B-Plex Plus] Tremor    Tramadol      Reaction Date: 22Aug2011;        Review of Systems     Review of Systems   Constitutional: Positive for appetite change  Negative for activity change, chills, fatigue and fever  HENT: Negative  Negative for congestion, ear pain, postnasal drip and sinus pain  Eyes: Negative  Respiratory: Positive for cough and shortness of breath  Negative for chest tightness  Cardiovascular: Positive for leg swelling  Negative for chest pain and palpitations  Gastrointestinal: Positive for diarrhea  Negative for constipation  Endocrine: Negative  Genitourinary: Positive for difficulty urinating  Negative for dysuria  Musculoskeletal: Positive for gait problem  Skin: Negative  Allergic/Immunologic: Negative  Negative for immunocompromised state  Neurological: Positive for weakness  Negative for dizziness and light-headedness  Hematological: Negative  Psychiatric/Behavioral: The patient is nervous/anxious  Medications and orders     All medications reviewed and updated in Nursing Home EMR  Objective     Vitals: per nursing home records    Physical Exam  Vitals and nursing note reviewed  Constitutional:       Appearance: Normal appearance  HENT:      Head: Normocephalic and atraumatic  Right Ear: Tympanic membrane, ear canal and external ear normal       Left Ear: Tympanic membrane, ear canal and external ear normal       Nose: Nose normal       Mouth/Throat:      Mouth: Mucous membranes are moist       Pharynx: Oropharynx is clear  Eyes:      Extraocular Movements: Extraocular movements intact  Conjunctiva/sclera: Conjunctivae normal       Pupils: Pupils are equal, round, and reactive to light  Cardiovascular:      Rate and Rhythm: Normal rate  Rhythm irregular  Pulses: Normal pulses  Heart sounds: Normal heart sounds  Pulmonary:      Effort: Pulmonary effort is normal       Breath sounds: Rhonchi present  Comments: Coarse with rhonchi  Diminished with fine rales BLL  Abdominal:      General: Bowel sounds are normal       Palpations: Abdomen is soft  Genitourinary:     Comments: John patient for concentrated yellow urine  Musculoskeletal:         General: Deformity present  Normal range of motion  Cervical back: Normal range of motion and neck supple  Right lower leg: Edema present  Left lower leg: Edema present  Comments: +OA  +2 pitting BLE edema   Skin:     General: Skin is warm and dry  Coloration: Skin is pale     Neurological:      General: No focal deficit present  Mental Status: She is alert and oriented to person, place, and time  Psychiatric:         Mood and Affect: Mood normal          Speech: Speech normal          Behavior: Behavior normal  Behavior is cooperative  Thought Content: Thought content normal          Judgment: Judgment normal          Pertinent Laboratory/Diagnostic Studies: The following labs and studies were reviewed please see chart or hospital paperwork for details  Start Zinc 220mg po daily, Calcium/D3 BID, Vitamin C 1000mg po dialy  Prednisone 30mg po daily x3days, 20mg po daily x3 days, 10mg po daily x3days  Lasix 40mg po daily with adjusting Kdur to 40mEq po BID  Continue isolation for COVID 19 at this time        Lizzy Aguirre  2/4/2022 12:06 PM

## 2022-02-07 ENCOUNTER — NURSING HOME VISIT (OUTPATIENT)
Dept: FAMILY MEDICINE CLINIC | Facility: CLINIC | Age: 87
End: 2022-02-07
Payer: COMMERCIAL

## 2022-02-07 DIAGNOSIS — I82.4Y1 ACUTE DEEP VEIN THROMBOSIS (DVT) OF PROXIMAL VEIN OF RIGHT LOWER EXTREMITY (HCC): ICD-10-CM

## 2022-02-07 DIAGNOSIS — J12.82 PNEUMONIA DUE TO COVID-19 VIRUS: ICD-10-CM

## 2022-02-07 DIAGNOSIS — F41.1 GENERALIZED ANXIETY DISORDER: Chronic | ICD-10-CM

## 2022-02-07 DIAGNOSIS — U07.1 PNEUMONIA DUE TO COVID-19 VIRUS: ICD-10-CM

## 2022-02-07 DIAGNOSIS — R33.9 URINARY RETENTION: ICD-10-CM

## 2022-02-07 DIAGNOSIS — S06.5X9A SUBDURAL HEMATOMA (HCC): Primary | ICD-10-CM

## 2022-02-07 DIAGNOSIS — A04.72 C. DIFFICILE ENTERITIS: ICD-10-CM

## 2022-02-07 DIAGNOSIS — R41.82 ALTERED MENTAL STATUS, UNSPECIFIED ALTERED MENTAL STATUS TYPE: ICD-10-CM

## 2022-02-07 DIAGNOSIS — I26.09 OTHER ACUTE PULMONARY EMBOLISM WITH ACUTE COR PULMONALE (HCC): ICD-10-CM

## 2022-02-07 PROCEDURE — 99309 SBSQ NF CARE MODERATE MDM 30: CPT | Performed by: NURSE PRACTITIONER

## 2022-02-07 NOTE — PROGRESS NOTES
[address]  [facility]: [facility]    NAME: Dorina Cano  AGE: 80 y o  SEX: female    DATE OF ENCOUNTER: 2/7/2022    Code status:  DNR w/ Hospitalization    Assessment and Plan     1  Subdural hematoma ([facility] [address] )    2  Pneumonia due to COVID-19 virus    3  C  difficile enteritis    4  Urinary retention    5  Generalized anxiety disorder    6  Altered mental status, unspecified altered mental status type    7  Other acute pulmonary embolism with acute cor pulmonale ([address] )    8  Acute deep vein thrombosis (DVT) of proximal vein of right lower extremity (HCC)        All medications and routine orders were reviewed and updated as needed  Plan discussed with: Patient, nursing staff    Chief Complaint     Interim evaluation    History of Present Illness     Brendan Wilkinson is confused today  Nursing found her O2 sat to be 86 on RA thus put her on O2 at 2L  She tested positive for COVID 19 on 2/3/22  She continues with dry hacky cough  She remains afebrile  HR/Bp are stable  Her BLE edema is less today  Bowels remain loose but less volume  Appetite is fair  She remains not AC due to subdural hematoma  +RLE DVT chronic without evidence of superficial thrombophlebitis on 1/11/22    The following portions of the patient's history were reviewed and updated as appropriate: current medications, past family history, past medical history, past social history, past surgical history and problem list     Allergies: Allergies   Allergen Reactions    Preservision Areds [B-Plex Plus] Tremor    Tramadol      Reaction Date: 22Aug2011;        Review of Systems     Review of Systems   Constitutional: Negative  Negative for activity change, appetite change, chills, fatigue and fever  HENT: Negative  Negative for congestion, ear pain, postnasal drip and sinus pain  Eyes: Negative  Respiratory: Positive for cough and shortness of breath      Cardiovascular: Positive for leg swelling  Negative for chest pain and palpitations  Gastrointestinal: Negative  Negative for constipation and diarrhea  Endocrine: Negative  Genitourinary: Positive for difficulty urinating  Negative for dysuria  Musculoskeletal: Positive for gait problem  Skin: Negative  Allergic/Immunologic: Negative  Negative for immunocompromised state  Neurological: Positive for weakness  Negative for dizziness and light-headedness  Hematological: Negative  Psychiatric/Behavioral: Positive for confusion  Negative for sleep disturbance  The patient is nervous/anxious  Medications and orders     All medications reviewed and updated in Nursing Home EMR  Objective     Vitals: per nursing home records    Physical Exam  Vitals and nursing note reviewed  Constitutional:       Appearance: Normal appearance  She is obese  HENT:      Head: Normocephalic and atraumatic  Right Ear: Tympanic membrane, ear canal and external ear normal       Left Ear: Tympanic membrane, ear canal and external ear normal       Ears:      Comments: +[place]     Nose: Nose normal       Mouth/Throat:      Mouth: Mucous membranes are moist       Pharynx: Oropharynx is clear  Eyes:      Extraocular Movements: Extraocular movements intact  Conjunctiva/sclera: Conjunctivae normal       Pupils: Pupils are equal, round, and reactive to light  Cardiovascular:      Rate and Rhythm: Normal rate  Rhythm irregular  Pulses:           Radial pulses are 2+ on the right side and 2+ on the left side  Dorsalis pedis pulses are 1+ on the right side and 2+ on the left side  Heart sounds: Normal heart sounds  Pulmonary:      Effort: Pulmonary effort is normal       Breath sounds: Normal breath sounds  Comments: Lungs diminished with dry hacking cough  Abdominal:      General: Bowel sounds are normal       Palpations: Abdomen is soft     Genitourinary:     Comments: John patent for concentrated yellow urine  Musculoskeletal:         General: Deformity present  Normal range of motion  Cervical back: Normal range of motion and neck supple  Right lower leg: Edema present  Left lower leg: Edema present  Comments: +OA  +2 pitting BLE edema   Skin:     General: Skin is warm and dry  Coloration: Skin is pale  Findings: Bruising present  Comments: Ecchymosis b/l pedals and right 2nd toe  Neurological:      General: No focal deficit present  Mental Status: She is alert  She is disoriented  GCS: GCS eye subscore is 4  GCS verbal subscore is 4  GCS motor subscore is 6  Motor: Tremor present  Gait: Gait abnormal    Psychiatric:         Mood and Affect: Mood is anxious  Speech: Speech normal          Behavior: Behavior normal  Behavior is cooperative  Thought Content: Thought content normal          Cognition and Memory: Cognition is impaired  Memory is impaired  Judgment: Judgment normal       Comments: Alert to person/place, disoriented to time  +CAM   Able to follow commands and make needs known  Anxious  Pertinent Laboratory/Diagnostic Studies: The following labs and studies were reviewed please see chart or hospital paperwork for details  Will have staff weigh patient today  Increase Kdur to 40mEq po BID  STAT CMP    Will keep rothman at this time with goal of voiding trial later this week    CYRUS Darling  2/7/2022 10:43 AM

## 2022-02-10 ENCOUNTER — NURSING HOME VISIT (OUTPATIENT)
Dept: FAMILY MEDICINE CLINIC | Facility: CLINIC | Age: 87
End: 2022-02-10
Payer: COMMERCIAL

## 2022-02-10 DIAGNOSIS — J12.82 PNEUMONIA DUE TO COVID-19 VIRUS: ICD-10-CM

## 2022-02-10 DIAGNOSIS — S06.5X9A SUBDURAL HEMATOMA (HCC): Primary | ICD-10-CM

## 2022-02-10 DIAGNOSIS — U07.1 PNEUMONIA DUE TO COVID-19 VIRUS: ICD-10-CM

## 2022-02-10 DIAGNOSIS — R33.9 URINARY RETENTION: ICD-10-CM

## 2022-02-10 DIAGNOSIS — A04.72 C. DIFFICILE ENTERITIS: ICD-10-CM

## 2022-02-10 PROCEDURE — 99309 SBSQ NF CARE MODERATE MDM 30: CPT | Performed by: NURSE PRACTITIONER

## 2022-02-10 NOTE — PROGRESS NOTES
[address]  Facility: [facility]    NAME: Oseas Soto  AGE: 80 y o  SEX: female    DATE OF ENCOUNTER: 2/10/2022    Code status:  DNR w/ Hospitalization    Assessment and Plan     1  Subdural hematoma ([address] )    2  Pneumonia due to COVID-19 virus    3  C  difficile enteritis    4  Urinary retention        All medications and routine orders were reviewed and updated as needed  Plan discussed with: Patient, nursing staff    Chief Complaint     Interim evaluation    History of Present Illness     Twin Dalton has improved today  She is back to RA, low to mid 90s  Low grade fever today, however when entering the room the patient has on a thermal shirt with a sweatshirt and the heat turned all the way up  She is tapering off prednisone which also may be a factor  She reports feeling better, persistent cough is now [facility]  She has lost 14lbs since being placed on lasix daily  Dietary has put her on protein supplements after CMP results showed albumin of 1 3,  Appetite remains fair  The following portions of the patient's history were reviewed and updated as appropriate: current medications, past family history, past medical history, past social history, past surgical history and problem list     Allergies: Allergies   Allergen Reactions    Preservision Areds [B-Plex Plus] Tremor    Tramadol      Reaction Date: 22Aug2011;        Review of Systems     Review of Systems   Constitutional: Negative  Negative for activity change, appetite change, chills, fatigue and fever  HENT: Negative  Negative for congestion, ear pain, postnasal drip and sinus pain  Eyes: Negative  Respiratory: Negative  Negative for cough, chest tightness and shortness of breath  Cardiovascular: Positive for leg swelling  Negative for chest pain and palpitations  Gastrointestinal: Negative  Negative for constipation and diarrhea  Endocrine: Negative  Genitourinary: Positive for difficulty urinating  Negative for dysuria  Musculoskeletal: Positive for gait problem  Skin: Positive for rash  Allergic/Immunologic: Negative  Negative for immunocompromised state  Neurological: Positive for tremors and weakness  Negative for dizziness and light-headedness  Hematological: Negative  Psychiatric/Behavioral: Negative  Negative for sleep disturbance  Medications and orders     All medications reviewed and updated in Nursing Home EMR  Objective     Vitals: per nursing home records    Physical Exam  Vitals and nursing note reviewed  Constitutional:       Appearance: Normal appearance  She is obese  HENT:      Head: Normocephalic and atraumatic  Right Ear: Tympanic membrane, ear canal and external ear normal       Left Ear: Tympanic membrane, ear canal and external ear normal       Nose: Nose normal       Mouth/Throat:      Mouth: Mucous membranes are moist       Pharynx: Oropharynx is clear  Eyes:      Extraocular Movements: Extraocular movements intact  Conjunctiva/sclera: Conjunctivae normal       Pupils: Pupils are equal, round, and reactive to light  Cardiovascular:      Rate and Rhythm: Normal rate and regular rhythm  Pulses: Normal pulses  Heart sounds: Normal heart sounds  Comments: S1S2 +ectopy  Pulmonary:      Effort: Pulmonary effort is normal       Breath sounds: Normal breath sounds  Comments: Lungs diminished  MNPC weak noted  Abdominal:      General: Bowel sounds are normal       Palpations: Abdomen is soft  Genitourinary:     Comments: John patent for clear pale yellow urine  Musculoskeletal:         General: Normal range of motion  Cervical back: Normal range of motion and neck supple  Right lower leg: Edema present  Left lower leg: Edema present  Comments: +3 pitting BLE edema thigh down/third spacing   Skin:     General: Skin is warm and dry        Coloration: Skin is pale       Findings: Rash present  Comments: [place]-area rash   Neurological:      General: No focal deficit present  Mental Status: She is alert and oriented to person, place, and time  GCS: GCS eye subscore is 4  GCS verbal subscore is 4  GCS motor subscore is 6  Motor: Tremor present  Comments: Intermittent BUE tremor   Psychiatric:         Mood and Affect: Mood and affect normal          Speech: Speech normal          Behavior: Behavior normal  Behavior is cooperative  Thought Content: Thought content normal          Judgment: Judgment normal       Comments: Alert/oriented to person, place  Disoriented to time  CAM negative  Pertinent Laboratory/Diagnostic Studies: The following labs and studies were reviewed please see chart or hospital paperwork for details  Will d/c rothman and start void trial   Change lasix to 40mg po BID x3days then back to 40mg po daily and decrease Norvasc to 2 5mg po daily       CYRUS Love  2/10/2022 3:42 PM

## 2022-02-14 ENCOUNTER — NURSING HOME VISIT (OUTPATIENT)
Dept: FAMILY MEDICINE CLINIC | Facility: CLINIC | Age: 87
End: 2022-02-14
Payer: COMMERCIAL

## 2022-02-14 DIAGNOSIS — U07.1 PNEUMONIA DUE TO COVID-19 VIRUS: ICD-10-CM

## 2022-02-14 DIAGNOSIS — S06.5X9A SUBDURAL HEMATOMA (HCC): Primary | ICD-10-CM

## 2022-02-14 DIAGNOSIS — R33.9 URINARY RETENTION: ICD-10-CM

## 2022-02-14 DIAGNOSIS — A04.72 C. DIFFICILE ENTERITIS: ICD-10-CM

## 2022-02-14 DIAGNOSIS — I10 ESSENTIAL HYPERTENSION: Chronic | ICD-10-CM

## 2022-02-14 DIAGNOSIS — J12.82 PNEUMONIA DUE TO COVID-19 VIRUS: ICD-10-CM

## 2022-02-14 PROCEDURE — 99309 SBSQ NF CARE MODERATE MDM 30: CPT | Performed by: NURSE PRACTITIONER

## 2022-02-14 NOTE — PROGRESS NOTES
[address]  Facility: [facility]    NAME: Meghann Osei  AGE: 80 y o  SEX: female    DATE OF ENCOUNTER: 2/14/2022    Code status:  DNR w/ Hospitalization    Assessment and Plan     1  Subdural hematoma ([address] )    2  Urinary retention    3  Pneumonia due to COVID-19 virus    4  C  difficile enteritis    5  Essential hypertension        All medications and routine orders were reviewed and updated as needed  Plan discussed with: Patient, nursing staff    Chief Complaint     Interim evaluation    History of Present Illness     Kath Richard is feeling better today  Appetite is returning, She is on RA sating mid 90s  Dry cough has lessened, no long MPNC  She continues to prefer the room to be very warm  She will complete her isolation today  She is requesting her hair to be done and is ready to work with PT/OT to become stronger  No recent falls  Her rothman was discontinued last evening and she is currently undergoing a void trial   Bowels have slowed  The following portions of the patient's history were reviewed and updated as appropriate: current medications, past family history, past medical history, past social history, past surgical history and problem list     Allergies: Allergies   Allergen Reactions    Preservision Areds [B-Plex Plus] Tremor    Tramadol      Reaction Date: 22Aug2011;        Review of Systems     Review of Systems   Constitutional: Negative  Negative for activity change, appetite change, chills, fatigue and fever  HENT: Positive for hearing loss  Negative for congestion, ear pain, postnasal drip and sinus pain  Eyes: Negative  Respiratory: Positive for cough  Negative for chest tightness and shortness of breath  Cardiovascular: Positive for leg swelling  Negative for chest pain and palpitations  Gastrointestinal: Negative  Negative for constipation and diarrhea  Endocrine: Negative      Genitourinary: Negative  Negative for dysuria  Musculoskeletal: Positive for gait problem  Skin: Negative  Allergic/Immunologic: Negative  Negative for immunocompromised state  Neurological: Negative for dizziness and light-headedness  Hematological: Negative  Psychiatric/Behavioral: Negative  Negative for sleep disturbance  Medications and orders     All medications reviewed and updated in Nursing Home EMR  Objective     Vitals: per nursing home records    Physical Exam  Vitals and nursing note reviewed  Constitutional:       Appearance: Normal appearance  HENT:      Head: Normocephalic and atraumatic  Right Ear: Tympanic membrane, ear canal and external ear normal       Left Ear: Tympanic membrane, ear canal and external ear normal       Nose: Nose normal       Mouth/Throat:      Mouth: Mucous membranes are moist       Pharynx: Oropharynx is clear  Eyes:      Extraocular Movements: Extraocular movements intact  Conjunctiva/sclera: Conjunctivae normal       Pupils: Pupils are equal, round, and reactive to light  Cardiovascular:      Rate and Rhythm: Normal rate  Rhythm irregular  Pulses: Normal pulses  Heart sounds: Normal heart sounds  Pulmonary:      Effort: Pulmonary effort is normal       Breath sounds: Normal breath sounds  Comments: Diminished bases  Abdominal:      General: Bowel sounds are normal       Palpations: Abdomen is soft  Musculoskeletal:         General: Deformity present  Normal range of motion  Cervical back: Normal range of motion and neck supple  Right lower leg: Edema present  Left lower leg: Edema present  Comments: +1-2pitting BLE edema  +OA hands   Skin:     General: Skin is warm and dry  Coloration: Skin is pale  Findings: Rash present  Comments: +candi rash   Neurological:      General: No focal deficit present  Mental Status: She is alert and oriented to person, place, and time        Motor: Tremor present  Gait: Gait abnormal       Comments: Mild BUE intermittent tremor noted   Psychiatric:         Mood and Affect: Mood normal          Speech: Speech normal          Behavior: Behavior normal  Behavior is cooperative  Thought Content: Thought content normal          Cognition and Memory: Cognition is impaired  Memory is impaired  Judgment: Judgment normal       Comments: Alert oriented to person/place, disoriented to time  Able to follow commands and make her needs known  Pertinent Laboratory/Diagnostic Studies: The following labs and studies were reviewed please see chart or hospital paperwork for details  No new orders  We will continue the current rx/tx  She will come out of isolation today        CYRUS Tatum  2/14/2022 11:44 AM

## 2022-02-17 ENCOUNTER — NURSING HOME VISIT (OUTPATIENT)
Dept: FAMILY MEDICINE CLINIC | Facility: CLINIC | Age: 87
End: 2022-02-17
Payer: COMMERCIAL

## 2022-02-17 ENCOUNTER — TELEMEDICINE (OUTPATIENT)
Dept: NEUROSURGERY | Facility: CLINIC | Age: 87
End: 2022-02-17
Payer: COMMERCIAL

## 2022-02-17 DIAGNOSIS — S06.5X9A SUBDURAL HEMATOMA (HCC): Primary | ICD-10-CM

## 2022-02-17 DIAGNOSIS — R33.9 URINARY RETENTION: ICD-10-CM

## 2022-02-17 DIAGNOSIS — F41.1 GENERALIZED ANXIETY DISORDER: Chronic | ICD-10-CM

## 2022-02-17 DIAGNOSIS — U07.1 PNEUMONIA DUE TO COVID-19 VIRUS: ICD-10-CM

## 2022-02-17 DIAGNOSIS — J12.82 PNEUMONIA DUE TO COVID-19 VIRUS: ICD-10-CM

## 2022-02-17 DIAGNOSIS — A04.72 C. DIFFICILE ENTERITIS: ICD-10-CM

## 2022-02-17 DIAGNOSIS — I10 ESSENTIAL HYPERTENSION: Chronic | ICD-10-CM

## 2022-02-17 PROCEDURE — 99309 SBSQ NF CARE MODERATE MDM 30: CPT | Performed by: NURSE PRACTITIONER

## 2022-02-17 PROCEDURE — 99213 OFFICE O/P EST LOW 20 MIN: CPT | Performed by: NURSE PRACTITIONER

## 2022-02-17 RX ORDER — FUROSEMIDE 40 MG/1
40 TABLET ORAL DAILY
COMMUNITY
End: 2022-03-31 | Stop reason: SDUPTHER

## 2022-02-17 RX ORDER — OMEPRAZOLE 20 MG/1
20 CAPSULE, DELAYED RELEASE ORAL DAILY
COMMUNITY

## 2022-02-17 RX ORDER — ACETAMINOPHEN 325 MG/1
650 TABLET ORAL EVERY 4 HOURS PRN
COMMUNITY
End: 2022-03-16 | Stop reason: ALTCHOICE

## 2022-02-17 RX ORDER — MAG HYDROX/ALUMINUM HYD/SIMETH 400-400-40
1 SUSPENSION, ORAL (FINAL DOSE FORM) ORAL AS NEEDED
COMMUNITY
End: 2022-03-27 | Stop reason: ALTCHOICE

## 2022-02-17 RX ORDER — MULTIVIT WITH MINERALS/LUTEIN
1000 TABLET ORAL DAILY
COMMUNITY
End: 2022-03-16 | Stop reason: ALTCHOICE

## 2022-02-17 NOTE — ASSESSMENT & PLAN NOTE
-Bp soft  -had Lasix pulse last week due to increased edema    -D/C Norvasc, continue lopressor 25mg po q12hrs, Lasix 40mg po daily

## 2022-02-17 NOTE — PROGRESS NOTES
[address]  [facility]    NAME: Albert Hayes  AGE: 80 y o  SEX: female    DATE OF ENCOUNTER: 2/17/2022    Code status:  DNR w/ Hospitalization    Assessment and Plan     1  Subdural hematoma ([address] )    2  Generalized anxiety disorder  Assessment & Plan:  -Was taking 0 5 ativan qHS and told me she recently started taking "1/2 pill ativan" qlunch time prior to hospitalization   Nohemy Melgar +fear of falling  Reports she took ativan to "clear her head"  Education provided regarding side effects of Ativan   -Will start lexapro 5mg po daily and monitor effects        3  Urinary retention  Assessment & Plan:  -rothman discontinued 2/13/22 evening shift  -no retention  -continue to monitor      4  Pneumonia due to COVID-19 virus  Assessment & Plan:  -resolved  -RA, no cough, appetite returned  -off precautions      5  C  difficile enteritis  Assessment & Plan: Bowels stabilizing  PO Vancomycin completed 1/23/22        6  Essential hypertension  Assessment & Plan:  -Bp soft  -had Lasix pulse last week due to increased edema  -D/C Norvasc, continue lopressor 25mg po q12hrs, Lasix 40mg po daily        All medications and routine orders were reviewed and updated as needed  Plan discussed with: Patient, nursing staff    Chief Complaint     Interim evaluation    History of Present Illness     Joe Neal continues to improve  Weight is down to 104lb, edema much improved  Her appetite is good and her bowels are stabilizing  She is voiding sufficient amounts of urine, denies dysuria  She reports her distaste for the prosouce supplement  Education provided on the benefits given her low albumin  She is getting stronger with PT  No recent falls  Vitals stable  She will be having a virtual follow up with Neurology today      The following portions of the patient's history were reviewed and updated as appropriate: current medications, past family history, past medical history, past social history, past surgical history and problem list     Allergies: Allergies   Allergen Reactions    Preservision Areds [B-Plex Plus] Tremor    Tramadol      Reaction Date: 22Aug2011;        Review of Systems     Review of Systems   Constitutional: Negative  Negative for activity change, appetite change, chills, fatigue and fever  HENT: Positive for hearing loss  Negative for congestion, ear pain, postnasal drip and sinus pain  Eyes: Negative  Respiratory: Negative  Negative for cough, chest tightness and shortness of breath  Cardiovascular: Positive for leg swelling  Negative for chest pain and palpitations  Gastrointestinal: Negative  Negative for constipation and diarrhea  Endocrine: Negative  Genitourinary: Negative  Negative for dysuria  Musculoskeletal: Positive for arthralgias and gait problem  Skin: Positive for rash  Allergic/Immunologic: Negative  Negative for immunocompromised state  Neurological: Positive for tremors  Negative for dizziness and light-headedness  Hematological: Negative  Psychiatric/Behavioral: Positive for confusion  The patient is nervous/anxious  Medications and orders     All medications reviewed and updated in Nursing Home EMR  Objective     Vitals: per nursing home records    Physical Exam  Vitals and nursing note reviewed  Constitutional:       Appearance: Normal appearance  HENT:      Head: Normocephalic and atraumatic  Right Ear: Tympanic membrane, ear canal and external ear normal       Left Ear: Tympanic membrane, ear canal and external ear normal       Ears:      Comments: +[place]     Nose: Nose normal       Mouth/Throat:      Mouth: Mucous membranes are moist       Pharynx: Oropharynx is clear  Eyes:      Extraocular Movements: Extraocular movements intact  Conjunctiva/sclera: Conjunctivae normal       Pupils: Pupils are equal, round, and reactive to light     Cardiovascular: Rate and Rhythm: Normal rate  Rhythm irregular  Pulses: Normal pulses  Heart sounds: Normal heart sounds  Pulmonary:      Effort: Pulmonary effort is normal       Breath sounds: Normal breath sounds  Abdominal:      General: Bowel sounds are normal       Palpations: Abdomen is soft  Musculoskeletal:         General: Deformity present  Normal range of motion  Cervical back: Normal range of motion and neck supple  Right lower leg: Edema present  Left lower leg: Edema present  Comments: +2 pitting BLE edema  +OA hands   Skin:     General: Skin is warm and dry  Coloration: Skin is pale  Neurological:      General: No focal deficit present  Mental Status: She is alert  Mental status is at baseline  She is confused  Motor: Tremor present  Gait: Gait abnormal    Psychiatric:         Mood and Affect: Mood is anxious  Behavior: Behavior normal  Behavior is cooperative  Thought Content: Thought content normal          Cognition and Memory: Cognition is impaired  Memory is impaired  Judgment: Judgment normal       Comments: Alert oriented to person/place, disoriented to time  Able to follow commands and make needs known  Pertinent Laboratory/Diagnostic Studies: The following labs and studies were reviewed please see chart or hospital paperwork for details      CYRUS West  2/17/2022 3:44 PM

## 2022-02-17 NOTE — PROGRESS NOTES
Virtual Regular Visit     Verification of patient location:    Patient is located in the following state in which I hold an active license [place]      Assessment/Plan:    Problem List Items Addressed This Visit        Nervous and Auditory    Subdural hematoma ([address] ) - Primary     · As addressed in hPI  · She is [place] , DIL assist with visit , holding phone close and repeating information  DIL also assist with physical assessment  · MIKE reports she is not at her BL ambulation or functional status , continues with skilled therapy  Dhe only walks in therapy is otherwise I a recliner or wc   She requires total assist of 2 for standing or transferring between surfaced  She is debilitated for prolonged hospitalization 1/10/2022 - 1/2022  · Patient is alert and responsive, answering questions appropriately  · She denies headache, or visual changes , has generalized weakness for debility was treated fr CDIFF colitis, dehydration FRANK ,hypernatremia, and generalized weakness in [facility]   · Has history of recent DVT / PE in October 2021, was treating with warfarin  · She remained seated in a recliner during visit but participated verbally in an appropriate manner  Imagining   CT Brain wo contrast  1/28/2022    Slightly decreased size and density of small subacute subdural hematoma along left frontal region  PLAN  · Continue to hold all anticoagulants and antiplatelets such as warfarin ASA, NSAID, dietary supplements that affect coagulation  Hold will continue until  intercranial hematoma completely resolved  · Repeat CT Brain in 4 weeks for last [facility] on 1/28/2022  At [facility]  Son will transport  , schedule on 1/25/2022   · Schedule next office visit after CT brain 3/1/2021 at 1500  · MIKE Fermin notified of next visit date and image date , reports her son soham take patient for image at [address]  MIKE FERMIN phone number [phone]    · Reviewed imagining chaya Garber Dues, and patient  · If patient has any acute change n mental status , headache, visual disturbance , acute in extremities , decline n functional or cognitive status, stroke like symptoms  take to closest emergency room for CT head check for recurrent bleeding  Relevant Orders    CT head without contrast               Reason for visit is   Chief Complaint   Patient presents with   [address] [place] follow up with CT Head        Encounter provider CYRUS Smith    Provider located at [address] [address] [phone]      Recent Visits  No visits were found meeting these conditions  Showing recent visits within past 7 days and meeting all other requirements  Today's Visits  Date Type Provider Dept   02/17/22 Telemedicine Veronica Solis today's visits and meeting all other requirements  Future Appointments  No visits were found meeting these conditions  Showing future appointments within next 150 days and meeting all other requirements       The patient was identified by name and date of birth  Jovani Amador was informed that this is a telemedicine visit and that the visit is being conducted through [address] [place] [address] Now and patient was informed that this is a secure, HIPAA-compliant platform  She agrees to proceed     My office door was closed  No one else was in the room  She acknowledged consent and understanding of privacy and security of the video platform  The patient has agreed to participate and understands they can discontinue the visit at any time  Patient is aware this is a billable service  Subjective  Jovani Amador is a 80 y o  female    HPI   Traumatic SDH  Status post 2 mechanical falls in home secondary to generalized weakness for several day of diarrhea   On 1/10/2022  she presented in [address] [place] [address] ED after the second fall sticking her forehead and sustained a laceration above right eyebrow and was transferred in [facility] for [facility] neurosurgery  She was treating wit warfarin after a diagnosed PE and DVT in October 2021  Inpatient admit at [facility] 1/10/2022 - 1/20/22   1/10/2022 CT wo contrast  Head showed Small left frontal acute subdural hematoma with some intermixed chronic components, no greater than 8 mm in overall total thickness with minimal sulcal effacement in the frontal region but otherwise no significant mass effect  Specifically, no midline shift or narrowing of the ventricles    Minimal subdural hemorrhage seen along the anterior interhemispheric fissure, no greater than 1 mm in thickness      No parenchymal hemorrhage  No calvarial fracture  Repeat CTH wo contrast 1/10/2022 VENTRICLES AND EXTRA-AXIAL SPACES:  Acute left frontal subdural hematoma, 7 mm in maximal thickness, essentially unchanged since the CT from earlier today  No mass effect  No new extra-axial hemorrhage  Sulci and ventricles normal in size for the patient's age  1/1/2021 CTH wo contrast Stable left frontal acute subdural hematoma  Stable underlying chronic microangiopathic change    Neurosurgeery consulted , completed on 1/11/22  No surgical intervention   Conservative management surveillance monitoring  F/U in neurosurgery office  In 2 weeks  She was discharges to [facility] on  1/20/22, for SNF services/reghab  She  contracted COVID 19 and was placed on isolation   2 neurosurgery office appointments were cancelled on 2/3/2022 and 2/4/2022   Appointment was rescheduled for today 2/17/202 with imagining CT brain completed on  1/28/2021 , a virtual AMWELL visit is conducted with the patient and her Cedric Liberty     She remains at the SNF receiving  skilled therapy                  Past Medical History:   Diagnosis Date    Anxiety     C  difficile enteritis 2/4/2022    COVID-19 2/4/2022    Disease of thyroid gland        Past Surgical History:   Procedure Laterality Date    CATARACT EXTRACTION Right     FOOT SURGERY      JOINT REPLACEMENT         Current Outpatient Medications   Medication Sig Dispense Refill    acetaminophen (TYLENOL) 325 mg tablet Take 650 mg by mouth every 4 (four) hours as needed for mild pain      amLODIPine (NORVASC) 10 mg tablet Take 0 5 tablets (5 mg total) by mouth daily (Patient taking differently: Take 2 5 mg by mouth daily  ) 15 tablet 3    Ascorbic Acid (vitamin C) 1000 MG tablet Take 1,000 mg by mouth daily      Bisacodyl (DULCOLAX RE) Insert into the rectum as needed      Calcium Carb-Cholecalciferol (CALCIUM 600 + D PO) Take by mouth 2 (two) times a day      furosemide (LASIX) 40 mg tablet Take 40 mg by mouth daily      glycerin adult 2 g suppository Insert 1 suppository into the rectum as needed for constipation      MAGNESIUM HYDROXIDE PO Take 30 mL by mouth daily as needed for constipation      metoprolol tartrate (LOPRESSOR) 25 mg tablet Take 1 tablet (25 mg total) by mouth every 12 (twelve) hours  0    Miconazole Nitrate (MARK ANTIFUNGAL EX) Apply topically      omeprazole (PriLOSEC) 20 mg delayed release capsule Take 20 mg by mouth daily      Potassium Chloride (KLOR-CON PO) Take 40 mEq by mouth 2 (two) times a day      ZINC SULFATE PO Take 220 mg by mouth in the morning      LORazepam (ATIVAN) 1 mg tablet Take 0 5 tablets (0 5 mg total) by mouth daily at bedtime for 10 days 10 tablet 0    pantoprazole (PROTONIX) 40 mg tablet Take 1 tablet (40 mg total) by mouth daily in the early morning 30 tablet 3     No current facility-administered medications for this visit  Allergies   Allergen Reactions    Preservision Areds [B-Plex Plus] Tremor    Tramadol      Reaction Date: 22Aug2011;        Review of Systems   Constitutional: Negative  HENT: Negative  Eyes: Negative  Respiratory: Positive for shortness of breath (with walking)  S/p Covid   Cardiovascular: Negative  Gastrointestinal: Negative  Endocrine: Negative  Genitourinary:        Incontinence   Musculoskeletal: Positive for gait problem (uses walker)  Skin: Negative  Allergic/Immunologic: Negative  Neurological: Positive for weakness (generalized)  Negative for dizziness, tremors, seizures, speech difficulty, light-headedness, numbness and headaches  Hematological: Negative  Psychiatric/Behavioral: Positive for confusion  Video Exam    There were no vitals filed for this visit  Physical Exam  Exam conducted with a chaperone present Megan MCKEON )  Constitutional:       Appearance: Normal appearance  Comments: As per DIL weight has been between  135 -140   HENT:      Head: Normocephalic and atraumatic  Ears:      Comments: [place]   Eyes:      General: No scleral icterus  Right eye: No discharge  Left eye: No discharge  Extraocular Movements: Extraocular movements intact  Pulmonary:      Effort: Pulmonary effort is normal  No respiratory distress  Neurological:      General: No focal deficit present  Mental Status: She is alert and oriented to person, place, and time  Coordination: Finger-Nose-Finger Test normal    Psychiatric:         Mood and Affect: Mood normal          Behavior: Behavior normal         Neurologic Exam     Mental Status   Oriented to person, place, and time  Oriented to person  Oriented to place  Oriented to country, city, area, street and number  Oriented to year, month, date, day and season  Attention: normal    Level of consciousness: alert  Knowledge: good  Able to perform simple calculations       Motor Exam   Muscle bulk: decreased    Strength   Right deltoid: 5/5  Left deltoid: 5/5  Right biceps: 5/5  Left biceps: 5/5  Right triceps: 5/5  Left triceps: 5/5  Right wrist flexion: 5/5  Left wrist flexion: 5/5  Right wrist extension: 4/5  Left wrist extension: 4/5  Right interossei: 4/5  Left interossei: 4/5  Right iliopsoas: 5/5  Left iliopsoas: 5/5  Right quadriceps: 5/5  Left quadriceps: 5/5  Right hamstrin/5  Left hamstrin/5  Right anterior tibial: 5/5  Left anterior tibial: 5/5  Left posterior tibial: 5/5  Right gastroc: 4/5  Left gastroc: 5/5DIL performed strength assessment with my direction     Gait, Coordination, and Reflexes     Gait  Gait: (in w/c per DIL requires 2 assist for transfers )    Coordination   Finger to nose coordination: normalThumb opposition normal right hand left hand with finger arthritic deformities  CT BRAIN - WITHOUT CONTRAST 2022      COMPARISON:  CT head without contrast 2022       Radiation dose length product (DLP) for this visit:  654 mGy-cm   This examination, like all CT scans performed in the [facility], was performed utilizing techniques to minimize radiation dose exposure, including the use of iterative   reconstruction and automated exposure control      FINDINGS:   PARENCHYMA AND EXTRA-AXIAL SPACES:     0 7 cm thick subacute subdural hematoma along left frontal cerebral convexity, slightly decreased in size (previously 0 8 cm thick) and previously more hyperdense    No new acute intracranial hemorrhage    Decreased attenuation is noted in periventricular and subcortical white matter demonstrating an appearance that is statistically most likely to represent mild microangiopathic change  No CT signs of acute infarction  No intracranial mass, mass effect   or midline shift  Tiny bilateral medial basal ganglia calcifications  Arterial calcifications of carotid siphons and bilateral intradural vertebral arteries   VENTRICLES: Normal for the patient's age    VISUALIZED ORBITS AND PARANASAL SINUSES: Sequela of bilateral cataract surgery  Clear sinuses    CALVARIUM AND EXTRACRANIAL SOFT TISSUES:  Normal      IMPRESSION:   Slightly decreased size and density of small subacute subdural hematoma along left frontal region  No new acute intracranial abnormality         Workstation performed: [id number]    I spent 35 minutes directly with the patient during this visit    VIRTUAL VISIT DISCLAIMER      Lauryn Alves verbally agrees to participate in [employer]  Pt is aware that [employer] could be limited without vital signs or the ability to perform a full hands-on physical [place] understands she or the provider may request at any time to terminate the video visit and request the patient to seek care or treatment in person

## 2022-02-17 NOTE — Clinical Note
Repeat CT Brain in 4 weeks for last [address] on 1/28/2022  At [facility]  Son will transport  , schedule on 1/25/2022   Schedule next office visit after CT brain 3/1/2021 at 1500

## 2022-02-17 NOTE — PATIENT INSTRUCTIONS
Intracranial Hematoma   AMBULATORY CARE:   An intracranial hematoma  is a collection of blood inside your skull  The blood leaks from a tear or rupture in a vein or artery, such as after a hemorrhagic stroke  The collected blood puts pressure on the brain  Serious medical problems can develop, such as seizures or a coma  An intracranial hematoma is a life-threatening emergency that needs immediate medical care  Warning signs of a stroke: The words BE FAST can help you remember and recognize warning signs of a stroke:  · B = Balance:  Sudden loss of balance    · E = Eyes:  Loss of vision in one or both eyes    · F = Face:  Face droops on one side    · A = Arms:  Arm drops when both arms are raised    · S = Speech:  Speech is slurred or sounds different    · T = Time:  Time to get help immediately       Types of intracranial hematoma:   · Intracerebral (or intraparenchymal) hematoma  is blood that collects within brain tissue  · Subdural hematoma  is blood that collects between your brain and its protective cover  · Epidural hematoma  is blood that collects between your brain's protective cover and your skull  Signs and symptoms  will depend on the type of hematoma you have  The following may develop over minutes, hours, or years:  · Headache, seizures, or confusion    · Weakness, numbness, or neck stiffness    · Difficulty speaking or slurred speech    · Fainting, loss of consciousness, or being less alert    · Nausea and vomiting    · Trouble walking or keeping your balance    · Vision problems    Have someone call your local emergency number (911 in the [address] [place] [address],[address]) if:   · You have any of the following signs of a stroke:      ? Numbness or drooping on one side of your face     ? Weakness in an arm or leg    ? Confusion or difficulty speaking    ? Dizziness, a severe headache, or vision loss       · You have a seizure  · You have new problems with balance or movement      Seek care immediately if:   · You are sleepier or are harder to wake than usual     · You have problems thinking  · Your behavior or personality has changed  · You have repeated or forceful vomiting or you cannot keep liquids down  Call your doctor or neurologist if:   · You have nausea or are vomiting  · Your symptoms are getting worse  · You have questions or concerns about your condition or care  Treatment  may include any of the following:  · Bed rest  may be needed if you have a small amount of blood in the dura  Healthcare providers may want to see you often or do CT or MRI scans to be sure there is no more bleeding  · Medicines  are used to prevent seizures, lower swelling, and get rid of extra fluid  · A catheter  (thin tube) may be placed in your skull to drain fluid  This will help decrease pressure in your brain and prevent more damage  The tube may also monitor pressure in your brain  · Surgery  may be used to remove the blood  Manage or prevent an intracranial hematoma:  Healthcare providers will help you create goals for your recovery  The following lifestyle changes can help lower your risk for a stroke that can lead to a hematoma:  · Manage health conditions  A condition such as diabetes can increase your risk for a stroke  Control your blood sugar level if you have hyperglycemia or diabetes  Take your prescribed medicines and check your blood sugar level as directed  · Check your blood pressure as directed  High blood pressure can increase your risk for a stroke  Follow your healthcare provider's directions for controlling your blood pressure  · Limit alcohol  Large amounts of alcohol can lead to an intracerebral hematoma or a stroke  Alcohol may also raise your blood pressure or thin your blood  Blood thinning can cause a hemorrhagic stroke  · Do not use nicotine products or illegal drugs  Nicotine and other chemicals in cigarettes and cigars can cause blood vessel damage  Nicotine and illegal drugs both increase your risk for a stroke  Ask your healthcare provider for information if you currently smoke or use drugs and need help to quit  E-cigarettes or smokeless tobacco still contain nicotine  Talk to your healthcare provider before you use these products  · Take blood thinners exactly as directed  Blood thinners increase your risk for an intracerebral hematoma  Your healthcare provider may make changes to your blood thinner if it caused your hematoma  Always follow directions so you do not take too much of this medicine  · Eat a variety of healthy foods  Healthy foods include whole-grain breads, low-fat dairy products, beans, lean meats, and fish  Eat at least 5 servings of fruits and vegetables each day  Choose foods that are low in fat, cholesterol, salt, and sugar  Eat foods that are high in potassium, such as potatoes and bananas  A nutritionist can help you create healthy meal plans  · Maintain a healthy weight  Ask your healthcare provider what a healthy weight is for you  Ask him or her to help you create a weight loss plan if you are overweight  He or she can help you create small goals if you have a lot of weight to lose  · Exercise as directed  Exercise can lower your blood pressure, cholesterol, weight, and blood sugar levels  Healthcare providers will help you create exercise goals  They can also help you make a plan to reach your goals  For example, you can break exercise into 10 minute periods, 3 times in a day  Find an exercise that you enjoy  This will make it easier for you to reach your exercise goals  · Manage stress  Stress can raise your blood pressure  Find new ways to relax, such as deep breathing or listening to music  Follow up with your doctor or neurologist within 2 days:  Write down your questions so you remember to ask them during your visits    For support and more information:   · American Heart Association and American Stroke Association  [address] , [address] [facility]   Phone: 0- 509 - 063-6103  Web Address: https://www strong com/  [address] 2021 Information is for End User's use only and may not be sold, redistributed or otherwise used for commercial purposes  All illustrations and images included in CareNotes® are the copyrighted property of [employer]  or [address] [place] [address]  The above information is an  only  It is not intended as medical advice for individual conditions or treatments  Talk to your doctor, nurse or pharmacist before following any medical regimen to see if it is safe and effective for you

## 2022-02-17 NOTE — ASSESSMENT & PLAN NOTE
-Was taking 0 5 ativan qHS and told me she recently started taking "1/2 pill ativan" qlunch time prior to hospitalization   Lanasukhwinder Hoffbenjamín +fear of falling  Reports she took ativan to "clear her head"      Education provided regarding side effects of Ativan   -Will start lexapro 5mg po daily and monitor effects

## 2022-02-18 ENCOUNTER — TELEPHONE (OUTPATIENT)
Dept: NEUROSURGERY | Facility: CLINIC | Age: 87
End: 2022-02-18

## 2022-02-18 NOTE — TELEPHONE ENCOUNTER
Faxed to [facility]: Consult note, Office note, After visit summary with follow up appointments for [address] sched at [facility] [place] 2/25 and Virtual office follow up 3/1    Unable to reach someone to speak to at [facility] x2 to confirm receipt of above, unable to [facility]    Unclear if Carolina Andrews will still be at [facility] or [address] [address] at time of next scheduled follow up, so also called Daughter in law, René Amador at [phone], [facility], provided follow up details, appointment info, and both my direct line and office numbers in case she has any questions or needs further assistance

## 2022-02-18 NOTE — ASSESSMENT & PLAN NOTE
· As addressed in hPI  · She is [place] , DIL assist with visit , holding phone close and repeating information  DIL also assist with physical assessment  · MIKE reports she is not at her BL ambulation or functional status , continues with skilled therapy  Dhe only walks in therapy is otherwise I a recliner or wc   She requires total assist of 2 for standing or transferring between surfaced  She is debilitated for prolonged hospitalization 1/10/2022 - 1/2022  · Patient is alert and responsive, answering questions appropriately  · She denies headache, or visual changes , has generalized weakness for debility was treated fr CDIFF colitis, dehydration FRANK ,hypernatremia, and generalized weakness in [facility]   · Has history of recent DVT / PE in October 2021, was treating with warfarin  · She remained seated in a recliner during visit but participated verbally in an appropriate manner  Imagining   CT Brain wo contrast  1/28/2022    Slightly decreased size and density of small subacute subdural hematoma along left frontal region  PLAN  · Continue to hold all anticoagulants and antiplatelets such as warfarin ASA, NSAID, dietary supplements that affect coagulation  Hold will continue until  intercranial hematoma completely resolved  · Repeat CT Brain in 4 weeks for last [address] on 1/28/2022  At [facility]  Son misael transport  , schedule on 1/25/2022   · Schedule next office visit after CT brain 3/1/2021 at 1500  · MIKE Fermin notified of next visit date and image date , reports her son soham take patient for image at [address]  MIKE FERMIN phone number [phone]  · Reviewed imagining wit MIKE Fermin, and patient  · If patient has any acute change n mental status , headache, visual disturbance , acute in extremities , decline n functional or cognitive status, stroke like symptoms  take to closest emergency room for CT head check for recurrent bleeding

## 2022-02-21 ENCOUNTER — NURSING HOME VISIT (OUTPATIENT)
Dept: FAMILY MEDICINE CLINIC | Facility: CLINIC | Age: 87
End: 2022-02-21
Payer: COMMERCIAL

## 2022-02-21 DIAGNOSIS — J12.82 PNEUMONIA DUE TO COVID-19 VIRUS: ICD-10-CM

## 2022-02-21 DIAGNOSIS — U07.1 PNEUMONIA DUE TO COVID-19 VIRUS: ICD-10-CM

## 2022-02-21 DIAGNOSIS — I10 ESSENTIAL HYPERTENSION: Chronic | ICD-10-CM

## 2022-02-21 DIAGNOSIS — S06.5X9A SUBDURAL HEMATOMA (HCC): Primary | ICD-10-CM

## 2022-02-21 DIAGNOSIS — F41.1 GENERALIZED ANXIETY DISORDER: Chronic | ICD-10-CM

## 2022-02-21 DIAGNOSIS — A04.72 C. DIFFICILE ENTERITIS: ICD-10-CM

## 2022-02-21 PROCEDURE — 99309 SBSQ NF CARE MODERATE MDM 30: CPT | Performed by: NURSE PRACTITIONER

## 2022-02-21 NOTE — PROGRESS NOTES
[address]  [facility]: [facility]    NAME: Fabienne Mortimer  AGE: 80 y o  SEX: female    DATE OF ENCOUNTER: 2/21/2022    Code status:  DNR w/ Hospitalization    Assessment and Plan     1  Subdural hematoma ([address] )    2  Pneumonia due to COVID-19 virus    3  C  difficile enteritis    4  Essential hypertension    5  Generalized anxiety disorder        All medications and routine orders were reviewed and updated as needed  Plan discussed with: Patient, nursing staff    Chief Complaint     Interim evaluation    History of Present Illness     Carrie Freire continues to progress with therapy  She is frustrated that her hair has not been cut, gave her a paper that her family can fill out to have this request fulfilled  She reports intermittent knee OA pain, managed with current rx  She states she is "gassy" at times but bowels have stabilized  Appetite is improving  Vitals and weight are stable  No recent falls  She is to have a follow up CT Head on the 25th with Neuro appt on the 1st       The following portions of the patient's history were reviewed and updated as appropriate: current medications, past family history, past medical history, past social history, past surgical history and problem list     Allergies: Allergies   Allergen Reactions    Preservision Areds [B-Plex Plus] Tremor    Tramadol      Reaction Date: 22Aug2011;        Review of Systems     Review of Systems   Constitutional: Negative  Negative for activity change, appetite change, chills, fatigue and fever  HENT: Positive for hearing loss  Negative for congestion, ear pain, postnasal drip and sinus pain  Eyes: Negative  Respiratory: Negative  Negative for cough, chest tightness and shortness of breath  Cardiovascular: Positive for leg swelling  Negative for chest pain and palpitations  Gastrointestinal: Negative    Negative for constipation and diarrhea         +flatulence Endocrine: Negative  Genitourinary: Negative  Negative for dysuria  Musculoskeletal: Positive for arthralgias and gait problem  Skin: Negative  Allergic/Immunologic: Negative  Negative for immunocompromised state  Neurological: Positive for weakness  Negative for dizziness and light-headedness  Hematological: Negative  Psychiatric/Behavioral: Negative  Negative for sleep disturbance  Medications and orders     All medications reviewed and updated in Nursing Home EMR  Objective     Vitals: per nursing home records    Physical Exam  Vitals and nursing note reviewed  Constitutional:       Appearance: Normal appearance  She is obese  HENT:      Head: Normocephalic and atraumatic  Right Ear: Tympanic membrane, ear canal and external ear normal       Left Ear: Tympanic membrane, ear canal and external ear normal       Ears:      Comments: +[place]     Nose: Nose normal       Mouth/Throat:      Mouth: Mucous membranes are moist       Pharynx: Oropharynx is clear  Eyes:      Extraocular Movements: Extraocular movements intact  Conjunctiva/sclera: Conjunctivae normal       Pupils: Pupils are equal, round, and reactive to light  Cardiovascular:      Rate and Rhythm: Normal rate  Rhythm irregular  Pulses: Normal pulses  Heart sounds: Murmur heard  Pulmonary:      Effort: Pulmonary effort is normal       Breath sounds: Normal breath sounds  Abdominal:      General: Bowel sounds are normal       Palpations: Abdomen is soft  Musculoskeletal:         General: Deformity present  Normal range of motion  Cervical back: Normal range of motion and neck supple  Right lower leg: Edema present  Left lower leg: Edema present  Comments: +2 pitting RLE edema, +1 pitting LLE edema  +OA b/l fingers   Skin:     General: Skin is warm and dry  Coloration: Skin is pale  Neurological:      General: No focal deficit present        Mental Status: She is alert and oriented to person, place, and time  Motor: Tremor present  Gait: Gait abnormal    Psychiatric:         Mood and Affect: Mood normal          Speech: Speech normal          Behavior: Behavior normal          Thought Content: Thought content normal          Cognition and Memory: Cognition is impaired  Memory is impaired  Judgment: Judgment normal       Comments: Alert/oriented with forgetfulness  Able to follow commands and make needs known  Not anxious today         Pertinent Laboratory/Diagnostic Studies: The following labs and studies were reviewed please see chart or hospital paperwork for details      Will ask for tubi  SHELLEY Dwyer, remove qPM       CYRUS Durbin  2/21/2022 10:40 AM

## 2022-02-22 NOTE — TELEPHONE ENCOUNTER
Called [facility] today to verify AVS, office notes, consult note and appointments were received  They could not locate, verified fax number and re-faxed today

## 2022-02-22 NOTE — TELEPHONE ENCOUNTER
Called [facility] again and confirmed receipt of faxed notes and appointments  Confirmed by [facility]

## 2022-02-23 ENCOUNTER — TELEMEDICINE (OUTPATIENT)
Dept: GASTROENTEROLOGY | Facility: CLINIC | Age: 87
End: 2022-02-23
Payer: COMMERCIAL

## 2022-02-23 VITALS — WEIGHT: 103.6 LBS | BODY MASS INDEX: 21.75 KG/M2 | HEIGHT: 58 IN

## 2022-02-23 DIAGNOSIS — A04.72 C. DIFFICILE ENTERITIS: ICD-10-CM

## 2022-02-23 DIAGNOSIS — K52.9 COLITIS: Primary | ICD-10-CM

## 2022-02-23 DIAGNOSIS — R19.7 DIARRHEA, UNSPECIFIED TYPE: ICD-10-CM

## 2022-02-23 PROCEDURE — 99213 OFFICE O/P EST LOW 20 MIN: CPT | Performed by: REGISTERED NURSE

## 2022-02-23 PROCEDURE — 1036F TOBACCO NON-USER: CPT | Performed by: REGISTERED NURSE

## 2022-02-23 PROCEDURE — 1160F RVW MEDS BY RX/DR IN RCRD: CPT | Performed by: REGISTERED NURSE

## 2022-02-23 NOTE — PROGRESS NOTES
Virtual Brief Visit    Patient is located in the following state in which I hold an active license [place]      Assessment/Plan:   1  Diarrhea  2  Pancolitis  Patient hospitalized a month ago with complaints of diarrhea  Ischemic colitis noted at the time as well as pan colitis  Patient finished a course of Rocephin, Flagyl and p o  vanco   No further complaints of diarrhea at this time  Telephone visit complete with nursing staff at [facility]  51-year-old female seen 01/07/2022 with complaints of diarrhea  She had previously been on antibiotics and was presumed to be infectious or C diff related  Stool studies were all negative at that time  She was preemptively started on vancomycin as well as Questran  She then presented to the emergency department a week later  CT scan at that time was suggestive of ischemic colitis  She was also found to be C diff positive  She was treated with IV antibiotics transition to p o  upon discharge as well as p o  vanco   Symptoms have since resolved  She is having daily nonbloody bowel movements  Her appetite is good, she eats at least 50% of each meals  No reports of fevers or chills  Vital signs stable  Problem List Items Addressed This Visit     None          Recent Visits  No visits were found meeting these conditions  Showing recent visits within past 7 days and meeting all other requirements  Today's Visits  Date Type Provider Dept   02/23/22 Telemedicine CYRUS [facility]   Showing today's visits and meeting all other requirements  Future Appointments  No visits were found meeting these conditions    Showing future appointments within next 150 days and meeting all other requirements         I spent 20 minutes directly with the patient during this visit

## 2022-02-25 ENCOUNTER — HOSPITAL ENCOUNTER (OUTPATIENT)
Dept: CT IMAGING | Facility: HOSPITAL | Age: 87
Discharge: HOME | End: 2022-02-25
Payer: COMMERCIAL

## 2022-02-25 ENCOUNTER — NURSING HOME VISIT (OUTPATIENT)
Dept: FAMILY MEDICINE CLINIC | Facility: CLINIC | Age: 87
End: 2022-02-25
Payer: COMMERCIAL

## 2022-02-25 DIAGNOSIS — J12.82 PNEUMONIA DUE TO COVID-19 VIRUS: ICD-10-CM

## 2022-02-25 DIAGNOSIS — A04.72 C. DIFFICILE ENTERITIS: ICD-10-CM

## 2022-02-25 DIAGNOSIS — U07.1 PNEUMONIA DUE TO COVID-19 VIRUS: ICD-10-CM

## 2022-02-25 DIAGNOSIS — S06.5X9A SUBDURAL HEMATOMA (HCC): Primary | ICD-10-CM

## 2022-02-25 DIAGNOSIS — S06.5X9A SUBDURAL HEMATOMA (HCC): ICD-10-CM

## 2022-02-25 DIAGNOSIS — F41.1 GENERALIZED ANXIETY DISORDER: Chronic | ICD-10-CM

## 2022-02-25 DIAGNOSIS — Z79.01 CHRONIC ANTICOAGULATION: ICD-10-CM

## 2022-02-25 PROCEDURE — 70450 CT HEAD/BRAIN W/O DYE: CPT

## 2022-02-25 PROCEDURE — G1004 CDSM NDSC: HCPCS

## 2022-02-25 PROCEDURE — 99309 SBSQ NF CARE MODERATE MDM 30: CPT | Performed by: NURSE PRACTITIONER

## 2022-02-25 NOTE — PROGRESS NOTES
[address] [facility]  [address]  Facility: [facility]    NAME: Gregg Aguirre  AGE: 80 y o  SEX: female    DATE OF ENCOUNTER: 2/25/2022    Code status:  DNR w/ Hospitalization    Assessment and Plan     1  Subdural hematoma (HCC)    2  C  difficile enteritis    3  Pneumonia due to COVID-19 virus    4  Chronic anticoagulation    5  Generalized anxiety disorder        All medications and routine orders were reviewed and updated as needed  Plan discussed with: Patient, nursing staff    Chief Complaint     Interim evaluation    History of Present Illness     Janae is doing well  She has her repeat CT Head this afternoon to further evaluate her subdural hematoma  She remains off [facility] which she was treated for recent DVT/PE in October 2021  Her bowels have stabilized and her appetite is slowly improving  Vitals and weight are stable  Nursing offers no concerns at this time  The following portions of the patient's history were reviewed and updated as appropriate: current medications, past family history, past medical history, past social history, past surgical history and problem list     Allergies: Allergies   Allergen Reactions    Preservision Areds [B-Plex Plus] Tremor    Tramadol      Reaction Date: 22Aug2011;        Review of Systems     Review of Systems   Constitutional: Negative  Negative for activity change, appetite change, chills, fatigue and fever  HENT: Positive for hearing loss  Negative for congestion, ear pain, postnasal drip and sinus pain  Eyes: Negative  Respiratory: Negative  Negative for cough and shortness of breath  Cardiovascular: Positive for leg swelling  Negative for chest pain and palpitations  Gastrointestinal: Negative  Negative for constipation and diarrhea  Endocrine: Negative  Genitourinary: Negative  Negative for decreased urine volume and dysuria     Musculoskeletal: Positive for arthralgias and gait problem  Skin: Negative  Allergic/Immunologic: Negative  Negative for immunocompromised state  Neurological: Positive for tremors  Negative for dizziness and light-headedness  Hematological: Negative  Psychiatric/Behavioral: Positive for confusion  Negative for sleep disturbance  Medications and orders     All medications reviewed and updated in Nursing Home EMR  Objective     Vitals: per nursing home records    Physical Exam  Vitals and nursing note reviewed  Constitutional:       Appearance: Normal appearance  HENT:      Head: Normocephalic and atraumatic  Right Ear: Tympanic membrane, ear canal and external ear normal       Left Ear: Tympanic membrane, ear canal and external ear normal       Nose: Nose normal       Mouth/Throat:      Mouth: Mucous membranes are moist       Pharynx: Oropharynx is clear  Eyes:      Extraocular Movements: Extraocular movements intact  Conjunctiva/sclera: Conjunctivae normal       Pupils: Pupils are equal, round, and reactive to light  Cardiovascular:      Rate and Rhythm: Normal rate and regular rhythm  Pulses: Normal pulses  Heart sounds: Murmur heard  Comments: S1S2 +ectopy  Pulmonary:      Effort: Pulmonary effort is normal       Breath sounds: Normal breath sounds  Abdominal:      General: Bowel sounds are normal       Palpations: Abdomen is soft  Musculoskeletal:         General: Deformity present  Normal range of motion  Cervical back: Normal range of motion and neck supple  Right lower leg: Edema present  Left lower leg: Edema present  Comments: +2 pitting RLE, +1 pitting LLE edema without tubi  in place  Negative homans b/l  +b/l DIP/PIP joint deformity   Skin:     General: Skin is warm and dry  Coloration: Skin is pale  Neurological:      General: No focal deficit present  Mental Status: She is alert  Mental status is at baseline  She is confused        GCS: GCS eye subscore is 4  GCS verbal subscore is 4  GCS motor subscore is 6  Motor: Tremor present  Gait: Gait abnormal    Psychiatric:         Mood and Affect: Mood and affect normal          Speech: Speech normal          Behavior: Behavior normal  Behavior is cooperative  Thought Content: Thought content normal          Cognition and Memory: Cognition is impaired  Memory is impaired  Judgment: Judgment normal       Comments: Alert oriented with forgetfulness  Able to follow commands and make needs known  Pertinent Laboratory/Diagnostic Studies: The following labs and studies were reviewed please see chart or hospital paperwork for details  Will ask for a BMP Monday 2/28/22  Will await further guidance from follow up with Neuro on 3/1/22        CYRUS Villegas  2/25/2022 10:34 AM

## 2022-02-28 ENCOUNTER — NURSING HOME VISIT (OUTPATIENT)
Dept: FAMILY MEDICINE CLINIC | Facility: CLINIC | Age: 87
End: 2022-02-28
Payer: COMMERCIAL

## 2022-02-28 DIAGNOSIS — Z86.718 HISTORY OF DVT (DEEP VEIN THROMBOSIS): ICD-10-CM

## 2022-02-28 DIAGNOSIS — A04.72 C. DIFFICILE ENTERITIS: ICD-10-CM

## 2022-02-28 DIAGNOSIS — F41.1 GENERALIZED ANXIETY DISORDER: Chronic | ICD-10-CM

## 2022-02-28 DIAGNOSIS — I10 ESSENTIAL HYPERTENSION: Chronic | ICD-10-CM

## 2022-02-28 DIAGNOSIS — S06.5X9A SUBDURAL HEMATOMA (HCC): Primary | ICD-10-CM

## 2022-02-28 PROCEDURE — 99309 SBSQ NF CARE MODERATE MDM 30: CPT | Performed by: NURSE PRACTITIONER

## 2022-02-28 NOTE — PROGRESS NOTES
[address]  [facility]    NAME: Josh Esparza  AGE: 80 y o  SEX: female    DATE OF ENCOUNTER: 2/28/2022    Code status:  DNR w/ Hospitalization    Assessment and Plan     1  Subdural hematoma ([address] )    2  Essential hypertension    3  C  difficile enteritis    4  History of DVT (deep vein thrombosis)    5  Generalized anxiety disorder        All medications and routine orders were reviewed and updated as needed  Plan discussed with: Patient    Chief Complaint     Interim evaluation    History of Present Illness     Raheel Clement is feeling stronger today however frustrated that she still needs assistance to get up  But once she is out of the chair she feels steady on her feet with RW  Her repeat CT head demonstrates a resolved subdural hematoma  She will have her follow up with neurology in hopes of restarting her [facility] tomorrow afternoon  Her appetite continues to improve, bowels are stable  Vitals and weight are stable  The following portions of the patient's history were reviewed and updated as appropriate: current medications, past family history, past medical history, past social history, past surgical history and problem list     Allergies: Allergies   Allergen Reactions    Preservision Areds [B-Plex Plus] Tremor    Tramadol      Reaction Date: 22Aug2011;        Review of Systems     Review of Systems   Constitutional: Negative  Negative for activity change, appetite change, chills, fatigue and fever  HENT: Negative  Negative for congestion, ear pain, postnasal drip and sinus pain  Eyes: Negative  Respiratory: Negative  Negative for cough, chest tightness and shortness of breath  Cardiovascular: Positive for leg swelling  Negative for chest pain and palpitations  Gastrointestinal: Negative  Negative for constipation and diarrhea  Endocrine: Negative  Genitourinary: Negative  Negative for dysuria  Musculoskeletal: Positive for arthralgias and gait problem  Skin: Negative  Allergic/Immunologic: Negative  Negative for immunocompromised state  Neurological: Positive for tremors  Negative for dizziness and light-headedness  Hematological: Negative  Psychiatric/Behavioral: Positive for confusion  Negative for sleep disturbance  Medications and orders     All medications reviewed and updated in Nursing Home EMR  Objective     Vitals: per nursing home records    Physical Exam  Vitals and nursing note reviewed  Constitutional:       Appearance: Normal appearance  HENT:      Head: Normocephalic and atraumatic  Right Ear: Tympanic membrane, ear canal and external ear normal       Left Ear: Tympanic membrane, ear canal and external ear normal       Ears:      Comments: [place]     Nose: Nose normal       Mouth/Throat:      Mouth: Mucous membranes are moist       Pharynx: Oropharynx is clear  Eyes:      Extraocular Movements: Extraocular movements intact  Conjunctiva/sclera: Conjunctivae normal       Pupils: Pupils are equal, round, and reactive to light  Cardiovascular:      Rate and Rhythm: Normal rate and regular rhythm  Pulses: Normal pulses  Heart sounds: Murmur heard  Pulmonary:      Effort: Pulmonary effort is normal       Breath sounds: Normal breath sounds  Abdominal:      General: Bowel sounds are normal       Palpations: Abdomen is soft  Musculoskeletal:         General: Normal range of motion  Cervical back: Normal range of motion and neck supple  Right lower leg: Edema present  Left lower leg: Edema present  Comments: +1 pitting BLE edema with tubi  in place  Skin:     General: Skin is warm and dry  Coloration: Skin is pale  Neurological:      General: No focal deficit present  Mental Status: She is alert and oriented to person, place, and time  Mental status is at baseline  Motor: Tremor present  Gait: Gait abnormal    Psychiatric:         Mood and Affect: Mood normal          Speech: Speech normal          Behavior: Behavior normal  Behavior is cooperative  Thought Content: Thought content normal          Judgment: Judgment normal       Comments: Alert oriented to person/place  Disoriented to time  Able to follow commands and make needs known  Pertinent Laboratory/Diagnostic Studies: The following labs and studies were reviewed please see chart or hospital paperwork for details  BMP pending  Will await further guidance from Neurology tomorrow  ADC 3/3/22      CYRUS Singh  2/28/2022 11:10 AM

## 2022-03-01 ENCOUNTER — TELEPHONE (OUTPATIENT)
Dept: FAMILY MEDICINE CLINIC | Facility: HOSPITAL | Age: 87
End: 2022-03-01

## 2022-03-01 ENCOUNTER — TELEMEDICINE (OUTPATIENT)
Dept: NEUROSURGERY | Facility: CLINIC | Age: 87
End: 2022-03-01
Payer: COMMERCIAL

## 2022-03-01 DIAGNOSIS — S06.5X9A SUBDURAL HEMATOMA (HCC): Primary | ICD-10-CM

## 2022-03-01 PROCEDURE — 99213 OFFICE O/P EST LOW 20 MIN: CPT | Performed by: NURSE PRACTITIONER

## 2022-03-01 NOTE — ASSESSMENT & PLAN NOTE
Patient seen in outpatient office today via virtual visit for post hospital follow-up for traumatic left frontal and interhemispheric subdural hematoma  · Patient initially presented to the ED on 01/10/2022 status post mechanical fall on Coumadin complaining of a headache  Patient's initial INR was 3 7 and was reversed with Kcentra  Patient was on Coumadin for history of PE and DVT  Imaging reviewed with Dr Jed Garner:    CT head wo 2/25/22:Resolved subdural hematoma in left frontal region  No new acute intracranial abnormality  Plan:  · Reviewed imaging with patient and daughter in-law  · Patient cleared from neurosurgical standpoint to restart Coumadin but would recommend patient follow-up with PCP to discuss risks versus benefits before restarting Coumadin  · Patient will follow-up prn or sooner symptoms worsen  · Patient made aware to seek care sooner if she develops any new or worsening neurological changes or red flag signs  · Patient made aware to contact Neurosurgery with any further questions or concerns

## 2022-03-01 NOTE — PATIENT INSTRUCTIONS
Patient will follow-up prn or if symptoms worsen    Patient cleared from neurosurgical standpoint to restart Coumadin but recommend patient follow-up with PCP to discuss risks versus benefits of restarting Coumadin

## 2022-03-01 NOTE — PROGRESS NOTES
Virtual Regular Visit    Verification of patient location:    Patient is located in the following state in which I hold an active license [place]      Assessment/Plan:    Problem List Items Addressed This Visit        Nervous and Auditory    Subdural hematoma ([address] ) - Primary     Patient seen in outpatient office today via virtual visit for post hospital follow-up for traumatic left frontal and interhemispheric subdural hematoma  · Patient initially presented to the ED on 01/10/2022 status post mechanical fall on Coumadin complaining of a headache  Patient's initial INR was 3 7 and was reversed with Kcentra  Patient was on Coumadin for history of PE and DVT  Imaging reviewed with Dr Akshat Dumas:    CT head wo 2/25/22:Resolved subdural hematoma in left frontal region  No new acute intracranial abnormality  Plan:  · Reviewed imaging with patient and daughter in-law  · Patient cleared from neurosurgical standpoint to restart Coumadin but would recommend patient follow-up with PCP to discuss risks versus benefits before restarting Coumadin  · Patient will follow-up prn or sooner symptoms worsen  · Patient made aware to seek care sooner if she develops any new or worsening neurological changes or red flag signs  · Patient made aware to contact Neurosurgery with any further questions or concerns  Reason for visit is   Chief Complaint   Patient presents with    Virtual Regular Visit        Encounter provider CYRUS Quezada    Provider located at [address] [place] [address]  [address] [id number]      Recent Visits  No visits were found meeting these conditions    Showing recent visits within past 7 days and meeting all other requirements  Today's Visits  Date Type Provider Dept   03/01/22 Telephone Nata Olivo MD [facility] [address]   03/01/22 [address] [place] [address]  [facility]   Showing today's visits and meeting all other requirements  Future Appointments  No visits were found meeting these conditions  Showing future appointments within next 150 days and meeting all other requirements       The patient was identified by name and date of birth  Oseas Soto was informed that this is a telemedicine visit and that the visit is being conducted through [address] [employer] and patient was informed that this is a secure, HIPAA-compliant platform  She agrees to proceed     My office door was closed  No one else was in the room  She acknowledged consent and understanding of privacy and security of the video platform  The patient has agreed to participate and understands they can discontinue the visit at any time  Patient is aware this is a billable service  Subjective  Oseas Soto is a 80 y o  female with past medical history significant for C diff, hypothyroidism, pneumonia, hypertension, pulmonary hypertension, urinary retention, COVID-19 infection, PE and DVT was previously on Coumadin  Patient seen in outpatient office today via virtual visit for post hospital follow-up for traumatic left frontal and interhemispheric subdural hematoma  Patient initially presented to the ED on 01/10 s/p mechanical fall on Coumadin complaining of a headache  Patient's initial INR was 3 7 and was reversed with Kcentra  Patient was on Coumadin for history of PE and DVT  Imaging demonstrated a traumatic left frontal and interhemispheric subdural hematoma  Repeat imaging was stable  Patient's Coumadin has since been on hold  Patient was also noted to have C diff during hospitalization and was discharged on vancomycin until 01/22/2022  Patient has no complaints at this time  She states she resides at facility and still needs help with ambulating but things are improving  She denies any recent falls or traumas or difficulty with her balance and states she uses a walker    She denies any headaches, dizziness, blurry vision, chest pain, shortness of breath, abdominal pain, nausea, vomiting, diarrhea, no problems with bowel or bladder, no new weakness or numbness/tingling        HPI     Past Medical History:   Diagnosis Date    Anxiety     C  difficile enteritis 2/4/2022    COVID-19 2/4/2022    Disease of thyroid gland        Past Surgical History:   Procedure Laterality Date    CATARACT EXTRACTION Right     FOOT SURGERY      JOINT REPLACEMENT         Current Outpatient Medications   Medication Sig Dispense Refill    acetaminophen (TYLENOL) 325 mg tablet Take 650 mg by mouth every 4 (four) hours as needed for mild pain      amLODIPine (NORVASC) 10 mg tablet Take 0 5 tablets (5 mg total) by mouth daily (Patient taking differently: Take 2 5 mg by mouth daily  ) 15 tablet 3    Ascorbic Acid (vitamin C) 1000 MG tablet Take 1,000 mg by mouth daily      Bisacodyl (DULCOLAX RE) Insert into the rectum as needed      Calcium Carb-Cholecalciferol (CALCIUM 600 + D PO) Take by mouth 2 (two) times a day      furosemide (LASIX) 40 mg tablet Take 40 mg by mouth daily      glycerin adult 2 g suppository Insert 1 suppository into the rectum as needed for constipation      LORazepam (ATIVAN) 1 mg tablet Take 0 5 tablets (0 5 mg total) by mouth daily at bedtime for 10 days 10 tablet 0    MAGNESIUM HYDROXIDE PO Take 30 mL by mouth daily as needed for constipation      metoprolol tartrate (LOPRESSOR) 25 mg tablet Take 1 tablet (25 mg total) by mouth every 12 (twelve) hours  0    Miconazole Nitrate (MARK ANTIFUNGAL EX) Apply topically      omeprazole (PriLOSEC) 20 mg delayed release capsule Take 20 mg by mouth daily      pantoprazole (PROTONIX) 40 mg tablet Take 1 tablet (40 mg total) by mouth daily in the early morning 30 tablet 3    Potassium Chloride (KLOR-CON PO) Take 40 mEq by mouth 2 (two) times a day      ZINC SULFATE PO Take 220 mg by mouth in the morning       No current facility-administered medications for this visit  Allergies   Allergen Reactions    Preservision Areds [B-Plex Plus] Tremor    Tramadol      Reaction Date: 22Aug2011;        Review of Systems   Constitutional: Negative  HENT: Negative  Eyes: Negative  Respiratory: Negative  Cardiovascular: Negative  Gastrointestinal: Negative  Endocrine: Negative  Genitourinary: Negative  Musculoskeletal: Negative  Skin: Negative  Allergic/Immunologic: Negative  Neurological: Negative  Hematological: Negative  Psychiatric/Behavioral: Negative  All other systems reviewed and are negative  ROS reviewed with patient and agree and changes were made as needed    Video Exam    There were no vitals filed for this visit  Exam:    Patient alert oriented x3, speech is clear, following commands, smile symmetrical, tongue midline,EOMI, patient here grossly intact, patient able to shrug bilateral shoulders up, patient able to lift bilateral arms above her head without difficulty or discomfort  Patient able to lift bilateral legs off of recliner without difficulty or discomfort  Per patient sensation to light touch is grossly intact in all extremities  Patient is able to perform simple math and identify an object  I spent 20 minutes directly with the patient during this visit    VIRTUAL VISIT DISCLAIMER      Hood Coto verbally agrees to participate in [employer]  Pt is aware that [employer] could be limited without vital signs or the ability to perform a full hands-on physical Jayson Ireland understands she or the provider may request at any time to terminate the video visit and request the patient to seek care or treatment in person

## 2022-03-03 ENCOUNTER — NURSING HOME VISIT (OUTPATIENT)
Dept: FAMILY MEDICINE CLINIC | Facility: CLINIC | Age: 87
End: 2022-03-03
Payer: COMMERCIAL

## 2022-03-03 DIAGNOSIS — I10 ESSENTIAL HYPERTENSION: Chronic | ICD-10-CM

## 2022-03-03 DIAGNOSIS — Z86.718 HISTORY OF DVT (DEEP VEIN THROMBOSIS): ICD-10-CM

## 2022-03-03 DIAGNOSIS — A04.72 C. DIFFICILE ENTERITIS: ICD-10-CM

## 2022-03-03 DIAGNOSIS — S06.5X9A SUBDURAL HEMATOMA (HCC): Primary | ICD-10-CM

## 2022-03-03 PROCEDURE — 99316 NF DSCHRG MGMT 30 MIN+: CPT | Performed by: NURSE PRACTITIONER

## 2022-03-03 NOTE — PROGRESS NOTES
[address]  Facility: [facility]    NAME: Malu Shah  AGE: 80 y o  SEX: female    DATE OF ENCOUNTER: 3/3/2022    Code status:  DNR w/ Hospitalization    Assessment and Plan     1  Subdural hematoma (HCC)    2  C  difficile enteritis    3  History of DVT (deep vein thrombosis)    4  Essential hypertension        All medications and routine orders were reviewed and updated as needed  Plan discussed with: Patient, nursing staff    Chief Complaint     Interim evaluation    History of Present Illness     Isela Landin is nervous to go home today  Reinforced to her that she will continue therapy from an outpatient standpoint and discussed all the progress she has made  She had follow up with Neurology on Tuesday and was advised to follow up with PCP to discuss risks vs benefits of restarting anticoagulation  +appetite and stable bowels  Denies pain  Asking for an extra pair of tubi   Vitals and weight stable  The following portions of the patient's history were reviewed and updated as appropriate: current medications, past family history, past medical history, past social history, past surgical history and problem list     Allergies: Allergies   Allergen Reactions    Preservision Areds [B-Plex Plus] Tremor    Tramadol      Reaction Date: 22Aug2011;        Review of Systems     Review of Systems   Constitutional: Negative  Negative for activity change, appetite change, chills, fatigue and fever  HENT: Negative  Negative for congestion, ear pain, postnasal drip and sinus pain  Eyes: Negative  Respiratory: Negative  Negative for cough and shortness of breath  Cardiovascular: Positive for leg swelling  Negative for chest pain and palpitations  Gastrointestinal: Negative  Negative for constipation and diarrhea  Endocrine: Negative  Genitourinary: Negative  Negative for dysuria     Musculoskeletal: Positive for gait problem  Skin: Negative  Allergic/Immunologic: Negative  Negative for immunocompromised state  Neurological: Positive for tremors  Negative for dizziness and light-headedness  Hematological: Negative  Psychiatric/Behavioral: Negative  Medications and orders     All medications reviewed and updated in Nursing Home EMR  Objective     Vitals: per nursing home records    Physical Exam  Vitals and nursing note reviewed  Constitutional:       Appearance: Normal appearance  HENT:      Head: Normocephalic and atraumatic  Right Ear: Tympanic membrane, ear canal and external ear normal       Left Ear: Tympanic membrane, ear canal and external ear normal       Nose: Nose normal       Mouth/Throat:      Mouth: Mucous membranes are moist       Pharynx: Oropharynx is clear  Eyes:      Extraocular Movements: Extraocular movements intact  Conjunctiva/sclera: Conjunctivae normal       Pupils: Pupils are equal, round, and reactive to light  Cardiovascular:      Rate and Rhythm: Normal rate and regular rhythm  Pulses: Normal pulses  Heart sounds: Murmur heard  Pulmonary:      Effort: Pulmonary effort is normal       Breath sounds: Normal breath sounds  Abdominal:      General: Bowel sounds are normal       Palpations: Abdomen is soft  Musculoskeletal:         General: Normal range of motion  Cervical back: Normal range of motion and neck supple  Right lower leg: Edema present  Left lower leg: Edema present  Comments: Trace edema with tubi    Skin:     General: Skin is warm and dry  Coloration: Skin is pale  Neurological:      General: No focal deficit present  Mental Status: She is alert and oriented to person, place, and time  Mental status is at baseline  Psychiatric:         Mood and Affect: Mood and affect normal          Speech: Speech normal          Behavior: Behavior normal  Behavior is cooperative           Thought Content: Thought content normal          Cognition and Memory: Cognition is impaired  Memory is impaired  Judgment: Judgment normal       Comments: Alert to person/place, disoriented to time  Able to follow commands and make needs known  Pertinent Laboratory/Diagnostic Studies: The following labs and studies were reviewed please see chart or hospital paperwork for details  Shiloh Price will be discharged home today with son/daughter in law  She will continue outpatient PT/OT  She will need to follow up with her PCP as per her Neurology follow up on 3/1/22 to discuss restarting Coumadin        Lizzy Damon  3/3/2022 10:23 AM

## 2022-03-04 DIAGNOSIS — Z71.89 COMPLEX CARE COORDINATION: ICD-10-CM

## 2022-03-04 DIAGNOSIS — N17.9 AKI (ACUTE KIDNEY INJURY) (HCC): Primary | ICD-10-CM

## 2022-03-07 ENCOUNTER — PATIENT OUTREACH (OUTPATIENT)
Dept: FAMILY MEDICINE CLINIC | Facility: HOSPITAL | Age: 87
End: 2022-03-07

## 2022-03-07 NOTE — PROGRESS NOTES
Outpatient Care Management Note:    Voice mail message left for Raheel Clement, with my contact information, introducing myself and requesting a call back

## 2022-03-08 ENCOUNTER — PATIENT OUTREACH (OUTPATIENT)
Dept: FAMILY MEDICINE CLINIC | Facility: HOSPITAL | Age: 87
End: 2022-03-08

## 2022-03-08 NOTE — PROGRESS NOTES
Outpatient Care Management Note:    Voice mail message received from Roula Catrachito returning my call about Isela Landin  Call back number is [phone]  Care manager called Ava Em back  He is listed on Sugey's communication form  He states that Isela Landin is doing well  She was seen by [facility] yesterday  CM reviewed that Isela Landin will need a BMP completed  CM will attempt to have the [facility] nurse draw the lab work  Isela Landin also needs follow up appts with Dr Ady Hendrix and GI  Isela Landin is scheduled with Dr Ady Hendrix on 4/6  CM will check to see if he wants to see her sooner to discuss whether coumadin should be restarted  CM gave Ava Em the contact information for GI  Med review completed  Ava Em reviewed his med list for Isela Landin  She is no longer taking amlodipine, Vit C, Calcium Carb-Cholecalciferol, Lorazepam, Miconazole cream, Pantoprazole or zinc sulfate  She is taking escitalopram 5mg daily which is not on her epic med list      CM gave Ava Em my contact information  He is aware to call Dr Skye Morris office directly for any urgent concerns  He noted that he is overwhelmed with all the phone calls  He did agree to CM following up later this week or early next week  BMP lab slip faxed to [facility]

## 2022-03-10 NOTE — PROGRESS NOTES
Can she come in at 10:20 next [facility]  OK to restart Coumadin at the same dose she was on before going into the hospital

## 2022-03-14 ENCOUNTER — PATIENT OUTREACH (OUTPATIENT)
Dept: FAMILY MEDICINE CLINIC | Facility: HOSPITAL | Age: 87
End: 2022-03-14

## 2022-03-14 NOTE — PROGRESS NOTES
Outpatient Care Management Note:    Care manager called Sugey's son, Lulú Velásquez  He states that she is still being followed by [facility]  [facility]  She is scheduled with Dr Sandy Bourgeois on Wednesday  Cm will follow up to see when her INR is due  Deacon Paredes had a virtual appt with GI on 2/23  Epic notes say follow up if symptoms worsen  Lulú Velásquez declined starting initial assessment today  CM will attempt to complete it on next outreach  Lulú Velásquez has my contact information and will call with any questions

## 2022-03-16 ENCOUNTER — OFFICE VISIT (OUTPATIENT)
Dept: FAMILY MEDICINE CLINIC | Facility: HOSPITAL | Age: 87
End: 2022-03-16
Payer: COMMERCIAL

## 2022-03-16 VITALS
HEIGHT: 58 IN | TEMPERATURE: 99.1 F | HEART RATE: 74 BPM | SYSTOLIC BLOOD PRESSURE: 110 MMHG | WEIGHT: 109.2 LBS | DIASTOLIC BLOOD PRESSURE: 60 MMHG | OXYGEN SATURATION: 96 % | BODY MASS INDEX: 22.92 KG/M2

## 2022-03-16 DIAGNOSIS — I27.82 OTHER CHRONIC PULMONARY EMBOLISM WITHOUT ACUTE COR PULMONALE (HCC): ICD-10-CM

## 2022-03-16 DIAGNOSIS — E03.9 ACQUIRED HYPOTHYROIDISM: ICD-10-CM

## 2022-03-16 DIAGNOSIS — A04.72 C. DIFFICILE ENTERITIS: ICD-10-CM

## 2022-03-16 DIAGNOSIS — S06.5X9A SUBDURAL HEMATOMA (HCC): ICD-10-CM

## 2022-03-16 DIAGNOSIS — Z86.718 HISTORY OF DVT (DEEP VEIN THROMBOSIS): Primary | ICD-10-CM

## 2022-03-16 DIAGNOSIS — I10 ESSENTIAL HYPERTENSION: Chronic | ICD-10-CM

## 2022-03-16 PROCEDURE — 1160F RVW MEDS BY RX/DR IN RCRD: CPT | Performed by: FAMILY MEDICINE

## 2022-03-16 PROCEDURE — 99214 OFFICE O/P EST MOD 30 MIN: CPT | Performed by: FAMILY MEDICINE

## 2022-03-16 PROCEDURE — 1036F TOBACCO NON-USER: CPT | Performed by: FAMILY MEDICINE

## 2022-03-16 RX ORDER — ASPIRIN 81 MG/1
81 TABLET ORAL DAILY
Start: 2022-03-16

## 2022-03-16 NOTE — PROGRESS NOTES
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)    C  difficile enteritis  Comments:  Colitis resolved          Subjective:      Patient ID: Eric Rapp is a 80 y o  female  HPI    The following portions of the patient's history were reviewed and updated as appropriate: allergies, current medications, past family history, past medical history, past social history, past surgical history and problem list     Review of Systems      Objective:      /60 (BP Location: Right arm, Patient Position: Sitting, Cuff Size: Standard)   Pulse 74   Temp 99 1 °F (37 3 °C) (Tympanic)   Ht 4' 9 99" (1 473 m)   Wt 49 5 kg (109 lb 3 2 oz)   SpO2 96%   BMI 22 83 kg/m²          Physical Exam  Vitals and nursing note reviewed  Cardiovascular:      Rate and Rhythm: Normal rate and regular rhythm  Pulses: Normal pulses  Heart sounds: Normal heart sounds  Pulmonary:      Effort: Pulmonary effort is normal       Breath sounds: Normal breath sounds  Musculoskeletal:      Right lower leg: No edema  Left lower leg: No edema  Neurological:      General: No focal deficit present  Mental Status: She is alert and oriented to person, place, and time     Psychiatric:         Mood and Affect: Mood normal

## 2022-03-21 ENCOUNTER — PATIENT OUTREACH (OUTPATIENT)
Dept: FAMILY MEDICINE CLINIC | Facility: HOSPITAL | Age: 87
End: 2022-03-21

## 2022-03-21 NOTE — PROGRESS NOTES
Outpatient Care Management Note:    Care manager called Asha Tellez and her son, Julissa Samayoa  He states that Asha Tellez is still being seen by [facility]  Therapy is scheduled to come today  Julissa Samayoa was able to get her out in the car for a drive today  She is using her walker and he denies any falls  Asha Finleywilfrid saw Dr Cheko Kenney last week  Her coumadin was discontinued  She did start aspirin daily  Julissa Samayoa denies any issues or concerns  He declined ongoing outreach  He will keep my contact information and will call with any questions  He is aware to call the PCP office directly with any urgent concerns

## 2022-03-31 DIAGNOSIS — I10 ESSENTIAL HYPERTENSION: Chronic | ICD-10-CM

## 2022-03-31 RX ORDER — FUROSEMIDE 40 MG/1
40 TABLET ORAL DAILY
Qty: 30 TABLET | Refills: 1 | Status: SHIPPED | OUTPATIENT
Start: 2022-03-31 | End: 2022-06-01 | Stop reason: SDUPTHER

## 2022-03-31 RX ORDER — POTASSIUM CHLORIDE 1.5 G/1.77G
40 POWDER, FOR SOLUTION ORAL 2 TIMES DAILY
Qty: 60 PACKET | Refills: 1 | Status: SHIPPED | OUTPATIENT
Start: 2022-03-31 | End: 2022-06-08 | Stop reason: DRUGHIGH

## 2022-04-01 ENCOUNTER — TELEPHONE (OUTPATIENT)
Dept: FAMILY MEDICINE CLINIC | Facility: HOSPITAL | Age: 87
End: 2022-04-01

## 2022-04-01 DIAGNOSIS — I10 ESSENTIAL HYPERTENSION: Chronic | ICD-10-CM

## 2022-04-01 RX ORDER — POTASSIUM CHLORIDE 20 MEQ/1
40 TABLET, EXTENDED RELEASE ORAL 2 TIMES DAILY
Qty: 120 TABLET | Refills: 3 | Status: SHIPPED | OUTPATIENT
Start: 2022-04-01

## 2022-04-01 NOTE — TELEPHONE ENCOUNTER
LM ON OUR VM @ 1052AM    potassium chloride (Klor-Con) 20 mEq packet          TOO EXPENSIVE , PLEASE CHANGE TO TABS AND RESEND

## 2022-04-04 ENCOUNTER — TELEPHONE (OUTPATIENT)
Dept: FAMILY MEDICINE CLINIC | Facility: HOSPITAL | Age: 87
End: 2022-04-04

## 2022-04-04 NOTE — TELEPHONE ENCOUNTER
Patient's son is requesting a refill of escitalipram 5mg tab 1 daily  Reports this was last filled by Dr Derrick Ricardo [facility])  I don't see this in her chart anywhere

## 2022-06-01 DIAGNOSIS — I10 ESSENTIAL HYPERTENSION: Chronic | ICD-10-CM

## 2022-06-01 RX ORDER — FUROSEMIDE 40 MG/1
40 TABLET ORAL DAILY
Qty: 30 TABLET | Refills: 3 | Status: SHIPPED | OUTPATIENT
Start: 2022-06-01

## 2022-06-03 DIAGNOSIS — I10 ESSENTIAL HYPERTENSION: Chronic | ICD-10-CM

## 2022-06-08 ENCOUNTER — OFFICE VISIT (OUTPATIENT)
Dept: FAMILY MEDICINE CLINIC | Facility: HOSPITAL | Age: 87
End: 2022-06-08
Payer: COMMERCIAL

## 2022-06-08 VITALS
SYSTOLIC BLOOD PRESSURE: 148 MMHG | DIASTOLIC BLOOD PRESSURE: 78 MMHG | HEIGHT: 58 IN | TEMPERATURE: 98.8 F | HEART RATE: 73 BPM | BODY MASS INDEX: 24.35 KG/M2 | WEIGHT: 116 LBS

## 2022-06-08 DIAGNOSIS — F51.05 INSOMNIA DUE TO ANXIETY AND FEAR: ICD-10-CM

## 2022-06-08 DIAGNOSIS — E03.9 ACQUIRED HYPOTHYROIDISM: ICD-10-CM

## 2022-06-08 DIAGNOSIS — L65.0 ACUTE TELOGEN EFFLUVIUM: ICD-10-CM

## 2022-06-08 DIAGNOSIS — I27.82 CHRONIC PULMONARY EMBOLISM WITHOUT ACUTE COR PULMONALE, UNSPECIFIED PULMONARY EMBOLISM TYPE (HCC): ICD-10-CM

## 2022-06-08 DIAGNOSIS — F40.9 INSOMNIA DUE TO ANXIETY AND FEAR: ICD-10-CM

## 2022-06-08 DIAGNOSIS — I10 ESSENTIAL HYPERTENSION: Primary | Chronic | ICD-10-CM

## 2022-06-08 PROBLEM — Z01.818 PRE-OP EXAM: Status: RESOLVED | Noted: 2019-05-10 | Resolved: 2022-06-08

## 2022-06-08 PROCEDURE — 99214 OFFICE O/P EST MOD 30 MIN: CPT | Performed by: FAMILY MEDICINE

## 2022-06-08 PROCEDURE — 1160F RVW MEDS BY RX/DR IN RCRD: CPT | Performed by: FAMILY MEDICINE

## 2022-06-08 PROCEDURE — 1036F TOBACCO NON-USER: CPT | Performed by: FAMILY MEDICINE

## 2022-06-08 RX ORDER — LORAZEPAM 0.5 MG/1
0.5 TABLET ORAL
Qty: 30 TABLET | Refills: 0 | Status: SHIPPED | OUTPATIENT
Start: 2022-06-08

## 2022-09-01 DIAGNOSIS — F40.9 INSOMNIA DUE TO ANXIETY AND FEAR: ICD-10-CM

## 2022-09-01 DIAGNOSIS — F51.05 INSOMNIA DUE TO ANXIETY AND FEAR: ICD-10-CM

## 2022-09-01 RX ORDER — LORAZEPAM 0.5 MG/1
0.5 TABLET ORAL
Qty: 30 TABLET | Refills: 0 | Status: SHIPPED | OUTPATIENT
Start: 2022-09-01

## 2022-09-09 ENCOUNTER — OFFICE VISIT (OUTPATIENT)
Dept: FAMILY MEDICINE CLINIC | Facility: HOSPITAL | Age: 87
End: 2022-09-09
Payer: COMMERCIAL

## 2022-09-09 VITALS
OXYGEN SATURATION: 99 % | BODY MASS INDEX: 25.19 KG/M2 | HEART RATE: 68 BPM | WEIGHT: 120 LBS | HEIGHT: 58 IN | SYSTOLIC BLOOD PRESSURE: 200 MMHG | TEMPERATURE: 98.1 F | DIASTOLIC BLOOD PRESSURE: 100 MMHG

## 2022-09-09 DIAGNOSIS — Z00.00 MEDICARE ANNUAL WELLNESS VISIT, SUBSEQUENT: Primary | ICD-10-CM

## 2022-09-09 DIAGNOSIS — I10 ESSENTIAL HYPERTENSION: Chronic | ICD-10-CM

## 2022-09-09 DIAGNOSIS — R19.7 DIARRHEA, UNSPECIFIED TYPE: ICD-10-CM

## 2022-09-09 DIAGNOSIS — F41.1 GENERALIZED ANXIETY DISORDER: Chronic | ICD-10-CM

## 2022-09-09 DIAGNOSIS — E03.9 ACQUIRED HYPOTHYROIDISM: ICD-10-CM

## 2022-09-09 PROBLEM — Z79.01 CHRONIC ANTICOAGULATION: Status: RESOLVED | Noted: 2021-11-22 | Resolved: 2022-09-09

## 2022-09-09 PROBLEM — L65.0 ACUTE TELOGEN EFFLUVIUM: Status: RESOLVED | Noted: 2022-06-08 | Resolved: 2022-09-09

## 2022-09-09 PROCEDURE — 3288F FALL RISK ASSESSMENT DOCD: CPT | Performed by: FAMILY MEDICINE

## 2022-09-09 PROCEDURE — 1125F AMNT PAIN NOTED PAIN PRSNT: CPT | Performed by: FAMILY MEDICINE

## 2022-09-09 PROCEDURE — 1170F FXNL STATUS ASSESSED: CPT | Performed by: FAMILY MEDICINE

## 2022-09-09 PROCEDURE — 1101F PT FALLS ASSESS-DOCD LE1/YR: CPT | Performed by: FAMILY MEDICINE

## 2022-09-09 PROCEDURE — 3725F SCREEN DEPRESSION PERFORMED: CPT | Performed by: FAMILY MEDICINE

## 2022-09-09 PROCEDURE — 1160F RVW MEDS BY RX/DR IN RCRD: CPT | Performed by: FAMILY MEDICINE

## 2022-09-09 PROCEDURE — 99214 OFFICE O/P EST MOD 30 MIN: CPT | Performed by: FAMILY MEDICINE

## 2022-09-09 PROCEDURE — G0439 PPPS, SUBSEQ VISIT: HCPCS | Performed by: FAMILY MEDICINE

## 2022-09-09 NOTE — PROGRESS NOTES
Assessment and Plan:     Problem List Items Addressed This Visit        Endocrine    Acquired hypothyroidism     Clinically euthyroid            Cardiovascular and Mediastinum    Essential hypertension (Chronic)     Very labile control- will check home readings            Other    Generalized anxiety disorder (Chronic)     Stable with PRN Lorazepam         Medicare annual wellness visit, subsequent - Primary    BMI 24 0-24 9, adult    Diarrhea     Now resolved             BMI Counseling: Body mass index is 25 17 kg/m²  The BMI is above normal  Nutrition recommendations include decreasing portion sizes, limiting drinks that contain sugar, moderation in carbohydrate intake and reducing intake of cholesterol  Exercise recommendations include strength training exercises  Rationale for BMI follow-up plan is due to patient being overweight or obese  Depression Screening and Follow-up Plan: Patient was screened for depression during today's encounter  They screened negative with a PHQ-2 score of 0  Preventive health issues were discussed with patient, and age appropriate screening tests were ordered as noted in patient's After Visit Summary  Personalized health advice and appropriate referrals for health education or preventive services given if needed, as noted in patient's After Visit Summary  History of Present Illness:     Patient presents for a Medicare Wellness Visit    3 month follow up    Seen by herself  No recent illness or injury  Happy with recent weight gain, appetite is good    As independent as possible    Medication list reviewed and revised     Patient Care Team:  Anum Dash MD as PCP - Lyndsay Torres MD     Review of Systems:     Review of Systems   Constitutional: Positive for unexpected weight change  HENT: Negative  Respiratory: Negative  Cardiovascular: Negative  Gastrointestinal: Negative  Genitourinary: Negative      Musculoskeletal: Positive for arthralgias  Neurological: Negative  Psychiatric/Behavioral: Negative  All other systems reviewed and are negative         Problem List:     Patient Active Problem List   Diagnosis    External hemorrhoids    Generalized anxiety disorder    Headache    Mixed hyperlipidemia    Essential hypertension    Acquired hypothyroidism    Intertrigo    Medicare annual wellness visit, subsequent    Cortical age-related cataract of left eye    BMI 24 0-24 9, adult    Severe sepsis (HCC)    Pneumonia    Elevated troponin    Chronic pulmonary embolism without acute cor pulmonale (HCC)    Paraesophageal hernia    Anemia    DVT (deep venous thrombosis) (HCC)    Pulmonary HTN (HCC)    Cardiac arrhythmia    Fall    Subdural hematoma (HCC)    Diarrhea    History of DVT (deep vein thrombosis)    Altered mental status    Hypernatremia    Dysphagia    Urinary retention    Pancolitis    C  difficile enteritis    Pneumonia due to COVID-19 virus    Insomnia due to anxiety and fear      Past Medical and Surgical History:     Past Medical History:   Diagnosis Date    Anxiety     C  difficile enteritis 2/4/2022    COVID-19 2/4/2022    Disease of thyroid gland      Past Surgical History:   Procedure Laterality Date    CATARACT EXTRACTION Right     FOOT SURGERY      JOINT REPLACEMENT        Family History:     Family History   Problem Relation Age of Onset    No Known Problems Mother     Stroke Father     Colon cancer Neg Hx     Colon polyps Neg Hx       Social History:     Social History     Socioeconomic History    Marital status: /Civil Union     Spouse name: None    Number of children: None    Years of education: None    Highest education level: None   Occupational History    None   Tobacco Use    Smoking status: Never Smoker    Smokeless tobacco: Never Used   Vaping Use    Vaping Use: Never used   Substance and Sexual Activity    Alcohol use: No    Drug use: No    Sexual activity: None   Other Topics Concern    None   Social History Narrative    None     Social Determinants of Health     Financial Resource Strain: Low Risk     Difficulty of Paying Living Expenses: Not very hard   Food Insecurity: No Food Insecurity    Worried About Running Out of Food in the Last Year: Never true    Rhys of Food in the Last Year: Never true   Transportation Needs: No Transportation Needs    Lack of Transportation (Medical): No    Lack of Transportation (Non-Medical): No   Physical Activity: Not on file   Stress: Not on file   Social Connections: Not on file   Intimate Partner Violence: Not on file   Housing Stability: Unknown    Unable to Pay for Housing in the Last Year: No    Number of Places Lived in the Last Year: Not on file    Unstable Housing in the Last Year: No      Medications and Allergies:     Current Outpatient Medications   Medication Sig Dispense Refill    aspirin (ECOTRIN LOW STRENGTH) 81 mg EC tablet Take 1 tablet (81 mg total) by mouth daily      Bisacodyl (DULCOLAX RE) Insert into the rectum as needed      furosemide (LASIX) 40 mg tablet Take 1 tablet (40 mg total) by mouth daily 30 tablet 3    LORazepam (Ativan) 0 5 mg tablet Take 1 tablet (0 5 mg total) by mouth daily at bedtime 30 tablet 0    metoprolol tartrate (LOPRESSOR) 25 mg tablet Take 1 tablet (25 mg total) by mouth every 12 (twelve) hours 60 tablet 5    omeprazole (PriLOSEC) 20 mg delayed release capsule Take 20 mg by mouth daily      potassium chloride (Klor-Con M20) 20 mEq tablet Take 2 tablets (40 mEq total) by mouth 2 (two) times a day 120 tablet 3    amLODIPine (NORVASC) 10 mg tablet Take 0 5 tablets (5 mg total) by mouth daily (Patient taking differently: Take 2 5 mg by mouth daily  ) 15 tablet 3     No current facility-administered medications for this visit       Allergies   Allergen Reactions    Preservision Areds [B-Plex Plus] Tremor    Tramadol      Reaction Date: [id number]; Immunizations:     Immunization History   Administered Date(s) Administered    COVID-19 PFIZER VACCINE 0 3 ML IM 04/22/2021    Tdap 07/02/2019, 01/10/2022      Health Maintenance:         Topic Date Due    DXA SCAN  03/09/2023 (Originally 1934)         Topic Date Due    Pneumococcal Vaccine: 65+ Years (1 - PCV) Never done    COVID-19 Vaccine (2 - Pfizer series) 05/13/2021    Influenza Vaccine (1) 09/01/2022      Medicare Screening Tests and Risk Assessments:     Jeremías Sandhu is here for her Subsequent Wellness visit  Last Medicare Wellness visit information reviewed, patient interviewed and updates made to the record today  Health Risk Assessment:   Patient rates overall health as good  Patient feels that their physical health rating is same  Patient is dissatisfied with their life  Eyesight was rated as slightly worse  Hearing was rated as slightly worse  Patient feels that their emotional and mental health rating is same  Patients states they are never, rarely angry  Patient states they are sometimes unusually tired/fatigued  Pain experienced in the last 7 days has been some  Patient's pain rating has been 2/10  Patient states that she has experienced no weight loss or gain in last 6 months  Depression Screening:   PHQ-2 Score: 0      Fall Risk Screening: In the past year, patient has experienced: no history of falling in past year      Urinary Incontinence Screening:   Patient has not leaked urine accidently in the last six months  Home Safety:  Patient does not have trouble with stairs inside or outside of their home  Patient has working smoke alarms and has no working carbon monoxide detector  Home safety hazards include: none  Nutrition:   Current diet is Regular and No Added Salt  Medications:   Patient is currently taking over-the-counter supplements  OTC medications include: see medication list  Patient is able to manage medications       Activities of Daily Living (ADLs)/Instrumental Activities of Daily Living (IADLs):   Walk and transfer into and out of bed and chair?: Yes  Dress and groom yourself?: Yes    Bathe or shower yourself?: Yes    Feed yourself? Yes  Do your laundry/housekeeping?: Yes  Manage your money, pay your bills and track your expenses?: Yes  Make your own meals?: Yes    Do your own shopping?: Yes    Previous Hospitalizations:   Any hospitalizations or ED visits within the last 12 months?: Yes    How many hospitalizations have you had in the last year?: 1-2    Hospitalization Comments: Patient had a stroke / and blood clots    Advance Care Planning:   Living will: Yes    Durable POA for healthcare:  Yes    Advanced directive: Yes    Advanced directive counseling given: No    Five wishes given: No    Patient declined ACP directive: No    End of Life Decisions reviewed with patient: Yes    Provider agrees with end of life decisions: Yes      Cognitive Screening:   Provider or family/friend/caregiver concerned regarding cognition?: No    PREVENTIVE SCREENINGS      Cardiovascular Screening:    General: Screening Not Indicated and History Lipid Disorder      Diabetes Screening:     General: Screening Current      Colorectal Cancer Screening:     General: Screening Not Indicated      Breast Cancer Screening:     General: Screening Not Indicated      Cervical Cancer Screening:    General: Screening Not Indicated      Osteoporosis Screening:    General: Screening Current      Abdominal Aortic Aneurysm (AAA) Screening:        General: Screening Not Indicated      Lung Cancer Screening:     General: Screening Not Indicated      Hepatitis C Screening:    General: Screening Not Indicated    Screening, Brief Intervention, and Referral to Treatment (SBIRT)    Screening      Single Item Drug Screening:  How often have you used an illegal drug (including marijuana) or a prescription medication for non-medical reasons in the past year? never    Single Item Drug Screen Score: 0  Interpretation: Negative screen for possible drug use disorder    No exam data present     Physical Exam:     BP (!) 200/100   Pulse 68   Temp 98 1 °F (36 7 °C)   Ht 4' 9 9" (1 471 m)   Wt 54 4 kg (120 lb)   SpO2 99%   BMI 25 17 kg/m²     Physical Exam  Vitals and nursing note reviewed  Neck:      Vascular: No carotid bruit  Cardiovascular:      Rate and Rhythm: Normal rate and regular rhythm  Heart sounds: Normal heart sounds  Pulmonary:      Breath sounds: Normal breath sounds  Musculoskeletal:      Right lower leg: No edema  Left lower leg: No edema  Neurological:      Mental Status: She is alert and oriented to person, place, and time     Psychiatric:         Mood and Affect: Mood normal           Ewa Choudhary MD

## 2022-10-12 PROBLEM — U07.1 PNEUMONIA DUE TO COVID-19 VIRUS: Status: RESOLVED | Noted: 2022-02-04 | Resolved: 2022-10-12

## 2022-10-12 PROBLEM — J18.9 PNEUMONIA: Status: RESOLVED | Noted: 2021-11-02 | Resolved: 2022-10-12

## 2022-10-12 PROBLEM — A41.9 SEVERE SEPSIS (HCC): Status: RESOLVED | Noted: 2021-11-02 | Resolved: 2022-10-12

## 2022-10-12 PROBLEM — J12.82 PNEUMONIA DUE TO COVID-19 VIRUS: Status: RESOLVED | Noted: 2022-02-04 | Resolved: 2022-10-12

## 2022-10-12 PROBLEM — R65.20 SEVERE SEPSIS (HCC): Status: RESOLVED | Noted: 2021-11-02 | Resolved: 2022-10-12

## 2022-11-08 DIAGNOSIS — F51.05 INSOMNIA DUE TO ANXIETY AND FEAR: ICD-10-CM

## 2022-11-08 DIAGNOSIS — F40.9 INSOMNIA DUE TO ANXIETY AND FEAR: ICD-10-CM

## 2022-11-08 RX ORDER — LORAZEPAM 0.5 MG/1
0.5 TABLET ORAL
Qty: 30 TABLET | Refills: 0 | Status: SHIPPED | OUTPATIENT
Start: 2022-11-08

## 2022-12-13 DIAGNOSIS — I10 ESSENTIAL HYPERTENSION: Chronic | ICD-10-CM

## 2023-01-18 DIAGNOSIS — F40.9 INSOMNIA DUE TO ANXIETY AND FEAR: ICD-10-CM

## 2023-01-18 DIAGNOSIS — F51.05 INSOMNIA DUE TO ANXIETY AND FEAR: ICD-10-CM

## 2023-01-18 RX ORDER — LORAZEPAM 0.5 MG/1
0.5 TABLET ORAL
Qty: 30 TABLET | Refills: 2 | Status: SHIPPED | OUTPATIENT
Start: 2023-01-18

## 2023-02-22 DIAGNOSIS — I10 ESSENTIAL HYPERTENSION: Chronic | ICD-10-CM

## 2023-02-23 RX ORDER — FUROSEMIDE 40 MG/1
40 TABLET ORAL DAILY
Qty: 30 TABLET | Refills: 5 | Status: SHIPPED | OUTPATIENT
Start: 2023-02-23

## 2023-03-28 DIAGNOSIS — I10 ESSENTIAL HYPERTENSION: Chronic | ICD-10-CM

## 2023-03-28 RX ORDER — POTASSIUM CHLORIDE 20 MEQ/1
40 TABLET, EXTENDED RELEASE ORAL 2 TIMES DAILY
Qty: 120 TABLET | Refills: 3 | Status: SHIPPED | OUTPATIENT
Start: 2023-03-28

## 2023-04-10 ENCOUNTER — OFFICE VISIT (OUTPATIENT)
Dept: FAMILY MEDICINE CLINIC | Facility: HOSPITAL | Age: 88
End: 2023-04-10

## 2023-04-10 VITALS
TEMPERATURE: 99.1 F | OXYGEN SATURATION: 99 % | HEIGHT: 58 IN | BODY MASS INDEX: 27.12 KG/M2 | DIASTOLIC BLOOD PRESSURE: 90 MMHG | WEIGHT: 129.2 LBS | HEART RATE: 75 BPM | SYSTOLIC BLOOD PRESSURE: 124 MMHG

## 2023-04-10 DIAGNOSIS — E03.9 ACQUIRED HYPOTHYROIDISM: Primary | ICD-10-CM

## 2023-04-10 DIAGNOSIS — F51.05 INSOMNIA DUE TO ANXIETY AND FEAR: ICD-10-CM

## 2023-04-10 DIAGNOSIS — F41.1 GENERALIZED ANXIETY DISORDER: Chronic | ICD-10-CM

## 2023-04-10 DIAGNOSIS — F40.9 INSOMNIA DUE TO ANXIETY AND FEAR: ICD-10-CM

## 2023-04-10 DIAGNOSIS — I10 ESSENTIAL HYPERTENSION: Chronic | ICD-10-CM

## 2023-04-10 NOTE — PROGRESS NOTES
Name: Cecy Alcocer      : 1934      MRN: [id number]  Encounter Provider: Suzan Parker MD  Encounter Date: 4/10/2023   [facility]  Acquired hypothyroidism  Assessment & Plan:  Clinically euthyroid      2  Essential hypertension  Assessment & Plan:  Excellent BP control      3  Generalized anxiety disorder  Assessment & Plan:  Stable Lorazepam use      4  Insomnia due to anxiety and fear    BMI Counseling: Body mass index is 27 1 kg/m²  The BMI is above normal  Nutrition recommendations include decreasing portion sizes, encouraging healthy choices of fruits and vegetables, limiting drinks that contain sugar and moderation in carbohydrate intake  Exercise recommendations include strength training exercises  No pharmacotherapy was ordered  Rationale for BMI follow-up plan is due to patient being overweight or obese  Subjective      6 month follow up    Intolerant of K+ Rx    No recent illness or injury, no falls    Medications reviewed and list revised  She declines lab testing    Mood is stable, she doesn't want to change Lorazepam      Review of Systems   Constitutional: Positive for unexpected weight change  Negative for fatigue  Respiratory: Negative  Cardiovascular: Negative  Gastrointestinal: Negative  Musculoskeletal: Positive for arthralgias and gait problem  Psychiatric/Behavioral: The patient is nervous/anxious  All other systems reviewed and are negative        Current Outpatient Medications on File Prior to Visit   Medication Sig    aspirin (ECOTRIN LOW STRENGTH) 81 mg EC tablet Take 1 tablet (81 mg total) by mouth daily    Bisacodyl (DULCOLAX RE) Insert into the rectum as needed    furosemide (LASIX) 40 mg tablet Take 1 tablet (40 mg total) by mouth daily    LORazepam (Ativan) 0 5 mg tablet Take 1 tablet (0 5 mg total) by mouth daily at bedtime    metoprolol tartrate (LOPRESSOR) 25 "mg tablet Take 1 tablet (25 mg total) by mouth every 12 (twelve) hours    omeprazole (PriLOSEC) 20 mg delayed release capsule Take 20 mg by mouth daily    potassium chloride (Klor-Con M20) 20 mEq tablet Take 2 tablets (40 mEq total) by mouth 2 (two) times a day    amLODIPine (NORVASC) 10 mg tablet Take 0 5 tablets (5 mg total) by mouth daily (Patient taking differently: Take 2 5 mg by mouth daily  )       Objective     /90 (BP Location: Left arm, Patient Position: Sitting, Cuff Size: Standard)   Pulse 75   Temp 99 1 °F (37 3 °C) (Tympanic)   Ht 4' 9 9\" (1 471 m)   Wt 58 6 kg (129 lb 3 2 oz)   SpO2 99%   BMI 27 10 kg/m²     Physical Exam  Vitals and nursing note reviewed  Constitutional:       Appearance: Normal appearance  Cardiovascular:      Rate and Rhythm: Normal rate and regular rhythm  Heart sounds: Normal heart sounds  Pulmonary:      Effort: Pulmonary effort is normal       Breath sounds: Normal breath sounds  Musculoskeletal:      Right lower leg: No edema  Left lower leg: No edema  Neurological:      Mental Status: She is oriented to person, place, and time     Psychiatric:         Mood and Affect: Mood normal        Gregorio Hannah MD  "

## 2023-06-20 DIAGNOSIS — F51.05 INSOMNIA DUE TO ANXIETY AND FEAR: ICD-10-CM

## 2023-06-20 DIAGNOSIS — F40.9 INSOMNIA DUE TO ANXIETY AND FEAR: ICD-10-CM

## 2023-06-20 RX ORDER — LORAZEPAM 0.5 MG/1
0.5 TABLET ORAL
Qty: 30 TABLET | Refills: 2 | Status: SHIPPED | OUTPATIENT
Start: 2023-06-20

## 2023-07-11 DIAGNOSIS — I10 ESSENTIAL HYPERTENSION: Chronic | ICD-10-CM

## 2023-10-11 ENCOUNTER — OFFICE VISIT (OUTPATIENT)
Dept: FAMILY MEDICINE CLINIC | Facility: HOSPITAL | Age: 88
End: 2023-10-11
Payer: COMMERCIAL

## 2023-10-11 VITALS
WEIGHT: 136.4 LBS | TEMPERATURE: 97.3 F | HEIGHT: 58 IN | DIASTOLIC BLOOD PRESSURE: 92 MMHG | BODY MASS INDEX: 28.63 KG/M2 | HEART RATE: 72 BPM | OXYGEN SATURATION: 97 % | SYSTOLIC BLOOD PRESSURE: 138 MMHG

## 2023-10-11 DIAGNOSIS — F41.1 GENERALIZED ANXIETY DISORDER: Chronic | ICD-10-CM

## 2023-10-11 DIAGNOSIS — Z00.00 MEDICARE ANNUAL WELLNESS VISIT, SUBSEQUENT: Primary | ICD-10-CM

## 2023-10-11 DIAGNOSIS — I48.0 PAROXYSMAL ATRIAL FIBRILLATION (HCC): ICD-10-CM

## 2023-10-11 DIAGNOSIS — E03.9 ACQUIRED HYPOTHYROIDISM: ICD-10-CM

## 2023-10-11 DIAGNOSIS — I10 ESSENTIAL HYPERTENSION: Chronic | ICD-10-CM

## 2023-10-11 DIAGNOSIS — N30.01 ACUTE CYSTITIS WITH HEMATURIA: ICD-10-CM

## 2023-10-11 PROBLEM — R79.89 ELEVATED TROPONIN: Status: RESOLVED | Noted: 2021-11-02 | Resolved: 2023-10-11

## 2023-10-11 PROBLEM — R19.7 DIARRHEA: Status: RESOLVED | Noted: 2022-01-11 | Resolved: 2023-10-11

## 2023-10-11 LAB
SL AMB  POCT GLUCOSE, UA: ABNORMAL
SL AMB LEUKOCYTE ESTERASE,UA: ABNORMAL
SL AMB POCT BILIRUBIN,UA: ABNORMAL
SL AMB POCT BLOOD,UA: ABNORMAL
SL AMB POCT CLARITY,UA: CLEAR
SL AMB POCT COLOR,UA: YELLOW
SL AMB POCT KETONES,UA: ABNORMAL
SL AMB POCT NITRITE,UA: ABNORMAL
SL AMB POCT PH,UA: 6
SL AMB POCT SPECIFIC GRAVITY,UA: 1.02
SL AMB POCT URINE PROTEIN: ABNORMAL
SL AMB POCT UROBILINOGEN: ABNORMAL

## 2023-10-11 PROCEDURE — G0439 PPPS, SUBSEQ VISIT: HCPCS | Performed by: FAMILY MEDICINE

## 2023-10-11 PROCEDURE — 99214 OFFICE O/P EST MOD 30 MIN: CPT | Performed by: FAMILY MEDICINE

## 2023-10-11 PROCEDURE — 81002 URINALYSIS NONAUTO W/O SCOPE: CPT | Performed by: FAMILY MEDICINE

## 2023-10-11 RX ORDER — LEVOFLOXACIN 250 MG/1
250 TABLET ORAL DAILY
Qty: 7 TABLET | Refills: 0 | Status: SHIPPED | OUTPATIENT
Start: 2023-10-11 | End: 2023-10-18

## 2023-10-11 RX ORDER — ESCITALOPRAM OXALATE 5 MG/1
5 TABLET ORAL DAILY
Qty: 30 TABLET | Refills: 5 | Status: SHIPPED | OUTPATIENT
Start: 2023-10-11

## 2023-10-11 NOTE — PROGRESS NOTES
Assessment and Plan:     Problem List Items Addressed This Visit          Endocrine    Acquired hypothyroidism     Clinically euthyroid            Cardiovascular and Mediastinum    Essential hypertension (Chronic)     Adequate BP control         Paroxysmal atrial fibrillation (HCC)     Anticoagulation contraindicated due to fall risk            Genitourinary    Acute cystitis with hematuria    Relevant Medications    levofloxacin (LEVAQUIN) 250 mg tablet    Other Relevant Orders    Urine culture    POCT urine dip (Completed)       Other    Generalized anxiety disorder (Chronic)     Hold Lorazepam         Relevant Medications    escitalopram (LEXAPRO) 5 mg tablet    Medicare annual wellness visit, subsequent - Primary    BMI 28.0-28.9,adult       Depression Screening and Follow-up Plan: Patient was screened for depression during today's encounter. They screened negative with a PHQ-2 score of 0. Urinary Incontinence Plan of Care: counseling topics discussed: use restroom every 2 hours, limiting fluid intake 3-4 hours before bed and preventing constipation. Preventive health issues were discussed with patient, and age appropriate screening tests were ordered as noted in patient's After Visit Summary. Personalized health advice and appropriate referrals for health education or preventive services given if needed, as noted in patient's After Visit Summary.      History of Present Illness:     Patient presents for a Medicare Wellness Visit    AWV and 6 month follow up on medical issues    No recent injury or illness    Some abdominal discomfort and wonders about urine being infected  POCT urine with blood and protein  Has hx of urine retention    Uses stool softener and PRN laxative    Was pretty regular with Lorazepam at bedtime but wonders about a different med specifically Escitalopram  OK for trial and hold the Lorazepam       Patient Care Team:  Ab Wong MD as PCP - Adriana Caraballo MD Review of Systems:     Review of Systems   Constitutional:  Positive for unexpected weight change. Negative for fever. Gastrointestinal:  Positive for abdominal pain and constipation. Genitourinary:  Positive for frequency. Musculoskeletal:  Positive for arthralgias. Neurological:  Positive for light-headedness. Psychiatric/Behavioral:  Positive for dysphoric mood and sleep disturbance. All other systems reviewed and are negative.        Problem List:     Patient Active Problem List   Diagnosis    External hemorrhoids    Generalized anxiety disorder    Headache    Mixed hyperlipidemia    Essential hypertension    Acquired hypothyroidism    Intertrigo    Medicare annual wellness visit, subsequent    Cortical age-related cataract of left eye    BMI 28.0-28.9,adult    Chronic pulmonary embolism without acute cor pulmonale (HCC)    Paraesophageal hernia    Anemia    DVT (deep venous thrombosis) (HCC)    Pulmonary HTN (HCC)    Cardiac arrhythmia    Fall    Subdural hematoma (HCC)    History of DVT (deep vein thrombosis)    Altered mental status    Hypernatremia    Dysphagia    Urinary retention    Pancolitis    C. difficile enteritis    Insomnia due to anxiety and fear    Acute cystitis with hematuria    Paroxysmal atrial fibrillation (HCC)      Past Medical and Surgical History:     Past Medical History:   Diagnosis Date    Anxiety     C. difficile enteritis 2/4/2022    COVID-19 2/4/2022    Disease of thyroid gland      Past Surgical History:   Procedure Laterality Date    CATARACT EXTRACTION Right     FOOT SURGERY      JOINT REPLACEMENT        Family History:     Family History   Problem Relation Age of Onset    No Known Problems Mother     Stroke Father     Colon cancer Neg Hx     Colon polyps Neg Hx       Social History:     Social History     Socioeconomic History    Marital status: /Civil Union     Spouse name: None    Number of children: None    Years of education: None    Highest education level: None   Occupational History    None   Tobacco Use    Smoking status: Never    Smokeless tobacco: Never   Vaping Use    Vaping Use: Never used   Substance and Sexual Activity    Alcohol use: No    Drug use: No    Sexual activity: None   Other Topics Concern    None   Social History Narrative    None     Social Determinants of Health     Financial Resource Strain: Low Risk  (10/11/2023)    Overall Financial Resource Strain (CARDIA)     Difficulty of Paying Living Expenses: Not very hard   Food Insecurity: No Food Insecurity (1/11/2022)    Hunger Vital Sign     Worried About Running Out of Food in the Last Year: Never true     Ran Out of Food in the Last Year: Never true   Transportation Needs: No Transportation Needs (10/11/2023)    PRAPARE - Transportation     Lack of Transportation (Medical): No     Lack of Transportation (Non-Medical):  No   Physical Activity: Not on file   Stress: Not on file   Social Connections: Not on file   Intimate Partner Violence: Not on file   Housing Stability: Unknown (1/11/2022)    Housing Stability Vital Sign     Unable to Pay for Housing in the Last Year: No     Number of Places Lived in the Last Year: Not on file     Unstable Housing in the Last Year: No      Medications and Allergies:     Current Outpatient Medications   Medication Sig Dispense Refill    aspirin (ECOTRIN LOW STRENGTH) 81 mg EC tablet Take 1 tablet (81 mg total) by mouth daily      Bisacodyl (DULCOLAX RE) Insert into the rectum as needed      escitalopram (LEXAPRO) 5 mg tablet Take 1 tablet (5 mg total) by mouth daily 30 tablet 5    furosemide (LASIX) 40 mg tablet Take 1 tablet (40 mg total) by mouth daily 30 tablet 5    levofloxacin (LEVAQUIN) 250 mg tablet Take 1 tablet (250 mg total) by mouth daily for 7 days 7 tablet 0    LORazepam (Ativan) 0.5 mg tablet Take 1 tablet (0.5 mg total) by mouth daily at bedtime 30 tablet 2    metoprolol tartrate (LOPRESSOR) 25 mg tablet Take 1 tablet (25 mg total) by mouth every 12 (twelve) hours 60 tablet 5    omeprazole (PriLOSEC) 20 mg delayed release capsule Take 20 mg by mouth daily      potassium chloride (Klor-Con M20) 20 mEq tablet Take 2 tablets (40 mEq total) by mouth 2 (two) times a day 120 tablet 3    amLODIPine (NORVASC) 10 mg tablet Take 0.5 tablets (5 mg total) by mouth daily (Patient taking differently: Take 2.5 mg by mouth daily  ) 15 tablet 3     No current facility-administered medications for this visit. Allergies   Allergen Reactions    Preservision Areds [B-Plex Plus] Tremor    Tramadol      Reaction Date: 22Aug2011;       Immunizations:     Immunization History   Administered Date(s) Administered    COVID-19 PFIZER VACCINE 0.3 ML IM 04/22/2021    Tdap 07/02/2019, 01/10/2022      Health Maintenance:         Topic Date Due    DXA SCAN  Never done         Topic Date Due    Pneumococcal Vaccine: 65+ Years (1 - PCV) Never done    COVID-19 Vaccine (2 - Pfizer series) 06/17/2021    Influenza Vaccine (1) Never done      Medicare Screening Tests and Risk Assessments:     Lazaro Jefferson is here for her Subsequent Wellness visit. Last Medicare Wellness visit information reviewed, patient interviewed and updates made to the record today. Health Risk Assessment:   Patient rates overall health as fair. Patient feels that their physical health rating is same. Patient is satisfied with their life. Eyesight was rated as slightly worse. Hearing was rated as slightly worse. Patient feels that their emotional and mental health rating is same. Patients states they are never, rarely angry. Patient states they are never, rarely unusually tired/fatigued. Pain experienced in the last 7 days has been none. Patient states that she has experienced no weight loss or gain in last 6 months. Depression Screening:   PHQ-2 Score: 0      Fall Risk Screening:    In the past year, patient has experienced: no history of falling in past year      Urinary Incontinence Screening:   Patient has leaked urine accidently in the last six months. Home Safety:  Patient does not have trouble with stairs inside or outside of their home. Patient has working smoke alarms and has working carbon monoxide detector. Home safety hazards include: none. Nutrition:   Current diet is Regular. Medications:   Patient is not currently taking any over-the-counter supplements. Patient is able to manage medications. Activities of Daily Living (ADLs)/Instrumental Activities of Daily Living (IADLs):   Walk and transfer into and out of bed and chair?: Yes  Dress and groom yourself?: Yes    Bathe or shower yourself?: Yes    Feed yourself? Yes  Do your laundry/housekeeping?: Yes  Manage your money, pay your bills and track your expenses?: Yes  Make your own meals?: Yes    Do your own shopping?: Yes    Previous Hospitalizations:   Any hospitalizations or ED visits within the last 12 months?: No      Advance Care Planning:   Living will: Yes    Durable POA for healthcare:  Yes    Advanced directive: Yes    Advanced directive counseling given: Yes    Five wishes given: No    Patient declined ACP directive: No    End of Life Decisions reviewed with patient: Yes    Provider agrees with end of life decisions: Yes      Cognitive Screening:   Provider or family/friend/caregiver concerned regarding cognition?: No    PREVENTIVE SCREENINGS      Cardiovascular Screening:    General: Screening Not Indicated and History Lipid Disorder      Diabetes Screening:     General: Patient Declines      Colorectal Cancer Screening:     General: Screening Not Indicated      Breast Cancer Screening:     General: Screening Not Indicated      Cervical Cancer Screening:    General: Screening Not Indicated      Osteoporosis Screening:    General: Screening Not Indicated      Abdominal Aortic Aneurysm (AAA) Screening:        General: Screening Not Indicated      Lung Cancer Screening:     General: Screening Not Indicated      Hepatitis C Screening: General: Screening Not Indicated    Screening, Brief Intervention, and Referral to Treatment (SBIRT)    Screening  Typical number of drinks in a day: 0  Typical number of drinks in a week: 0  Interpretation: Low risk drinking behavior. Single Item Drug Screening:  How often have you used an illegal drug (including marijuana) or a prescription medication for non-medical reasons in the past year? never    Single Item Drug Screen Score: 0  Interpretation: Negative screen for possible drug use disorder    No results found. Physical Exam:     /92   Pulse 72   Temp (!) 97.3 °F (36.3 °C) (Tympanic)   Ht 4' 9.9" (1.471 m)   Wt 61.9 kg (136 lb 6.4 oz)   SpO2 97%   BMI 28.61 kg/m²     Physical Exam  Vitals reviewed. Constitutional:       Appearance: Normal appearance. Cardiovascular:      Rate and Rhythm: Rhythm irregular. Heart sounds: Normal heart sounds. Pulmonary:      Breath sounds: Normal breath sounds. Musculoskeletal:      Right lower leg: No edema. Left lower leg: No edema. Neurological:      Mental Status: She is alert and oriented to person, place, and time.    Psychiatric:         Mood and Affect: Mood normal.          Abby Ruvalcaba MD

## 2023-10-11 NOTE — PATIENT INSTRUCTIONS
Medicare Preventive Visit Patient Instructions  Thank you for completing your Welcome to Medicare Visit or Medicare Annual Wellness Visit today. Your next wellness visit will be due in one year (10/11/2024). The screening/preventive services that you may require over the next 5-10 years are detailed below. Some tests may not apply to you based off risk factors and/or age. Screening tests ordered at today's visit but not completed yet may show as past due. Also, please note that scanned in results may not display below. Preventive Screenings:  Service Recommendations Previous Testing/Comments   Colorectal Cancer Screening  * Colonoscopy    * Fecal Occult Blood Test (FOBT)/Fecal Immunochemical Test (FIT)  * Fecal DNA/Cologuard Test  * Flexible Sigmoidoscopy Age: 43-73 years old   Colonoscopy: every 10 years (may be performed more frequently if at higher risk)  OR  FOBT/FIT: every 1 year  OR  Cologuard: every 3 years  OR  Sigmoidoscopy: every 5 years  Screening may be recommended earlier than age 39 if at higher risk for colorectal cancer. Also, an individualized decision between you and your healthcare provider will decide whether screening between the ages of 77-80 would be appropriate. Colonoscopy: 01/13/2022  FOBT/FIT: 11/06/2021  Cologuard: Not on file  Sigmoidoscopy: Not on file    Screening Not Indicated     Breast Cancer Screening Age: 36 years old  Frequency: every 1-2 years  Not required if history of left and right mastectomy Mammogram: Not on file        Cervical Cancer Screening Between the ages of 21-29, pap smear recommended once every 3 years. Between the ages of 32-69, can perform pap smear with HPV co-testing every 5 years.    Recommendations may differ for women with a history of total hysterectomy, cervical cancer, or abnormal pap smears in past. Pap Smear: Not on file    Screening Not Indicated   Hepatitis C Screening Once for adults born between 1945 and 1965  More frequently in patients at high risk for Hepatitis C Hep C Antibody: Not on file        Diabetes Screening 1-2 times per year if you're at risk for diabetes or have pre-diabetes Fasting glucose: 137 mg/dL (1/7/2022)  A1C: No results in last 5 years (No results in last 5 years)      Cholesterol Screening Once every 5 years if you don't have a lipid disorder. May order more often based on risk factors. Lipid panel: Not on file    Screening Not Indicated  History Lipid Disorder     Other Preventive Screenings Covered by Medicare:  Abdominal Aortic Aneurysm (AAA) Screening: covered once if your at risk. You're considered to be at risk if you have a family history of AAA. Lung Cancer Screening: covers low dose CT scan once per year if you meet all of the following conditions: (1) Age 48-67; (2) No signs or symptoms of lung cancer; (3) Current smoker or have quit smoking within the last 15 years; (4) You have a tobacco smoking history of at least 20 pack years (packs per day multiplied by number of years you smoked); (5) You get a written order from a healthcare provider. Glaucoma Screening: covered annually if you're considered high risk: (1) You have diabetes OR (2) Family history of glaucoma OR (3)  aged 48 and older OR (3)  American aged 72 and older  Osteoporosis Screening: covered every 2 years if you meet one of the following conditions: (1) You're estrogen deficient and at risk for osteoporosis based off medical history and other findings; (2) Have a vertebral abnormality; (3) On glucocorticoid therapy for more than 3 months; (4) Have primary hyperparathyroidism; (5) On osteoporosis medications and need to assess response to drug therapy. Last bone density test (DXA Scan): Not on file. HIV Screening: covered annually if you're between the age of 14-79. Also covered annually if you are younger than 13 and older than 72 with risk factors for HIV infection.  For pregnant patients, it is covered up to 3 times per pregnancy. Immunizations:  Immunization Recommendations   Influenza Vaccine Annual influenza vaccination during flu season is recommended for all persons aged >= 6 months who do not have contraindications   Pneumococcal Vaccine   * Pneumococcal conjugate vaccine = PCV13 (Prevnar 13), PCV15 (Vaxneuvance), PCV20 (Prevnar 20)  * Pneumococcal polysaccharide vaccine = PPSV23 (Pneumovax) Adults 59-21 yo with certain risk factors or if 69+ yo  If never received any pneumonia vaccine: recommend Prevnar 20 (PCV20)  Give PCV20 if previously received 1 dose of PCV13 or PPSV23   Hepatitis B Vaccine 3 dose series if at intermediate or high risk (ex: diabetes, end stage renal disease, liver disease)   Respiratory syncytial virus (RSV) Vaccine - COVERED BY MEDICARE PART D  * RSVPreF3 (Arexvy) CDC recommends that adults 61years of age and older may receive a single dose of RSV vaccine using shared clinical decision-making (SCDM)   Tetanus (Td) Vaccine - COST NOT COVERED BY MEDICARE PART B Following completion of primary series, a booster dose should be given every 10 years to maintain immunity against tetanus. Td may also be given as tetanus wound prophylaxis. Tdap Vaccine - COST NOT COVERED BY MEDICARE PART B Recommended at least once for all adults. For pregnant patients, recommended with each pregnancy. Shingles Vaccine (Shingrix) - COST NOT COVERED BY MEDICARE PART B  2 shot series recommended in those 19 years and older who have or will have weakened immune systems or those 50 years and older     Health Maintenance Due:      Topic Date Due   • DXA SCAN  Never done     Immunizations Due:      Topic Date Due   • Pneumococcal Vaccine: 65+ Years (1 - PCV) Never done   • COVID-19 Vaccine (2 - Pfizer series) 06/17/2021   • Influenza Vaccine (1) Never done     Advance Directives   What are advance directives? Advance directives are legal documents that state your wishes and plans for medical care.  These plans are made ahead of time in case you lose your ability to make decisions for yourself. Advance directives can apply to any medical decision, such as the treatments you want, and if you want to donate organs. What are the types of advance directives? There are many types of advance directives, and each state has rules about how to use them. You may choose a combination of any of the following:  Living will: This is a written record of the treatment you want. You can also choose which treatments you do not want, which to limit, and which to stop at a certain time. This includes surgery, medicine, IV fluid, and tube feedings. Durable power of  for healthcare [facility]): This is a written record that states who you want to make healthcare choices for you when you are unable to make them for yourself. This person, called a proxy, is usually a family member or a friend. You may choose more than 1 proxy. Do not resuscitate (DNR) order:  A DNR order is used in case your heart stops beating or you stop breathing. It is a request not to have certain forms of treatment, such as CPR. A DNR order may be included in other types of advance directives. Medical directive: This covers the care that you want if you are in a coma, near death, or unable to make decisions for yourself. You can list the treatments you want for each condition. Treatment may include pain medicine, surgery, blood transfusions, dialysis, IV or tube feedings, and a ventilator (breathing machine). Values history: This document has questions about your views, beliefs, and how you feel and think about life. This information can help others choose the care that you would choose. Why are advance directives important? An advance directive helps you control your care. Although spoken wishes may be used, it is better to have your wishes written down. Spoken wishes can be misunderstood, or not followed. Treatments may be given even if you do not want them.  An advance directive may make it easier for your family to make difficult choices about your care. Urinary Incontinence   Urinary incontinence (UI)  is when you lose control of your bladder. UI develops because your bladder cannot store or empty urine properly. The 3 most common types of UI are stress incontinence, urge incontinence, or both. Medicines:   May be given to help strengthen your bladder control. Report any side effects of medication to your healthcare provider. Do pelvic muscle exercises often:  Your pelvic muscles help you stop urinating. Squeeze these muscles tight for 5 seconds, then relax for 5 seconds. Gradually work up to squeezing for 10 seconds. Do 3 sets of 15 repetitions a day, or as directed. This will help strengthen your pelvic muscles and improve bladder control. Train your bladder:  Go to the bathroom at set times, such as every 2 hours, even if you do not feel the urge to go. You can also try to hold your urine when you feel the urge to go. For example, hold your urine for 5 minutes when you feel the urge to go. As that becomes easier, hold your urine for 10 minutes. Self-care:   Keep a UI record. Write down how often you leak urine and how much you leak. Make a note of what you were doing when you leaked urine. Drink liquids as directed. You may need to limit the amount of liquid you drink to help control your urine leakage. Do not drink any liquid right before you go to bed. Limit or do not have drinks that contain caffeine or alcohol. Prevent constipation. Eat a variety of high-fiber foods. Good examples are high-fiber cereals, beans, vegetables, and whole-grain breads. Walking is the best way to trigger your intestines to have a bowel movement. Exercise regularly and maintain a healthy weight. Weight loss and exercise will decrease pressure on your bladder and help you control your leakage. Use a catheter as directed  to help empty your bladder.  A catheter is a tiny, plastic tube that is put into your bladder to drain your urine. Go to behavior therapy as directed. Behavior therapy may be used to help you learn to control your urge to urinate. Weight Management   Why it is important to manage your weight:  Being overweight increases your risk of health conditions such as heart disease, high blood pressure, type 2 diabetes, and certain types of cancer. It can also increase your risk for osteoarthritis, sleep apnea, and other respiratory problems. Aim for a slow, steady weight loss. Even a small amount of weight loss can lower your risk of health problems. How to lose weight safely:  A safe and healthy way to lose weight is to eat fewer calories and get regular exercise. You can lose up about 1 pound a week by decreasing the number of calories you eat by 500 calories each day. Healthy meal plan for weight management:  A healthy meal plan includes a variety of foods, contains fewer calories, and helps you stay healthy. A healthy meal plan includes the following:  Eat whole-grain foods more often. A healthy meal plan should contain fiber. Fiber is the part of grains, fruits, and vegetables that is not broken down by your body. Whole-grain foods are healthy and provide extra fiber in your diet. Some examples of whole-grain foods are whole-wheat breads and pastas, oatmeal, brown rice, and bulgur. Eat a variety of vegetables every day. Include dark, leafy greens such as spinach, kale, bharti greens, and mustard greens. Eat yellow and orange vegetables such as carrots, sweet potatoes, and winter squash. Eat a variety of fruits every day. Choose fresh or canned fruit (canned in its own juice or light syrup) instead of juice. Fruit juice has very little or no fiber. Eat low-fat dairy foods. Drink fat-free (skim) milk or 1% milk. Eat fat-free yogurt and low-fat cottage cheese. Try low-fat cheeses such as mozzarella and other reduced-fat cheeses.   Choose meat and other protein foods that are low in fat. Choose beans or other legumes such as split peas or lentils. Choose fish, skinless poultry (chicken or turkey), or lean cuts of red meat (beef or pork). Before you cook meat or poultry, cut off any visible fat. Use less fat and oil. Try baking foods instead of frying them. Add less fat, such as margarine, sour cream, regular salad dressing and mayonnaise to foods. Eat fewer high-fat foods. Some examples of high-fat foods include french fries, doughnuts, ice cream, and cakes. Eat fewer sweets. Limit foods and drinks that are high in sugar. This includes candy, cookies, regular soda, and sweetened drinks. Exercise:  Exercise at least 30 minutes per day on most days of the week. Some examples of exercise include walking, biking, dancing, and swimming. You can also fit in more physical activity by taking the stairs instead of the elevator or parking farther away from stores. Ask your healthcare provider about the best exercise plan for you. © Copyright [address] 2018 Information is for End User's use only and may not be sold, redistributed or otherwise used for commercial purposes.  All illustrations and images included in CareNotes® are the copyrighted property of [employer] or [address] [place] [address]

## 2023-10-12 LAB
BACTERIA UR CULT: NORMAL
Lab: NORMAL

## 2023-12-10 PROBLEM — N30.01 ACUTE CYSTITIS WITH HEMATURIA: Status: RESOLVED | Noted: 2023-10-11 | Resolved: 2023-12-10

## 2024-01-24 DIAGNOSIS — I10 ESSENTIAL HYPERTENSION: Chronic | ICD-10-CM

## 2024-02-21 PROBLEM — Z00.00 MEDICARE ANNUAL WELLNESS VISIT, SUBSEQUENT: Status: RESOLVED | Noted: 2018-06-11 | Resolved: 2024-02-21

## 2024-02-27 NOTE — ASSESSMENT & PLAN NOTE
- Patient with hypernatremia likely secondary to dehydration from multiple liquid bowel movements  - Continue current IV fluid therapy  - Continue to monitor BMP with repeat labs on 01/16/2022   - Encephalopathy improving  Never

## 2024-04-10 ENCOUNTER — OFFICE VISIT (OUTPATIENT)
Dept: FAMILY MEDICINE CLINIC | Facility: HOSPITAL | Age: 89
End: 2024-04-10
Payer: COMMERCIAL

## 2024-04-10 VITALS
HEART RATE: 63 BPM | OXYGEN SATURATION: 98 % | BODY MASS INDEX: 27.08 KG/M2 | DIASTOLIC BLOOD PRESSURE: 89 MMHG | HEIGHT: 58 IN | WEIGHT: 129 LBS | TEMPERATURE: 98.6 F | SYSTOLIC BLOOD PRESSURE: 139 MMHG

## 2024-04-10 DIAGNOSIS — I48.0 PAROXYSMAL ATRIAL FIBRILLATION (HCC): ICD-10-CM

## 2024-04-10 DIAGNOSIS — S06.5X9A TRAUMATIC SUBDURAL HEMORRHAGE WITH LOSS OF CONSCIOUSNESS OF UNSPECIFIED DURATION, INITIAL ENCOUNTER (HCC): ICD-10-CM

## 2024-04-10 DIAGNOSIS — F51.05 INSOMNIA DUE TO ANXIETY AND FEAR: ICD-10-CM

## 2024-04-10 DIAGNOSIS — E88.09 HYPOALBUMINEMIA DUE TO PROTEIN-CALORIE MALNUTRITION (HCC): Primary | ICD-10-CM

## 2024-04-10 DIAGNOSIS — I27.20 PULMONARY HTN (HCC): ICD-10-CM

## 2024-04-10 DIAGNOSIS — E46 HYPOALBUMINEMIA DUE TO PROTEIN-CALORIE MALNUTRITION (HCC): Primary | ICD-10-CM

## 2024-04-10 DIAGNOSIS — F40.9 INSOMNIA DUE TO ANXIETY AND FEAR: ICD-10-CM

## 2024-04-10 PROBLEM — S06.5XAA SUBDURAL HEMATOMA (HCC): Status: RESOLVED | Noted: 2022-01-11 | Resolved: 2024-04-10

## 2024-04-10 PROBLEM — I27.82 CHRONIC PULMONARY EMBOLISM WITHOUT ACUTE COR PULMONALE (HCC): Status: RESOLVED | Noted: 2021-11-04 | Resolved: 2024-04-10

## 2024-04-10 PROCEDURE — 99214 OFFICE O/P EST MOD 30 MIN: CPT | Performed by: FAMILY MEDICINE

## 2024-04-10 PROCEDURE — G2211 COMPLEX E/M VISIT ADD ON: HCPCS | Performed by: FAMILY MEDICINE

## 2024-04-10 RX ORDER — LORAZEPAM 0.5 MG/1
0.5 TABLET ORAL
Qty: 30 TABLET | Refills: 2 | Status: SHIPPED | OUTPATIENT
Start: 2024-04-10

## 2024-04-10 NOTE — PROGRESS NOTES
Name: Sugey Barrios      : 1934      MRN: [id number]  Encounter Provider: Ramírez Crystal MD  Encounter Date: 4/10/2024   Encounter department: [facility] CARE [address]     Assessment & Plan     1. Hypoalbuminemia due to protein-calorie malnutrition (HCC)    2. Insomnia due to anxiety and fear  -     LORazepam (Ativan) 0.5 mg tablet; Take 1 tablet (0.5 mg total) by mouth daily at bedtime    3. Traumatic subdural hemorrhage with loss of consciousness of unspecified duration, initial encounter (HCC)    4. Pulmonary HTN (HCC)    5. Paroxysmal atrial fibrillation (HCC)        Depression Screening and Follow-up Plan: Patient assessed for underlying major depression. Brief counseling provided and recommend additional follow-up/re-evaluation next office visit.         Subjective     6 month follow up.    Discussed AMD OS, will return to Dr Woods    Tried Lexapro but doesn't note much benefit for sleep or nerves. Unhappy that she doesn't have Lorazepam to use at bedtime which had helped her for a very long time      Review of Systems   Constitutional:  Negative for unexpected weight change.   HENT:  Positive for hearing loss.    Respiratory: Negative.     Cardiovascular: Negative.    Gastrointestinal: Negative.    Musculoskeletal: Negative.    Psychiatric/Behavioral:  Positive for sleep disturbance. The patient is nervous/anxious.    All other systems reviewed and are negative.      Past Medical History:   Diagnosis Date    Anxiety     C. difficile enteritis 2022    COVID-19 2022    Disease of thyroid gland      Past Surgical History:   Procedure Laterality Date    CATARACT EXTRACTION Right     FOOT SURGERY      JOINT REPLACEMENT       Family History   Problem Relation Age of Onset    No Known Problems Mother     Stroke Father     Colon cancer Neg Hx     Colon polyps Neg Hx      Social History     Socioeconomic History    Marital status: /Civil Union     Spouse name: None     Number of children: None    Years of education: None    Highest education level: None   Occupational History    None   Tobacco Use    Smoking status: Never    Smokeless tobacco: Never   Vaping Use    Vaping status: Never Used   Substance and Sexual Activity    Alcohol use: No    Drug use: No    Sexual activity: None   Other Topics Concern    None   Social History Narrative    None     Social Determinants of Health     Financial Resource Strain: Low Risk  (10/11/2023)    Overall Financial Resource Strain (CARDIA)     Difficulty of Paying Living Expenses: Not very hard   Food Insecurity: No Food Insecurity (1/11/2022)    Hunger Vital Sign     Worried About Running Out of Food in the Last Year: Never true     Ran Out of Food in the Last Year: Never true   Transportation Needs: No Transportation Needs (10/11/2023)    PRAPARE - Transportation     Lack of Transportation (Medical): No     Lack of Transportation (Non-Medical): No   Physical Activity: Not on file   Stress: Not on file   Social Connections: Not on file   Intimate Partner Violence: Not on file   Housing Stability: Unknown (1/11/2022)    Housing Stability Vital Sign     Unable to Pay for Housing in the Last Year: No     Number of Places Lived in the Last Year: Not on file     Unstable Housing in the Last Year: No     Current Outpatient Medications on File Prior to Visit   Medication Sig    aspirin (ECOTRIN LOW STRENGTH) 81 mg EC tablet Take 1 tablet (81 mg total) by mouth daily    escitalopram (LEXAPRO) 5 mg tablet Take 1 tablet (5 mg total) by mouth daily    furosemide (LASIX) 40 mg tablet Take 1 tablet (40 mg total) by mouth daily (Patient taking differently: Take 40 mg by mouth daily Reports she is only taking half a pill)    metoprolol tartrate (LOPRESSOR) 25 mg tablet Take 1 tablet (25 mg total) by mouth every 12 (twelve) hours    omeprazole (PriLOSEC) 20 mg delayed release capsule Take 20 mg by mouth daily    potassium chloride (Klor-Con M20) 20 mEq  "tablet Take 2 tablets (40 mEq total) by mouth 2 (two) times a day (Patient taking differently: Take 40 mEq by mouth 2 (two) times a day Taking 1 daily.)    Bisacodyl (DULCOLAX RE) Insert into the rectum as needed    [DISCONTINUED] amLODIPine (NORVASC) 10 mg tablet Take 0.5 tablets (5 mg total) by mouth daily (Patient not taking: Reported on 4/10/2024)    [DISCONTINUED] LORazepam (Ativan) 0.5 mg tablet Take 1 tablet (0.5 mg total) by mouth daily at bedtime (Patient not taking: Reported on 4/10/2024)     Allergies   Allergen Reactions    Preservision Areds [B-Plex Plus] Tremor    Tramadol      Reaction Date: [id number];      Immunization History   Administered Date(s) Administered    COVID-19 PFIZER VACCINE 0.3 ML IM 03/31/2021, 04/22/2021    Tdap 07/02/2019, 01/10/2022       Objective     /89 (BP Location: Left arm, Patient Position: Sitting, Cuff Size: Standard)   Pulse 63   Temp 98.6 °F (37 °C) (Tympanic)   Ht 4' 9.9\" (1.471 m)   Wt 58.5 kg (129 lb)   SpO2 98%   BMI 27.05 kg/m²     Physical Exam  Vitals and nursing note reviewed.   Constitutional:       Appearance: Normal appearance.   Cardiovascular:      Rate and Rhythm: Normal rate and regular rhythm.      Heart sounds: Normal heart sounds.   Pulmonary:      Effort: Pulmonary effort is normal.      Breath sounds: Normal breath sounds.   Musculoskeletal:      Right lower leg: No edema.      Left lower leg: No edema.   Psychiatric:         Thought Content: Thought content normal.       Ramírez Crystal MD    "

## 2024-05-07 DIAGNOSIS — I10 ESSENTIAL HYPERTENSION: Chronic | ICD-10-CM

## 2024-05-07 RX ORDER — FUROSEMIDE 40 MG/1
20 TABLET ORAL DAILY
Qty: 45 TABLET | Refills: 2 | Status: SHIPPED | OUTPATIENT
Start: 2024-05-07

## 2024-08-20 DIAGNOSIS — I10 ESSENTIAL HYPERTENSION: Chronic | ICD-10-CM

## 2024-08-20 NOTE — TELEPHONE ENCOUNTER
Reason for call:   [x] Refill   [] Prior Auth  [] Other:     Office:   [x] PCP/Provider - [facility] [place]  [] Specialty/Provider -     Medication:       Pharmacy: [facility] - [place] - [address] [facility]. [phone]     Does the patient have enough for 3 days?   [] Yes   [x] No - Send as HP to POD

## 2024-08-21 RX ORDER — METOPROLOL TARTRATE 25 MG/1
25 TABLET, FILM COATED ORAL EVERY 12 HOURS SCHEDULED
Qty: 200 TABLET | Refills: 1 | Status: SHIPPED | OUTPATIENT
Start: 2024-08-21

## 2024-09-11 DIAGNOSIS — F40.9 INSOMNIA DUE TO ANXIETY AND FEAR: ICD-10-CM

## 2024-09-11 DIAGNOSIS — F51.05 INSOMNIA DUE TO ANXIETY AND FEAR: ICD-10-CM

## 2024-09-11 RX ORDER — LORAZEPAM 0.5 MG/1
0.5 TABLET ORAL
Qty: 30 TABLET | Refills: 2 | Status: SHIPPED | OUTPATIENT
Start: 2024-09-11

## 2024-09-11 NOTE — TELEPHONE ENCOUNTER
Reason for call:   [x] Refill   [] Prior Auth  [] Other:     Office:   [x] PCP/Provider -   [] Specialty/Provider -     Medication:   Lorazepam 0.5mg- take 1 tablet by mouth daily at bedtime      Pharmacy: [facility]    Does the patient have enough for 3 days?   [] Yes   [x] No - Send as HP to POD

## 2024-10-29 NOTE — PATIENT INSTRUCTIONS
Medicare Preventive Visit Patient Instructions  Thank you for completing your Welcome to Medicare Visit or Medicare Annual Wellness Visit today  Your next wellness visit will be due in one year (9/10/2023)  The screening/preventive services that you may require over the next 5-10 years are detailed below  Some tests may not apply to you based off risk factors and/or age  Screening tests ordered at today's visit but not completed yet may show as past due  Also, please note that scanned in results may not display below  Preventive Screenings:  Service Recommendations Previous Testing/Comments   Colorectal Cancer Screening  * Colonoscopy    * Fecal Occult Blood Test (FOBT)/Fecal Immunochemical Test (FIT)  * Fecal DNA/Cologuard Test  * Flexible Sigmoidoscopy Age: 39-70 years old   Colonoscopy: every 10 years (may be performed more frequently if at higher risk)  OR  FOBT/FIT: every 1 year  OR  Cologuard: every 3 years  OR  Sigmoidoscopy: every 5 years  Screening may be recommended earlier than age 39 if at higher risk for colorectal cancer  Also, an individualized decision between you and your healthcare provider will decide whether screening between the ages of 74-80 would be appropriate  Colonoscopy: 01/13/2022  FOBT/FIT: 11/06/2021  Cologuard: Not on file  Sigmoidoscopy: Not on file    Screening Not Indicated     Breast Cancer Screening Age: 36 years old  Frequency: every 1-2 years  Not required if history of left and right mastectomy Mammogram: Not on file        Cervical Cancer Screening Between the ages of 21-29, pap smear recommended once every 3 years  Between the ages of 33-67, can perform pap smear with HPV co-testing every 5 years     Recommendations may differ for women with a history of total hysterectomy, cervical cancer, or abnormal pap smears in past  Pap Smear: Not on file    Screening Not Indicated   Hepatitis C Screening Once for adults born between 1945 and 1965  More frequently in patients at high risk for Hepatitis C Hep C Antibody: Not on file        Diabetes Screening 1-2 times per year if you're at risk for diabetes or have pre-diabetes Fasting glucose: 137 mg/dL (1/7/2022)  A1C: No results in last 5 years (No results in last 5 years)  Screening Current   Cholesterol Screening Once every 5 years if you don't have a lipid disorder  May order more often based on risk factors  Lipid panel: Not on file    Screening Not Indicated  History Lipid Disorder     Other Preventive Screenings Covered by Medicare:  1  Abdominal Aortic Aneurysm (AAA) Screening: covered once if your at risk  You're considered to be at risk if you have a family history of AAA  2  Lung Cancer Screening: covers low dose CT scan once per year if you meet all of the following conditions: (1) Age 50-69; (2) No signs or symptoms of lung cancer; (3) Current smoker or have quit smoking within the last 15 years; (4) You have a tobacco smoking history of at least 20 pack years (packs per day multiplied by number of years you smoked); (5) You get a written order from a healthcare provider  3  Glaucoma Screening: covered annually if you're considered high risk: (1) You have diabetes OR (2) Family history of glaucoma OR (3)  aged 48 and older OR (3)  American aged 72 and older  3  Osteoporosis Screening: covered every 2 years if you meet one of the following conditions: (1) You're estrogen deficient and at risk for osteoporosis based off medical history and other findings; (2) Have a vertebral abnormality; (3) On glucocorticoid therapy for more than 3 months; (4) Have primary hyperparathyroidism; (5) On osteoporosis medications and need to assess response to drug therapy  · Last bone density test (DXA Scan): Not on file  5  HIV Screening: covered annually if you're between the age of 12-76  Also covered annually if you are younger than 13 and older than 72 with risk factors for HIV infection   For pregnant patients, it is covered up to 3 times per pregnancy  Immunizations:  Immunization Recommendations   Influenza Vaccine Annual influenza vaccination during flu season is recommended for all persons aged >= 6 months who do not have contraindications   Pneumococcal Vaccine   * Pneumococcal conjugate vaccine = PCV13 (Prevnar 13), PCV15 (Vaxneuvance), PCV20 (Prevnar 20)  * Pneumococcal polysaccharide vaccine = PPSV23 (Pneumovax) Adults 25-60 years old: 1-3 doses may be recommended based on certain risk factors  Adults 72 years old: 1-2 doses may be recommended based off what pneumonia vaccine you previously received   Hepatitis B Vaccine 3 dose series if at intermediate or high risk (ex: diabetes, end stage renal disease, liver disease)   Tetanus (Td) Vaccine - COST NOT COVERED BY MEDICARE PART B Following completion of primary series, a booster dose should be given every 10 years to maintain immunity against tetanus  Td may also be given as tetanus wound prophylaxis  Tdap Vaccine - COST NOT COVERED BY MEDICARE PART B Recommended at least once for all adults  For pregnant patients, recommended with each pregnancy  Shingles Vaccine (Shingrix) - COST NOT COVERED BY MEDICARE PART B  2 shot series recommended in those aged 48 and above     Health Maintenance Due:      Topic Date Due    DXA SCAN  03/09/2023 (Originally 1934)     Immunizations Due:      Topic Date Due    Pneumococcal Vaccine: 65+ Years (1 - PCV) Never done    COVID-19 Vaccine (2 - Pfizer series) 05/13/2021    Influenza Vaccine (1) 09/01/2022     Advance Directives   What are advance directives? Advance directives are legal documents that state your wishes and plans for medical care  These plans are made ahead of time in case you lose your ability to make decisions for yourself  Advance directives can apply to any medical decision, such as the treatments you want, and if you want to donate organs  What are the types of advance directives?   There are many types of advance directives, and each state has rules about how to use them  You may choose a combination of any of the following:  · Living will: This is a written record of the treatment you want  You can also choose which treatments you do not want, which to limit, and which to stop at a certain time  This includes surgery, medicine, IV fluid, and tube feedings  · Durable power of  for healthcare [facility]): This is a written record that states who you want to make healthcare choices for you when you are unable to make them for yourself  This person, called a proxy, is usually a family member or a friend  You may choose more than 1 proxy  · Do not resuscitate (DNR) order:  A DNR order is used in case your heart stops beating or you stop breathing  It is a request not to have certain forms of treatment, such as CPR  A DNR order may be included in other types of advance directives  · Medical directive: This covers the care that you want if you are in a coma, near death, or unable to make decisions for yourself  You can list the treatments you want for each condition  Treatment may include pain medicine, surgery, blood transfusions, dialysis, IV or tube feedings, and a ventilator (breathing machine)  · Values history: This document has questions about your views, beliefs, and how you feel and think about life  This information can help others choose the care that you would choose  Why are advance directives important? An advance directive helps you control your care  Although spoken wishes may be used, it is better to have your wishes written down  Spoken wishes can be misunderstood, or not followed  Treatments may be given even if you do not want them  An advance directive may make it easier for your family to make difficult choices about your care     Weight Management   Why it is important to manage your weight:  Being overweight increases your risk of health conditions such as heart disease, high blood pressure, type 2 diabetes, and certain types of cancer  It can also increase your risk for osteoarthritis, sleep apnea, and other respiratory problems  Aim for a slow, steady weight loss  Even a small amount of weight loss can lower your risk of health problems  How to lose weight safely:  A safe and healthy way to lose weight is to eat fewer calories and get regular exercise  You can lose up about 1 pound a week by decreasing the number of calories you eat by 500 calories each day  Healthy meal plan for weight management:  A healthy meal plan includes a variety of foods, contains fewer calories, and helps you stay healthy  A healthy meal plan includes the following:  · Eat whole-grain foods more often  A healthy meal plan should contain fiber  Fiber is the part of grains, fruits, and vegetables that is not broken down by your body  Whole-grain foods are healthy and provide extra fiber in your diet  Some examples of whole-grain foods are whole-wheat breads and pastas, oatmeal, brown rice, and bulgur  · Eat a variety of vegetables every day  Include dark, leafy greens such as spinach, kale, bharti greens, and mustard greens  Eat yellow and orange vegetables such as carrots, sweet potatoes, and winter squash  · Eat a variety of fruits every day  Choose fresh or canned fruit (canned in its own juice or light syrup) instead of juice  Fruit juice has very little or no fiber  · Eat low-fat dairy foods  Drink fat-free (skim) milk or 1% milk  Eat fat-free yogurt and low-fat cottage cheese  Try low-fat cheeses such as mozzarella and other reduced-fat cheeses  · Choose meat and other protein foods that are low in fat  Choose beans or other legumes such as split peas or lentils  Choose fish, skinless poultry (chicken or turkey), or lean cuts of red meat (beef or pork)  Before you cook meat or poultry, cut off any visible fat  · Use less fat and oil  Try baking foods instead of frying them   Add less fat, such as margarine, sour cream, regular salad dressing and mayonnaise to foods  Eat fewer high-fat foods  Some examples of high-fat foods include french fries, doughnuts, ice cream, and cakes  · Eat fewer sweets  Limit foods and drinks that are high in sugar  This includes candy, cookies, regular soda, and sweetened drinks  Exercise:  Exercise at least 30 minutes per day on most days of the week  Some examples of exercise include walking, biking, dancing, and swimming  You can also fit in more physical activity by taking the stairs instead of the elevator or parking farther away from stores  Ask your healthcare provider about the best exercise plan for you  © Copyright [employer] 2018 Information is for End User's use only and may not be sold, redistributed or otherwise used for commercial purposes   All illustrations and images included in CareNotes® are the copyrighted property of A [employer] M , Inc  or [address] [place] [address] No

## 2024-11-12 ENCOUNTER — OFFICE VISIT (OUTPATIENT)
Dept: FAMILY MEDICINE CLINIC | Facility: HOSPITAL | Age: 89
End: 2024-11-12
Payer: COMMERCIAL

## 2024-11-12 VITALS
SYSTOLIC BLOOD PRESSURE: 142 MMHG | WEIGHT: 135 LBS | HEIGHT: 57 IN | HEART RATE: 78 BPM | BODY MASS INDEX: 29.12 KG/M2 | DIASTOLIC BLOOD PRESSURE: 90 MMHG | TEMPERATURE: 98.7 F

## 2024-11-12 DIAGNOSIS — I10 ESSENTIAL HYPERTENSION: Primary | Chronic | ICD-10-CM

## 2024-11-12 DIAGNOSIS — Z00.00 MEDICARE ANNUAL WELLNESS VISIT, SUBSEQUENT: ICD-10-CM

## 2024-11-12 DIAGNOSIS — F40.9 INSOMNIA DUE TO ANXIETY AND FEAR: ICD-10-CM

## 2024-11-12 DIAGNOSIS — E03.9 ACQUIRED HYPOTHYROIDISM: ICD-10-CM

## 2024-11-12 DIAGNOSIS — F51.05 INSOMNIA DUE TO ANXIETY AND FEAR: ICD-10-CM

## 2024-11-12 PROBLEM — R41.82 ALTERED MENTAL STATUS: Status: RESOLVED | Noted: 2022-01-11 | Resolved: 2024-11-12

## 2024-11-12 PROBLEM — L30.4 INTERTRIGO: Status: RESOLVED | Noted: 2018-03-26 | Resolved: 2024-11-12

## 2024-11-12 PROBLEM — S06.5X9A TRAUMATIC SUBDURAL HEMORRHAGE WITH LOSS OF CONSCIOUSNESS OF UNSPECIFIED DURATION, INITIAL ENCOUNTER (HCC): Status: RESOLVED | Noted: 2024-04-10 | Resolved: 2024-11-12

## 2024-11-12 PROBLEM — I82.409 DVT (DEEP VENOUS THROMBOSIS) (HCC): Status: RESOLVED | Noted: 2021-11-06 | Resolved: 2024-11-12

## 2024-11-12 PROBLEM — K52.9 COLITIS: Status: RESOLVED | Noted: 2022-01-14 | Resolved: 2024-11-12
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
## 2024-11-12 PROBLEM — E46 HYPOALBUMINEMIA DUE TO PROTEIN-CALORIE MALNUTRITION (HCC): Status: RESOLVED | Noted: 2024-04-10 | Resolved: 2024-11-12

## 2024-11-12 PROBLEM — E88.09 HYPOALBUMINEMIA DUE TO PROTEIN-CALORIE MALNUTRITION (HCC): Status: RESOLVED | Noted: 2024-04-10 | Resolved: 2024-11-12

## 2024-11-12 PROBLEM — R33.9 URINARY RETENTION: Status: RESOLVED | Noted: 2022-01-13 | Resolved: 2024-11-12

## 2024-11-12 PROBLEM — R13.10 DYSPHAGIA: Status: RESOLVED | Noted: 2022-01-12 | Resolved: 2024-11-12

## 2024-11-12 PROBLEM — A04.72 C. DIFFICILE ENTERITIS: Status: RESOLVED | Noted: 2022-02-04 | Resolved: 2024-11-12

## 2024-11-12 PROCEDURE — 99214 OFFICE O/P EST MOD 30 MIN: CPT | Performed by: NURSE PRACTITIONER

## 2024-11-12 PROCEDURE — G0439 PPPS, SUBSEQ VISIT: HCPCS | Performed by: NURSE PRACTITIONER

## 2024-11-12 NOTE — PROGRESS NOTES
Ambulatory Visit  Name: Sugey Barrios      : 1934      MRN: [id number]  Encounter Provider: CYRUS Kapadia  Encounter Date: 2024   Encounter department: [facility] CARE [address]     Assessment & Plan  Essential hypertension  BP borderline elevated in office likely due to known anxiety/white coat   Advise she continue same metoprolol and check BP outside of office when able  Return in 6 months - update labs as ordered before    Orders:    CBC and differential; Future    Comprehensive metabolic panel; Future    CBC and differential    Comprehensive metabolic panel    Acquired hypothyroidism  Currently off levothyroxine  Update TSH as ordered    Orders:    TSH, 3rd generation with Free T4 reflex; Future    TSH, 3rd generation with Free T4 reflex    Insomnia due to anxiety and fear  She uses lorazepam occasionally when needed       Medicare annual wellness visit, subsequent  AWV updated, next due in 1 year         Depression Screening and Follow-up Plan: Patient was screened for depression during today's encounter. They screened negative with a PHQ-2 score of 0.      Preventive health issues were discussed with patient, and age appropriate screening tests were ordered as noted in patient's After Visit Summary. Personalized health advice and appropriate referrals for health education or preventive services given if needed, as noted in patient's After Visit Summary.    History of Present Illness         States she has been well and denies concerns.   Typically saw Dr Crystal every 5 months. Lives with her son. States she takes her meds daily as rx'd. Denies need for refills today. Does not check BP regularly at home but knows it's better than in office - usually feels anxious here.          Patient Care Team:  CYRUS Kapadia as PCP - General (Family Medicine)  Ramírez Crystal MD    Review of Systems   Constitutional: Negative.    Eyes:  Positive for visual disturbance  (follows w/eye MD, recently got new glasses).   Respiratory: Negative.     Cardiovascular: Negative.    Psychiatric/Behavioral:  Positive for sleep disturbance (occ has trouble falling asleep, will take lorazepam as needed but not often). The patient is nervous/anxious (in office).      Medical History Reviewed by provider this encounter:  Tobacco  Allergies  Meds  Problems  Med Hx  Surg Hx  Fam Hx       Annual Wellness Visit Questionnaire   Sugey is here for her Subsequent Wellness visit. Last Medicare Wellness visit information reviewed, patient interviewed and updates made to the record today.      Health Risk Assessment:   Patient rates overall health as good. Patient feels that their physical health rating is same. Patient is satisfied with their life. Eyesight was rated as same. Hearing was rated as same. Patient feels that their emotional and mental health rating is same. Patients states they are never, rarely angry. Patient states they are sometimes unusually tired/fatigued. Pain experienced in the last 7 days has been some. Patient's pain rating has been 5/10. Patient states that she has experienced no weight loss or gain in last 6 months.     Depression Screening:   PHQ-2 Score: 0      Fall Risk Screening:   In the past year, patient has experienced: no history of falling in past year      Urinary Incontinence Screening:   Patient has not leaked urine accidently in the last six months.     Home Safety:  Patient has trouble with stairs inside or outside of their home. Patient has working smoke alarms and has working carbon monoxide detector. Home safety hazards include: none.     Nutrition:   Current diet is Regular.     Medications:   Patient is not currently taking any over-the-counter supplements. Patient is able to manage medications.     Activities of Daily Living (ADLs)/Instrumental Activities of Daily Living (IADLs):   Walk and transfer into and out of bed and chair?: Yes  Dress and groom  yourself?: Yes    Bathe or shower yourself?: Yes    Feed yourself? Yes  Do your laundry/housekeeping?: Yes  Manage your money, pay your bills and track your expenses?: Yes  Make your own meals?: Yes    Do your own shopping?: No    Previous Hospitalizations:   Any hospitalizations or ED visits within the last 12 months?: No      Advance Care Planning:   Living will: Yes    Durable POA for healthcare: Yes    Advanced directive: Yes      PREVENTIVE SCREENINGS      Cardiovascular Screening:    General: Screening Not Indicated and History Lipid Disorder      Diabetes Screening:       Due for: Blood Glucose      Colorectal Cancer Screening:     General: Screening Not Indicated      Breast Cancer Screening:     General: Screening Not Indicated      Cervical Cancer Screening:    General: Screening Not Indicated      Osteoporosis Screening:    General: Screening Not Indicated      Abdominal Aortic Aneurysm (AAA) Screening:        General: Screening Not Indicated      Lung Cancer Screening:     General: Screening Not Indicated      Hepatitis C Screening:    General: Screening Not Indicated    Screening, Brief Intervention, and Referral to Treatment (SBIRT)    Screening      Single Item Drug Screening:  How often have you used an illegal drug (including marijuana) or a prescription medication for non-medical reasons in the past year? never    Single Item Drug Screen Score: 0  Interpretation: Negative screen for possible drug use disorder    Social Determinants of Health     Financial Resource Strain: Low Risk  (10/11/2023)    Overall Financial Resource Strain (CARDIA)     Difficulty of Paying Living Expenses: Not very hard   Food Insecurity: No Food Insecurity (11/12/2024)    Hunger Vital Sign     Worried About Running Out of Food in the Last Year: Never true     Ran Out of Food in the Last Year: Never true   Transportation Needs: No Transportation Needs (11/12/2024)    PRAPARE - Transportation     Lack of Transportation  "(Medical): No     Lack of Transportation (Non-Medical): No   Housing Stability: Low Risk  (11/12/2024)    Housing Stability Vital Sign     Unable to Pay for Housing in the Last Year: No     Number of Times Moved in the Last Year: 0     Homeless in the Last Year: No   Utilities: Not At Risk (11/12/2024)    [facility] Utilities     Threatened with loss of utilities: No     Vision Screening    Right eye Left eye Both eyes   Without correction      With correction 20/25 20/20 20/20       Objective     /90   Pulse 78   Temp 98.7 °F (37.1 °C)   Ht 4' 9\" (1.448 m)   Wt 61.2 kg (135 lb)   BMI 29.21 kg/m²       Physical Exam  Vitals reviewed.   Constitutional:       General: She is not in acute distress.     Appearance: Normal appearance.   Eyes:      General: No scleral icterus.  Neck:      Thyroid: No thyromegaly.   Cardiovascular:      Rate and Rhythm: Normal rate and regular rhythm.   Pulmonary:      Effort: Pulmonary effort is normal. No respiratory distress.      Breath sounds: Normal breath sounds.   Musculoskeletal:      Cervical back: Normal range of motion.      Right lower leg: No edema.      Left lower leg: No edema.   Lymphadenopathy:      Cervical: No cervical adenopathy.   Skin:     General: Skin is warm and dry.   Neurological:      General: No focal deficit present.      Mental Status: She is alert and oriented to person, place, and time.   Psychiatric:         Mood and Affect: Mood is anxious.         Speech: Speech normal.         Behavior: Behavior normal. Behavior is cooperative.         Cognition and Memory: Cognition normal.         Administrative Statements   I have spent a total time of 20 minutes in caring for this patient on the day of the visit/encounter including Instructions for management, Patient and family education, Impressions, Counseling / Coordination of care, Documenting in the medical record, Reviewing / ordering tests, medicine, procedures  , and Obtaining or reviewing history  .  "

## 2024-11-12 NOTE — ASSESSMENT & PLAN NOTE
Currently off levothyroxine  Update TSH as ordered    Orders:    TSH, 3rd generation with Free T4 reflex; Future    TSH, 3rd generation with Free T4 reflex

## 2024-11-12 NOTE — ASSESSMENT & PLAN NOTE
BP borderline elevated in office likely due to known anxiety/white coat   Advise she continue same metoprolol and check BP outside of office when able  Return in 6 months - update labs as ordered before    Orders:    CBC and differential; Future    Comprehensive metabolic panel; Future    CBC and differential    Comprehensive metabolic panel

## 2025-01-08 DIAGNOSIS — F40.9 INSOMNIA DUE TO ANXIETY AND FEAR: ICD-10-CM

## 2025-01-08 DIAGNOSIS — F51.05 INSOMNIA DUE TO ANXIETY AND FEAR: ICD-10-CM

## 2025-01-08 NOTE — TELEPHONE ENCOUNTER
Patient is requesting a 30-day supply + refills, if possible    Reason for call:   [x] Refill   [] Prior Auth  [] Other:     Office:   [x] PCP/Provider - Giovanny  / Thrush    Medication: lorazepam    Dose/Frequency: 0.5mg qhs     Quantity: 30 + refills    Pharmacy: [facility] - [address] [facility].     Does the patient have enough for 3 days?   [] Yes   [x] No - Send as HP to POD

## 2025-01-09 RX ORDER — LORAZEPAM 0.5 MG/1
0.5 TABLET ORAL
Qty: 30 TABLET | Refills: 2 | Status: SHIPPED | OUTPATIENT
Start: 2025-01-09

## 2025-01-09 NOTE — TELEPHONE ENCOUNTER
Patient called Rx refill line to check the status of this refill- advised her it was approved and sent to the pharmacy today 1/9/25

## 2025-04-01 DIAGNOSIS — I10 ESSENTIAL HYPERTENSION: Chronic | ICD-10-CM

## 2025-04-01 DIAGNOSIS — F51.05 INSOMNIA DUE TO ANXIETY AND FEAR: ICD-10-CM

## 2025-04-01 DIAGNOSIS — F40.9 INSOMNIA DUE TO ANXIETY AND FEAR: ICD-10-CM

## 2025-04-01 RX ORDER — METOPROLOL TARTRATE 25 MG/1
25 TABLET, FILM COATED ORAL EVERY 12 HOURS SCHEDULED
Qty: 200 TABLET | Refills: 1 | Status: SHIPPED | OUTPATIENT
Start: 2025-04-01

## 2025-04-01 RX ORDER — LORAZEPAM 0.5 MG/1
0.5 TABLET ORAL
Qty: 30 TABLET | Refills: 0 | Status: SHIPPED | OUTPATIENT
Start: 2025-04-01

## 2025-04-01 NOTE — TELEPHONE ENCOUNTER
Reason for call:   [x] Refill   [] Prior Auth  [] Other:     Office:   [x] PCP/Provider -   [] Specialty/Provider -       LORazepam (Ativan) 0.5 mg tablet 0.5 mg, Oral, Daily at bedtime         Edit  Cancel Reorder   metoprolol tartrate (LOPRESSOR) 25 mg tablet 25 mg, Oral, Every 12 hours scheduled              Quantity: 90    Pharmacy: [facility]   Does the patient have enough for 3 days?   [] Yes   [x] No - Send as HP to POD    Mail Away Pharmacy   Does the patient have enough for 10 days?   [] Yes   [] No - Send as HP to POD

## 2025-04-25 NOTE — PROGRESS NOTES
Assessment and Plan:     Problem List Items Addressed This Visit        Endocrine    Acquired hypothyroidism       Cardiovascular and Mediastinum    Essential hypertension       Other    Mixed hyperlipidemia    Medicare annual wellness visit, subsequent - Primary    BMI 26 0-26 9,adult        BMI Counseling: Body mass index is 26 96 kg/m²  The BMI is above normal  Nutrition recommendations include decreasing portion sizes, moderation in carbohydrate intake and reducing intake of cholesterol  Exercise recommendations include exercising 3-5 times per week  No pharmacotherapy was ordered  Preventive health issues were discussed with patient, and age appropriate screening tests were ordered as noted in patient's After Visit Summary  Personalized health advice and appropriate referrals for health education or preventive services given if needed, as noted in patient's After Visit Summary  History of Present Illness:     Patient presents for Medicare Annual Wellness visit    Patient Care Team:  Libra Fortune MD as PCP - General  Libra Fortune MD     Problem List:     Patient Active Problem List   Diagnosis    External hemorrhoids    Generalized anxiety disorder    Headache    Mixed hyperlipidemia    Essential hypertension    Acquired hypothyroidism    Intertrigo    Medicare annual wellness visit, subsequent    Cortical age-related cataract of left eye    Pre-op exam    BMI 26 0-26 9,adult      Past Medical and Surgical History:     Past Medical History:   Diagnosis Date    Anxiety     Disease of thyroid gland     Hypertension      Past Surgical History:   Procedure Laterality Date    CATARACT EXTRACTION Right     FOOT SURGERY      JOINT REPLACEMENT        Family History:     History reviewed  No pertinent family history     Social History:     Social History     Socioeconomic History    Marital status: /Civil Union     Spouse name: None    Number of children: None    Years of education: None    Highest education level: None   Occupational History    None   Tobacco Use    Smoking status: Never Smoker    Smokeless tobacco: Never Used   Vaping Use    Vaping Use: Never used   Substance and Sexual Activity    Alcohol use: No    Drug use: No    Sexual activity: None   Other Topics Concern    None   Social History Narrative    None     Social Determinants of Health     Financial Resource Strain:     Difficulty of Paying Living Expenses:    Food Insecurity:     Worried About Running Out of Food in the Last Year:     Ran Out of Food in the Last Year:    Transportation Needs:     Lack of Transportation (Medical):  Lack of Transportation (Non-Medical):    Physical Activity:     Days of Exercise per Week:     Minutes of Exercise per Session:    Stress:     Feeling of Stress :    Social Connections:     Frequency of Communication with Friends and Family:     Frequency of Social Gatherings with Friends and Family:     Attends [employer] Services:     Active Member of Clubs or Organizations:     Attends Club or Organization Meetings:     Marital Status:    Intimate Partner Violence:     Fear of Current or Ex-Partner:     Emotionally Abused:     Physically Abused:     Sexually Abused:       Medications and Allergies:     Current Outpatient Medications   Medication Sig Dispense Refill    enalapril (VASOTEC) 2 5 mg tablet Take 1 tablet (2 5 mg total) by mouth daily 90 tablet 1    LORazepam (ATIVAN) 1 mg tablet Take 1 tablet (1 mg total) by mouth daily at bedtime 30 tablet 3     No current facility-administered medications for this visit       Allergies   Allergen Reactions    Preservision Areds [B-Plex Plus] Tremor    Tramadol      Reaction Date: 22Aug2011;       Immunizations:     Immunization History   Administered Date(s) Administered    SARS-CoV-2 / COVID-19 mRNA IM (Pfizer-BioNTech) 04/22/2021    Tdap 07/02/2019      Health Maintenance:         Topic Date Due    DXA SCAN  01/14/2022 (Originally 1934)         Topic Date Due    COVID-19 Vaccine (2 - Pfizer 2-dose series) 05/13/2021    Influenza Vaccine (1) 09/01/2021      Medicare Health Risk Assessment:     /82   Pulse 77   Temp 98 2 °F (36 8 °C)   Ht 4' 10" (1 473 m)   Wt 58 5 kg (129 lb)   BMI 26 96 kg/m²      Naif Day is here for her Subsequent Wellness visit  Last Medicare Wellness visit information reviewed, patient interviewed and updates made to the record today  Health Risk Assessment:   Patient rates overall health as good  Patient feels that their physical health rating is same  Patient is satisfied with their life  Eyesight was rated as same  Hearing was rated as same  Patient feels that their emotional and mental health rating is same  Patients states they are never, rarely angry  Patient states they are never, rarely unusually tired/fatigued  Pain experienced in the last 7 days has been none  Patient states that she has experienced no weight loss or gain in last 6 months  Arthritic pain and headaches    Depression Screening:   PHQ-2 Score: 0      Fall Risk Screening: In the past year, patient has experienced: no history of falling in past year      Urinary Incontinence Screening:   Patient has not leaked urine accidently in the last six months  Home Safety:  Patient does not have trouble with stairs inside or outside of their home  Patient has working smoke alarms and has working carbon monoxide detector  Home safety hazards include: none  Nutrition:   Current diet is Regular  Medications:   Patient is currently taking over-the-counter supplements  OTC medications include: see medication list  Patient is able to manage medications  Activities of Daily Living (ADLs)/Instrumental Activities of Daily Living (IADLs):   Walk and transfer into and out of bed and chair?: Yes  Dress and groom yourself?: Yes    Bathe or shower yourself?: Yes    Feed yourself?  Yes  Do your laundry/housekeeping?: Yes  Manage your money, pay your bills and track your expenses?: Yes  Make your own meals?: Yes    Do your own shopping?: Yes    ADL comments: Son helps with shopping  Previous Hospitalizations:   Any hospitalizations or ED visits within the last 12 months?: No      Advance Care Planning:   Living will: Yes    Durable POA for healthcare: No    Advanced directive: Yes    Advanced directive counseling given: No    Five wishes given: No    Patient declined ACP directive: No    End of Life Decisions reviewed with patient: Yes    Provider agrees with end of life decisions: Yes      Cognitive Screening:   Provider or family/friend/caregiver concerned regarding cognition?: No    PREVENTIVE SCREENINGS      Cardiovascular Screening:    General: Screening Not Indicated and History Lipid Disorder      Diabetes Screening:     General: Risks and Benefits Discussed and Patient Declines      Colorectal Cancer Screening:     General: Screening Not Indicated      Breast Cancer Screening:     General: Risks and Benefits Discussed and Patient Declines      Cervical Cancer Screening:    General: Screening Not Indicated      Osteoporosis Screening:    General: Screening Current      Abdominal Aortic Aneurysm (AAA) Screening:        General: Screening Not Indicated      Lung Cancer Screening:     General: Screening Not Indicated      Hepatitis C Screening:    General: Screening Not Indicated    Screening, Brief Intervention, and Referral to Treatment (SBIRT)    Screening  Typical number of drinks in a day: 0  Typical number of drinks in a week: 0  Interpretation: Low risk drinking behavior      Single Item Drug Screening:  How often have you used an illegal drug (including marijuana) or a prescription medication for non-medical reasons in the past year? never    Single Item Drug Screen Score: 0  Interpretation: Negative screen for possible drug use disorder      Jose Raul More MD 20

## 2025-05-06 DIAGNOSIS — F40.9 INSOMNIA DUE TO ANXIETY AND FEAR: ICD-10-CM

## 2025-05-06 DIAGNOSIS — F51.05 INSOMNIA DUE TO ANXIETY AND FEAR: ICD-10-CM

## 2025-05-06 DIAGNOSIS — I10 ESSENTIAL HYPERTENSION: Chronic | ICD-10-CM

## 2025-05-06 NOTE — TELEPHONE ENCOUNTER
Reason for call:   [x] Refill   [] Prior Auth  [] Other:     Office:   [x] PCP/Provider - CYRUS Kapadia / [facility]    [] Specialty/Provider -     furosemide (LASIX) 40 mg tablet/ Take 0.5 tablets (20 mg total) by mouth daily / Qty 45    LORazepam (Ativan) 0.5 mg tablet/ Take 1 tablet (0.5 mg total) by mouth daily at bedtime / Qty 30    Pharmacy: [facility] [id number]  [place], [place] - [address] [facility].     Local Pharmacy   Does the patient have enough for 3 days?   [x] Yes   [] No - Send as HP to POD    Mail Away Pharmacy   Does the patient have enough for 10 days?   [] Yes   [] No - Send as HP to POD

## 2025-05-07 DIAGNOSIS — F40.9 INSOMNIA DUE TO ANXIETY AND FEAR: ICD-10-CM

## 2025-05-07 DIAGNOSIS — F51.05 INSOMNIA DUE TO ANXIETY AND FEAR: ICD-10-CM

## 2025-05-08 RX ORDER — LORAZEPAM 0.5 MG/1
0.5 TABLET ORAL
Qty: 30 TABLET | Refills: 0 | Status: SHIPPED | OUTPATIENT
Start: 2025-05-08

## 2025-05-08 RX ORDER — FUROSEMIDE 40 MG/1
20 TABLET ORAL DAILY
Qty: 45 TABLET | Refills: 0 | Status: SHIPPED | OUTPATIENT
Start: 2025-05-08

## 2025-05-08 RX ORDER — LORAZEPAM 0.5 MG/1
0.5 TABLET ORAL
Qty: 30 TABLET | Refills: 0 | OUTPATIENT
Start: 2025-05-08

## 2025-05-08 NOTE — TELEPHONE ENCOUNTER
Refills issued - please call and remind patient to update labs as previously ordered before Nolvia appointment...

## 2025-05-08 NOTE — TELEPHONE ENCOUNTER
Patient called requesting refill for lorazepam. Patient made aware medication was refilled on 5/8/25 for 30 with 0 refills to [facility] pharmacy. Patient instructed to contact the pharmacy and speak with someone directly to obtain refills of medication. Patient advised to call back for refill if their pharmacy is unable to assist them. Patient verbalized understanding.

## 2025-05-12 ENCOUNTER — APPOINTMENT (OUTPATIENT)
Dept: LAB | Facility: HOSPITAL | Age: OVER 89
End: 2025-05-12
Payer: COMMERCIAL

## 2025-05-12 ENCOUNTER — RESULTS FOLLOW-UP (OUTPATIENT)
Dept: FAMILY MEDICINE CLINIC | Facility: HOSPITAL | Age: OVER 89
End: 2025-05-12

## 2025-05-12 DIAGNOSIS — E03.9 ACQUIRED HYPOTHYROIDISM: ICD-10-CM

## 2025-05-12 DIAGNOSIS — I10 ESSENTIAL HYPERTENSION: Chronic | ICD-10-CM

## 2025-05-12 LAB
ALBUMIN SERPL BCG-MCNC: 4.2 G/DL (ref 3.5–5)
ALP SERPL-CCNC: 82 U/L (ref 34–104)
ALT SERPL W P-5'-P-CCNC: 12 U/L (ref 7–52)
ANION GAP SERPL CALCULATED.3IONS-SCNC: 15 MMOL/L (ref 4–13)
AST SERPL W P-5'-P-CCNC: 23 U/L (ref 13–39)
BASOPHILS # BLD AUTO: 0.05 THOUSANDS/ÂΜL (ref 0–0.1)
BASOPHILS NFR BLD AUTO: 1 % (ref 0–1)
BILIRUB SERPL-MCNC: 0.52 MG/DL (ref 0.2–1)
BUN SERPL-MCNC: 16 MG/DL (ref 5–25)
CALCIUM SERPL-MCNC: 9.4 MG/DL (ref 8.4–10.2)
CHLORIDE SERPL-SCNC: 105 MMOL/L (ref 96–108)
CO2 SERPL-SCNC: 21 MMOL/L (ref 21–32)
CREAT SERPL-MCNC: 0.98 MG/DL (ref 0.6–1.3)
EOSINOPHIL # BLD AUTO: 0.12 THOUSAND/ÂΜL (ref 0–0.61)
EOSINOPHIL NFR BLD AUTO: 2 % (ref 0–6)
ERYTHROCYTE [DISTWIDTH] IN BLOOD BY AUTOMATED COUNT: 14.3 % (ref 11.6–15.1)
GFR SERPL CREATININE-BSD FRML MDRD: 50 ML/MIN/1.73SQ M
GLUCOSE P FAST SERPL-MCNC: 95 MG/DL (ref 65–99)
HCT VFR BLD AUTO: 44.8 % (ref 34.8–46.1)
HGB BLD-MCNC: 14.5 G/DL (ref 11.5–15.4)
IMM GRANULOCYTES # BLD AUTO: 0.02 THOUSAND/UL (ref 0–0.2)
IMM GRANULOCYTES NFR BLD AUTO: 0 % (ref 0–2)
LYMPHOCYTES # BLD AUTO: 2.1 THOUSANDS/ÂΜL (ref 0.6–4.47)
LYMPHOCYTES NFR BLD AUTO: 31 % (ref 14–44)
MCH RBC QN AUTO: 29.8 PG (ref 26.8–34.3)
MCHC RBC AUTO-ENTMCNC: 32.4 G/DL (ref 31.4–37.4)
MCV RBC AUTO: 92 FL (ref 82–98)
MONOCYTES # BLD AUTO: 0.48 THOUSAND/ÂΜL (ref 0.17–1.22)
MONOCYTES NFR BLD AUTO: 7 % (ref 4–12)
NEUTROPHILS # BLD AUTO: 3.98 THOUSANDS/ÂΜL (ref 1.85–7.62)
NEUTS SEG NFR BLD AUTO: 59 % (ref 43–75)
NRBC BLD AUTO-RTO: 0 /100 WBCS
PLATELET # BLD AUTO: 220 THOUSANDS/UL (ref 149–390)
PMV BLD AUTO: 11.2 FL (ref 8.9–12.7)
POTASSIUM SERPL-SCNC: 4.1 MMOL/L (ref 3.5–5.3)
PROT SERPL-MCNC: 7.2 G/DL (ref 6.4–8.4)
RBC # BLD AUTO: 4.86 MILLION/UL (ref 3.81–5.12)
SODIUM SERPL-SCNC: 141 MMOL/L (ref 135–147)
T4 FREE SERPL-MCNC: 1.19 NG/DL (ref 0.61–1.12)
TSH SERPL DL<=0.05 MIU/L-ACNC: 6.11 UIU/ML (ref 0.45–4.5)
WBC # BLD AUTO: 6.75 THOUSAND/UL (ref 4.31–10.16)

## 2025-05-12 PROCEDURE — 84443 ASSAY THYROID STIM HORMONE: CPT

## 2025-05-12 PROCEDURE — 84439 ASSAY OF FREE THYROXINE: CPT

## 2025-05-12 PROCEDURE — 36415 COLL VENOUS BLD VENIPUNCTURE: CPT

## 2025-05-12 PROCEDURE — 80053 COMPREHEN METABOLIC PANEL: CPT

## 2025-05-12 PROCEDURE — 85025 COMPLETE CBC W/AUTO DIFF WBC: CPT

## 2025-06-02 DIAGNOSIS — F51.05 INSOMNIA DUE TO ANXIETY AND FEAR: ICD-10-CM

## 2025-06-02 DIAGNOSIS — F40.9 INSOMNIA DUE TO ANXIETY AND FEAR: ICD-10-CM

## 2025-06-02 NOTE — TELEPHONE ENCOUNTER
Reason for call:   [x] Refill   [] Prior Auth  [] Other:     Office:   [x] PCP/Provider -   Ordering Department: [facility] [address]  Authorized By: CYRUS Kapadia  [] Specialty/Provider -     Medication:  LORazepam (Ativan) 0.5 mg tablet    Dose/Frequency: Take 1 tablet (0.5 mg total) by mouth daily at bedtime     Quantity: 30    Pharmacy: [facility] [place] - [address] [facility] [phone]    Local Pharmacy   Does the patient have enough for 3 days?   [x] Yes   [] No - Send as HP to POD    Mail Away Pharmacy   Does the patient have enough for 10 days?   [x] Yes   [] No - Send as HP to POD

## 2025-06-05 DIAGNOSIS — F40.9 INSOMNIA DUE TO ANXIETY AND FEAR: ICD-10-CM

## 2025-06-05 DIAGNOSIS — F51.05 INSOMNIA DUE TO ANXIETY AND FEAR: ICD-10-CM

## 2025-06-05 NOTE — TELEPHONE ENCOUNTER
Patient called to request a refill for their lorazepam advised a refill was requested on 6/2 and is pending approval. Patient verbalized understanding and is in agreement.     Does the patient have enough for 3 days?   [x] Yes   [] No - Send as HP to POD

## 2025-06-06 RX ORDER — LORAZEPAM 0.5 MG/1
0.5 TABLET ORAL
Qty: 30 TABLET | Refills: 0 | Status: SHIPPED | OUTPATIENT
Start: 2025-06-06

## 2025-06-06 RX ORDER — LORAZEPAM 0.5 MG/1
0.5 TABLET ORAL
Qty: 30 TABLET | Refills: 0 | OUTPATIENT
Start: 2025-06-06

## 2025-06-09 ENCOUNTER — OFFICE VISIT (OUTPATIENT)
Dept: FAMILY MEDICINE CLINIC | Facility: HOSPITAL | Age: OVER 89
End: 2025-06-09
Payer: COMMERCIAL

## 2025-06-09 VITALS
HEART RATE: 61 BPM | SYSTOLIC BLOOD PRESSURE: 144 MMHG | WEIGHT: 133.8 LBS | BODY MASS INDEX: 28.87 KG/M2 | DIASTOLIC BLOOD PRESSURE: 86 MMHG | HEIGHT: 57 IN | OXYGEN SATURATION: 97 %

## 2025-06-09 DIAGNOSIS — I48.0 PAROXYSMAL ATRIAL FIBRILLATION (HCC): ICD-10-CM

## 2025-06-09 DIAGNOSIS — R82.90 CLOUDY URINE: ICD-10-CM

## 2025-06-09 DIAGNOSIS — F41.1 GENERALIZED ANXIETY DISORDER: Chronic | ICD-10-CM

## 2025-06-09 DIAGNOSIS — I27.20 PULMONARY HTN (HCC): ICD-10-CM

## 2025-06-09 DIAGNOSIS — I10 ESSENTIAL HYPERTENSION: Primary | Chronic | ICD-10-CM

## 2025-06-09 LAB
SL AMB  POCT GLUCOSE, UA: ABNORMAL
SL AMB LEUKOCYTE ESTERASE,UA: ABNORMAL
SL AMB POCT BILIRUBIN,UA: ABNORMAL
SL AMB POCT BLOOD,UA: ABNORMAL
SL AMB POCT CLARITY,UA: ABNORMAL
SL AMB POCT COLOR,UA: YELLOW
SL AMB POCT KETONES,UA: ABNORMAL
SL AMB POCT NITRITE,UA: ABNORMAL
SL AMB POCT PH,UA: 7.5
SL AMB POCT SPECIFIC GRAVITY,UA: 1.01
SL AMB POCT URINE PROTEIN: ABNORMAL
SL AMB POCT UROBILINOGEN: ABNORMAL

## 2025-06-09 PROCEDURE — 81002 URINALYSIS NONAUTO W/O SCOPE: CPT | Performed by: NURSE PRACTITIONER

## 2025-06-09 PROCEDURE — 99214 OFFICE O/P EST MOD 30 MIN: CPT | Performed by: NURSE PRACTITIONER

## 2025-06-09 NOTE — PROGRESS NOTES
"Name: Sugey Barrios      : 1934      MRN: [id number]  Encounter Provider: CYRUS Kapadia  Encounter Date: 2025   Encounter department: [facility] CARE [address]   :  Assessment & Plan  Essential hypertension  Borderline but acceptable BP control  Continue same metoprolol as rx'd  Return in Nov       Generalized anxiety disorder  Stable w/consistent need for lorazepam at bedtime  Advise her refill was authorized on  and should be ready for  at pharmacy  Reviewed controlled substance rx regulations       Pulmonary HTN (HCC)  Clinically stable/asymptomatic       Paroxysmal atrial fibrillation (HCC)  Clinically stable in NSR       Cloudy urine  In office urine dip is normal  Will send for cx to further evaluate & rx antibiotic if needed pending result    Orders:    POCT urine dip    Urine culture      Update AWV at next appt in Nov        Depression Screening and Follow-up Plan: Patient was screened for depression during today's encounter. They screened negative with a PHQ-2 score of 0.        History of Present Illness       States she has been OK. Is frustrated her lorazepam was not refilled last week when she called. Her son picks up meds for her on  and script was not at pharmacy (bc refill was requested too early).         Review of Systems   Constitutional:  Negative for chills and fever.   Respiratory: Negative.     Cardiovascular: Negative.    Gastrointestinal:  Positive for abdominal pain (\"crampy\" in middle, concerned she has a UTI) and constipation (occ takes MOM or increases fruit intake).   Musculoskeletal:  Positive for back pain (has been laying on heat).   Psychiatric/Behavioral:  The patient is nervous/anxious.        Objective   /86   Pulse 61   Ht 4' 9\" (1.448 m)   Wt 60.7 kg (133 lb 12.8 oz)   SpO2 97%   BMI 28.95 kg/m²          Physical Exam  Vitals reviewed.   Constitutional:       General: She is not in acute distress.     " Appearance: Normal appearance.   HENT:      Head: Normocephalic.     Eyes:      General: No scleral icterus.      Cardiovascular:      Rate and Rhythm: Normal rate and regular rhythm.   Pulmonary:      Effort: Pulmonary effort is normal. No respiratory distress.      Breath sounds: Normal breath sounds.     Musculoskeletal:      Cervical back: Normal range of motion.      Right lower leg: No edema.      Left lower leg: No edema.   Lymphadenopathy:      Cervical: No cervical adenopathy.     Skin:     General: Skin is warm and dry.     Neurological:      General: No focal deficit present.      Mental Status: She is alert and oriented to person, place, and time.     Psychiatric:         Mood and Affect: Mood is anxious.         Speech: Speech normal.         Behavior: Behavior normal.         Thought Content: Thought content normal.         Cognition and Memory: Cognition and memory normal.         Judgment: Judgment normal.         Administrative Statements   I have spent a total time of 15 minutes in caring for this patient on the day of the visit/encounter including Risks and benefits of tx options, Instructions for management, Patient and family education, Impressions, Counseling / Coordination of care, Documenting in the medical record, Reviewing/placing orders in the medical record (including tests, medications, and/or procedures), and Obtaining or reviewing history  .

## 2025-06-09 NOTE — ASSESSMENT & PLAN NOTE
Stable w/consistent need for lorazepam at bedtime  Advise her refill was authorized on 6/6 and should be ready for  at pharmacy  Reviewed controlled substance rx regulations

## 2025-06-11 LAB
BACTERIA UR CULT: NORMAL
Lab: NORMAL

## 2025-06-12 ENCOUNTER — RESULTS FOLLOW-UP (OUTPATIENT)
Dept: FAMILY MEDICINE CLINIC | Facility: HOSPITAL | Age: OVER 89
End: 2025-06-12

## 2025-06-13 NOTE — TELEPHONE ENCOUNTER
----- Message from CYRUS Kapadia sent at 6/13/2025  7:41 AM EDT -----  Please call and relay my [employer] message  ----- Message -----  From: Mariah [facility] Lab Results In  Sent: 6/11/2025   4:06 PM EDT  To: CYRUS Kapadia

## 2025-07-07 DIAGNOSIS — F51.05 INSOMNIA DUE TO ANXIETY AND FEAR: ICD-10-CM

## 2025-07-07 DIAGNOSIS — F40.9 INSOMNIA DUE TO ANXIETY AND FEAR: ICD-10-CM

## 2025-07-07 NOTE — TELEPHONE ENCOUNTER
Reason for call:   [x] Refill   [] Prior Auth  [] Other:     Office:   [x] PCP/Provider - Thrush  [] Specialty/Provider -     Medication: Lorazepam 0.5mg    Dose/Frequency: 1 tab hs     Quantity: 30    Pharmacy: [facility] - [place] [address] [facility]. [phone]     Local Pharmacy   Does the patient have enough for 3 days?   [x] Yes   [] No - Send as HP to POD

## 2025-07-09 RX ORDER — LORAZEPAM 0.5 MG/1
0.5 TABLET ORAL
Qty: 30 TABLET | Refills: 0 | Status: SHIPPED | OUTPATIENT
Start: 2025-07-09